# Patient Record
Sex: FEMALE | Race: WHITE | NOT HISPANIC OR LATINO | Employment: OTHER | URBAN - METROPOLITAN AREA
[De-identification: names, ages, dates, MRNs, and addresses within clinical notes are randomized per-mention and may not be internally consistent; named-entity substitution may affect disease eponyms.]

---

## 2018-08-01 LAB — HBA1C MFR BLD HPLC: 6.5 %

## 2019-01-22 ENCOUNTER — OFFICE VISIT (OUTPATIENT)
Dept: FAMILY MEDICINE CLINIC | Facility: OTHER | Age: 69
End: 2019-01-22
Payer: COMMERCIAL

## 2019-01-22 VITALS
TEMPERATURE: 99.2 F | OXYGEN SATURATION: 97 % | DIASTOLIC BLOOD PRESSURE: 82 MMHG | BODY MASS INDEX: 42.92 KG/M2 | HEIGHT: 63 IN | HEART RATE: 75 BPM | SYSTOLIC BLOOD PRESSURE: 134 MMHG | WEIGHT: 242.25 LBS

## 2019-01-22 DIAGNOSIS — E11.59 TYPE 2 DIABETES MELLITUS WITH OTHER CIRCULATORY COMPLICATION, WITHOUT LONG-TERM CURRENT USE OF INSULIN (HCC): Primary | ICD-10-CM

## 2019-01-22 DIAGNOSIS — E78.2 MIXED HYPERLIPIDEMIA: ICD-10-CM

## 2019-01-22 DIAGNOSIS — E55.9 VITAMIN D DEFICIENCY: ICD-10-CM

## 2019-01-22 DIAGNOSIS — I10 ESSENTIAL HYPERTENSION: ICD-10-CM

## 2019-01-22 DIAGNOSIS — Z11.59 NEED FOR HEPATITIS C SCREENING TEST: ICD-10-CM

## 2019-01-22 DIAGNOSIS — E03.9 HYPOTHYROIDISM (ACQUIRED): ICD-10-CM

## 2019-01-22 PROCEDURE — 3725F SCREEN DEPRESSION PERFORMED: CPT | Performed by: FAMILY MEDICINE

## 2019-01-22 PROCEDURE — 99204 OFFICE O/P NEW MOD 45 MIN: CPT | Performed by: FAMILY MEDICINE

## 2019-01-22 RX ORDER — PNEUMOCOCCAL 13-VALENT CONJUGATE VACCINE 2.2; 2.2; 2.2; 2.2; 2.2; 4.4; 2.2; 2.2; 2.2; 2.2; 2.2; 2.2; 2.2 UG/.5ML; UG/.5ML; UG/.5ML; UG/.5ML; UG/.5ML; UG/.5ML; UG/.5ML; UG/.5ML; UG/.5ML; UG/.5ML; UG/.5ML; UG/.5ML; UG/.5ML
INJECTION, SUSPENSION INTRAMUSCULAR
Refills: 0 | COMMUNITY
Start: 2018-11-10 | End: 2019-01-22

## 2019-01-22 RX ORDER — PRAMIPEXOLE DIHYDROCHLORIDE 0.25 MG/1
0.25 TABLET ORAL DAILY
COMMUNITY
Start: 2018-12-17 | End: 2020-12-07

## 2019-01-22 RX ORDER — BIOTIN 1 MG
1000 TABLET ORAL
COMMUNITY
Start: 2014-09-09 | End: 2020-12-10 | Stop reason: SDUPTHER

## 2019-01-22 RX ORDER — ZOSTER VACCINE RECOMBINANT, ADJUVANTED 50 MCG/0.5
KIT INTRAMUSCULAR
Refills: 0 | COMMUNITY
Start: 2018-11-10 | End: 2019-01-22

## 2019-01-22 RX ORDER — ASPIRIN 81 MG/1
81 TABLET ORAL DAILY
COMMUNITY

## 2019-01-22 RX ORDER — CANAGLIFLOZIN 300 MG/1
300 TABLET, FILM COATED ORAL DAILY
COMMUNITY
Start: 2018-12-17 | End: 2020-09-11

## 2019-01-22 RX ORDER — POTASSIUM CHLORIDE 750 MG/1
TABLET, FILM COATED, EXTENDED RELEASE ORAL
COMMUNITY
Start: 2018-11-29 | End: 2019-06-06 | Stop reason: SDUPTHER

## 2019-01-22 RX ORDER — ATORVASTATIN CALCIUM 80 MG/1
TABLET, FILM COATED ORAL
COMMUNITY
Start: 2018-12-21 | End: 2019-06-06 | Stop reason: SDUPTHER

## 2019-01-22 RX ORDER — DIPHENOXYLATE HYDROCHLORIDE AND ATROPINE SULFATE 2.5; .025 MG/1; MG/1
1 TABLET ORAL DAILY
COMMUNITY
End: 2022-01-27

## 2019-01-22 RX ORDER — RAMIPRIL 2.5 MG/1
2.5 CAPSULE ORAL DAILY
COMMUNITY
Start: 2018-11-15 | End: 2019-12-13 | Stop reason: SDUPTHER

## 2019-01-22 RX ORDER — LEVOTHYROXINE SODIUM 112 UG/1
TABLET ORAL
Refills: 0 | COMMUNITY
Start: 2018-11-24 | End: 2019-06-06 | Stop reason: SDUPTHER

## 2019-01-22 RX ORDER — LANCETS 33 GAUGE
EACH MISCELLANEOUS
Refills: 3 | COMMUNITY
Start: 2019-01-17 | End: 2019-05-02 | Stop reason: SDUPTHER

## 2019-01-22 RX ORDER — CETIRIZINE HYDROCHLORIDE 10 MG/1
10 TABLET ORAL DAILY
COMMUNITY
End: 2020-01-07 | Stop reason: ALTCHOICE

## 2019-01-22 RX ORDER — ASCORBIC ACID 500 MG
500 TABLET ORAL DAILY
COMMUNITY
End: 2022-01-27

## 2019-01-22 RX ORDER — NEBIVOLOL 5 MG/1
2.5 TABLET ORAL DAILY
COMMUNITY
Start: 2015-09-11 | End: 2020-08-12

## 2019-01-22 RX ORDER — FLUTICASONE PROPIONATE 50 MCG
1 SPRAY, SUSPENSION (ML) NASAL DAILY
COMMUNITY

## 2019-01-24 ENCOUNTER — APPOINTMENT (OUTPATIENT)
Dept: LAB | Facility: CLINIC | Age: 69
End: 2019-01-24
Payer: COMMERCIAL

## 2019-01-24 DIAGNOSIS — E78.2 MIXED HYPERLIPIDEMIA: ICD-10-CM

## 2019-01-24 DIAGNOSIS — I10 ESSENTIAL HYPERTENSION: ICD-10-CM

## 2019-01-24 DIAGNOSIS — E55.9 VITAMIN D DEFICIENCY: ICD-10-CM

## 2019-01-24 DIAGNOSIS — Z11.59 NEED FOR HEPATITIS C SCREENING TEST: ICD-10-CM

## 2019-01-24 DIAGNOSIS — E11.59 TYPE 2 DIABETES MELLITUS WITH OTHER CIRCULATORY COMPLICATION, WITHOUT LONG-TERM CURRENT USE OF INSULIN (HCC): ICD-10-CM

## 2019-01-24 DIAGNOSIS — E03.9 HYPOTHYROIDISM (ACQUIRED): ICD-10-CM

## 2019-01-24 LAB
25(OH)D3 SERPL-MCNC: 42.4 NG/ML (ref 30–100)
ALBUMIN SERPL BCP-MCNC: 3.5 G/DL (ref 3.5–5)
ALP SERPL-CCNC: 86 U/L (ref 46–116)
ALT SERPL W P-5'-P-CCNC: 37 U/L (ref 12–78)
ANION GAP SERPL CALCULATED.3IONS-SCNC: 6 MMOL/L (ref 4–13)
AST SERPL W P-5'-P-CCNC: 22 U/L (ref 5–45)
BILIRUB SERPL-MCNC: 0.6 MG/DL (ref 0.2–1)
BUN SERPL-MCNC: 20 MG/DL (ref 5–25)
CALCIUM SERPL-MCNC: 8.9 MG/DL (ref 8.3–10.1)
CHLORIDE SERPL-SCNC: 107 MMOL/L (ref 100–108)
CHOLEST SERPL-MCNC: 149 MG/DL (ref 50–200)
CO2 SERPL-SCNC: 26 MMOL/L (ref 21–32)
CREAT SERPL-MCNC: 0.75 MG/DL (ref 0.6–1.3)
CREAT UR-MCNC: 105 MG/DL
ERYTHROCYTE [DISTWIDTH] IN BLOOD BY AUTOMATED COUNT: 14.6 % (ref 11.6–15.1)
EST. AVERAGE GLUCOSE BLD GHB EST-MCNC: 157 MG/DL
GFR SERPL CREATININE-BSD FRML MDRD: 83 ML/MIN/1.73SQ M
GLUCOSE P FAST SERPL-MCNC: 129 MG/DL (ref 65–99)
HBA1C MFR BLD: 7.1 % (ref 4.2–6.3)
HCT VFR BLD AUTO: 46.7 % (ref 34.8–46.1)
HCV AB SER QL: NORMAL
HDLC SERPL-MCNC: 51 MG/DL (ref 40–60)
HGB BLD-MCNC: 15.2 G/DL (ref 11.5–15.4)
LDLC SERPL CALC-MCNC: 69 MG/DL (ref 0–100)
MCH RBC QN AUTO: 30.9 PG (ref 26.8–34.3)
MCHC RBC AUTO-ENTMCNC: 32.5 G/DL (ref 31.4–37.4)
MCV RBC AUTO: 95 FL (ref 82–98)
MICROALBUMIN UR-MCNC: 7.9 MG/L (ref 0–20)
MICROALBUMIN/CREAT 24H UR: 8 MG/G CREATININE (ref 0–30)
NONHDLC SERPL-MCNC: 98 MG/DL
PLATELET # BLD AUTO: 252 THOUSANDS/UL (ref 149–390)
PMV BLD AUTO: 11.1 FL (ref 8.9–12.7)
POTASSIUM SERPL-SCNC: 4.3 MMOL/L (ref 3.5–5.3)
PROT SERPL-MCNC: 7.2 G/DL (ref 6.4–8.2)
RBC # BLD AUTO: 4.92 MILLION/UL (ref 3.81–5.12)
SODIUM SERPL-SCNC: 139 MMOL/L (ref 136–145)
TRIGL SERPL-MCNC: 145 MG/DL
TSH SERPL DL<=0.05 MIU/L-ACNC: 0.81 UIU/ML (ref 0.36–3.74)
WBC # BLD AUTO: 6.72 THOUSAND/UL (ref 4.31–10.16)

## 2019-01-24 PROCEDURE — 82043 UR ALBUMIN QUANTITATIVE: CPT | Performed by: FAMILY MEDICINE

## 2019-01-24 PROCEDURE — 82570 ASSAY OF URINE CREATININE: CPT | Performed by: FAMILY MEDICINE

## 2019-01-24 PROCEDURE — 86803 HEPATITIS C AB TEST: CPT

## 2019-01-24 PROCEDURE — 85027 COMPLETE CBC AUTOMATED: CPT

## 2019-01-24 PROCEDURE — 80061 LIPID PANEL: CPT

## 2019-01-24 PROCEDURE — 36415 COLL VENOUS BLD VENIPUNCTURE: CPT

## 2019-01-24 PROCEDURE — 84443 ASSAY THYROID STIM HORMONE: CPT

## 2019-01-24 PROCEDURE — 3061F NEG MICROALBUMINURIA REV: CPT | Performed by: FAMILY MEDICINE

## 2019-01-24 PROCEDURE — 80053 COMPREHEN METABOLIC PANEL: CPT

## 2019-01-24 PROCEDURE — 83036 HEMOGLOBIN GLYCOSYLATED A1C: CPT

## 2019-01-24 PROCEDURE — 82306 VITAMIN D 25 HYDROXY: CPT

## 2019-01-29 ENCOUNTER — TELEPHONE (OUTPATIENT)
Dept: FAMILY MEDICINE CLINIC | Facility: OTHER | Age: 69
End: 2019-01-29

## 2019-01-29 NOTE — TELEPHONE ENCOUNTER
Left message on machine to call back    ----- Message from Estee Cabrera DO sent at 1/29/2019  8:21 AM EST -----  Good morning, Deidre Peñaloza! I have reviewed your recent labs (my apologies for the delay)  Everything looks good with the exception of a slightly elevated fasting glucose and A1c  Please continue your current medications and make every effort to follow a healthy diet and regular exercise routine  We will review this information together at your next visit  Please reach out if you have any questions in the meantime      Take care,  Dr Vilma Morgan

## 2019-01-29 NOTE — PROGRESS NOTES
Subjective:      Patient ID: Babita Tavarez is a 80 y o  female  Diabetes   She presents for her follow-up diabetic visit  She has type 2 diabetes mellitus  Her disease course has been stable  There are no hypoglycemic associated symptoms  Pertinent negatives for hypoglycemia include no dizziness, headaches, nervousness/anxiousness, sweats or tremors  Pertinent negatives for diabetes include no blurred vision, no chest pain, no fatigue, no foot paresthesias, no foot ulcerations, no polydipsia, no polyphagia, no polyuria, no visual change, no weakness and no weight loss  There are no hypoglycemic complications  There are no diabetic complications  Pertinent negatives for diabetic complications include no CVA, PVD or retinopathy  Risk factors for coronary artery disease include diabetes mellitus, dyslipidemia, family history, hypertension, obesity, post-menopausal, sedentary lifestyle and stress  Current diabetic treatment includes diet  She is compliant with treatment most of the time  Her weight is stable  She is following a generally healthy diet  Meal planning includes avoidance of concentrated sweets and carbohydrate counting  She has had a previous visit with a dietitian  She participates in exercise intermittently  An ACE inhibitor/angiotensin II receptor blocker is being taken  She does not see a podiatrist Eye exam is not current  Hypertension   This is a chronic problem  The current episode started more than 1 year ago  The problem has been gradually improving since onset  The problem is controlled  Pertinent negatives include no anxiety, blurred vision, chest pain, headaches, malaise/fatigue, neck pain, orthopnea, palpitations, peripheral edema, PND, shortness of breath or sweats  There are no associated agents to hypertension  Risk factors for coronary artery disease include diabetes mellitus, dyslipidemia, family history, obesity, post-menopausal state, sedentary lifestyle and stress   Past treatments include ACE inhibitors, beta blockers and lifestyle changes  The current treatment provides moderate improvement  Compliance problems include diet, exercise and psychosocial issues  There is no history of angina, kidney disease, CAD/MI, CVA, heart failure, left ventricular hypertrophy, PVD or retinopathy  Identifiable causes of hypertension include a thyroid problem  There is no history of chronic renal disease or a hypertension causing med  Hyperlipidemia   This is a chronic problem  The current episode started more than 1 year ago  The problem is controlled  Exacerbating diseases include diabetes, hypothyroidism and obesity  She has no history of chronic renal disease, liver disease or nephrotic syndrome  There are no known factors aggravating her hyperlipidemia  Pertinent negatives include no chest pain, focal sensory loss, focal weakness, leg pain, myalgias or shortness of breath  Current antihyperlipidemic treatment includes statins  The current treatment provides moderate improvement of lipids  Compliance problems include adherence to diet, adherence to exercise and psychosocial issues  Risk factors for coronary artery disease include post-menopausal, a sedentary lifestyle, stress, obesity, hypertension, diabetes mellitus, dyslipidemia and family history  Thyroid Problem   Presents for initial visit  Patient reports no anxiety, cold intolerance, constipation, diarrhea, dry skin, fatigue, heat intolerance, leg swelling, nail problem, palpitations, tremors, visual change or weight loss  Treatments tried: Levothyroxine 112 mcg  The treatment provided moderate relief  Her past medical history is significant for diabetes, hyperlipidemia and obesity  There is no history of atrial fibrillation, dementia, heart failure, neuropathy or osteopenia  There are no known risk factors         The following portions of the patient's history were reviewed and updated as appropriate: allergies, current medications, past family history, past medical history, past social history, past surgical history and problem list       Current Outpatient Prescriptions:     ascorbic acid (VITAMIN C) 500 mg tablet, Take 500 mg by mouth daily, Disp: , Rfl:     aspirin (ECOTRIN LOW STRENGTH) 81 mg EC tablet, Take 81 mg by mouth daily, Disp: , Rfl:     atorvastatin (LIPITOR) 80 mg tablet, , Disp: , Rfl:     Biotin 5000 MCG CAPS, Take by mouth, Disp: , Rfl:     cetirizine (ZyrTEC) 10 mg tablet, Take 10 mg by mouth daily, Disp: , Rfl:     Cholecalciferol (VITAMIN D3) 1000 units CAPS, Take 1,000 Units by mouth, Disp: , Rfl:     famotidine-calcium carbonate-magnesium hydroxide (PEPCID COMPLETE) -165 MG CHEW, Chew 1 tablet daily as needed for heartburn, Disp: , Rfl:     fluticasone (FLONASE) 50 mcg/act nasal spray, 1 spray into each nostril daily, Disp: , Rfl:     INVOKANA 300 MG TABS, , Disp: , Rfl:     levothyroxine 112 mcg tablet, TK 1 T PO QD, Disp: , Rfl: 0    Magnesium Cl-Calcium Carbonate (SLOW-MAG PO), Take by mouth, Disp: , Rfl:     multivitamin (THERAGRAN) TABS, Take 1 tablet by mouth daily, Disp: , Rfl:     nebivolol (BYSTOLIC) 5 mg tablet, , Disp: , Rfl:     Omega-3 Fatty Acids (FISH OIL) 1200 MG CPDR, 1200mg two  tablets, Disp: , Rfl:     ONE TOUCH ULTRA TEST test strip, TEST ONCE D UTD, Disp: , Rfl: 3    ONETOUCH DELICA LANCETS 65J MISC, USE ONCE D OR UTD, Disp: , Rfl: 3    potassium chloride (K-DUR) 10 mEq tablet, , Disp: , Rfl:     pramipexole (MIRAPEX) 0 25 mg tablet, , Disp: , Rfl:     ramipril (ALTACE) 2 5 mg capsule, , Disp: , Rfl:       Review of Systems   Constitutional: Negative for activity change, fatigue, fever, malaise/fatigue and weight loss  HENT: Negative for congestion, ear pain, sinus pain and sore throat  Eyes: Negative for blurred vision, pain and itching  Respiratory: Negative for cough and shortness of breath      Cardiovascular: Negative for chest pain, palpitations, orthopnea and PND  Gastrointestinal: Negative for abdominal pain, constipation, diarrhea, nausea and vomiting  Endocrine: Negative for cold intolerance, heat intolerance, polydipsia, polyphagia and polyuria  Genitourinary: Negative for difficulty urinating, dysuria, frequency and urgency  Musculoskeletal: Negative for arthralgias, back pain, gait problem, myalgias and neck pain  Skin: Negative for color change and rash  Neurological: Negative for dizziness, tremors, focal weakness, syncope, weakness and headaches  Hematological: Negative for adenopathy  Psychiatric/Behavioral: Negative for behavioral problems, dysphoric mood and sleep disturbance  The patient is not nervous/anxious  Objective:      /82 (BP Location: Right arm, Patient Position: Sitting, Cuff Size: Large)   Pulse 75   Temp 99 2 °F (37 3 °C) (Tympanic)   Ht 5' 3" (1 6 m)   Wt 110 kg (242 lb 4 oz)   SpO2 97%   BMI 42 91 kg/m²          Physical Exam   Constitutional: She is oriented to person, place, and time  She appears well-developed and well-nourished  No distress  HENT:   Head: Normocephalic and atraumatic  Right Ear: External ear normal    Left Ear: External ear normal    Nose: Nose normal    Mouth/Throat: Oropharynx is clear and moist    Eyes: Pupils are equal, round, and reactive to light  Conjunctivae and EOM are normal  No scleral icterus  Neck: Normal range of motion  Neck supple  No thyromegaly present  Cardiovascular: Normal rate, regular rhythm and normal heart sounds  No murmur heard  Pulmonary/Chest: Effort normal and breath sounds normal  No respiratory distress  She has no wheezes  Abdominal: Soft  Bowel sounds are normal  She exhibits no distension  There is no tenderness  Musculoskeletal: Normal range of motion  She exhibits no edema, tenderness or deformity  Lymphadenopathy:     She has no cervical adenopathy  Neurological: She is alert and oriented to person, place, and time  No cranial nerve deficit  Coordination normal    Skin: Skin is warm and dry  No rash noted  No erythema  Psychiatric: She has a normal mood and affect  Her behavior is normal    Vitals reviewed  Assessment/Plan:   Diagnoses and all orders for this visit:    Type 2 diabetes mellitus with other circulatory complication, without long-term current use of insulin (HCC)  -     Comprehensive metabolic panel; Future  -     Hemoglobin A1C; Future  -     Microalbumin / creatinine urine ratio  -     A1c goal 6-7%    Hypothyroidism (acquired)  -     TSH, 3rd generation with Free T4 reflex; Future    Essential hypertension  -     Comprehensive metabolic panel; Future  -     Lipid panel; Future  -     CBC; Future  -     BP goal less than 140/90, preferably less than 130/80    Mixed hyperlipidemia  -     Lipid panel; Future  -     CBC; Future  -     LDL goal less than 100    Vitamin D deficiency  -     Vitamin D 25 hydroxy; Future    Need for hepatitis C screening test  -     Hepatitis C antibody; Future    Other orders  -     levothyroxine 112 mcg tablet; TK 1 T PO QD  -     INVOKANA 300 MG TABS;   -     nebivolol (BYSTOLIC) 5 mg tablet;   -     ramipril (ALTACE) 2 5 mg capsule;   -     potassium chloride (K-DUR) 10 mEq tablet;   -     pramipexole (MIRAPEX) 0 25 mg tablet;   -     atorvastatin (LIPITOR) 80 mg tablet;   -     ONETOUCH DELICA LANCETS 52I MISC; USE ONCE D OR UTD  -     ONE TOUCH ULTRA TEST test strip; TEST ONCE D UTD  -     Cholecalciferol (VITAMIN D3) 1000 units CAPS; Take 1,000 Units by mouth  -     Discontinue: PREVNAR 13; ADM 0 5ML IM UTD  -     Discontinue: SHINGRIX 50 MCG/0 5ML SUSR; ADM 0 5ML IM UTD  -     Omega-3 Fatty Acids (FISH OIL) 1200 MG CPDR; 1200mg two  tablets  -     aspirin (ECOTRIN LOW STRENGTH) 81 mg EC tablet; Take 81 mg by mouth daily  -     multivitamin (THERAGRAN) TABS; Take 1 tablet by mouth daily  -     Biotin 5000 MCG CAPS;  Take by mouth  -     ascorbic acid (VITAMIN C) 500 mg tablet; Take 500 mg by mouth daily  -     famotidine-calcium carbonate-magnesium hydroxide (PEPCID COMPLETE) -165 MG CHEW; Chew 1 tablet daily as needed for heartburn  -     fluticasone (FLONASE) 50 mcg/act nasal spray; 1 spray into each nostril daily  -     Magnesium Cl-Calcium Carbonate (SLOW-MAG PO); Take by mouth  -     cetirizine (ZyrTEC) 10 mg tablet; Take 10 mg by mouth daily          Return in about 6 weeks (around 3/5/2019) for Recheck DM, HTN  The patient indicates understanding of these issues and agrees with the plan            Russ Morales,

## 2019-03-04 ENCOUNTER — OFFICE VISIT (OUTPATIENT)
Dept: OBGYN CLINIC | Facility: CLINIC | Age: 69
End: 2019-03-04
Payer: COMMERCIAL

## 2019-03-04 VITALS
HEIGHT: 63 IN | WEIGHT: 242 LBS | SYSTOLIC BLOOD PRESSURE: 122 MMHG | DIASTOLIC BLOOD PRESSURE: 80 MMHG | BODY MASS INDEX: 42.88 KG/M2

## 2019-03-04 DIAGNOSIS — Z01.419 ENCOUNTER FOR GYNECOLOGICAL EXAMINATION WITHOUT ABNORMAL FINDING: Primary | ICD-10-CM

## 2019-03-04 DIAGNOSIS — Z12.39 SCREENING FOR MALIGNANT NEOPLASM OF BREAST: ICD-10-CM

## 2019-03-04 PROCEDURE — 99387 INIT PM E/M NEW PAT 65+ YRS: CPT | Performed by: OBSTETRICS & GYNECOLOGY

## 2019-03-04 RX ORDER — IBUPROFEN 200 MG
200 TABLET ORAL
COMMUNITY
Start: 2009-10-15 | End: 2019-09-09

## 2019-03-04 NOTE — PROGRESS NOTES
Vannessa Granado is a 80 y o   female who presents for annual well woman exam  Patient reports no bleeding, spotting, or discharge  Patient reports No hot flashes/night sweats or minimal, not sexually active, No vaginal dryness, sleeping well  Discussed some detrusor instability symptoms with some urgency and urge incontinence, drinks a lot of water, glucose mostly controlled  Discussed watch caffiene and try to practice kegels for feedback to bladder  Patient to call if desire PT additional intervention  Current contraception: post menopausal status  History of abnormal Pap smear: no  Family history of uterine or ovarian cancer: no  Regular self breast exam: yes  History of abnormal mammogram: no  Family history of breast cancer: no    Menstrual History:  OB History    None        Menarche age: 16  Patient's last menstrual period was 2004  The following portions of the patient's history were reviewed and updated as appropriate: allergies, current medications, past family history, past medical history, past social history, past surgical history and problem list   Past Medical History:   Diagnosis Date    Anemia     Anxiety     Chronic constipation     Diabetes mellitus ([address] )     GERD (gastroesophageal reflux disease)     Hyperlipidemia     Hypertension     Hypothyroidism     Obesity     Restless legs     Vitamin D deficiency      Past Surgical History:   Procedure Laterality Date    BREAST LUMPECTOMY Right     CHOLECYSTECTOMY      CORONARY ANGIOPLASTY      Dr Jazmin Reddy     OB History    None         Review of Systems  Review of Systems   Constitutional: Positive for unexpected weight change  Negative for chills, fatigue and fever  HENT: Negative for dental problem, sinus pressure and sinus pain  Eyes: Positive for visual disturbance  Respiratory: Negative for cough, shortness of breath and wheezing      Cardiovascular: Negative for chest pain and leg swelling  Gastrointestinal: Negative for constipation, diarrhea, nausea and vomiting  Genitourinary: Negative for menstrual problem, pelvic pain and urgency  Musculoskeletal: Positive for arthralgias  Negative for back pain  Allergic/Immunologic: Negative for environmental allergies  Neurological: Positive for headaches  Negative for dizziness  Objective      LMP 2004   Vitals:    19 1044   BP: 122/80   BP Location: Left arm   Patient Position: Sitting   Cuff Size: Large   Weight: 110 kg (242 lb)   Height: 5' 3" (1 6 m)       General:   alert and oriented, in no acute distress   Heart: regular rate and rhythm, S1, S2 normal, no murmur, click, rub or gallop   Lungs: clear to auscultation bilaterally   Abdomen: soft, non-tender, without masses or organomegaly   Vulva: normal   Vagina: normal mucosa, atrophic   Cervix: no bleeding following Pap, no cervical motion tenderness and no lesions   Uterus: normal size, mobile, non-tender   Adnexa: normal adnexa and no mass, fullness, tenderness   Breast inspection negative, no nipple discharge or bleeding, no masses or nodularity palpable, some glandularity mostly fatty replaced  Rectal deferred, normal colonscopy   Pupils equal round reactive to light and accommodation  Throat clear no lesions or findings  Thyroid normal no masses or nodules       Assessment   80year-old  here for new annual exam   Patient had all history of normal Paps and mammograms and her prior gyn had told her she did not require further Paps  Reviewed with patient as we do not have record that we would do a Pap and if normal would likely not need any further Pap  Discussed mild urgency and urge incontinence  Plan   Thin prep with Co testing performed, patient had normal mammogram 2018  Ordered mammogram for next September return in 1 year or sooner as needed

## 2019-03-06 ENCOUNTER — OFFICE VISIT (OUTPATIENT)
Dept: FAMILY MEDICINE CLINIC | Facility: OTHER | Age: 69
End: 2019-03-06

## 2019-03-06 VITALS
HEART RATE: 66 BPM | BODY MASS INDEX: 42.59 KG/M2 | TEMPERATURE: 98.2 F | HEIGHT: 63 IN | OXYGEN SATURATION: 97 % | DIASTOLIC BLOOD PRESSURE: 80 MMHG | SYSTOLIC BLOOD PRESSURE: 132 MMHG | WEIGHT: 240.38 LBS

## 2019-03-06 DIAGNOSIS — Z12.11 SCREENING FOR COLON CANCER: ICD-10-CM

## 2019-03-06 DIAGNOSIS — I10 ESSENTIAL HYPERTENSION: ICD-10-CM

## 2019-03-06 DIAGNOSIS — G44.209 ACUTE NON INTRACTABLE TENSION-TYPE HEADACHE: Primary | ICD-10-CM

## 2019-03-06 PROCEDURE — 3079F DIAST BP 80-89 MM HG: CPT | Performed by: FAMILY MEDICINE

## 2019-03-06 PROCEDURE — 3008F BODY MASS INDEX DOCD: CPT | Performed by: FAMILY MEDICINE

## 2019-03-06 PROCEDURE — 99214 OFFICE O/P EST MOD 30 MIN: CPT | Performed by: FAMILY MEDICINE

## 2019-03-06 PROCEDURE — 1160F RVW MEDS BY RX/DR IN RCRD: CPT | Performed by: FAMILY MEDICINE

## 2019-03-06 PROCEDURE — 3075F SYST BP GE 130 - 139MM HG: CPT | Performed by: FAMILY MEDICINE

## 2019-03-06 PROCEDURE — 1036F TOBACCO NON-USER: CPT | Performed by: FAMILY MEDICINE

## 2019-03-06 RX ORDER — CYCLOBENZAPRINE HCL 5 MG
5 TABLET ORAL
Qty: 7 TABLET | Refills: 0 | Status: SHIPPED | OUTPATIENT
Start: 2019-03-06 | End: 2019-09-09

## 2019-03-06 NOTE — PROGRESS NOTES
Subjective:      Patient ID: Brenda Benítez is a 80 y o  female  Headache    This is a recurrent problem  The current episode started 1 to 4 weeks ago  The problem occurs intermittently  The problem has been waxing and waning  The pain is located in the occipital region  The pain radiates to the right shoulder and right neck  The pain quality is similar to prior headaches  The quality of the pain is described as pulsating and throbbing  The pain is moderate  Associated symptoms include muscle aches  Pertinent negatives include no abdominal pain, abnormal behavior, anorexia, back pain, blurred vision, coughing, dizziness, drainage, ear pain, eye pain, eye redness, eye watering, facial sweating, fever, hearing loss, insomnia, loss of balance, nausea, numbness, phonophobia, photophobia, rhinorrhea, scalp tenderness, seizures, sinus pressure, sore throat, swollen glands, tingling, tinnitus, visual change, vomiting, weakness or weight loss  The symptoms are aggravated by unknown  She has tried NSAIDs for the symptoms  The treatment provided mild relief  Her past medical history is significant for hypertension, migraine headaches and obesity  There is no history of recent head traumas or sinus disease         The following portions of the patient's history were reviewed and updated as appropriate: allergies, current medications, past family history, past medical history, past social history, past surgical history and problem list       Current Outpatient Medications:     ascorbic acid (VITAMIN C) 500 mg tablet, Take 500 mg by mouth daily, Disp: , Rfl:     aspirin (ECOTRIN LOW STRENGTH) 81 mg EC tablet, Take 81 mg by mouth daily, Disp: , Rfl:     atorvastatin (LIPITOR) 80 mg tablet, , Disp: , Rfl:     Biotin 5000 MCG CAPS, Take by mouth, Disp: , Rfl:     cetirizine (ZyrTEC) 10 mg tablet, Take 10 mg by mouth daily, Disp: , Rfl:     Cholecalciferol (VITAMIN D3) 1000 units CAPS, Take 1,000 Units by mouth, Disp: , Rfl:     Exenatide ER 2 MG SRER, Inject 2 mg under the skin, Disp: , Rfl:     famotidine-calcium carbonate-magnesium hydroxide (PEPCID COMPLETE) -165 MG CHEW, Chew 1 tablet daily as needed for heartburn, Disp: , Rfl:     fluticasone (FLONASE) 50 mcg/act nasal spray, 1 spray into each nostril daily, Disp: , Rfl:     ibuprofen (MOTRIN) 200 mg tablet, Take 200 mg by mouth, Disp: , Rfl:     INVOKANA 300 MG TABS, , Disp: , Rfl:     levothyroxine 112 mcg tablet, TK 1 T PO QD, Disp: , Rfl: 0    Magnesium Cl-Calcium Carbonate (SLOW-MAG PO), Take by mouth, Disp: , Rfl:     multivitamin (THERAGRAN) TABS, Take 1 tablet by mouth daily, Disp: , Rfl:     nebivolol (BYSTOLIC) 5 mg tablet, , Disp: , Rfl:     Omega-3 Fatty Acids (FISH OIL) 1200 MG CPDR, 1200mg two  tablets, Disp: , Rfl:     ONE TOUCH ULTRA TEST test strip, TEST ONCE D UTD, Disp: , Rfl: 3    ONETOUCH DELICA LANCETS 97D MISC, USE ONCE D OR UTD, Disp: , Rfl: 3    potassium chloride (K-DUR) 10 mEq tablet, , Disp: , Rfl:     pramipexole (MIRAPEX) 0 25 mg tablet, , Disp: , Rfl:     ramipril (ALTACE) 2 5 mg capsule, , Disp: , Rfl:     cyclobenzaprine (FLEXERIL) 5 mg tablet, Take 1 tablet (5 mg total) by mouth daily at bedtime for 7 days Do not drive while taking, Disp: 7 tablet, Rfl: 0      Review of Systems   Constitutional: Negative for activity change, fatigue, fever and weight loss  HENT: Negative for congestion, ear pain, hearing loss, rhinorrhea, sinus pressure, sinus pain, sore throat and tinnitus  Eyes: Negative for blurred vision, photophobia, pain, redness and itching  Respiratory: Negative for cough, chest tightness and shortness of breath  Cardiovascular: Negative for chest pain and palpitations  Gastrointestinal: Negative for abdominal pain, anorexia, constipation, diarrhea, nausea and vomiting  Endocrine: Negative for cold intolerance and heat intolerance     Genitourinary: Negative for difficulty urinating, dysuria, frequency and urgency  Musculoskeletal: Positive for neck stiffness  Negative for back pain and myalgias  Skin: Negative for color change and rash  Neurological: Positive for headaches  Negative for dizziness, tingling, seizures, syncope, facial asymmetry, speech difficulty, weakness, light-headedness, numbness and loss of balance  Hematological: Negative for adenopathy  Psychiatric/Behavioral: Negative for behavioral problems, dysphoric mood and sleep disturbance  The patient is not nervous/anxious and does not have insomnia  Objective:      /80 (BP Location: Left arm, Patient Position: Sitting, Cuff Size: Large)   Pulse 66   Temp 98 2 °F (36 8 °C) (Tympanic)   Ht 5' 3" (1 6 m)   Wt 109 kg (240 lb 6 oz)   SpO2 97%   BMI 42 58 kg/m²          Physical Exam   Constitutional: She is oriented to person, place, and time  She appears well-developed and well-nourished  No distress  HENT:   Head: Normocephalic and atraumatic  Right Ear: External ear normal    Left Ear: External ear normal    Nose: Nose normal    Mouth/Throat: Oropharynx is clear and moist    Eyes: Pupils are equal, round, and reactive to light  Conjunctivae and EOM are normal  No scleral icterus  Neck: Normal range of motion  Neck supple  No thyromegaly present  Cardiovascular: Normal rate, regular rhythm and normal heart sounds  Pulses are no weak pulses  No murmur heard  Pulses:       Dorsalis pedis pulses are 2+ on the right side, and 2+ on the left side  Posterior tibial pulses are 2+ on the right side, and 2+ on the left side  Pulmonary/Chest: Effort normal and breath sounds normal  No respiratory distress  She has no wheezes  Abdominal: Soft  Bowel sounds are normal  She exhibits no distension  There is no tenderness  Musculoskeletal: Normal range of motion  She exhibits no edema, tenderness or deformity     Feet:   Right Foot:   Skin Integrity: Negative for ulcer, skin breakdown, erythema, warmth, callus or dry skin  Left Foot:   Skin Integrity: Negative for ulcer, skin breakdown, erythema, warmth, callus or dry skin  Lymphadenopathy:     She has no cervical adenopathy  Neurological: She is alert and oriented to person, place, and time  No cranial nerve deficit  Coordination normal    Skin: Skin is warm and dry  No rash noted  No erythema  No pallor  Psychiatric: She has a normal mood and affect  Her behavior is normal        Patient's shoes and socks removed  Right Foot/Ankle   Right Foot Inspection  Skin Exam: skin normal and skin intact no dry skin, no warmth, no callus, no erythema, no maceration, no abnormal color, no pre-ulcer, no ulcer and no callus                          Toe Exam: ROM and strength within normal limits  Sensory   Vibration: intact  Proprioception: intact   Monofilament testing: intact  Vascular  Capillary refills: < 3 seconds  The right DP pulse is 2+  The right PT pulse is 2+  Left Foot/Ankle  Left Foot Inspection  Skin Exam: skin normal and skin intactno dry skin, no warmth, no erythema, no maceration, normal color, no pre-ulcer, no ulcer and no callus                         Toe Exam: ROM and strength within normal limits                   Sensory   Vibration: intact  Proprioception: intact  Monofilament: intact  Vascular  Capillary refills: < 3 seconds  The left DP pulse is 2+  The left PT pulse is 2+  Assign Risk Category:  No deformity present; No loss of protective sensation; No weak pulses       Risk: 0            Assessment/Plan:  Diagnoses and all orders for this visit:    Acute non intractable tension-type headache  -     cyclobenzaprine (FLEXERIL) 5 mg tablet;  Take 1 tablet (5 mg total) by mouth daily at bedtime for 7 days Do not drive while taking  -     Continue PRN Advil/Aleve for sx relief along with heat/ice    Essential hypertension        -     Stable -- home BP readings <130/80 -- will consider stopping Bystolic of BP continues to run low    Screening for colon cancer  -     Ambulatory referral to Gastroenterology; Future          Return in about 6 months (around 9/6/2019) for Recheck HTN; AWV  The patient indicates understanding of these issues and agrees with the plan            Nancy Austin DO

## 2019-03-07 LAB
HPV HR 12 DNA CVX QL NAA+PROBE: NOT DETECTED
HPV16 DNA SPEC QL NAA+PROBE: NOT DETECTED
HPV18 DNA SPEC QL NAA+PROBE: NOT DETECTED
THIN PREP CVX: NORMAL

## 2019-03-21 ENCOUNTER — OFFICE VISIT (OUTPATIENT)
Dept: FAMILY MEDICINE CLINIC | Facility: OTHER | Age: 69
End: 2019-03-21
Payer: COMMERCIAL

## 2019-03-21 VITALS
BODY MASS INDEX: 42.52 KG/M2 | OXYGEN SATURATION: 98 % | HEART RATE: 66 BPM | DIASTOLIC BLOOD PRESSURE: 78 MMHG | SYSTOLIC BLOOD PRESSURE: 118 MMHG | HEIGHT: 63 IN | WEIGHT: 240 LBS | TEMPERATURE: 99.6 F

## 2019-03-21 DIAGNOSIS — B07.0 PLANTAR WART OF LEFT FOOT: Primary | ICD-10-CM

## 2019-03-21 PROCEDURE — 99213 OFFICE O/P EST LOW 20 MIN: CPT | Performed by: FAMILY MEDICINE

## 2019-03-24 NOTE — PROGRESS NOTES
Subjective:      Patient ID: Elton Solis is a 80 y o  female      Chief Complaint   Patient presents with    Foot Pain     left foot pain which started last week, but patient has had a plantars wort        HPI   Pt presents c/o left foot pain  Began "a while ago" but has been worsening over the last week  Lateral aspect of left foot (plantar surface) affected  Noticed a plantar wart there some time ago  Has not tried any OTC remedies  Aggravated by certain pairs of shoes  Denies f/c, n/v/d, CP, SOB        The following portions of the patient's history were reviewed and updated as appropriate: allergies, current medications, past family history, past medical history, past social history, past surgical history and problem list       Current Outpatient Medications:     ascorbic acid (VITAMIN C) 500 mg tablet, Take 500 mg by mouth daily, Disp: , Rfl:     aspirin (ECOTRIN LOW STRENGTH) 81 mg EC tablet, Take 81 mg by mouth daily, Disp: , Rfl:     atorvastatin (LIPITOR) 80 mg tablet, , Disp: , Rfl:     Biotin 5000 MCG CAPS, Take by mouth, Disp: , Rfl:     cetirizine (ZyrTEC) 10 mg tablet, Take 10 mg by mouth daily, Disp: , Rfl:     Cholecalciferol (VITAMIN D3) 1000 units CAPS, Take 1,000 Units by mouth, Disp: , Rfl:     famotidine-calcium carbonate-magnesium hydroxide (PEPCID COMPLETE) -165 MG CHEW, Chew 1 tablet daily as needed for heartburn, Disp: , Rfl:     fluticasone (FLONASE) 50 mcg/act nasal spray, 1 spray into each nostril daily, Disp: , Rfl:     ibuprofen (MOTRIN) 200 mg tablet, Take 200 mg by mouth, Disp: , Rfl:     INVOKANA 300 MG TABS, , Disp: , Rfl:     levothyroxine 112 mcg tablet, TK 1 T PO QD, Disp: , Rfl: 0    Magnesium Cl-Calcium Carbonate (SLOW-MAG PO), Take by mouth, Disp: , Rfl:     multivitamin (THERAGRAN) TABS, Take 1 tablet by mouth daily, Disp: , Rfl:     nebivolol (BYSTOLIC) 5 mg tablet, , Disp: , Rfl:     Omega-3 Fatty Acids (FISH OIL) 1200 MG CPDR, 1200mg two  tablets, Disp: , Rfl:     ONE TOUCH ULTRA TEST test strip, TEST ONCE D UTD, Disp: , Rfl: 3    ONETOUCH DELICA LANCETS 48Y MISC, USE ONCE D OR UTD, Disp: , Rfl: 3    potassium chloride (K-DUR) 10 mEq tablet, , Disp: , Rfl:     pramipexole (MIRAPEX) 0 25 mg tablet, , Disp: , Rfl:     ramipril (ALTACE) 2 5 mg capsule, , Disp: , Rfl:     cyclobenzaprine (FLEXERIL) 5 mg tablet, Take 1 tablet (5 mg total) by mouth daily at bedtime for 7 days Do not drive while taking, Disp: 7 tablet, Rfl: 0    Exenatide ER 2 MG SRER, Inject 2 mg under the skin, Disp: , Rfl:       Review of Systems   Constitutional: Negative for activity change, fatigue and fever  HENT: Negative for congestion, ear pain, sinus pain and sore throat  Eyes: Negative for pain and itching  Respiratory: Negative for cough and shortness of breath  Cardiovascular: Negative for chest pain and palpitations  Gastrointestinal: Negative for abdominal pain, constipation, diarrhea, nausea and vomiting  Endocrine: Negative for cold intolerance and heat intolerance  Genitourinary: Negative for dysuria  Musculoskeletal: Negative for myalgias  Skin: Negative for color change and rash  Left plantar wart per HPI   Neurological: Negative for dizziness, syncope and headaches  Hematological: Negative for adenopathy  Psychiatric/Behavioral: Negative for behavioral problems, dysphoric mood and sleep disturbance  The patient is not nervous/anxious  Objective:      /78 (BP Location: Left arm, Patient Position: Sitting, Cuff Size: Large)   Pulse 66   Temp 99 6 °F (37 6 °C) (Tympanic)   Ht 5' 2 5" (1 588 m)   Wt 109 kg (240 lb)   SpO2 98%   BMI 43 20 kg/m²          Physical Exam   Constitutional: She is oriented to person, place, and time  She appears well-developed and well-nourished  No distress  HENT:   Head: Normocephalic and atraumatic     Right Ear: External ear normal    Left Ear: External ear normal    Nose: Nose normal  Mouth/Throat: Oropharynx is clear and moist    Eyes: Pupils are equal, round, and reactive to light  Conjunctivae and EOM are normal  No scleral icterus  Neck: Normal range of motion  Neck supple  No thyromegaly present  Cardiovascular: Normal rate, regular rhythm and normal heart sounds  No murmur heard  Pulmonary/Chest: Effort normal and breath sounds normal  No respiratory distress  She has no wheezes  Abdominal: Soft  Bowel sounds are normal  She exhibits no distension  There is no tenderness  Musculoskeletal: Normal range of motion  She exhibits no edema, tenderness or deformity  Lymphadenopathy:     She has no cervical adenopathy  Neurological: She is alert and oriented to person, place, and time  No cranial nerve deficit  Coordination normal    Skin: Skin is warm and dry  No rash noted  No erythema  No pallor  4mm plantar wart on left foot (over distal aspect of 5th metatarsal)   Psychiatric: She has a normal mood and affect  Her behavior is normal    Vitals reviewed  Assessment/Plan:   Diagnoses and all orders for this visit:    Plantar wart of left foot        -     Discussed tx options including cryotherapy; Pt is interested in cryo when liquid nitrogen shipment is received  Return for Next scheduled follow up  The patient indicates understanding of these issues and agrees with the plan            Romi Morgan DO

## 2019-03-26 ENCOUNTER — TELEPHONE (OUTPATIENT)
Dept: FAMILY MEDICINE CLINIC | Facility: OTHER | Age: 69
End: 2019-03-26

## 2019-03-26 NOTE — TELEPHONE ENCOUNTER
Please offer Orin Bosworth appt to freeze plantar wart now that we have liquid nitrogen    Thanks!   Mat Badillo, DO

## 2019-04-02 ENCOUNTER — OFFICE VISIT (OUTPATIENT)
Dept: FAMILY MEDICINE CLINIC | Facility: OTHER | Age: 69
End: 2019-04-02
Payer: COMMERCIAL

## 2019-04-02 VITALS
DIASTOLIC BLOOD PRESSURE: 82 MMHG | OXYGEN SATURATION: 98 % | SYSTOLIC BLOOD PRESSURE: 122 MMHG | TEMPERATURE: 98.9 F | BODY MASS INDEX: 43.11 KG/M2 | HEART RATE: 66 BPM | WEIGHT: 243.31 LBS | HEIGHT: 63 IN

## 2019-04-02 DIAGNOSIS — B07.0 PLANTAR WART OF LEFT FOOT: Primary | ICD-10-CM

## 2019-04-02 PROCEDURE — 17000 DESTRUCT PREMALG LESION: CPT | Performed by: FAMILY MEDICINE

## 2019-04-04 DIAGNOSIS — Z12.39 SCREENING FOR MALIGNANT NEOPLASM OF BREAST: ICD-10-CM

## 2019-04-10 ENCOUNTER — OFFICE VISIT (OUTPATIENT)
Dept: FAMILY MEDICINE CLINIC | Facility: OTHER | Age: 69
End: 2019-04-10
Payer: COMMERCIAL

## 2019-04-10 VITALS
SYSTOLIC BLOOD PRESSURE: 122 MMHG | HEART RATE: 66 BPM | DIASTOLIC BLOOD PRESSURE: 70 MMHG | HEIGHT: 63 IN | OXYGEN SATURATION: 95 % | TEMPERATURE: 98.9 F | BODY MASS INDEX: 42.96 KG/M2 | WEIGHT: 242.44 LBS

## 2019-04-10 DIAGNOSIS — M72.2 PLANTAR FASCIITIS OF LEFT FOOT: Primary | ICD-10-CM

## 2019-04-10 PROCEDURE — 3008F BODY MASS INDEX DOCD: CPT | Performed by: FAMILY MEDICINE

## 2019-04-10 PROCEDURE — 99213 OFFICE O/P EST LOW 20 MIN: CPT | Performed by: FAMILY MEDICINE

## 2019-04-10 PROCEDURE — 1036F TOBACCO NON-USER: CPT | Performed by: FAMILY MEDICINE

## 2019-04-10 PROCEDURE — 1160F RVW MEDS BY RX/DR IN RCRD: CPT | Performed by: FAMILY MEDICINE

## 2019-05-02 ENCOUNTER — OFFICE VISIT (OUTPATIENT)
Dept: FAMILY MEDICINE CLINIC | Facility: OTHER | Age: 69
End: 2019-05-02
Payer: COMMERCIAL

## 2019-05-02 VITALS
BODY MASS INDEX: 42.43 KG/M2 | WEIGHT: 239.44 LBS | OXYGEN SATURATION: 98 % | HEIGHT: 63 IN | HEART RATE: 64 BPM | DIASTOLIC BLOOD PRESSURE: 66 MMHG | TEMPERATURE: 99.1 F | SYSTOLIC BLOOD PRESSURE: 104 MMHG

## 2019-05-02 DIAGNOSIS — B07.0 PLANTAR WART, LEFT FOOT: Primary | ICD-10-CM

## 2019-05-02 DIAGNOSIS — E11.59 TYPE 2 DIABETES MELLITUS WITH OTHER CIRCULATORY COMPLICATION, WITHOUT LONG-TERM CURRENT USE OF INSULIN (HCC): ICD-10-CM

## 2019-05-02 PROCEDURE — 99212 OFFICE O/P EST SF 10 MIN: CPT | Performed by: FAMILY MEDICINE

## 2019-05-02 PROCEDURE — 17000 DESTRUCT PREMALG LESION: CPT | Performed by: FAMILY MEDICINE

## 2019-05-02 RX ORDER — LANCETS 33 GAUGE
EACH MISCELLANEOUS DAILY
Qty: 90 EACH | Refills: 3 | Status: SHIPPED | OUTPATIENT
Start: 2019-05-02 | End: 2020-05-04 | Stop reason: SDUPTHER

## 2019-05-22 ENCOUNTER — OFFICE VISIT (OUTPATIENT)
Dept: FAMILY MEDICINE CLINIC | Facility: OTHER | Age: 69
End: 2019-05-22
Payer: COMMERCIAL

## 2019-05-22 VITALS
HEART RATE: 61 BPM | OXYGEN SATURATION: 98 % | TEMPERATURE: 98.3 F | DIASTOLIC BLOOD PRESSURE: 62 MMHG | SYSTOLIC BLOOD PRESSURE: 116 MMHG | WEIGHT: 242.19 LBS | HEIGHT: 63 IN | BODY MASS INDEX: 42.91 KG/M2

## 2019-05-22 DIAGNOSIS — B07.0 PLANTAR WART OF LEFT FOOT: Primary | ICD-10-CM

## 2019-05-22 PROCEDURE — 17110 DESTRUCTION B9 LES UP TO 14: CPT | Performed by: FAMILY MEDICINE

## 2019-05-22 PROCEDURE — 99212 OFFICE O/P EST SF 10 MIN: CPT | Performed by: FAMILY MEDICINE

## 2019-06-06 ENCOUNTER — OFFICE VISIT (OUTPATIENT)
Dept: FAMILY MEDICINE CLINIC | Facility: OTHER | Age: 69
End: 2019-06-06
Payer: COMMERCIAL

## 2019-06-06 VITALS
HEIGHT: 63 IN | DIASTOLIC BLOOD PRESSURE: 78 MMHG | SYSTOLIC BLOOD PRESSURE: 118 MMHG | TEMPERATURE: 98 F | WEIGHT: 244.8 LBS | OXYGEN SATURATION: 98 % | BODY MASS INDEX: 43.38 KG/M2 | HEART RATE: 70 BPM

## 2019-06-06 DIAGNOSIS — E03.9 HYPOTHYROIDISM (ACQUIRED): ICD-10-CM

## 2019-06-06 DIAGNOSIS — B07.0 PLANTAR WART OF LEFT FOOT: Primary | ICD-10-CM

## 2019-06-06 DIAGNOSIS — E78.2 MIXED HYPERLIPIDEMIA: ICD-10-CM

## 2019-06-06 DIAGNOSIS — I10 ESSENTIAL HYPERTENSION: ICD-10-CM

## 2019-06-06 DIAGNOSIS — E11.59 TYPE 2 DIABETES MELLITUS WITH OTHER CIRCULATORY COMPLICATION, WITHOUT LONG-TERM CURRENT USE OF INSULIN (HCC): ICD-10-CM

## 2019-06-06 PROCEDURE — 3078F DIAST BP <80 MM HG: CPT | Performed by: FAMILY MEDICINE

## 2019-06-06 PROCEDURE — 99213 OFFICE O/P EST LOW 20 MIN: CPT | Performed by: FAMILY MEDICINE

## 2019-06-06 PROCEDURE — 3074F SYST BP LT 130 MM HG: CPT | Performed by: FAMILY MEDICINE

## 2019-06-06 PROCEDURE — 17000 DESTRUCT PREMALG LESION: CPT | Performed by: FAMILY MEDICINE

## 2019-06-06 PROCEDURE — 3008F BODY MASS INDEX DOCD: CPT | Performed by: FAMILY MEDICINE

## 2019-06-06 RX ORDER — LEVOTHYROXINE SODIUM 112 UG/1
112 TABLET ORAL DAILY
Qty: 90 TABLET | Refills: 1 | Status: SHIPPED | OUTPATIENT
Start: 2019-06-06 | End: 2019-11-30 | Stop reason: SDUPTHER

## 2019-06-06 RX ORDER — ATORVASTATIN CALCIUM 80 MG/1
80 TABLET, FILM COATED ORAL DAILY
Qty: 90 TABLET | Refills: 1 | Status: SHIPPED | OUTPATIENT
Start: 2019-06-06 | End: 2020-01-07 | Stop reason: SDUPTHER

## 2019-06-06 RX ORDER — POTASSIUM CHLORIDE 750 MG/1
10 TABLET, FILM COATED, EXTENDED RELEASE ORAL 2 TIMES DAILY
Qty: 180 TABLET | Refills: 1 | Status: SHIPPED | OUTPATIENT
Start: 2019-06-06 | End: 2019-11-30 | Stop reason: SDUPTHER

## 2019-06-11 ENCOUNTER — APPOINTMENT (OUTPATIENT)
Dept: LAB | Facility: CLINIC | Age: 69
End: 2019-06-11
Payer: COMMERCIAL

## 2019-06-11 DIAGNOSIS — E03.9 HYPOTHYROIDISM (ACQUIRED): ICD-10-CM

## 2019-06-11 DIAGNOSIS — E11.59 TYPE 2 DIABETES MELLITUS WITH OTHER CIRCULATORY COMPLICATION, WITHOUT LONG-TERM CURRENT USE OF INSULIN (HCC): ICD-10-CM

## 2019-06-11 DIAGNOSIS — I10 ESSENTIAL HYPERTENSION: ICD-10-CM

## 2019-06-11 DIAGNOSIS — E78.2 MIXED HYPERLIPIDEMIA: ICD-10-CM

## 2019-06-11 LAB
ALBUMIN SERPL BCP-MCNC: 3.6 G/DL (ref 3.5–5)
ALP SERPL-CCNC: 85 U/L (ref 46–116)
ALT SERPL W P-5'-P-CCNC: 35 U/L (ref 12–78)
ANION GAP SERPL CALCULATED.3IONS-SCNC: 4 MMOL/L (ref 4–13)
AST SERPL W P-5'-P-CCNC: 18 U/L (ref 5–45)
BILIRUB SERPL-MCNC: 0.69 MG/DL (ref 0.2–1)
BUN SERPL-MCNC: 19 MG/DL (ref 5–25)
CALCIUM SERPL-MCNC: 9 MG/DL (ref 8.3–10.1)
CHLORIDE SERPL-SCNC: 105 MMOL/L (ref 100–108)
CHOLEST SERPL-MCNC: 155 MG/DL (ref 50–200)
CO2 SERPL-SCNC: 29 MMOL/L (ref 21–32)
CREAT SERPL-MCNC: 0.79 MG/DL (ref 0.6–1.3)
CREAT UR-MCNC: 75 MG/DL
EST. AVERAGE GLUCOSE BLD GHB EST-MCNC: 148 MG/DL
GFR SERPL CREATININE-BSD FRML MDRD: 77 ML/MIN/1.73SQ M
GLUCOSE P FAST SERPL-MCNC: 127 MG/DL (ref 65–99)
HBA1C MFR BLD: 6.8 % (ref 4.2–6.3)
HDLC SERPL-MCNC: 53 MG/DL (ref 40–60)
LDLC SERPL CALC-MCNC: 73 MG/DL (ref 0–100)
MICROALBUMIN UR-MCNC: 6.2 MG/L (ref 0–20)
MICROALBUMIN/CREAT 24H UR: 8 MG/G CREATININE (ref 0–30)
NONHDLC SERPL-MCNC: 102 MG/DL
POTASSIUM SERPL-SCNC: 4.3 MMOL/L (ref 3.5–5.3)
PROT SERPL-MCNC: 7.3 G/DL (ref 6.4–8.2)
SODIUM SERPL-SCNC: 138 MMOL/L (ref 136–145)
TRIGL SERPL-MCNC: 143 MG/DL
TSH SERPL DL<=0.05 MIU/L-ACNC: 0.36 UIU/ML (ref 0.36–3.74)

## 2019-06-11 PROCEDURE — 84443 ASSAY THYROID STIM HORMONE: CPT

## 2019-06-11 PROCEDURE — 82043 UR ALBUMIN QUANTITATIVE: CPT | Performed by: FAMILY MEDICINE

## 2019-06-11 PROCEDURE — 3061F NEG MICROALBUMINURIA REV: CPT | Performed by: FAMILY MEDICINE

## 2019-06-11 PROCEDURE — 82570 ASSAY OF URINE CREATININE: CPT | Performed by: FAMILY MEDICINE

## 2019-06-11 PROCEDURE — 80061 LIPID PANEL: CPT

## 2019-06-11 PROCEDURE — 80053 COMPREHEN METABOLIC PANEL: CPT

## 2019-06-11 PROCEDURE — 83036 HEMOGLOBIN GLYCOSYLATED A1C: CPT

## 2019-06-11 PROCEDURE — 36415 COLL VENOUS BLD VENIPUNCTURE: CPT

## 2019-06-12 ENCOUNTER — TELEPHONE (OUTPATIENT)
Dept: FAMILY MEDICINE CLINIC | Facility: OTHER | Age: 69
End: 2019-06-12

## 2019-06-18 ENCOUNTER — OFFICE VISIT (OUTPATIENT)
Dept: ENDOCRINOLOGY | Facility: CLINIC | Age: 69
End: 2019-06-18
Payer: COMMERCIAL

## 2019-06-18 VITALS
BODY MASS INDEX: 44.75 KG/M2 | SYSTOLIC BLOOD PRESSURE: 140 MMHG | DIASTOLIC BLOOD PRESSURE: 80 MMHG | WEIGHT: 243.2 LBS | HEART RATE: 63 BPM | HEIGHT: 62 IN

## 2019-06-18 DIAGNOSIS — I10 ESSENTIAL HYPERTENSION: ICD-10-CM

## 2019-06-18 DIAGNOSIS — E04.2 NONTOXIC MULTINODULAR GOITER: Primary | ICD-10-CM

## 2019-06-18 DIAGNOSIS — E78.5 HYPERLIPIDEMIA, UNSPECIFIED HYPERLIPIDEMIA TYPE: ICD-10-CM

## 2019-06-18 DIAGNOSIS — E11.65 TYPE 2 DIABETES MELLITUS WITH HYPERGLYCEMIA, WITHOUT LONG-TERM CURRENT USE OF INSULIN (HCC): ICD-10-CM

## 2019-06-18 DIAGNOSIS — E03.9 HYPOTHYROIDISM, UNSPECIFIED TYPE: ICD-10-CM

## 2019-06-18 PROCEDURE — 99204 OFFICE O/P NEW MOD 45 MIN: CPT | Performed by: INTERNAL MEDICINE

## 2019-06-21 ENCOUNTER — HOSPITAL ENCOUNTER (OUTPATIENT)
Dept: ULTRASOUND IMAGING | Facility: HOSPITAL | Age: 69
Discharge: HOME | End: 2019-06-21
Attending: INTERNAL MEDICINE
Payer: COMMERCIAL

## 2019-06-21 DIAGNOSIS — E04.2 NONTOXIC MULTINODULAR GOITER: ICD-10-CM

## 2019-06-21 PROCEDURE — 76536 US EXAM OF HEAD AND NECK: CPT

## 2019-06-25 ENCOUNTER — OFFICE VISIT (OUTPATIENT)
Dept: DIABETES SERVICES | Facility: CLINIC | Age: 69
End: 2019-06-25
Payer: COMMERCIAL

## 2019-06-25 DIAGNOSIS — E11.65 TYPE 2 DIABETES MELLITUS WITH HYPERGLYCEMIA, WITHOUT LONG-TERM CURRENT USE OF INSULIN (HCC): Primary | ICD-10-CM

## 2019-06-25 PROCEDURE — G0108 DIAB MANAGE TRN  PER INDIV: HCPCS | Performed by: DIETITIAN, REGISTERED

## 2019-06-28 ENCOUNTER — TELEPHONE (OUTPATIENT)
Dept: ENDOCRINOLOGY | Facility: CLINIC | Age: 69
End: 2019-06-28

## 2019-07-18 ENCOUNTER — OFFICE VISIT (OUTPATIENT)
Dept: DIABETES SERVICES | Facility: CLINIC | Age: 69
End: 2019-07-18
Payer: COMMERCIAL

## 2019-07-18 ENCOUNTER — TELEPHONE (OUTPATIENT)
Dept: ENDOCRINOLOGY | Facility: CLINIC | Age: 69
End: 2019-07-18

## 2019-07-18 VITALS — BODY MASS INDEX: 44.52 KG/M2 | WEIGHT: 243.4 LBS

## 2019-07-18 DIAGNOSIS — E11.65 TYPE 2 DIABETES MELLITUS WITH HYPERGLYCEMIA, WITHOUT LONG-TERM CURRENT USE OF INSULIN (HCC): Primary | ICD-10-CM

## 2019-07-18 PROCEDURE — 97802 MEDICAL NUTRITION INDIV IN: CPT | Performed by: DIETITIAN, REGISTERED

## 2019-07-18 NOTE — PROGRESS NOTES
Medical Nutrition Therapy        Assessment    Visit Type: Follow-up visit    Chief complaint T2DM    HPI: Lili Richards has Type 2 Diabetes, most recent HbA1c 6 8%  Lili Richards reports that she was diagnosed with T2DM 5 - 6 years ago  She was in our office for a Living Well with Diabetes class assessment 3 weeks ago and will begin classes in September at [facility] location  She has returned to our office today for initial MNT  Jessica diet history reveals inadequate carbohydrate intake  Currently meals range from 0 to 30 grams of carbohydrate  Breakfast is Sindy's most consistent meal ranging 21 - 30 g CHO  Lunch is the most variable meal ranging 7 - 30 grams CHO  Alexa Dora is routinely inadequate in carbohydrates with both examples from diet recall being void of carbohydrates  Explained basic pathophysiology of diabetes and impact of diet on blood glucose levels  Together we discussed what foods contain CHO, reading a food label, simple vs complex carbohydrates, benefits of fiber, timing of meals, and serving sizes  Discussed switching to fat free/low fat, low sodium versions of foods such as cheese and lunch meat to help improve cholesterol, blood pressure, and weight loss  Used the portion booklet to teach Lili Richards more about food groups and basic carbohydrate counting  Created an individualized meal plan for Lili Richards with 3 meals and 2 snacks providing 30 - 45 g carb per meal and 15 g carb per snack  Put together sample meals for Sindy's reference  Lili Richards demonstrated good understanding and will call with any questions prior to MNT follow-up appointment in 3 months      Home BG readings:  Before breakfast: 136 - 164 mg/dl  2 hours after breakfast: 121 - 179 mg/dl  Before lunch: 112 - 116 mg/dl  2 hour after lunch:132 - 157 mg/dl  Before dinner: 101 - 189 mg/dl  2 hours after dinner:139 -174 mg/dl  Before bedtime: 140 - 163 mg/dl    Ht Readings from Last 1 Encounters:   06/18/19 5' 2" (1 575 m)     Wt Readings from Last 2 Encounters:   07/18/19 110 kg (243 lb 6 4 oz)   06/18/19 110 kg (243 lb 3 2 oz)     Weight Change: No    Medical Diagnosis/ICD10: E11 66 T2DM with hyperglycemia, without long-term current use of insulin    Barriers to Learning: no barriers    Do you follow any special diet presently?: Yes - going to weight watchers  Who shops: patient  Who cooks: patient    Food Log: Completed via the method of food recall  Breakfast:wakes 5 -6 am  Eats 8 am  Cereal 3/4 cup (cheerios, corn falkes, all bran) 1% milk half cup OR [employer] oats (1 cup)  Coffee (2 cups) with flavored cream (2 tbsp per cup)  Morning Snack:n/a  Lunch:12- 12:30 pm  Chamois (ham and cheese with a little bit of butter) on whole wheat bread  OR roll up meat and cheese with a fruit (banana - little bigge than a dollar bill , apple 4 oz , cherries -6 )  Water  Afternoon Snack: 2-3 pm  Cracker or a piece of fruit with peanut butter  Dinner:5:30 - 6 pm  Deni Bowles (makes her own) no roll, salad (bagged with tomatoes, onion, varies dressing oil vinegar or a creamy one)  Or vegetable (spinach, kale, green beans)  Not usually any starchy vegetables) OR fish, salad with oil and vinegar  Evening Snack:8 pm  Yogurt or 9 mini vanilla wafers  Beverages: coffee, water  Eating out/Take out:with friends maybe once a month  Exercise joined the [facility] going every other day  Does cardio (walking usually)  ЕЛЕНА arevalo she has weights that she uses or a bike on the ground       Calorie needs 1600 kcals/day Carbs: 30 - 45 g/meal, 15 g/snack         Nutrition Diagnosis:  Inadequate carbohydrate intake  related to Food and nutrition related knowledge deficit concerning appropriate amount of dietary carbohydrate as evidenced by  Estimated carbohydrate intake less than recommended amounts    Intervention: plate method, reduced fat intake, increased fiber intake, label reading, carbohydrate counting, increased plant based foods, meal planning, individualized meal plan and food diary     Treatment Goals: Patient understands education and recommendations, Patient will monitor food intake daily with tracker, Patient will increase their intake of plant based foods and Patient will count carbohydrates    Monitoring and evaluation:    Term code indicator  FH 1 6 3 Carbohydrate Intake Criteria: 30 - 45 grams of carbohydrate per meal, 15 grams of carbohydrate per snack  Term code indicator  FH 4 4 Mealtime Behavior Criteria: 3 meals per day, 4 - 5 hours apart  2 snacks per day, at least 2 hour buffer apart from meals  Patients Response to Instruction:  Katerina Richards  Expected Compliancegood    Thank you for coming to the [facility] for education today  Please feel free to call with any questions or concerns      Minor Hayes, [address]  [address] [facility] [id number]

## 2019-07-18 NOTE — PATIENT INSTRUCTIONS
3 meals per day, 2 snacks per day  Meals 4 - 5 hours part  Snacks at least a 2 hour buffer away from meals if they contain carbohydrates    30 - 45 grams of carbohydrates per meal  15 grams of carbohydrates per snack

## 2019-07-19 NOTE — TELEPHONE ENCOUNTER
Blood sugars are in optimal range   Continue Invokana 300 mg po daily   Follow diabetic diet   No need to send log, she can bring the log for next appt  Call if fs, < 70 or >300 perisistantly     Haresh Ortega MD

## 2019-07-19 NOTE — TELEPHONE ENCOUNTER
Patient informed to continue current treatment and follow diabetic diet    Patient verbally understands

## 2019-07-26 ENCOUNTER — TRANSCRIBE ORDERS (OUTPATIENT)
Dept: LAB | Facility: CLINIC | Age: 69
End: 2019-07-26

## 2019-07-26 ENCOUNTER — APPOINTMENT (OUTPATIENT)
Dept: LAB | Facility: CLINIC | Age: 69
End: 2019-07-26
Payer: COMMERCIAL

## 2019-07-26 DIAGNOSIS — H49.22 ABDUCENS NERVE PALSY, LEFT: Primary | ICD-10-CM

## 2019-07-26 DIAGNOSIS — H49.22 ABDUCENS NERVE PALSY, LEFT: ICD-10-CM

## 2019-07-26 LAB
CRP SERPL QL: <3 MG/L
ERYTHROCYTE [DISTWIDTH] IN BLOOD BY AUTOMATED COUNT: 14.7 % (ref 11.6–15.1)
ERYTHROCYTE [SEDIMENTATION RATE] IN BLOOD: 16 MM/HOUR (ref 0–20)
EST. AVERAGE GLUCOSE BLD GHB EST-MCNC: 146 MG/DL
GLUCOSE P FAST SERPL-MCNC: 130 MG/DL (ref 65–99)
HBA1C MFR BLD: 6.7 % (ref 4.2–6.3)
HCT VFR BLD AUTO: 47.5 % (ref 34.8–46.1)
HGB BLD-MCNC: 15.3 G/DL (ref 11.5–15.4)
MCH RBC QN AUTO: 30.2 PG (ref 26.8–34.3)
MCHC RBC AUTO-ENTMCNC: 32.2 G/DL (ref 31.4–37.4)
MCV RBC AUTO: 94 FL (ref 82–98)
PLATELET # BLD AUTO: 243 THOUSANDS/UL (ref 149–390)
PMV BLD AUTO: 11.2 FL (ref 8.9–12.7)
RBC # BLD AUTO: 5.06 MILLION/UL (ref 3.81–5.12)
WBC # BLD AUTO: 5.8 THOUSAND/UL (ref 4.31–10.16)

## 2019-07-26 PROCEDURE — 83036 HEMOGLOBIN GLYCOSYLATED A1C: CPT

## 2019-07-26 PROCEDURE — 86140 C-REACTIVE PROTEIN: CPT

## 2019-07-26 PROCEDURE — 82947 ASSAY GLUCOSE BLOOD QUANT: CPT

## 2019-07-26 PROCEDURE — 36415 COLL VENOUS BLD VENIPUNCTURE: CPT

## 2019-07-26 PROCEDURE — 85027 COMPLETE CBC AUTOMATED: CPT

## 2019-07-26 PROCEDURE — 85652 RBC SED RATE AUTOMATED: CPT

## 2019-09-04 ENCOUNTER — OFFICE VISIT (OUTPATIENT)
Dept: DIABETES SERVICES | Facility: CLINIC | Age: 69
End: 2019-09-04
Payer: COMMERCIAL

## 2019-09-04 DIAGNOSIS — E11.65 TYPE 2 DIABETES MELLITUS WITH HYPERGLYCEMIA, WITHOUT LONG-TERM CURRENT USE OF INSULIN (HCC): Primary | ICD-10-CM

## 2019-09-04 PROCEDURE — G0109 DIAB MANAGE TRN IND/GROUP: HCPCS | Performed by: DIETITIAN, REGISTERED

## 2019-09-05 NOTE — PROGRESS NOTES
Living Well with Diabetes Group Class #2    Yadira Lozoya attended the Living Well with Diabetes Group Class #2  During class, Kenny Mcmahon was instructed on the following topics: Macronutrients, Carbohydrate sources, What one serving of carbohydrate equals, effects of diet on blood glucose levels, effect of carbohydrates on blood glucose levels, basics of meal planning: balance, portions, meal times, measurements, reading food labels to determine carbohydrates  Kenny Mcmahon participated in group activities of reading labels together and completing the meal planning activity  Kenny Mcmahon will follow up with class #3  Will call with any questions or concerns prior to next session  The patient's history was reviewed and updated as appropriate: allergies, current medications  Lab Results   Component Value Date    HGBA1C 6 7 (H) 07/26/2019       Diabetes Education Record  Kenny Mcmahon was provided Living Well with Diabetes Class #2 book, portion book, and related handouts  Patient response to instruction    Comprehensionvery good  Motivationvery good  Expected Compliancevery good    Begin Time: 6pm  End Time: 8pm  Referring Provider: Ubaldo France    Thank you for referring your patient to [facility], it was a pleasure working with them today  Please feel free to call with any questions or concerns      Azeem Gauthier  [address]  [address] [facility] [id number]

## 2019-09-09 ENCOUNTER — OFFICE VISIT (OUTPATIENT)
Dept: DIABETES SERVICES | Facility: CLINIC | Age: 69
End: 2019-09-09
Payer: COMMERCIAL

## 2019-09-09 ENCOUNTER — OFFICE VISIT (OUTPATIENT)
Dept: FAMILY MEDICINE CLINIC | Facility: OTHER | Age: 69
End: 2019-09-09
Payer: COMMERCIAL

## 2019-09-09 VITALS
BODY MASS INDEX: 42.39 KG/M2 | TEMPERATURE: 98.3 F | HEART RATE: 64 BPM | DIASTOLIC BLOOD PRESSURE: 70 MMHG | SYSTOLIC BLOOD PRESSURE: 116 MMHG | OXYGEN SATURATION: 98 % | WEIGHT: 239.25 LBS | HEIGHT: 63 IN

## 2019-09-09 DIAGNOSIS — Z00.00 MEDICARE ANNUAL WELLNESS VISIT, SUBSEQUENT: Primary | ICD-10-CM

## 2019-09-09 DIAGNOSIS — E11.65 TYPE 2 DIABETES MELLITUS WITH HYPERGLYCEMIA, WITHOUT LONG-TERM CURRENT USE OF INSULIN (HCC): Primary | ICD-10-CM

## 2019-09-09 DIAGNOSIS — Z12.11 SCREENING FOR COLON CANCER: ICD-10-CM

## 2019-09-09 DIAGNOSIS — Z12.39 BREAST CANCER SCREENING: ICD-10-CM

## 2019-09-09 DIAGNOSIS — Z23 ENCOUNTER FOR IMMUNIZATION: ICD-10-CM

## 2019-09-09 DIAGNOSIS — Z13.820 OSTEOPOROSIS SCREENING: ICD-10-CM

## 2019-09-09 DIAGNOSIS — Z78.0 POST-MENOPAUSAL: ICD-10-CM

## 2019-09-09 DIAGNOSIS — R09.89 RIGHT CAROTID BRUIT: ICD-10-CM

## 2019-09-09 PROCEDURE — G0109 DIAB MANAGE TRN IND/GROUP: HCPCS | Performed by: DIETITIAN, REGISTERED

## 2019-09-09 PROCEDURE — G0008 ADMIN INFLUENZA VIRUS VAC: HCPCS

## 2019-09-09 PROCEDURE — 1170F FXNL STATUS ASSESSED: CPT

## 2019-09-09 PROCEDURE — 1125F AMNT PAIN NOTED PAIN PRSNT: CPT

## 2019-09-09 PROCEDURE — 90662 IIV NO PRSV INCREASED AG IM: CPT

## 2019-09-09 PROCEDURE — G0439 PPPS, SUBSEQ VISIT: HCPCS

## 2019-09-09 RX ORDER — POTASSIUM CHLORIDE 750 MG/1
TABLET, EXTENDED RELEASE ORAL
Qty: 180 TABLET | OUTPATIENT
Start: 2019-09-09

## 2019-09-09 NOTE — PROGRESS NOTES
History of Present Illness:     Patient presents for Medicare Annual Wellness visit    Patient Care Team:  Migel Stone DO as PCP - General (Family Medicine)  Jeovany Caba MD as PCP - Endocrinology (Endocrinology)  Aida Webb MD as Care Coordinator (Gastroenterology)  Prabhakar Galvez MD as Care Coordinator (Ophthalmology)  Alis Edge MD as Care Coordinator (Otolaryngology)  Zenaida Mcmahan MD as Care Coordinator (Cardiology)  Prabhakar Galvez MD (Ophthalmology)     Problem List:     Patient Active Problem List   Diagnosis    Hypothyroidism (acquired)    Nontoxic multinodular goiter    Type 2 diabetes mellitus with circulatory disorder, without long-term current use of insulin ([address] )    Essential hypertension    Mixed hyperlipidemia    Vitamin D deficiency      Past Medical and Surgical History:     Past Medical History:   Diagnosis Date    Anemia     Anxiety     Chronic constipation     Diabetes mellitus ([address] )     GERD (gastroesophageal reflux disease)     Hyperlipidemia     Hypertension     Hypothyroidism     Obesity     Restless legs     Vitamin D deficiency      Past Surgical History:   Procedure Laterality Date    BREAST LUMPECTOMY Right     CHOLECYSTECTOMY      CORONARY ANGIOPLASTY  2015    Dr Hoffmann Proffer      Family History:     Family History   Problem Relation Age of Onset    Multiple sclerosis Brother     Multiple sclerosis Paternal Uncle     Hypertension Father     Diabetes Father     Heart disease Father     Multiple sclerosis Family       Social History:     Social History     Tobacco Use   Smoking Status Never Smoker   Smokeless Tobacco Never Used     Social History     Substance and Sexual Activity   Alcohol Use Yes    Frequency: Monthly or less    Comment: occasional     Social History     Substance and Sexual Activity   Drug Use No      Medications and Allergies:     Current Outpatient Medications   Medication Sig Dispense Refill    ascorbic acid (VITAMIN C) 500 mg tablet Take 500 mg by mouth daily      aspirin (ECOTRIN LOW STRENGTH) 81 mg EC tablet Take 81 mg by mouth daily      atorvastatin (LIPITOR) 80 mg tablet Take 1 tablet (80 mg total) by mouth daily 90 tablet 1    cetirizine (ZyrTEC) 10 mg tablet Take 10 mg by mouth daily      Cholecalciferol (VITAMIN D3) 1000 units CAPS Take 1,000 Units by mouth      famotidine-calcium carbonate-magnesium hydroxide (PEPCID COMPLETE) -165 MG CHEW Chew 1 tablet daily as needed for heartburn      fluticasone (FLONASE) 50 mcg/act nasal spray 1 spray into each nostril daily      INVOKANA 300 MG TABS Take 300 mg by mouth daily       levothyroxine 112 mcg tablet Take 1 tablet (112 mcg total) by mouth daily 90 tablet 1    Magnesium Cl-Calcium Carbonate (SLOW-MAG PO) Take by mouth      multivitamin (THERAGRAN) TABS Take 1 tablet by mouth daily      nebivolol (BYSTOLIC) 5 mg tablet Take 5 mg by mouth daily       Omega-3 Fatty Acids (FISH OIL) 1200 MG CPDR 1200mg two  tablets      ONE TOUCH ULTRA TEST test strip 1 each by Other route daily Use as instructed 90 each 3    ONETOUCH DELICA LANCETS 14S MISC by Other route daily 90 each 3    potassium chloride (K-DUR) 10 mEq tablet Take 1 tablet (10 mEq total) by mouth 2 (two) times a day 180 tablet 1    pramipexole (MIRAPEX) 0 25 mg tablet Take 0 25 mg by mouth daily       ramipril (ALTACE) 2 5 mg capsule Take 2 5 mg by mouth daily        No current facility-administered medications for this visit        Allergies   Allergen Reactions    Other      Seasonal, dust, Cat dander, mold    Penicillins       Immunizations:     Immunization History   Administered Date(s) Administered    INFLUENZA 10/25/2010, 11/14/2011, 10/01/2012, 10/16/2013, 11/20/2014, 11/10/2015, 10/03/2016, 09/20/2017, 09/11/2018, 11/10/2018    Influenza, high dose seasonal 0 5 mL 09/09/2019    Pneumococcal Conjugate 13-Valent 03/17/2014, 11/10/2018    Pneumococcal Conjugate PCV 7 10/01/2003    Pneumococcal Polysaccharide PPV23 05/03/2018    Tdap 10/25/2010    Zoster 02/07/2013    Zoster Vaccine Recombinant 09/11/2018, 11/10/2018      Medicare Screening Tests and Risk Assessments:     [employer] is here for her Subsequent Wellness visit  Health Risk Assessment:  Patient rates overall health as excellent  Patient feels that their physical health rating is Much better  Eyesight was rated as Same  Hearing was rated as Same  Patient feels that their emotional and mental health rating is Much better  Pain experienced by patient in the last 7 days has been None  Patient's pain rating has been 1/10  Emotional/Mental Health:  Patient has not been feeling nervous/anxious  PHQ-9 Depression Screening:    Frequency of the following problems over the past two weeks:      1  Little interest or pleasure in doing things: 0 - not at all      2  Feeling down, depressed, or hopeless: 0 - not at all  PHQ-2 Score: 0          Broken Bones/Falls: Fall Risk Assessment:    In the past year, patient has experienced: No history of falling in past year          Bladder/Bowel:  Patient has leaked urine accidently in the last six months  Patient reports no loss of bowel control  Immunizations:  Patient has had a flu vaccination within the last year  Patient has received a pneumonia shot  Patient has received a shingles shot  Home Safety:  Patient does not have trouble with stairs inside or outside of their home  Patient currently reports that there are no safety hazards present in home, working smoke alarms, no working carbon monoxide detectors  Preventative Screenings:   Breast cancer screening performed, glaucoma eye exam completed,     Nutrition:  Current diet: Diabetic, No Added Salt and Unhealthy with servings of the following:    Medications:  Patient is currently taking over-the-counter supplements  Patient is able to manage medications  Lifestyle Choices:  Patient reports no tobacco use  Patient has not smoked or used tobacco in the past   Patient reports no alcohol use  Patient drives a vehicle  Patient wears seat belt  Activities of Daily Living:  Can get out of bed by his or her self, able to dress self, able to make own meals, able to do own shopping, able to bathe self, can do own laundry/housekeeping, can manage own money, pay bills and track expenses    Previous Hospitalizations:  No hospitalization or ED visit in past 12 months        Advanced Directives:  Patient has decided on a power of   Patient has spoken to designated power of   Patient has not completed advanced directive  Preventative Screening/Counseling:      Cardiovascular:      General: Risks and Benefits Discussed and Screening Current      Counseling: Healthy Diet, Healthy Weight, Improve Cholesterol, Improve Blood Pressure and Improve Exercise Tolerance          Diabetes:      General: Risks and Benefits Discussed and Screening Current      Counseling: Healthy Diet, Healthy Weight and Improve Physical Activity          Colorectal Cancer:      General: Risks and Benefits Discussed      Counseling: high fiber diet      Due for studies: Colonoscopy - Low Risk          Breast Cancer:      General: Risks and Benefits Discussed          Cervical Cancer:      General: Screening Not Indicated          Osteoporosis:      General: Risks and Benefits Discussed      Counseling: Calcium and Vitamin D Intake and Regular Weightbearing Exercise      Due for studies: DXA Axial          AAA:      General: Screening Not Indicated          Glaucoma:      General: Risks and Benefits Discussed and Screening Current          HIV:      General: Screening Not Indicated          Hepatitis C:      General: Risks and Benefits Discussed and Screening Current        Advanced Directives:   Patient has no living will for healthcare, has durable POA for healthcare, patient does not have an advanced directive   Information on ACP and/or AD provided  5 wishes given  Immunizations:      Influenza: Risks & Benefits Discussed and Influenza Due Today      Pneumococcal: Risks & Benefits Discussed and Lifetime Vaccine Completed      Shingrix: Risks & Benefits Discussed      Other Preventative Counseling (Non-Medicare):  Alcohol Use, Fall Prevention, Increase physical activity, Nutrition Counseling, Car/seat belt/driving safety reviewed, Skin self-exam and Sunscreen use          Review of Systems   Constitutional: Negative for appetite change, fatigue, fever and unexpected weight change  HENT: Negative for congestion, dental problem, ear pain, postnasal drip, sore throat and tinnitus  Eyes: Negative for pain, discharge and visual disturbance  Respiratory: Negative for cough, shortness of breath and wheezing  Cardiovascular: Negative for chest pain, palpitations and leg swelling  Gastrointestinal: Negative for abdominal pain, bowel incontinence, constipation, diarrhea, nausea and vomiting  Endocrine: Negative for cold intolerance and heat intolerance  Genitourinary: Negative for difficulty urinating, dysuria, flank pain and urgency  Musculoskeletal: Negative for arthralgias, back pain, joint swelling and myalgias  Skin: Negative for rash and wound  Allergic/Immunologic: Negative for immunocompromised state  Neurological: Negative for dizziness, syncope, speech difficulty, weakness and numbness  Hematological: Negative for adenopathy  Does not bruise/bleed easily  Psychiatric/Behavioral: Negative for confusion, dysphoric mood and sleep disturbance  The patient is not nervous/anxious  /70 (BP Location: Right arm, Patient Position: Sitting, Cuff Size: Large)   Pulse 64   Temp 98 3 °F (36 8 °C) (Tympanic)   Ht 5' 2 5" (1 588 m)   Wt 109 kg (239 lb 4 oz)   SpO2 98%   BMI 43 06 kg/m²       Physical Exam   Constitutional: She is oriented to person, place, and time   She appears well-developed and well-nourished  No distress  Body mass index is 43 06 kg/m²  HENT:   Head: Normocephalic and atraumatic  Right Ear: Hearing, tympanic membrane, external ear and ear canal normal    Left Ear: Hearing, tympanic membrane, external ear and ear canal normal    Nose: Nose normal    Mouth/Throat: Uvula is midline, oropharynx is clear and moist and mucous membranes are normal    Eyes: Pupils are equal, round, and reactive to light  Conjunctivae and EOM are normal  No scleral icterus  Neck: Normal range of motion  Neck supple  No thyromegaly present  Cardiovascular: Normal rate, regular rhythm, normal heart sounds and intact distal pulses  Pulses are no weak pulses  No murmur heard  Pulses:       Dorsalis pedis pulses are 2+ on the right side, and 2+ on the left side  Posterior tibial pulses are 2+ on the right side, and 2+ on the left side  Right carotid bruit   Pulmonary/Chest: Effort normal and breath sounds normal  No respiratory distress  She has no wheezes  Abdominal: Soft  Bowel sounds are normal  She exhibits no distension  There is no tenderness  Musculoskeletal: Normal range of motion  She exhibits no edema, tenderness or deformity  Feet:   Right Foot:   Skin Integrity: Negative for ulcer, skin breakdown, erythema, warmth, callus or dry skin  Left Foot:   Skin Integrity: Negative for ulcer, skin breakdown, erythema, warmth, callus or dry skin  Lymphadenopathy:     She has no cervical adenopathy  Neurological: She is alert and oriented to person, place, and time  No cranial nerve deficit  Coordination normal    Skin: Skin is warm and dry  No rash noted  No erythema  No pallor  Psychiatric: She has a normal mood and affect  Her behavior is normal    Nursing note and vitals reviewed  Patient's shoes and socks removed  Right Foot/Ankle   Right Foot Inspection  Skin Exam: skin normal and skin intact no dry skin, no warmth, no callus, no erythema, no maceration, no abnormal color, no pre-ulcer, no ulcer and no callus                          Toe Exam: ROM and strength within normal limits  Sensory   Vibration: intact  Proprioception: intact   Monofilament testing: intact  Vascular  Capillary refills: < 3 seconds  The right DP pulse is 2+  The right PT pulse is 2+  Left Foot/Ankle  Left Foot Inspection  Skin Exam: skin normal and skin intactno dry skin, no warmth, no erythema, no maceration, normal color, no pre-ulcer, no ulcer and no callus                         Toe Exam: ROM and strength within normal limits                   Sensory   Vibration: intact  Proprioception: intact  Monofilament: intact  Vascular  Capillary refills: < 3 seconds  The left DP pulse is 2+  The left PT pulse is 2+  Assign Risk Category:  No deformity present; No loss of protective sensation; No weak pulses       Risk: 0             Assessment and Plan:     Problem List Items Addressed This Visit     None      Visit Diagnoses     Medicare annual wellness visit, subsequent    -  Primary    Breast cancer screening        Relevant Orders    Mammo screening bilateral w 3d & cad    Post-menopausal        Relevant Orders    DXA bone density spine hip and pelvis    Osteoporosis screening        Relevant Orders    DXA bone density spine hip and pelvis    Screening for colon cancer        Relevant Orders    Ambulatory referral to Gastroenterology    Encounter for immunization        Relevant Orders    PREFERRED: influenza vaccine, [id number], high-dose, PF 0 5 mL, for patients 65 yr+ (FLUZONE HIGH-DOSE)          BMI Counseling: Body mass index is 43 06 kg/m²  Discussed the patient's BMI with her  The BMI is above average  BMI counseling and education was provided to the patient   Nutrition recommendations include reducing portion sizes, decreasing overall calorie intake, 3-5 servings of fruits/vegetables daily, reducing fast food intake, consuming healthier snacks, decreasing soda and/or juice intake, moderation in carbohydrate intake, increasing intake of lean protein, reducing intake of saturated fat and trans fat and reducing intake of cholesterol  Exercise recommendations include moderate aerobic physical activity for 150 minutes/week  Return in about 6 months (around 3/9/2020) for Recheck HTN, DM, HLD  The patient indicates understanding of these issues and agrees with the plan          Jose Luis De Jesus DO

## 2019-09-09 NOTE — PATIENT INSTRUCTIONS
Obesity   AMBULATORY CARE:   Obesity  is when your body mass index (BMI) is greater than 30  Your healthcare provider will use your height and weight to measure your BMI  The risks of obesity include  many health problems, such as injuries or physical disability  You may need tests to check for the following:  · Diabetes     · High blood pressure or high cholesterol     · Heart disease     · Gallbladder or liver disease     · Cancer of the colon, breast, prostate, liver, or kidney     · Sleep apnea     · Arthritis or gout  Seek care immediately if:   · You have a severe headache, confusion, or difficulty speaking  · You have weakness on one side of your body  · You have chest pain, sweating, or shortness of breath  Contact your healthcare provider if:   · You have symptoms of gallbladder or liver disease, such as pain in your upper abdomen  · You have knee or hip pain and discomfort while walking  · You have symptoms of diabetes, such as intense hunger and thirst, and frequent urination  · You have symptoms of sleep apnea, such as snoring or daytime sleepiness  · You have questions or concerns about your condition or care  Treatment for obesity  focuses on helping you lose weight to improve your health  Even a small decrease in BMI can reduce the risk for many health problems  Your healthcare provider will help you set a weight-loss goal   · Lifestyle changes  are the first step in treating obesity  These include making healthy food choices and getting regular physical activity  Your healthcare provider may suggest a weight-loss program that involves coaching, education, and therapy  · Medicine  may help you lose weight when it is used with a healthy diet and physical activity  · Surgery  can help you lose weight if you are very obese and have other health problems  There are several types of weight-loss surgery  Ask your healthcare provider for more information    Be successful losing weight:   · Set small, realistic goals  An example of a small goal is to walk for 20 minutes 5 days a week  Anther goal is to lose 5% of your body weight  · Tell friends, family members, and coworkers about your goals  and ask for their support  Ask a friend to lose weight with you, or join a weight-loss support group  · Identify foods or triggers that may cause you to overeat , and find ways to avoid them  Remove tempting high-calorie foods from your home and workplace  Place a bowl of fresh fruit on your kitchen counter  If stress causes you to eat, then find other ways to cope with stress  · Keep a diary to track what you eat and drink  Also write down how many minutes of physical activity you do each day  Weigh yourself once a week and record it in your diary  Eating changes: You will need to eat 500 to 1,000 fewer calories each day than you currently eat to lose 1 to 2 pounds a week  The following changes will help you cut calories:  · Eat smaller portions  Use small plates, no larger than 9 inches in diameter  Fill your plate half full of fruits and vegetables  Measure your food using measuring cups until you know what a serving size looks like  · Eat 3 meals and 1 or 2 snacks each day  Plan your meals in advance  [employer] and eat at home most of the time  Eat slowly  · Eat fruits and vegetables at every meal   They are low in calories and high in fiber, which makes you feel full  Do not add butter, margarine, or cream sauce to vegetables  Use herbs to season steamed vegetables  · Eat less fat and fewer fried foods  Eat more baked or grilled chicken and fish  These protein sources are lower in calories and fat than red meat  Limit fast food  Dress your salads with olive oil and vinegar instead of bottled dressing  · Limit the amount of sugar you eat  Do not drink sugary beverages  Limit alcohol  Activity changes:  Physical activity is good for your body in many ways   It helps you burn calories and build strong muscles  It decreases stress and depression, and improves your mood  It can also help you sleep better  Talk to your healthcare provider before you begin an exercise program   · Exercise for at least 30 minutes 5 days a week  Start slowly  Set aside time each day for physical activity that you enjoy and that is convenient for you  It is best to do both weight training and an activity that increases your heart rate, such as walking, bicycling, or swimming  · Find ways to be more active  Do yard work and housecleaning  Walk up the stairs instead of using elevators  Spend your leisure time going to events that require walking, such as outdoor festivals or fairs  This extra physical activity can help you lose weight and keep it off  Follow up with your healthcare provider as directed: You may need to meet with a dietitian  Write down your questions so you remember to ask them during your visits  © 2017 [facility] Information is for End User's use only and may not be sold, redistributed or otherwise used for commercial purposes  All illustrations and images included in CareNotes® are the copyrighted property of [employer]  or [employer]ad G[employer]elum  The above information is an  only  It is not intended as medical advice for individual conditions or treatments  Talk to your doctor, nurse or pharmacist before following any medical regimen to see if it is safe and effective for you  Urinary Incontinence   WHAT YOU NEED TO KNOW:   What is urinary incontinence? Urinary incontinence (UI) is when you lose control of your bladder  What causes UI? UI occurs because your bladder cannot store or empty urine properly  The following are the most common types of UI:  · Stress incontinence  is when you leak urine due to increased bladder pressure  This may happen when you cough, sneeze, or exercise       · Urge incontinence  is when you feel the need to urinate right away and leak urine accidentally  · Mixed incontinence  is when you have both stress and urge UI  What are the signs and symptoms of UI?   · You feel like your bladder does not empty completely when you urinate  · You urinate often and need to urinate immediately  · You leak urine when you sleep, or you wake up with the urge to urinate  · You leak urine when you cough, sneeze, exercise, or laugh  How is UI diagnosed? Your healthcare provider will ask how often you leak urine and whether you have stress or urge symptoms  Tell him which medicines you take, how often you urinate, and how much liquid you drink each day  You may need any of the following tests:  · Urine tests  may show infection or kidney function  · A pelvic exam  may be done to check for blockages  A pelvic exam will also show if your bladder, uterus, or other organs have moved out of place  · An x-ray, ultrasound, or CT  may show problems with parts of your urinary system  You may be given contrast liquid to help your organs show up better in the pictures  Tell the healthcare provider if you have ever had an allergic reaction to contrast liquid  Do not enter the MRI room with anything metal  Metal can cause serious injury  Tell the healthcare provider if you have any metal in or on your body  · A bladder scan  will show how much urine is left in your bladder after you urinate  You will be asked to urinate and then healthcare providers will use a small ultrasound machine to check the urine left in your bladder  · Cystometry  is used to check the function of your urinary system  Your healthcare provider checks the pressure in your bladder while filling it with fluid  Your bladder pressure may also be tested when your bladder is full and while you urinate  How is UI treated? · Medicines  can help strengthen your bladder control      · Electrical stimulation  is used to send a small amount of electrical energy to your pelvic floor muscles  This helps control your bladder function  Electrodes may be placed outside your body or in your rectum  For women, the electrodes may be placed in the vagina  · A bulking agent  may be injected into the wall of your urethra to make it thicker  This helps keep your urethra closed and decreases urine leakage  · Devices  such as a clamp, pessary, or tampon may help stop urine leaks  Ask your healthcare provider for more information about these and other devices  · Surgery  may be needed if other treatments do not work  Several types of surgery can help improve your bladder control  Ask your healthcare provider for more information about the surgery you may need  How can I manage my symptoms? · Do pelvic muscle exercises often  Your pelvic muscles help you stop urinating  Squeeze these muscles tight for 5 seconds, then relax for 5 seconds  Gradually work up to squeezing for 10 seconds  Do 3 sets of 15 repetitions a day, or as directed  This will help strengthen your pelvic muscles and improve bladder control  · A catheter  may be used to help empty your bladder  A catheter is a tiny, plastic tube that is put into your bladder to drain your urine  Your healthcare provider may tell you to use a catheter to prevent your bladder from getting too full and leaking urine  · Keep a UI record  Write down how often you leak urine and how much you leak  Make a note of what you were doing when you leaked urine  · Train your bladder  Go to the bathroom at set times, such as every 2 hours, even if you do not feel the urge to go  You can also try to hold your urine when you feel the urge to go  For example, hold your urine for 5 minutes when you feel the urge to go  As that becomes easier, hold your urine for 10 minutes  · Drink liquids as directed  Ask your healthcare provider how much liquid to drink each day and which liquids are best for you   You may need to limit the amount of liquid you drink to help control your urine leakage  Limit or do not have drinks that contain caffeine or alcohol  Do not drink any liquid right before you go to bed  · Prevent constipation  Eat a variety of high-fiber foods  Good examples are high-fiber cereals, beans, vegetables, and whole-grain breads  Prune juice may help make your bowel movement softer  Walking is the best way to trigger your intestines to have a bowel movement  · Exercise regularly and maintain a healthy weight  Ask your healthcare provider how much you should weigh and about the best exercise plan for you  Weight loss and exercise will decrease pressure on your bladder and help you control your leakage  Ask him to help you create a weight loss plan if you are overweight  When should I seek immediate care? · You have severe pain  · You are confused or cannot think clearly  When should I contact my healthcare provider? · You have a fever  · You see blood in your urine  · You have pain when you urinate  · You have new or worse pain, even after treatment  · Your mouth feels dry or you have vision changes  · Your urine is cloudy or smells bad  · You have questions or concerns about your condition or care  CARE AGREEMENT:   You have the right to help plan your care  Learn about your health condition and how it may be treated  Discuss treatment options with your caregivers to decide what care you want to receive  You always have the right to refuse treatment  The above information is an  only  It is not intended as medical advice for individual conditions or treatments  Talk to your doctor, nurse or pharmacist before following any medical regimen to see if it is safe and effective for you  © 2017 [address] Information is for End User's use only and may not be sold, redistributed or otherwise used for commercial purposes   All illustrations and images included in CareNotes® are the copyrighted property of [employer]  or Thad Gastelum  Cigarette Smoking and Your Health   AMBULATORY CARE:   Risks to your health if you smoke:  Nicotine and other chemicals found in tobacco damage every cell in your body  Even if you are a light smoker, you have an increased risk for cancer, heart disease, and lung disease  If you are pregnant or have diabetes, smoking increases your risk for complications  Benefits to your health if you stop smoking:   · You decrease respiratory symptoms such as coughing, wheezing, and shortness of breath  · You reduce your risk for cancers of the lung, mouth, throat, kidney, bladder, pancreas, stomach, and cervix  If you already have cancer, you increase the benefits of chemotherapy  You also reduce your risk for cancer returning or a second cancer from developing  · You reduce your risk for heart disease, blood clots, heart attack, and stroke  · You reduce your risk for lung infections, and diseases such as pneumonia, asthma, chronic bronchitis, and emphysema  · Your circulation improves  More oxygen can be delivered to your body  If you have diabetes, you lower your risk for complications, such as kidney, artery, and eye diseases  You also lower your risk for nerve damage  Nerve damage can lead to amputations, poor vision, and blindness  · You improve your body's ability to heal and to fight infections  Benefits to the health of others if you stop smoking:  Tobacco is harmful to nonsmokers who breathe in your secondhand smoke  The following are ways the health of others around you may improve when you stop smoking:  · You lower the risks for lung cancer and heart disease in nonsmoking adults  · If you are pregnant, you lower the risk for miscarriage, early delivery, low birth weight, and stillbirth  You also lower your baby's risk for SIDS, obesity, developmental delay, and neurobehavioral problems, such as ADHD  · If you have children, you lower their risk for ear infections, colds, pneumonia, bronchitis, and asthma  For more information and support to stop smoking:   · [employer]  comment.com  Phone: 8- 387 - 263-2236  Web Address: www smokefree  gov  Follow up with your healthcare provider as directed:  Write down your questions so you remember to ask them during your visits  © 2017 [address] Information is for End User's use only and may not be sold, redistributed or otherwise used for commercial purposes  All illustrations and images included in CareNotes® are the copyrighted property of [employer] M [employer]  or Thad G[employer]elum  The above information is an  only  It is not intended as medical advice for individual conditions or treatments  Talk to your doctor, nurse or pharmacist before following any medical regimen to see if it is safe and effective for you  Fall Prevention   AMBULATORY CARE:   Fall prevention  includes ways to make your home and other areas safer  It also includes ways you can move more carefully to prevent a fall  Health conditions that cause changes in your blood pressure, vision, or muscle strength and coordination may increase your risk for falls  Medicines may also increase your risk for falls if they make you dizzy, weak, or sleepy  Call 911 or have someone else call if:   · You have fallen and are unconscious  · You have fallen and cannot move part of your body  Contact your healthcare provider if:   · You have fallen and have pain or a headache  · You have questions or concerns about your condition or care  Fall prevention tips:   · Stand or sit up slowly  This may help you keep your balance and prevent falls  · Use assistive devices as directed  Your healthcare provider may suggest that you use a cane or walker to help you keep your balance  You may need to have grab bars put in your bathroom near the toilet or in the shower      · Wear shoes that fit well and have soles that   Wear shoes both inside and outside  Use slippers with good   Do not wear shoes with high heels  · Wear a personal alarm  This is a device that allows you to call 911 if you fall and need help  Ask your healthcare provider for more information  · Stay active  Exercise can help strengthen your muscles and improve your balance  Your healthcare provider may recommend water aerobics or walking  He or she may also recommend physical therapy to improve your coordination  Never start an exercise program without talking to your healthcare provider first      · Manage your medical conditions  Keep all appointments with your healthcare providers  Visit your eye doctor as directed  Home safety tips:   · Add items to prevent falls in the bathroom  Put nonslip strips on your bath or shower floor to prevent you from slipping  Use a bath mat if you do not have carpet in the bathroom  This will prevent you from falling when you step out of the bath or shower  Use a shower seat so you do not need to stand while you shower  Sit on the toilet or a chair in your bathroom to dry yourself and put on clothing  This will prevent you from losing your balance from drying or dressing yourself while you are standing  · Keep paths clear  Remove books, shoes, and other objects from walkways and stairs  Place cords for telephones and lamps out of the way so that you do not need to walk over them  Tape them down if you cannot move them  Remove small rugs  If you cannot remove a rug, secure it with double-sided tape  This will prevent you from tripping  · Install bright lights in your home  Use night lights to help light paths to the bathroom or kitchen  Always turn on the light before you start walking  · Keep items you use often on shelves within reach  Do not use a step stool to help you reach an item  · Paint or place reflective tape on the edges of your stairs    This will help you see the stairs better  Follow up with your healthcare provider as directed:  Write down your questions so you remember to ask them during your visits  © 2017 [address] Information is for End User's use only and may not be sold, redistributed or otherwise used for commercial purposes  All illustrations and images included in CareNotes® are the copyrighted property of [employer]  or [employer]ad G[employer]el[employer]  The above information is an  only  It is not intended as medical advice for individual conditions or treatments  Talk to your doctor, nurse or pharmacist before following any medical regimen to see if it is safe and effective for you  Advance Directives   WHAT YOU NEED TO KNOW:   What are advance directives? Advance directives are legal documents that state your wishes and plans for medical care  These plans are made ahead of time in case you lose your ability to make decisions for yourself  Advance directives can apply to any medical decision, such as the treatments you want, and if you want to donate organs  What are the types of advance directives? There are many types of advance directives, and each state has rules about how to use them  You may choose a combination of any of the following:  · Living will: This is a written record of the treatment you want  You can also choose which treatments you do not want, which to limit, and which to stop at a certain time  This includes surgery, medicine, IV fluid, and tube feedings  · Durable power of  for healthcare [facility]): This is a written record that states who you want to make healthcare choices for you when you are unable to make them for yourself  This person, called a proxy, is usually a family member or a friend  You may choose more than 1 proxy  · Do not resuscitate (DNR) order:  A DNR order is used in case your heart stops beating or you stop breathing   It is a request not to have certain forms of treatment, such as CPR  A DNR order may be included in other types of advance directives  · Medical directive: This covers the care that you want if you are in a coma, near death, or unable to make decisions for yourself  You can list the treatments you want for each condition  Treatment may include pain medicine, surgery, blood transfusions, dialysis, IV or tube feedings, and a ventilator (breathing machine)  · Values history: This document has questions about your views, beliefs, and how you feel and think about life  This information can help others choose the care that you would choose  Why are advance directives important? An advance directive helps you control your care  Although spoken wishes may be used, it is better to have your wishes written down  Spoken wishes can be misunderstood, or not followed  Treatments may be given even if you do not want them  An advance directive may make it easier for your family to make difficult choices about your care  How do I decide what to put in my advance directives? · Make informed decisions:  Make sure you fully understand treatments or care you may receive  Think about the benefits and problems your decisions could cause for you or your family  Talk to healthcare providers if you have concerns or questions before you write down your wishes  You may also want to talk with your Anabaptist or , or a   Check your state laws to make sure that what you put in your advance directive is legal      · Sign all forms:  Sign and date your advance directive when you have finished  You may also need 2 witnesses to sign the forms  Witnesses cannot be your doctor or his staff, your spouse, heirs or beneficiaries, people you owe money to, or your chosen proxy  Talk to your family, proxy, and healthcare providers about your advance directive  Give each person a copy, and keep one for yourself in a place you can get to easily   Do not keep it hidden or locked away  · Review and revise your plans: You can revise your advance directive at any time, as long as you are able to make decisions  Review your plan every year, and when there are changes in your life, or your health  When you make changes, let your family, proxy, and healthcare providers know  Give each a new copy  Where can I find more information? · [employer]  [address] [place] , [place] [address]  Phone: 0- [phone] - [phone]  Phone: 0- [phone] - [phone]  Web Address: http://www  aafp org  · [address] [facility])  [address] [address] [facility], [address] [facility] , [address] [place] [address]  Phone: [phone]  Phone: [phone]  Web Address: Jessica sethi  CARE AGREEMENT:   You have the right to help plan your care  To help with this plan, you must learn about your health condition and treatment options  You must also learn about advance directives and how they are used  Work with your healthcare providers to decide what care will be used to treat you  You always have the right to refuse treatment  The above information is an  only  It is not intended as medical advice for individual conditions or treatments  Talk to your doctor, nurse or pharmacist before following any medical regimen to see if it is safe and effective for you  © 2017 [address] Information is for End User's use only and may not be sold, redistributed or otherwise used for commercial purposes  All illustrations and images included in CareNotes® are the copyrighted property of [employer]  or [employer]ad [employer]elum

## 2019-09-10 NOTE — PROGRESS NOTES
Living Well with Diabetes Group Class #1    Kenneth Khan attended the Living Well with Diabetes Group Class #1  Topics Covered in class today include: What is diabetes; Types of Diabetes; How Diabetes is diagnosed; Management skills; the role of exercise in blood sugar managements, Home glucose monitoring, and target ranges  Lili Richards participated in group activities    The patient's history was reviewed and updated as appropriate: allergies, current medications  Lab Results   Component Value Date    HGBA1C 6 7 (H) 07/26/2019       Diabetes Education Record  Lili Richards was provided Living Well with Diabetes Class #1 book, A1C magnet, BG log booklet      Patient response to instruction    Comprehensiongood  Motivationgood  Expected Compliancegood    Start- Stop: 6:00-8:00  Total Minutes: 120 Minutes  Group or Individual Instruction: DSMT-G1  Other: Dr Shana Piedra MD    Thank you for referring your patient to [facility], it was a pleasure working with them today  Please feel free to call with any questions or concerns      Mojgan Washington  [address] [place] [phone]

## 2019-09-16 ENCOUNTER — OFFICE VISIT (OUTPATIENT)
Dept: DIABETES SERVICES | Facility: CLINIC | Age: 69
End: 2019-09-16
Payer: COMMERCIAL

## 2019-09-16 DIAGNOSIS — E11.65 TYPE 2 DIABETES MELLITUS WITH HYPERGLYCEMIA, WITHOUT LONG-TERM CURRENT USE OF INSULIN (HCC): Primary | ICD-10-CM

## 2019-09-16 PROCEDURE — G0109 DIAB MANAGE TRN IND/GROUP: HCPCS | Performed by: DIETITIAN, REGISTERED

## 2019-09-17 NOTE — PROGRESS NOTES
Living Well with Diabetes Group Class #3    Chloe Perkins attended the Living Well with Diabetes Group Class #3  During class, Niles Rausch was instructed on the following topics: Oral and injectable medications, short term complications of diabetes, long term complications of diabetes, prevention of complications, foot care, sick day management, stress management, and traveling with diabetes  Niles Rausch participated in group activities  Niles Shalom will follow up with class #4  Will call with any questions or concerns prior to next session  The patient's history was reviewed and updated as appropriate: allergies, current medications  Lab Results   Component Value Date    HGBA1C 6 7 (H) 07/26/2019       Diabetes Education Record  Niles Rausch was provided Living Well with Diabetes Class #3 book and goals sheet/DM support form      Patient response to instruction    Comprehensiongood  Motivationgood  Expected Compliancegood    Start- Stop: 6:00-8:00  Total Minutes: 120 Minutes  Group or Individual Instruction: DSMT-G3  Other: Dr Sarah Anderson    Thank you for referring your patient to [facility], it was a pleasure working with them today  Please feel free to call with any questions or concerns      Fredi Skinner  [address] [place] [id number]

## 2019-09-18 ENCOUNTER — OFFICE VISIT (OUTPATIENT)
Dept: DIABETES SERVICES | Facility: CLINIC | Age: 69
End: 2019-09-18
Payer: COMMERCIAL

## 2019-09-18 DIAGNOSIS — E11.65 TYPE 2 DIABETES MELLITUS WITH HYPERGLYCEMIA, WITHOUT LONG-TERM CURRENT USE OF INSULIN (HCC): Primary | ICD-10-CM

## 2019-09-18 PROCEDURE — G0109 DIAB MANAGE TRN IND/GROUP: HCPCS | Performed by: DIETITIAN, REGISTERED

## 2019-09-19 NOTE — PROGRESS NOTES
Living Well with Diabetes Group Class #4    Kwaku Isabel attended the Living Well with Diabetes Group Class #4  During class, Michael Berry was instructed on the following topics: Types of cholesterol, dietary sources of cholesterol, types of fat, types of fiber, reading food labels- in depth, healthy choices when dining out  Michael Berry participated in group activities of reading labels together and completed the dining out activity  Michael Berry will follow up with class #5 or additional DSMT/MNT as desired  Will call with any questions or concerns prior to next session  The patient's history was reviewed and updated as appropriate: allergies, current medications  Lab Results   Component Value Date    HGBA1C 6 7 (H) 07/26/2019       Diabetes Education Record  Michael Berry was provided Living Well with Diabetes Class #4 book      Patient response to instruction    Comprehensionvery good  Motivationvery good  Expected Compliancevery good    Begin Time: 6pm  End Time: 8pm  Referring Provider: Jenny Hernandez    Thank you for referring your patient to [facility], it was a pleasure working with them today  Please feel free to call with any questions or concerns      Ashley Selby  [id number]  [address] [facility] [phone]

## 2019-09-20 ENCOUNTER — TELEPHONE (OUTPATIENT)
Dept: RADIOLOGY | Facility: HOSPITAL | Age: 69
End: 2019-09-20

## 2019-10-08 ENCOUNTER — HOSPITAL ENCOUNTER (OUTPATIENT)
Dept: RADIOLOGY | Facility: HOSPITAL | Age: 69
Discharge: HOME | End: 2019-10-08
Payer: COMMERCIAL

## 2019-10-08 VITALS — BODY MASS INDEX: 42.35 KG/M2 | HEIGHT: 63 IN | WEIGHT: 239 LBS

## 2019-10-08 DIAGNOSIS — Z78.0 POST-MENOPAUSAL: ICD-10-CM

## 2019-10-08 DIAGNOSIS — Z12.39 BREAST CANCER SCREENING: ICD-10-CM

## 2019-10-08 DIAGNOSIS — Z13.820 OSTEOPOROSIS SCREENING: ICD-10-CM

## 2019-10-08 PROCEDURE — 77080 DXA BONE DENSITY AXIAL: CPT

## 2019-10-08 PROCEDURE — 77063 BREAST TOMOSYNTHESIS BI: CPT

## 2019-10-08 PROCEDURE — 77067 SCR MAMMO BI INCL CAD: CPT

## 2019-10-09 ENCOUNTER — TELEPHONE (OUTPATIENT)
Dept: RADIOLOGY | Facility: HOSPITAL | Age: 69
End: 2019-10-09

## 2019-10-09 ENCOUNTER — TELEPHONE (OUTPATIENT)
Dept: FAMILY MEDICINE CLINIC | Facility: OTHER | Age: 69
End: 2019-10-09

## 2019-10-09 NOTE — TELEPHONE ENCOUNTER
Pt aware    ----- Message from Dl Gamboa DO sent at 10/9/2019  1:18 PM EDT -----  Please inform pt that DXA was normal -- repeat screening for osteoporosis in 2 yrs    Thanks!   Dl Gamboa DO

## 2019-10-17 ENCOUNTER — LAB (OUTPATIENT)
Dept: LAB | Facility: CLINIC | Age: 69
End: 2019-10-17
Payer: COMMERCIAL

## 2019-10-17 DIAGNOSIS — I10 ESSENTIAL HYPERTENSION: ICD-10-CM

## 2019-10-17 DIAGNOSIS — E11.65 TYPE 2 DIABETES MELLITUS WITH HYPERGLYCEMIA, WITHOUT LONG-TERM CURRENT USE OF INSULIN (HCC): ICD-10-CM

## 2019-10-17 DIAGNOSIS — E04.2 NONTOXIC MULTINODULAR GOITER: ICD-10-CM

## 2019-10-17 LAB
EST. AVERAGE GLUCOSE BLD GHB EST-MCNC: 140 MG/DL
HBA1C MFR BLD: 6.5 % (ref 4.2–6.3)
T4 FREE SERPL-MCNC: 1.11 NG/DL (ref 0.76–1.46)
TSH SERPL DL<=0.05 MIU/L-ACNC: 0.43 UIU/ML (ref 0.36–3.74)

## 2019-10-17 PROCEDURE — 84443 ASSAY THYROID STIM HORMONE: CPT

## 2019-10-17 PROCEDURE — 36415 COLL VENOUS BLD VENIPUNCTURE: CPT

## 2019-10-17 PROCEDURE — 83036 HEMOGLOBIN GLYCOSYLATED A1C: CPT

## 2019-10-17 PROCEDURE — 84439 ASSAY OF FREE THYROXINE: CPT

## 2019-10-22 ENCOUNTER — OFFICE VISIT (OUTPATIENT)
Dept: ENDOCRINOLOGY | Facility: CLINIC | Age: 69
End: 2019-10-22
Payer: COMMERCIAL

## 2019-10-22 ENCOUNTER — OFFICE VISIT (OUTPATIENT)
Dept: DIABETES SERVICES | Facility: CLINIC | Age: 69
End: 2019-10-22
Payer: COMMERCIAL

## 2019-10-22 VITALS
SYSTOLIC BLOOD PRESSURE: 124 MMHG | BODY MASS INDEX: 42.52 KG/M2 | HEIGHT: 63 IN | WEIGHT: 240 LBS | DIASTOLIC BLOOD PRESSURE: 78 MMHG

## 2019-10-22 VITALS — BODY MASS INDEX: 43.34 KG/M2 | WEIGHT: 240.8 LBS

## 2019-10-22 DIAGNOSIS — E04.2 NONTOXIC MULTINODULAR GOITER: ICD-10-CM

## 2019-10-22 DIAGNOSIS — E03.9 HYPOTHYROIDISM, UNSPECIFIED TYPE: ICD-10-CM

## 2019-10-22 DIAGNOSIS — I10 ESSENTIAL HYPERTENSION: ICD-10-CM

## 2019-10-22 DIAGNOSIS — E11.65 TYPE 2 DIABETES MELLITUS WITH HYPERGLYCEMIA, WITHOUT LONG-TERM CURRENT USE OF INSULIN (HCC): Primary | ICD-10-CM

## 2019-10-22 PROCEDURE — 3078F DIAST BP <80 MM HG: CPT | Performed by: PHYSICIAN ASSISTANT

## 2019-10-22 PROCEDURE — 3074F SYST BP LT 130 MM HG: CPT | Performed by: PHYSICIAN ASSISTANT

## 2019-10-22 PROCEDURE — 99214 OFFICE O/P EST MOD 30 MIN: CPT | Performed by: PHYSICIAN ASSISTANT

## 2019-10-22 PROCEDURE — 97803 MED NUTRITION INDIV SUBSEQ: CPT | Performed by: DIETITIAN, REGISTERED

## 2019-10-22 NOTE — PATIENT INSTRUCTIONS
Hypoglycemia instructions   Soren Broussard  10/22/2019  [id number]    Low Blood Sugar    Steps to treat low blood sugar  1  Test blood sugar if you have symptoms of low blood sugar:   Low Blood Sugar Symptoms:  o Sweaty  o Dizzy  o Rapid heartbeat  o Shaky  o Bad mood  o Hungry      2  Treat blood sugar less than 70 with 15 grams of fast-acting carbohydrate:   Examples of 15 grams Fast-Acting Carbohydrate:  o 4 oz juice  o 4 oz regular soda  o 3-4 glucose tablets (chew)  o 3-4 hard candies (chew)          3  Wait 15 minutes and test your blood sugar again     4   If blood sugar is less than 100, repeat steps 2-3     5  When your blood sugar is 100 or more, eat a snack if it will be longer than one hour until your next meal  The snack should be 15 grams of carbohydrate and a protein:   Examples of snacks:  o ½ sandwich  o 6 crackers with cheese  o Piece of fruit with cheese or peanut butter  o 6 crackers with peanut butter

## 2019-10-22 NOTE — PROGRESS NOTES
Medical Nutrition Therapy      Assessment    Chief complaint T2DM    Visit Type: Follow-up visit    HPI: Maribel Art returned for follow-up today  Most recent HbA1c has improved to 6 5% from previously 6 7%  Maribel Art recently completed Living Well with Diabetes classes and is here today for a follow-up MNT appointment  Maribel Art has been keeping a daily food record with carbohydrate counts and trying to stay between 30-45 grams of carbohydrate per meal and 15 grams per snack  Sindys food record reveals that she has been counting carbohydrates in foods that she should not be such as proteins including sausage, rotisserie chicken, and tuna and fats including almonds  There are rare occasions that she is not having enough carbohydrate at breakfast and lunch, however, she is more frequently not having enough carbohydrate at dinner  There was one isolated occasion that she over consumed carbohydrates at dinner by having 75 grams of carbs due to not knowing how to count chicken chili  Overall, Sindys meals provide between 5-75 grams of carbohydrate per meal   Based on meal plan review and food record provided by patient today provided education about referring to the portion book to determine whether or not to count carbohydrates in certain foods and the importance of having at least 30 grams of carbohydrate at each meal  Maribel Art is doing well reading labels, however, she needs to refer back to the Portion Book to understand that protein and fat foods should not be counted as carbohydrates  Maribel Art also has questions about how to determine carbohydrate amounts when baking foods such as pies  Discussed how to add up sum of ingredients using labels and divide by number or portions  Maribel Art demonstrated good understanding and will call with any questions prior to next follow-up appointment in 3 months      Ht Readings from Last 1 Encounters:   10/08/19 5' 2 5" (1 588 m)     Wt Readings from Last 2 Encounters:   10/08/19 108 kg (239 lb) 09/09/19 109 kg (239 lb 4 oz)     Weight Change: Yes lost about 4 lbs over last 3 months    Medical Diagnosis/reason for visit E11 65 (ICD-10-CM) - Type 2 diabetes mellitus with hyperglycemia, without long-term current use of insulin     Food Log:  Please see scanned 3 day food diary    Exercise Exercises using steps at her house and does some floor exercises as well  Calorie needs 1600 kcals/day Carbs: 30g/meal, 15g/snack     Fat: 45g/day    Protein:88 g/day    Nutrition Diagnosis:  Inconsistent carbohydrate intake  intake related to Food and nutrition related knowledge deficit concerning appropriate amount and timing of carbohydrate intake as evidenced by  Estimated carbohydrate intake that is different from recommended types or ingested on an irregular basis    Intervention: label reading, carbohydrate counting, meal planning and food diary     Treatment Goals: Patient understands education and recommendations, Patient will monitor food intake daily with tracker, Patient will monitor portion control and Patient will count carbohydrates    Monitoring and evaluation:    Term code indicator  FH 1 6 3 Carbohydrate Intake Criteria: 30 grams of carbohydrate per meal, 15 grams of carbohydrate per snack  Term code indicator  FH 4 4 Mealtime Behavior Criteria: 3 meals per day, 4-5 hours apart  3 snacks per day, at least 2 hours apart from meals  Patients Response to Instruction:  Comprehensiongood  Motivationvery good  Expected Compliancevery good     Start- Stop: 10:00 am - 10:48 am  Total Minutes: 50 Minutes  Group or Individual Instruction: MNT - I - FU  Other: Julieta Marino MD    Thank you for coming to the [facility] for education today  Please feel free to call with any questions or concerns      Praneeth Stallworth [address] [facility] [phone]

## 2019-10-22 NOTE — PATIENT INSTRUCTIONS
Refer to the Portion Book if unsure about whether to count carbohydrates in certain foods  When baking: Multiply servings per container times "total carbohydrate" and add all ingredient totals for the pie together, then divide by number of slices  Carbohydrate intake per meal 30 - 45 grams, snacks between meals 15 grams  Please complete another 3 day food record and bring with you to next follow up appointment in 3 months

## 2019-10-22 NOTE — PROGRESS NOTES
Patient Progress Note    CC: hypothyroidism, goiter, DM2     Referring Provider  No referring provider defined for this encounter  History of Present Illness:     Patient is a 67-year-old female here for follow-up hypothyroidism, goiter, type 2 diabetes  Hypothyroidism/goiter: Patient is on levothyroxine 112 mcg 1 tablet daily  Patient is taking it 1 hr before breakfast on an empty stomach  Does not wait 4 hrs to take supplements  Tolerating medication well  No history of external radiation to head/neck/chest   No family history of thyroid cancer  No recent Iodine loading in form of medication, erbs or kelp supplements or radiological diagnostic studies  Most recent thyroid ultrasound results:  June 2019  FINDINGS:  Thyroid parenchyma is diffusely heterogeneous in echotexture with focal nodule(s) as described below      Right lobe:  5 2 x 1 8 x 2 8 cm  Left lobe:  4 1 x 2 0 x 1 8 cm  Isthmus:  0 3 cm      Nodule #1  Image 8  RIGHT midgland nodule measuring 1 5 x 1 8 x 1 4 cm  Given differences in measuring technique, no significant change from prior  This nodule stable dating back to 9/4/2013  COMPOSITION:  2 points, solid or almost completely solid   ECHOGENICITY:  1 point, hyperechoic or isoechoic  SHAPE:  0 points, wider-than-tall  MARGIN: 0 points, smooth  ECHOGENIC FOCI:  0 points, none or large comet-tail artifacts  TI-RADS Classification: TR 3 (3 points), Mildly suspicious  FNA if >2 5 cm  Follow if >1 5 cm  This nodule remains stable for greater than 5 years and has had a prior benign biopsy  If there is a high level of clinical concern, continued surveillance at 2 year intervals could be considered, otherwise no additional workup recommended      Nodule #2  Image 15  RIGHT lower pole nodule measuring 1 5 x 1 5 x 1 1 cm  Given differences in measuring technique, no significant change from prior  This nodule stable dating back to 9/4/2013    COMPOSITION:  2 points, solid or almost completely solid   ECHOGENICITY:  1 point, hyperechoic or isoechoic  SHAPE:  0 points, wider-than-tall  MARGIN: 0 points, smooth  ECHOGENIC FOCI:  0 points, none or large comet-tail artifacts  TI-RADS Classification: TR 3 (3 points), Mildly suspicious  FNA if >2 5 cm  Follow if >1 5 cm  This nodule remains stable for greater than 5 years and has had a prior benign biopsy  If there is a high level of clinical concern, continued surveillance at 2 year intervals could be considered, otherwise no additional workup recommended      Nodule #3  Image 26  RIGHT upper pole nodule measuring 1 2 x 1 6 x 1 1 cm  Given differences in measuring technique, no significant change from prior  COMPOSITION:  2 points, solid or almost completely solid   ECHOGENICITY:  1 point, hyperechoic or isoechoic  SHAPE:  0 points, wider-than-tall  MARGIN: 0 points, smooth  ECHOGENIC FOCI:  0 points, none or large comet-tail artifacts  TI-RADS Classification: TR 3 (3 points), Mildly suspicious  FNA if >2 5 cm  Follow if >1 5 cm  This nodule remains stable for greater than 5 years and has had a prior benign biopsy  If there is a high level of clinical concern, continued surveillance at 2 year intervals could be considered, otherwise no additional workup recommended      Nodule #4  Image 55 and 56  LEFT midgland nodule measuring 1 8 x 1 2 x 1 0 cm  Given differences in measuring technique, no significant change from prior  This is stable dating back to 10/15/2013  COMPOSITION:  2 points, solid or almost completely solid   ECHOGENICITY:  1 point, hyperechoic or isoechoic  SHAPE:  0 points, wider-than-tall  MARGIN: 0 points, ill-defined  ECHOGENIC FOCI:  0 points, none or large comet-tail artifacts  TI-RADS Classification: TR 3 (3 points), Mildly suspicious  FNA if >2 5 cm  Follow if >1 5 cm  This nodule remains stable for greater than 5 years and has had a prior benign biopsy    If there is a high level of clinical concern, continued surveillance at 2 year intervals could be considered, otherwise no additional workup recommended      There are additional nodules of lesser size and/or TI-RADS score  These do not necessitate additional evaluation based on ACR criteria       IMPRESSION:     Bilateral thyroid nodules as described above  These are stable for a period of greater than 5 years with prior benign biopsy  If there is a high level of clinical concern, continued surveillance at 2 year intervals could be considered, otherwise no additional workup recommended  Type 2 diabetes:  Patient is currently taking Invokana 300 mg daily  She is tolerating medication well  In the past she has tried metformin but did not tolerate it  Patient is taking a statin and ACE-inhibitor  Her eye and foot exams are up-to-date  Most recent hemoglobin A1c controlled at 6 5%  She is checking blood glucose levels every other day       Patient Active Problem List   Diagnosis    Hypothyroidism (acquired)    Nontoxic multinodular goiter    Type 2 diabetes mellitus with circulatory disorder, without long-term current use of insulin (HCC)    Essential hypertension    Mixed hyperlipidemia    Vitamin D deficiency     Past Medical History:   Diagnosis Date    Anemia     Anxiety     Chronic constipation     Diabetes mellitus ([address] )     GERD (gastroesophageal reflux disease)     Hyperlipidemia     Hypertension     Hypothyroidism     Obesity     Restless legs     Vitamin D deficiency       Past Surgical History:   Procedure Laterality Date    BREAST BIOPSY Right 2001    BREAST LUMPECTOMY Right     CHOLECYSTECTOMY      CORONARY ANGIOPLASTY  2015    Dr Tiffanie Phillip      Family History   Problem Relation Age of Onset    Multiple sclerosis Brother     Multiple sclerosis Paternal Uncle     Hypertension Father     Diabetes Father     Heart disease Father     Multiple sclerosis Family      Social History     Tobacco Use    Smoking status: Never Smoker    Smokeless tobacco: Never Used   Substance Use Topics    Alcohol use: Yes     Frequency: Monthly or less     Comment: occasional     Allergies   Allergen Reactions    Other      Seasonal, dust, Cat dander, mold    Penicillins      Current Outpatient Medications   Medication Sig Dispense Refill    ascorbic acid (VITAMIN C) 500 mg tablet Take 500 mg by mouth daily      aspirin (ECOTRIN LOW STRENGTH) 81 mg EC tablet Take 81 mg by mouth daily      atorvastatin (LIPITOR) 80 mg tablet Take 1 tablet (80 mg total) by mouth daily 90 tablet 1    cetirizine (ZyrTEC) 10 mg tablet Take 10 mg by mouth daily      Cholecalciferol (VITAMIN D3) 1000 units CAPS Take 1,000 Units by mouth      famotidine-calcium carbonate-magnesium hydroxide (PEPCID COMPLETE) -165 MG CHEW Chew 1 tablet daily as needed for heartburn      fluticasone (FLONASE) 50 mcg/act nasal spray 1 spray into each nostril daily      INVOKANA 300 MG TABS Take 300 mg by mouth daily       levothyroxine 112 mcg tablet Take 1 tablet (112 mcg total) by mouth daily 90 tablet 1    Magnesium Cl-Calcium Carbonate (SLOW-MAG PO) Take by mouth      multivitamin (THERAGRAN) TABS Take 1 tablet by mouth daily      nebivolol (BYSTOLIC) 5 mg tablet Take 2 5 mg by mouth daily       Omega-3 Fatty Acids (FISH OIL) 1200 MG CPDR 1200mg two  tablets      ONE TOUCH ULTRA TEST test strip 1 each by Other route daily Use as instructed (Patient taking differently: 1 each by Other route every other day ) 90 each 3    ONETOUCH DELICA LANCETS 82Z MISC by Other route daily 90 each 3    potassium chloride (K-DUR) 10 mEq tablet Take 1 tablet (10 mEq total) by mouth 2 (two) times a day 180 tablet 1    pramipexole (MIRAPEX) 0 25 mg tablet Take 0 25 mg by mouth daily       ramipril (ALTACE) 2 5 mg capsule Take 2 5 mg by mouth daily        No current facility-administered medications for this visit            Review of Systems Constitutional: Negative for activity change, appetite change, fatigue and unexpected weight change  HENT: Negative for trouble swallowing  Eyes: Negative for visual disturbance  Respiratory: Negative for shortness of breath  Cardiovascular: Negative for chest pain and palpitations  Gastrointestinal: Negative for constipation and diarrhea  Endocrine: Negative for cold intolerance, heat intolerance, polydipsia and polyuria  Musculoskeletal: Positive for arthralgias (arthritis)  Skin: Negative for wound  Neurological: Negative for numbness  Psychiatric/Behavioral: Negative  Physical Exam:  Body mass index is 43 2 kg/m²  /78   Ht 5' 2 5" (1 588 m)   Wt 109 kg (240 lb)   BMI 43 20 kg/m²    Wt Readings from Last 3 Encounters:   10/22/19 109 kg (240 lb)   10/22/19 109 kg (240 lb 12 8 oz)   10/08/19 108 kg (239 lb)       Physical Exam   Constitutional: She appears well-developed and well-nourished  HENT:   Head: Normocephalic  Eyes: Pupils are equal, round, and reactive to light  EOM are normal  No scleral icterus  Neck: Neck supple  No thyromegaly present  Cardiovascular: Normal rate and regular rhythm  Murmur heard  Pulses:       Radial pulses are 2+ on the right side, and 2+ on the left side  Pulmonary/Chest: Effort normal and breath sounds normal  No respiratory distress  She has no wheezes  Neurological: She is alert  She has normal reflexes  Skin: Skin is warm and dry  Psychiatric: She has a normal mood and affect  Nursing note and vitals reviewed  Patient's shoes and socks were not removed        Labs:   Component      Latest Ref Rng & Units 6/11/2019 10/17/2019   Sodium      136 - 145 mmol/L 138    Potassium      3 5 - 5 3 mmol/L 4 3    Chloride      100 - 108 mmol/L 105    CO2      21 - 32 mmol/L 29    Anion Gap      4 - 13 mmol/L 4    BUN      5 - 25 mg/dL 19    Creatinine      0 60 - 1 30 mg/dL 0 79    GLUCOSE FASTING      65 - 99 mg/dL 127 (H) Calcium      8 3 - 10 1 mg/dL 9 0    AST      5 - 45 U/L 18    ALT      12 - 78 U/L 35    Alkaline Phosphatase      46 - 116 U/L 85    Total Protein      6 4 - 8 2 g/dL 7 3    Albumin      3 5 - 5 0 g/dL 3 6    TOTAL BILIRUBIN      0 20 - 1 00 mg/dL 0 69    eGFR      ml/min/1 73sq m 77    Cholesterol      50 - 200 mg/dL 155    Triglycerides      <=150 mg/dL 143    HDL      40 - 60 mg/dL 53    LDL Direct      0 - 100 mg/dL 73    Non-HDL Cholesterol      mg/dl 102    EXT Creatinine Urine      mg/dL 75 0    MICROALBUM ,U,RANDOM      0 0 - 20 0 mg/L 6 2    MICROALBUMIN/CREATININE RATIO      0 - 30 mg/g creatinine 8    Hemoglobin A1C      4 2 - 6 3 % 6 8 (H) 6 5 (H)   EAG      mg/dl 148 140   TSH 3RD GENERATON      0 358 - 3 740 uIU/mL 0 364 0 431   Free T4      0 76 - 1 46 ng/dL  1 11       Plan:    Diagnoses and all orders for this visit:    Type 2 diabetes mellitus with hyperglycemia, without long-term current use of insulin (HCC)  HGA1C 6 5%  Improved  Treatment regimen:  Continue current treatment  Discussed risks/complications associated with uncontrolled diabetes  Advised to adhere to diabetic diet, and recommended staying active/exercising routinely  Keep carbohydrates consistent to limit blood glucose fluctuations  Advised to call if blood sugars less than 70 mg/dl or over 300 mg/dl  Check blood glucose 1 time a day  Discussed symptoms and treatment of hypoglycemia  Recommended routine follow-up with podiatry and ophthalmology  Ordered blood work to complete prior to next visit  -     Hemoglobin A1C; Future  -     Basic metabolic panel; Future    Essential hypertension  Blood pressure well controlled, continue current treatment  -     Basic metabolic panel;  Future    Hypothyroidism  Thyroid function tests within normal range, TSH 0 431 and free T4 1 11  Patient clinically and biochemically euthyroid  Continue current dose of levothyroxine  Repeat blood work prior to next visit  -     T4, free; Future  -     TSH, 3rd generation; Future    Nontoxic multinodular goiter  June 2019: Bilateral thyroid nodules that are stable for a period of greater than 5 years with prior benign biopsy  Discussed with the patient and all questions fully answered  She will call me if any problems arise      Counseled patient on diagnostic results, prognosis, risk and benefit of treatment options, instruction for management, importance of treatment compliance, risk  factor reduction and impressions      Elizabeth Ortega PA-C

## 2019-10-31 RX ORDER — POTASSIUM CHLORIDE 750 MG/1
TABLET, EXTENDED RELEASE ORAL
Qty: 180 TABLET | OUTPATIENT
Start: 2019-10-31

## 2019-11-01 ENCOUNTER — HOSPITAL ENCOUNTER (OUTPATIENT)
Dept: RADIOLOGY | Facility: HOSPITAL | Age: 69
Discharge: HOME | End: 2019-11-01

## 2019-11-01 ENCOUNTER — HOSPITAL ENCOUNTER (OUTPATIENT)
Dept: RADIOLOGY | Facility: HOSPITAL | Age: 69
Discharge: HOME | End: 2019-11-01
Payer: COMMERCIAL

## 2019-11-01 VITALS — WEIGHT: 239 LBS | HEIGHT: 62 IN | BODY MASS INDEX: 43.98 KG/M2

## 2019-11-01 DIAGNOSIS — R92.8 ABNORMAL MAMMOGRAM: ICD-10-CM

## 2019-11-01 PROCEDURE — 77066 DX MAMMO INCL CAD BI: CPT

## 2019-11-01 PROCEDURE — G0279 TOMOSYNTHESIS, MAMMO: HCPCS

## 2019-11-04 ENCOUNTER — TELEPHONE (OUTPATIENT)
Dept: FAMILY MEDICINE CLINIC | Facility: OTHER | Age: 69
End: 2019-11-04

## 2019-11-04 NOTE — TELEPHONE ENCOUNTER
PT aware      ----- Message from Balwinder Carpio DO sent at 11/4/2019 11:20 AM EST -----  Please inform pt that mammo looked good -- repeat in 1 yr    Thanks!   Balwinder Carpio DO

## 2019-11-30 DIAGNOSIS — E03.9 HYPOTHYROIDISM (ACQUIRED): ICD-10-CM

## 2019-11-30 DIAGNOSIS — I10 ESSENTIAL HYPERTENSION: ICD-10-CM

## 2019-12-02 RX ORDER — LEVOTHYROXINE SODIUM 112 UG/1
112 TABLET ORAL DAILY
Qty: 90 TABLET | Refills: 1 | Status: SHIPPED | OUTPATIENT
Start: 2019-12-02 | End: 2020-01-07 | Stop reason: SDUPTHER

## 2019-12-02 RX ORDER — POTASSIUM CHLORIDE 750 MG/1
10 TABLET, FILM COATED, EXTENDED RELEASE ORAL 2 TIMES DAILY
Qty: 180 TABLET | Refills: 1 | Status: SHIPPED | OUTPATIENT
Start: 2019-12-02 | End: 2020-05-22 | Stop reason: SDUPTHER

## 2019-12-09 ENCOUNTER — OFFICE VISIT (OUTPATIENT)
Dept: FAMILY MEDICINE CLINIC | Facility: OTHER | Age: 69
End: 2019-12-09
Payer: COMMERCIAL

## 2019-12-09 VITALS
HEIGHT: 62 IN | TEMPERATURE: 99 F | HEART RATE: 64 BPM | WEIGHT: 245 LBS | BODY MASS INDEX: 45.08 KG/M2 | OXYGEN SATURATION: 98 % | DIASTOLIC BLOOD PRESSURE: 64 MMHG | SYSTOLIC BLOOD PRESSURE: 106 MMHG

## 2019-12-09 DIAGNOSIS — J06.9 VIRAL UPPER RESPIRATORY TRACT INFECTION: Primary | ICD-10-CM

## 2019-12-09 PROCEDURE — 1036F TOBACCO NON-USER: CPT | Performed by: NURSE PRACTITIONER

## 2019-12-09 PROCEDURE — 3008F BODY MASS INDEX DOCD: CPT | Performed by: NURSE PRACTITIONER

## 2019-12-09 PROCEDURE — 99213 OFFICE O/P EST LOW 20 MIN: CPT | Performed by: NURSE PRACTITIONER

## 2019-12-09 PROCEDURE — 1160F RVW MEDS BY RX/DR IN RCRD: CPT | Performed by: NURSE PRACTITIONER

## 2019-12-09 NOTE — PROGRESS NOTES
Assessment/Plan:         Problem List Items Addressed This Visit     None      Visit Diagnoses     Viral upper respiratory tract infection    -  Primary  --Advise rest, fluids, OTC expectorant, cool mist humidifier, Tylenol, saline nasal spray  --Declines Rx cough suppressant  --Monitor blood sugars more frequently while sick  --Call for no improvement/worsening over the next 5-7 days  --UTD with immunizations including flu shot, PVX            Subjective:      Patient ID: Miguel Anders is a 77 y o  female  Patient of Dr Jimy Farley  Here with complaints of scratchy throat, productive cough, nasal congestion, clear rhinorrhea since yesterday  Mildly achy  No headaches  No fever/chills, wheezing, chest pain, dyspnea  Normal appetite, N/V/D, abdominal pain  No known sick contacts  No OTC meds taken  Had flu shot, PVX  Well controlled T2D  Denies history of asthma  Non-smoker  The following portions of the patient's history were reviewed and updated as appropriate: current medications, past family history, past medical history, past social history, past surgical history and problem list     Review of Systems   Constitutional: Negative for fever  HENT: Positive for rhinorrhea and sore throat  Eyes: Negative for visual disturbance  Respiratory: Positive for cough  Negative for shortness of breath and wheezing  Cardiovascular: Negative for chest pain and palpitations  Gastrointestinal: Negative for abdominal pain, blood in stool, constipation, diarrhea, nausea and vomiting  Genitourinary: Negative for difficulty urinating  Skin: Negative  Neurological: Negative for dizziness and headaches  Psychiatric/Behavioral: Negative            Objective:      /64 (BP Location: Left arm, Patient Position: Sitting, Cuff Size: Large)   Pulse 64   Temp 99 °F (37 2 °C) (Tympanic)   Ht 5' 2" (1 575 m)   Wt 111 kg (245 lb)   LMP 01/01/2004   SpO2 98%   BMI 44 81 kg/m² Physical Exam   Constitutional: She is oriented to person, place, and time  She appears well-developed and well-nourished  HENT:   Head: Normocephalic  Right Ear: External ear normal    Left Ear: External ear normal    Turbinates swollen, erythematous  No sinus tenderness  Tonsils 1+, mildly erythematous, without exudate  Eyes: Pupils are equal, round, and reactive to light  Conjunctivae are normal    Neck: Neck supple  Cardiovascular: Normal rate, regular rhythm and normal heart sounds  Pulmonary/Chest: Effort normal and breath sounds normal  She has no wheezes  She has no rales  Breathing easy  RR=16  No cough noted  Abdominal: Soft  Bowel sounds are normal  There is no tenderness  Lymphadenopathy:     She has no cervical adenopathy  Neurological: She is alert and oriented to person, place, and time  She has normal reflexes  Skin: Skin is warm and dry  Psychiatric: She has a normal mood and affect

## 2019-12-09 NOTE — PATIENT INSTRUCTIONS
Upper Respiratory Infection, Ambulatory Care   GENERAL INFORMATION:   An upper respiratory infection  is also called a common cold  It can affect your nose, throat, ears, and sinuses  Common symptoms include the following:   · Runny or stuffy nose    · Sneezing and coughing    · Sore throat or hoarseness    · Red, watery, and sore eyes    · Tiredness or restlessness    · Chills and fever    · Headache, body aches, or sore muscles  Seek immediate care for the following symptoms:   · Headaches or a stiff neck    · Bright lights hurt your eyes    · Chest pain or trouble breathing  Treatment for an upper respiratory infection  may include any of the following:  · Decongestants  help decrease nasal congestion and improve your breathing  Do not use decongestant sprays for more than a few days  · Cough suppressants  help decrease coughing  Ask your healthcare provider which type of cough medicine is best for you  Some cough medicines need a doctor's order  · NSAIDs  help decrease swelling and pain or fever  This medicine is available with or without a doctor's order  NSAIDs can cause stomach bleeding or kidney problems in certain people  If you take blood thinner medicine, always ask your healthcare provider if NSAIDs are safe for you  Always read the medicine label and follow directions  Care for an upper respiratory infection:   · Rest  until your fever is gone or you feel better  · Drink liquids as directed to prevent dehydration  You may need to drink 8 to 10 cups of liquid each day  Good liquids to drink include water, ginger ale, tea, or fruit juices  · Gargle  with warm salt water to help your sore throat feel better  Mix ¼ teaspoon salt with 1 cup warm water  You may also suck on hard candy or throat lozenges  · Saline nasal drops  help loosen your nasal congestion  They can be bought without a doctor's order      · Take a warm bath or shower  to help decrease body aches and help you breathe easier  · Use a cool-mist humidifier  to increase air moisture and make it easier for you to breathe  Prevent the spread of germs:   · Avoid others for the first 2 to 3 days of your cold  Germs are easily spread during this time  · Do not share food, drinks,  towels, or personal items with others  · Wash your hands often  Use soap and water  Wash your hands after you use the bathroom, change a child's diapers, or sneeze  Wash your hands before you prepare or eat food  Cover your mouth and nose with a tissue when you sneeze or cough  Follow up with your healthcare provider as directed:  Write down your questions so you remember to ask them during your visits  CARE AGREEMENT:   You have the right to help plan your care  Learn about your health condition and how it may be treated  Discuss treatment options with your caregivers to decide what care you want to receive  You always have the right to refuse treatment  The above information is an  only  It is not intended as medical advice for individual conditions or treatments  Talk to your doctor, nurse or pharmacist before following any medical regimen to see if it is safe and effective for you  © 2014 [address] is for End User's use only and may not be sold, redistributed or otherwise used for commercial purposes  All illustrations and images included in CareNotes® are the copyrighted property of [employer]  or Thad Gastelum

## 2019-12-10 LAB
LEFT EYE DIABETIC RETINOPATHY: NORMAL
RIGHT EYE DIABETIC RETINOPATHY: NORMAL

## 2019-12-13 DIAGNOSIS — I10 ESSENTIAL HYPERTENSION: Primary | ICD-10-CM

## 2019-12-16 PROCEDURE — 4010F ACE/ARB THERAPY RXD/TAKEN: CPT | Performed by: FAMILY MEDICINE

## 2019-12-16 RX ORDER — RAMIPRIL 2.5 MG/1
2.5 CAPSULE ORAL DAILY
Qty: 90 CAPSULE | Refills: 1 | Status: SHIPPED | OUTPATIENT
Start: 2019-12-16 | End: 2020-12-09 | Stop reason: SDUPTHER

## 2020-01-07 DIAGNOSIS — E78.2 MIXED HYPERLIPIDEMIA: ICD-10-CM

## 2020-01-07 DIAGNOSIS — E03.9 HYPOTHYROIDISM (ACQUIRED): ICD-10-CM

## 2020-01-08 RX ORDER — ATORVASTATIN CALCIUM 80 MG/1
80 TABLET, FILM COATED ORAL DAILY
Qty: 90 TABLET | Refills: 0 | Status: SHIPPED | OUTPATIENT
Start: 2020-01-08 | End: 2020-05-22 | Stop reason: SDUPTHER

## 2020-01-08 RX ORDER — LEVOTHYROXINE SODIUM 112 UG/1
112 TABLET ORAL DAILY
Qty: 90 TABLET | Refills: 0 | Status: SHIPPED | OUTPATIENT
Start: 2020-01-08 | End: 2020-06-16 | Stop reason: SDUPTHER

## 2020-01-22 ENCOUNTER — OFFICE VISIT (OUTPATIENT)
Dept: FAMILY MEDICINE CLINIC | Facility: OTHER | Age: 70
End: 2020-01-22
Payer: COMMERCIAL

## 2020-01-22 VITALS
HEIGHT: 62 IN | WEIGHT: 246.13 LBS | DIASTOLIC BLOOD PRESSURE: 70 MMHG | BODY MASS INDEX: 45.29 KG/M2 | SYSTOLIC BLOOD PRESSURE: 116 MMHG | HEART RATE: 72 BPM | TEMPERATURE: 100.2 F | OXYGEN SATURATION: 97 %

## 2020-01-22 DIAGNOSIS — I10 ESSENTIAL HYPERTENSION: Primary | ICD-10-CM

## 2020-01-22 DIAGNOSIS — J30.9 ALLERGIC RHINITIS, UNSPECIFIED SEASONALITY, UNSPECIFIED TRIGGER: ICD-10-CM

## 2020-01-22 DIAGNOSIS — E78.2 MIXED HYPERLIPIDEMIA: ICD-10-CM

## 2020-01-22 PROCEDURE — 99213 OFFICE O/P EST LOW 20 MIN: CPT | Performed by: NURSE PRACTITIONER

## 2020-01-22 PROCEDURE — 3078F DIAST BP <80 MM HG: CPT | Performed by: NURSE PRACTITIONER

## 2020-01-22 PROCEDURE — 1160F RVW MEDS BY RX/DR IN RCRD: CPT | Performed by: NURSE PRACTITIONER

## 2020-01-22 PROCEDURE — 3074F SYST BP LT 130 MM HG: CPT | Performed by: NURSE PRACTITIONER

## 2020-01-22 PROCEDURE — 3008F BODY MASS INDEX DOCD: CPT | Performed by: NURSE PRACTITIONER

## 2020-01-22 PROCEDURE — 1036F TOBACCO NON-USER: CPT | Performed by: NURSE PRACTITIONER

## 2020-01-22 NOTE — PROGRESS NOTES
Assessment/Plan:         Problem List Items Addressed This Visit     Essential hypertension  --She is currently normotensive, asymptomatic on low dose ACEI + B-blocker  Given her concurrent CAD and moderate aortic stenosis, I am deferring to her cardiologist regarding the appropriateness of stopping her B-blocker  I advised her not to do so unless OK'd by them  She will contact their office for advice  --I did, however, mention that when stopping B-blockers, it is prudent to do so gradually so as not to induce rebound hypertension or exacerbation of underlying cardiac conditions  She verbalized understanding  Allergic rhinitis  --On Xyzal and nasal steroid  Followed by ENT/allergist              Subjective:      Patient ID: Miguel Anders is a 77 y o  female  Patient of Dr Jimy Farley  Here because she would like to discuss stopping her Bystolic  Has been taking this for some time now  Last year, dose was decreased by cardiologist from 5 mg BID to 2 5 mg BID by her cardiologist   She is tolerating this without AE's including fatigue and dizziness, however she sees an allergist for year round environmental allergies  They would like to begin desensitization therapy and want her to be off of B-blocker before doing so  She also takes a low dose ACEI for her blood pressure  Home readings range 915'V-124'I/87'Z-12'W systolic  Saw cardiologist ([facility]) two weeks ago  Asked about stopping B-blocker  Was apparently told to see PCP for this  Other relevant history includes CAD and moderate aortic stenosis  The following portions of the patient's history were reviewed and updated as appropriate: current medications, past family history, past medical history, past social history, past surgical history and problem list     Review of Systems   Constitutional: Negative for fatigue and unexpected weight change  Respiratory: Negative for shortness of breath      Cardiovascular: Negative for chest pain  Neurological: Negative for dizziness  Objective:      /70 (BP Location: Left arm, Patient Position: Sitting, Cuff Size: Large)   Pulse 72   Temp 100 2 °F (37 9 °C) (Tympanic)   Ht 5' 2" (1 575 m)   Wt 112 kg (246 lb 2 oz)   LMP 01/01/2004   SpO2 97%   BMI 45 02 kg/m²          Physical Exam   Constitutional: She is oriented to person, place, and time  She appears well-developed  Cardiovascular: Normal rate and regular rhythm  Murmur heard  Systolic murmur is present with a grade of 2/6  Pulmonary/Chest: Effort normal and breath sounds normal    Musculoskeletal: She exhibits no edema  Neurological: She is alert and oriented to person, place, and time

## 2020-03-23 ENCOUNTER — LAB (OUTPATIENT)
Dept: LAB | Facility: CLINIC | Age: 70
End: 2020-03-23
Payer: COMMERCIAL

## 2020-03-23 DIAGNOSIS — E11.65 TYPE 2 DIABETES MELLITUS WITH HYPERGLYCEMIA, WITHOUT LONG-TERM CURRENT USE OF INSULIN (HCC): ICD-10-CM

## 2020-03-23 DIAGNOSIS — I10 ESSENTIAL HYPERTENSION: ICD-10-CM

## 2020-03-23 DIAGNOSIS — E03.9 HYPOTHYROIDISM, UNSPECIFIED TYPE: ICD-10-CM

## 2020-03-23 LAB
ANION GAP SERPL CALCULATED.3IONS-SCNC: 5 MMOL/L (ref 4–13)
BUN SERPL-MCNC: 19 MG/DL (ref 5–25)
CALCIUM SERPL-MCNC: 8.9 MG/DL (ref 8.3–10.1)
CHLORIDE SERPL-SCNC: 109 MMOL/L (ref 100–108)
CO2 SERPL-SCNC: 28 MMOL/L (ref 21–32)
CREAT SERPL-MCNC: 0.79 MG/DL (ref 0.6–1.3)
EST. AVERAGE GLUCOSE BLD GHB EST-MCNC: 137 MG/DL
GFR SERPL CREATININE-BSD FRML MDRD: 77 ML/MIN/1.73SQ M
GLUCOSE P FAST SERPL-MCNC: 135 MG/DL (ref 65–99)
HBA1C MFR BLD: 6.4 %
POTASSIUM SERPL-SCNC: 4.4 MMOL/L (ref 3.5–5.3)
SODIUM SERPL-SCNC: 142 MMOL/L (ref 136–145)
T4 FREE SERPL-MCNC: 1.2 NG/DL (ref 0.76–1.46)
TSH SERPL DL<=0.05 MIU/L-ACNC: 0.78 UIU/ML (ref 0.36–3.74)

## 2020-03-23 PROCEDURE — 80048 BASIC METABOLIC PNL TOTAL CA: CPT

## 2020-03-23 PROCEDURE — 36415 COLL VENOUS BLD VENIPUNCTURE: CPT

## 2020-03-23 PROCEDURE — 84443 ASSAY THYROID STIM HORMONE: CPT

## 2020-03-23 PROCEDURE — 84439 ASSAY OF FREE THYROXINE: CPT

## 2020-03-23 PROCEDURE — 83036 HEMOGLOBIN GLYCOSYLATED A1C: CPT

## 2020-04-02 ENCOUNTER — TELEMEDICINE (OUTPATIENT)
Dept: ENDOCRINOLOGY | Facility: CLINIC | Age: 70
End: 2020-04-02
Payer: COMMERCIAL

## 2020-04-02 DIAGNOSIS — I10 ESSENTIAL HYPERTENSION: ICD-10-CM

## 2020-04-02 DIAGNOSIS — E11.65 TYPE 2 DIABETES MELLITUS WITH HYPERGLYCEMIA, WITHOUT LONG-TERM CURRENT USE OF INSULIN (HCC): Primary | ICD-10-CM

## 2020-04-02 DIAGNOSIS — E78.5 HYPERLIPIDEMIA, UNSPECIFIED HYPERLIPIDEMIA TYPE: ICD-10-CM

## 2020-04-02 DIAGNOSIS — E03.9 HYPOTHYROIDISM, UNSPECIFIED TYPE: ICD-10-CM

## 2020-04-02 DIAGNOSIS — E04.2 NONTOXIC MULTINODULAR GOITER: ICD-10-CM

## 2020-04-02 PROCEDURE — 99214 OFFICE O/P EST MOD 30 MIN: CPT | Performed by: INTERNAL MEDICINE

## 2020-04-13 ENCOUNTER — TELEMEDICINE (OUTPATIENT)
Dept: FAMILY MEDICINE CLINIC | Facility: OTHER | Age: 70
End: 2020-04-13
Payer: COMMERCIAL
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
## 2020-04-13 DIAGNOSIS — R20.2 PARESTHESIAS IN LEFT HAND: ICD-10-CM
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
## 2020-04-13 DIAGNOSIS — S40.022A ARM CONTUSION, LEFT, INITIAL ENCOUNTER: ICD-10-CM

## 2020-04-13 DIAGNOSIS — S60.212A CONTUSION OF LEFT WRIST, INITIAL ENCOUNTER: ICD-10-CM

## 2020-04-13 PROBLEM — E66.01 SEVERE OBESITY (BMI >= 40) (HCC): Status: ACTIVE | Noted: 2019-09-17

## 2020-04-13 PROBLEM — I35.9 AORTIC VALVE DISORDER: Status: ACTIVE | Noted: 2019-09-17

## 2020-04-13 PROBLEM — I25.10 CORONARY ARTERY DISEASE INVOLVING NATIVE CORONARY ARTERY OF NATIVE HEART WITHOUT ANGINA PECTORIS: Status: ACTIVE | Noted: 2019-09-17

## 2020-04-13 PROCEDURE — 99213 OFFICE O/P EST LOW 20 MIN: CPT | Performed by: FAMILY MEDICINE

## 2020-04-28 ENCOUNTER — TELEMEDICINE (OUTPATIENT)
Dept: FAMILY MEDICINE CLINIC | Facility: OTHER | Age: 70
End: 2020-04-28
Payer: COMMERCIAL
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
## 2020-04-28 DIAGNOSIS — S60.212D CONTUSION OF LEFT WRIST, SUBSEQUENT ENCOUNTER: ICD-10-CM

## 2020-04-28 DIAGNOSIS — S40.022D ARM CONTUSION, LEFT, SUBSEQUENT ENCOUNTER: ICD-10-CM
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
## 2020-04-28 PROCEDURE — 99441 PR PHYS/QHP TELEPHONE EVALUATION 5-10 MIN: CPT | Performed by: FAMILY MEDICINE

## 2020-05-04 DIAGNOSIS — E11.59 TYPE 2 DIABETES MELLITUS WITH OTHER CIRCULATORY COMPLICATION, WITHOUT LONG-TERM CURRENT USE OF INSULIN (HCC): ICD-10-CM

## 2020-05-04 RX ORDER — LANCETS 33 GAUGE
EACH MISCELLANEOUS
Qty: 90 EACH | Refills: 3 | Status: SHIPPED | OUTPATIENT
Start: 2020-05-04 | End: 2020-05-06 | Stop reason: SDUPTHER

## 2020-05-04 RX ORDER — LANCETS 33 GAUGE
EACH MISCELLANEOUS DAILY
Qty: 90 EACH | Refills: 3 | Status: SHIPPED | OUTPATIENT
Start: 2020-05-04 | End: 2020-05-04 | Stop reason: SDUPTHER

## 2020-05-06 RX ORDER — LANCETS 33 GAUGE
EACH MISCELLANEOUS
Qty: 90 EACH | Refills: 3 | Status: SHIPPED | OUTPATIENT
Start: 2020-05-06 | End: 2021-06-02 | Stop reason: SDUPTHER

## 2020-05-22 DIAGNOSIS — I10 ESSENTIAL HYPERTENSION: ICD-10-CM

## 2020-05-22 DIAGNOSIS — E78.2 MIXED HYPERLIPIDEMIA: ICD-10-CM

## 2020-05-26 RX ORDER — ATORVASTATIN CALCIUM 80 MG/1
80 TABLET, FILM COATED ORAL DAILY
Qty: 90 TABLET | Refills: 0 | Status: SHIPPED | OUTPATIENT
Start: 2020-05-26 | End: 2020-07-31 | Stop reason: SDUPTHER

## 2020-05-26 RX ORDER — POTASSIUM CHLORIDE 750 MG/1
10 TABLET, FILM COATED, EXTENDED RELEASE ORAL 2 TIMES DAILY
Qty: 180 TABLET | Refills: 0 | Status: SHIPPED | OUTPATIENT
Start: 2020-05-26 | End: 2020-07-31 | Stop reason: SDUPTHER

## 2020-05-27 ENCOUNTER — TELEPHONE (OUTPATIENT)
Dept: FAMILY MEDICINE CLINIC | Facility: OTHER | Age: 70
End: 2020-05-27

## 2020-05-27 RX ORDER — GATIFLOXACIN 5 MG/ML
SOLUTION/ DROPS OPHTHALMIC
COMMUNITY
Start: 2020-05-22 | End: 2020-09-10

## 2020-05-27 RX ORDER — LOTEPREDNOL ETABONATE 10 MG/ML
SUSPENSION TOPICAL
COMMUNITY
Start: 2020-05-22 | End: 2020-09-10

## 2020-05-28 ENCOUNTER — CONSULT (OUTPATIENT)
Dept: FAMILY MEDICINE CLINIC | Facility: OTHER | Age: 70
End: 2020-05-28
Payer: COMMERCIAL

## 2020-05-28 VITALS
HEIGHT: 62 IN | TEMPERATURE: 97.7 F | HEART RATE: 72 BPM | WEIGHT: 247.38 LBS | BODY MASS INDEX: 45.52 KG/M2 | DIASTOLIC BLOOD PRESSURE: 78 MMHG | SYSTOLIC BLOOD PRESSURE: 124 MMHG | OXYGEN SATURATION: 97 %

## 2020-05-28 DIAGNOSIS — Z01.818 PREOP GENERAL PHYSICAL EXAM: Primary | ICD-10-CM

## 2020-05-28 PROCEDURE — 3044F HG A1C LEVEL LT 7.0%: CPT | Performed by: FAMILY MEDICINE

## 2020-05-28 PROCEDURE — 3074F SYST BP LT 130 MM HG: CPT | Performed by: FAMILY MEDICINE

## 2020-05-28 PROCEDURE — 99214 OFFICE O/P EST MOD 30 MIN: CPT | Performed by: FAMILY MEDICINE

## 2020-05-28 PROCEDURE — 3078F DIAST BP <80 MM HG: CPT | Performed by: FAMILY MEDICINE

## 2020-05-28 PROCEDURE — 1160F RVW MEDS BY RX/DR IN RCRD: CPT | Performed by: FAMILY MEDICINE

## 2020-05-29 ENCOUNTER — TELEPHONE (OUTPATIENT)
Dept: FAMILY MEDICINE CLINIC | Facility: OTHER | Age: 70
End: 2020-05-29

## 2020-06-16 DIAGNOSIS — E03.9 HYPOTHYROIDISM (ACQUIRED): ICD-10-CM

## 2020-06-17 RX ORDER — LEVOTHYROXINE SODIUM 112 UG/1
112 TABLET ORAL DAILY
Qty: 90 TABLET | Refills: 1 | Status: SHIPPED | OUTPATIENT
Start: 2020-06-17 | End: 2020-09-18 | Stop reason: SDUPTHER

## 2020-07-20 DIAGNOSIS — E78.2 MIXED HYPERLIPIDEMIA: ICD-10-CM

## 2020-07-21 RX ORDER — POTASSIUM CHLORIDE 750 MG/1
TABLET, EXTENDED RELEASE ORAL
Qty: 180 TABLET | OUTPATIENT
Start: 2020-07-21

## 2020-07-21 RX ORDER — ATORVASTATIN CALCIUM 80 MG/1
TABLET, FILM COATED ORAL
Qty: 90 TABLET | Refills: 0 | OUTPATIENT
Start: 2020-07-21

## 2020-07-31 ENCOUNTER — OFFICE VISIT (OUTPATIENT)
Dept: FAMILY MEDICINE CLINIC | Facility: OTHER | Age: 70
End: 2020-07-31
Payer: COMMERCIAL

## 2020-07-31 VITALS
HEART RATE: 71 BPM | RESPIRATION RATE: 18 BRPM | OXYGEN SATURATION: 95 % | DIASTOLIC BLOOD PRESSURE: 84 MMHG | SYSTOLIC BLOOD PRESSURE: 138 MMHG | BODY MASS INDEX: 46.74 KG/M2 | HEIGHT: 62 IN | WEIGHT: 254 LBS | TEMPERATURE: 98.4 F

## 2020-07-31 DIAGNOSIS — I10 ESSENTIAL HYPERTENSION: ICD-10-CM

## 2020-07-31 DIAGNOSIS — E11.59 TYPE 2 DIABETES MELLITUS WITH OTHER CIRCULATORY COMPLICATION, WITHOUT LONG-TERM CURRENT USE OF INSULIN (HCC): Primary | ICD-10-CM

## 2020-07-31 DIAGNOSIS — E78.2 MIXED HYPERLIPIDEMIA: ICD-10-CM

## 2020-07-31 LAB
CREAT UR-MCNC: 26.4 MG/DL
MICROALBUMIN UR-MCNC: <5 MG/L (ref 0–20)
MICROALBUMIN/CREAT 24H UR: <19 MG/G CREATININE (ref 0–30)
SL AMB POCT HEMOGLOBIN AIC: 6.7 (ref ?–6.5)

## 2020-07-31 PROCEDURE — 3044F HG A1C LEVEL LT 7.0%: CPT | Performed by: PHYSICIAN ASSISTANT

## 2020-07-31 PROCEDURE — 82570 ASSAY OF URINE CREATININE: CPT | Performed by: FAMILY MEDICINE

## 2020-07-31 PROCEDURE — 99213 OFFICE O/P EST LOW 20 MIN: CPT | Performed by: FAMILY MEDICINE

## 2020-07-31 PROCEDURE — 3079F DIAST BP 80-89 MM HG: CPT | Performed by: FAMILY MEDICINE

## 2020-07-31 PROCEDURE — 3008F BODY MASS INDEX DOCD: CPT | Performed by: PHYSICIAN ASSISTANT

## 2020-07-31 PROCEDURE — 83036 HEMOGLOBIN GLYCOSYLATED A1C: CPT | Performed by: FAMILY MEDICINE

## 2020-07-31 PROCEDURE — 3044F HG A1C LEVEL LT 7.0%: CPT | Performed by: FAMILY MEDICINE

## 2020-07-31 PROCEDURE — 1036F TOBACCO NON-USER: CPT | Performed by: FAMILY MEDICINE

## 2020-07-31 PROCEDURE — 82043 UR ALBUMIN QUANTITATIVE: CPT | Performed by: FAMILY MEDICINE

## 2020-07-31 PROCEDURE — 1160F RVW MEDS BY RX/DR IN RCRD: CPT | Performed by: FAMILY MEDICINE

## 2020-07-31 PROCEDURE — 4040F PNEUMOC VAC/ADMIN/RCVD: CPT | Performed by: FAMILY MEDICINE

## 2020-07-31 PROCEDURE — 3075F SYST BP GE 130 - 139MM HG: CPT | Performed by: FAMILY MEDICINE

## 2020-07-31 RX ORDER — ATORVASTATIN CALCIUM 80 MG/1
80 TABLET, FILM COATED ORAL DAILY
Qty: 90 TABLET | Refills: 0 | Status: SHIPPED | OUTPATIENT
Start: 2020-07-31 | End: 2020-09-23 | Stop reason: SDUPTHER

## 2020-07-31 RX ORDER — POTASSIUM CHLORIDE 750 MG/1
10 TABLET, FILM COATED, EXTENDED RELEASE ORAL 2 TIMES DAILY
Qty: 180 TABLET | Refills: 0 | Status: SHIPPED | OUTPATIENT
Start: 2020-07-31 | End: 2020-09-23 | Stop reason: SDUPTHER

## 2020-07-31 NOTE — PROGRESS NOTES
Assessment/Plan:     Diagnoses and all orders for this visit:    Type 2 diabetes mellitus with other circulatory complication, without long-term current use of insulin ([facility])  -     POCT hemoglobin A1c  -     Microalbumin / creatinine urine ratio    Essential hypertension    Other orders  -     Cancel: POCT urine microalbumin/creatinine        BMI Counseling: Body mass index is 46 46 kg/m²  The BMI is above normal  Nutrition recommendations include reducing portion sizes, decreasing overall calorie intake, 3-5 servings of fruits/vegetables daily, reducing fast food intake, consuming healthier snacks, decreasing soda and/or juice intake, moderation in carbohydrate intake, increasing intake of lean protein, reducing intake of saturated fat and trans fat and reducing intake of cholesterol  Exercise recommendations include moderate aerobic physical activity for 150 minutes/week and strength training exercises  Patient is an obese, but otherwise well appearing 72year old women who presents for follow up of T2DM and HTN  Patient's A1C has increased from 6 4 in March 2020 to 6 7 today  Patient advised to seriously limit sugars/refined carbohydrates in her diet  She will continue on previously prescribed dose of Invokana for now and follow up with Endocrinologist next week  Return in about 6 weeks (around 9/10/2020) for AWV  The patient indicates understanding of these issues and agrees with the plan  Subjective:      Patient ID: Khushboo Courtney is a 72 y o  female  HPI   Patient with past medical history significant for Type 2 Diabetes Mellitus and Hypertension presents for follow up of both conditions  Patient reports that her fasting blood glucose is usually between 120-140  She reports that her post-prandial blood glucose is usually between 160-180  Patient reports that she is compliant with taking her Invokana daily  She denies hypoglycemic symptoms   She reports that her diet is mostly healthy, but she consumes sweets like ice cream a few times a week  She reports that she has been more physically active over the past few weeks, but denies weight changes  Patient's A1C in March of 2020 was 6 4  Patient's POCT hemoglobin A1C has increased and is 6 7  She reports that she has an upcoming appointment with her Endocrinologist on 8/6/2020  In regards to patient's Hypertension, she reports that she monitors her blood pressure daily at various times  She reports her SBP is usually between 115-125 and DBP is usually between 65-75  She reports that she is compliant with daily antihypertensives  Blood pressure today is adequate at 138/84  She denies headaches, chest pain, leg swelling, shortness of breath, peripheral neuropathy and vision changes  The following portions of the patient's history were reviewed and updated as appropriate: allergies, current medications, past family history, past medical history, past social history, past surgical history and problem list     Review of Systems      Objective:  /84   Pulse 71   Temp 98 4 °F (36 9 °C)   Resp 18   Ht 5' 2"   Wt 115 kg (254 lb)   LMP 01/01/2004   SpO2 95%   BMI 46 46 kg/m²          Physical Exam   Constitutional: She is oriented to person, place, and time  She appears well-developed  No distress  HENT:   Head: Normocephalic and atraumatic  Right Ear: External ear normal    Left Ear: External ear normal    Nose: Nose normal    Eyes: Conjunctivae are normal  Right eye exhibits no discharge  Left eye exhibits no discharge  No scleral icterus  Neck: Normal range of motion  Neck supple  Cardiovascular: Normal rate, regular rhythm and normal heart sounds  Pulses are no weak pulses  Pulses:       Dorsalis pedis pulses are 2+ on the right side and 2+ on the left side  Posterior tibial pulses are 2+ on the right side and 2+ on the left side  Pulmonary/Chest: Effort normal and breath sounds normal  No stridor   No respiratory distress  She has no wheezes  She has no rales  Abdominal: Soft  Normal appearance and bowel sounds are normal  She exhibits no distension and no mass  There is no abdominal tenderness  There is no rebound and no guarding  Musculoskeletal: Normal range of motion  General: No swelling, tenderness or deformity  Right lower leg: No edema  Left lower leg: No edema  Feet:   Right Foot:   Skin Integrity: Negative for ulcer, skin breakdown, erythema, warmth, callus or dry skin  Left Foot:   Skin Integrity: Negative for ulcer, skin breakdown, erythema, warmth, callus or dry skin  Neurological: She is alert and oriented to person, place, and time  She displays no weakness and normal reflexes  No cranial nerve deficit or sensory deficit  She exhibits normal muscle tone  Coordination and gait normal    Skin: Skin is warm and dry  She is not diaphoretic  Psychiatric: Her behavior is normal  Mood, judgment and thought content normal          Patient's shoes and socks removed  Right Foot/Ankle   Right Foot Inspection  Skin Exam: skin normal and skin intact no dry skin, no warmth, no callus, no erythema, no maceration, no abnormal color, no pre-ulcer, no ulcer and no callus                          Toe Exam: ROM and strength within normal limitsno swelling, no tenderness, erythema and  no right toe deformity  Sensory   Vibration: intact  Proprioception: intact   Monofilament testing: intact  Vascular  Capillary refills: < 3 seconds  The right DP pulse is 2+  The right PT pulse is 2+       Left Foot/Ankle  Left Foot Inspection  Skin Exam: skin normal and skin intactno dry skin, no warmth, no erythema, no maceration, normal color, no pre-ulcer, no ulcer and no callus                         Toe Exam: ROM and strength within normal limitsno swelling, no tenderness, no erythema and no left toe deformity                   Sensory   Vibration: intact  Proprioception: intact  Monofilament: intact  Vascular  Capillary refills: < 3 seconds  The left DP pulse is 2+  The left PT pulse is 2+  Assign Risk Category:  No deformity present; No loss of protective sensation;  No weak pulses       Risk: 0

## 2020-08-05 PROBLEM — E11.65 TYPE 2 DIABETES MELLITUS WITH HYPERGLYCEMIA, WITHOUT LONG-TERM CURRENT USE OF INSULIN (HCC): Status: ACTIVE | Noted: 2019-01-22

## 2020-08-06 ENCOUNTER — TELEMEDICINE (OUTPATIENT)
Dept: ENDOCRINOLOGY | Facility: CLINIC | Age: 70
End: 2020-08-06
Payer: COMMERCIAL

## 2020-08-06 DIAGNOSIS — E04.2 NONTOXIC MULTINODULAR GOITER: ICD-10-CM

## 2020-08-06 DIAGNOSIS — E03.9 ACQUIRED HYPOTHYROIDISM: ICD-10-CM

## 2020-08-06 DIAGNOSIS — I10 ESSENTIAL HYPERTENSION: ICD-10-CM

## 2020-08-06 DIAGNOSIS — E11.65 TYPE 2 DIABETES MELLITUS WITH HYPERGLYCEMIA, WITHOUT LONG-TERM CURRENT USE OF INSULIN (HCC): Primary | ICD-10-CM

## 2020-08-06 PROCEDURE — 3075F SYST BP GE 130 - 139MM HG: CPT | Performed by: PHYSICIAN ASSISTANT

## 2020-08-06 PROCEDURE — 3079F DIAST BP 80-89 MM HG: CPT | Performed by: PHYSICIAN ASSISTANT

## 2020-08-06 PROCEDURE — 1160F RVW MEDS BY RX/DR IN RCRD: CPT | Performed by: PHYSICIAN ASSISTANT

## 2020-08-06 PROCEDURE — 3044F HG A1C LEVEL LT 7.0%: CPT | Performed by: PHYSICIAN ASSISTANT

## 2020-08-06 PROCEDURE — 99214 OFFICE O/P EST MOD 30 MIN: CPT | Performed by: PHYSICIAN ASSISTANT

## 2020-08-06 PROCEDURE — 1036F TOBACCO NON-USER: CPT | Performed by: PHYSICIAN ASSISTANT

## 2020-08-06 PROCEDURE — 4040F PNEUMOC VAC/ADMIN/RCVD: CPT | Performed by: PHYSICIAN ASSISTANT

## 2020-08-06 NOTE — PATIENT INSTRUCTIONS
Hypoglycemia instructions   Blayne Vannesa  8/6/2020  [id number]    Low Blood Sugar    Steps to treat low blood sugar  1  Test blood sugar if you have symptoms of low blood sugar:   Low Blood Sugar Symptoms:  o Sweaty  o Dizzy  o Rapid heartbeat  o Shaky  o Bad mood  o Hungry      2  Treat blood sugar less than 70 with 15 grams of fast-acting carbohydrate:   Examples of 15 grams Fast-Acting Carbohydrate:  o 4 oz juice  o 4 oz regular soda  o 3-4 glucose tablets (chew)  o 3-4 hard candies (chew)          3  Wait 15 minutes and test your blood sugar again     4   If blood sugar is less than 100, repeat steps 2-3     5  When your blood sugar is 100 or more, eat a snack if it will be longer than one hour until your next meal  The snack should be 15 grams of carbohydrate and a protein:   Examples of snacks:  o ½ sandwich  o 6 crackers with cheese  o Piece of fruit with cheese or peanut butter  o 6 crackers with peanut butter

## 2020-08-12 DIAGNOSIS — I10 ESSENTIAL HYPERTENSION: Primary | ICD-10-CM

## 2020-08-12 RX ORDER — NEBIVOLOL HYDROCHLORIDE 5 MG/1
TABLET ORAL
Qty: 90 TABLET | Refills: 3 | Status: SHIPPED | OUTPATIENT
Start: 2020-08-12 | End: 2021-07-02

## 2020-09-10 ENCOUNTER — ANNUAL EXAM (OUTPATIENT)
Dept: OBGYN CLINIC | Facility: CLINIC | Age: 70
End: 2020-09-10
Payer: COMMERCIAL

## 2020-09-10 VITALS
TEMPERATURE: 98 F | WEIGHT: 249 LBS | DIASTOLIC BLOOD PRESSURE: 78 MMHG | HEIGHT: 63 IN | SYSTOLIC BLOOD PRESSURE: 134 MMHG | BODY MASS INDEX: 44.12 KG/M2

## 2020-09-10 DIAGNOSIS — E11.9 TYPE 2 DIABETES MELLITUS WITHOUT COMPLICATION, WITHOUT LONG-TERM CURRENT USE OF INSULIN (HCC): Primary | ICD-10-CM

## 2020-09-10 DIAGNOSIS — Z12.39 BREAST CANCER SCREENING: Primary | ICD-10-CM

## 2020-09-10 DIAGNOSIS — Z01.419 ENCOUNTER FOR GYNECOLOGICAL EXAMINATION (GENERAL) (ROUTINE) WITHOUT ABNORMAL FINDINGS: Primary | ICD-10-CM

## 2020-09-10 DIAGNOSIS — E11.59 TYPE 2 DIABETES MELLITUS WITH OTHER CIRCULATORY COMPLICATION, WITHOUT LONG-TERM CURRENT USE OF INSULIN (HCC): Primary | ICD-10-CM

## 2020-09-10 PROCEDURE — G0101 CA SCREEN;PELVIC/BREAST EXAM: HCPCS | Performed by: PHYSICIAN ASSISTANT

## 2020-09-10 RX ORDER — CETIRIZINE HYDROCHLORIDE 10 MG/1
10 TABLET ORAL DAILY
COMMUNITY
End: 2021-09-23

## 2020-09-10 NOTE — PROGRESS NOTES
Assessment/Plan   Diagnoses and all orders for this visit:    Encounter for gynecological examination (general) (routine) without abnormal findings  The current ASCCP guidelines were reviewed  Patient's last pap was 3/4/19 - WNL (-) HRHPV type 16/18 negative and therefore, a pap with HPV cotesting is not indicated at this time and reviewed based on history and guidelines - can discontinue pap smear screening  I emphasized the importance of pelvic and breast exam  Patient ok to discontinue pap smear screening  Discussion  I have discussed the importance of monthly self-breast exams, exercise and healthy diet as well as adequate intake of calcium and vitamin D  Encourage at least 1200 mg calcium citrate + 2000 IUs vitamin D3 divided through diet and supplement throughout the day; Encourage 30-40 min weight bearing exercise most days of week  STD testing - declines  A yearly mammogram is recommended for breast cancer screening starting at age 36 - patient has scheduled and order in [address]; In addition, colon cancer screening with a colonoscopy is recommended starting at age 48 and reviewed benefits - patient is up to date with screening  I have reviewed the patient's risk factors for osteoporosis and ordered a dexa scan if applicable  Pt to check with insurance for coverage - patient is up to date with screening  All questions have been answered to her satisfaction  RTO for APE or sooner if needed    Subjective     HPI   Khushboo Courtney is a 72 y o  female who presents for annual well woman exam    Menarche - 16; LMP - 2004; Denies  bleeding  No vulvar itch/burn; No vaginal itch/burn; No abn discharge or odor; No urinary sx - burning/pain/frequency/hematuria; (+) urge incontinence - encourage follow-up with urologist for urodynamic testing  (+) SBEs - no breast masses, asymmetry, nipple discharge or bleeding, changes in skin of breast, or breast tenderness bilaterally  No abd/pelvic pain or HAs;    No menopausal symptoms: No hot flashes/night sweats, problems with intercourse, vaginal dryness; sleeping well  Pt is not sexually active; No issues with intercourse; She declines std/hiv/hep testing; Feels safe at home  (+) PCP for routine Bw/care; Last Pap - 3/4/19 - WNL (-) HRHPV type 16/18 negative  History of abnormal Pap smear: no  Last mammo - 19 - B/L diagnostic mammo - Birads 2 benign - B/L screen in 1 year  History of abnormal mammogram: yes - all benign in nature  Last colonoscopy - about 5 years with Dr Amanda Roberto on [place] [address]  Last Dexa scan - 10/8/19 - WNL    Review of Systems   Constitutional: Negative for activity change, fatigue, fever and unexpected weight change  HENT: Negative for congestion, dental problem, sinus pressure and sinus pain  Eyes: Negative for visual disturbance  Respiratory: Negative for cough, shortness of breath and wheezing  Cardiovascular: Negative for chest pain and leg swelling  Gastrointestinal: Negative for abdominal distention, abdominal pain, blood in stool, constipation, diarrhea, nausea and vomiting  Endocrine: Negative for polydipsia  Genitourinary: Negative for difficulty urinating, dyspareunia, dysuria, frequency, hematuria, menstrual problem, pelvic pain, urgency, vaginal bleeding, vaginal discharge and vaginal pain  Musculoskeletal: Negative for arthralgias and back pain  Allergic/Immunologic: Negative for environmental allergies  Neurological: Negative for dizziness, seizures and headaches  Psychiatric/Behavioral: Negative for dysphoric mood and sleep disturbance  The patient is not nervous/anxious          The following portions of the patient's history were reviewed and updated as appropriate: allergies, current medications, past family history, past medical history, past social history, past surgical history and problem list          OB History        0    Para   0    Term   0       0    AB   0    Living   0 SAB   0    TAB   0    Ectopic   0    Multiple   0    Live Births   0                 Past Medical History:   Diagnosis Date    Allergic rhinitis     Allergies     Anemia     Anxiety     Chronic constipation     Diabetes ([facility] [address]     Diabetes mellitus ([facility] )     GERD (gastroesophageal reflux disease)     Hyperlipidemia     Hypertension     Hypothyroidism     Obesity     Obesity     Restless legs     Vitamin D deficiency        Past Surgical History:   Procedure Laterality Date    BREAST BIOPSY Right 2001    BREAST LUMPECTOMY Right     BREAST LUMPECTOMY Right 01/01/2001    Dr Nakul Levine, BILATERAL      CHOLECYSTECTOMY      COLONOSCOPY  06/01/2018    goes q 5 yr-due in Summer 2018-Dr Marciano Guy  2015    Dr Carlos Betts  01/01/2000    Dr Teo Agudelo       Family History   Problem Relation Age of Onset    Multiple sclerosis Brother     Multiple sclerosis Paternal Uncle     Hypertension Father     Diabetes Father     Heart disease Father     Multiple sclerosis Family     Heart disease Daughter        Social History     Socioeconomic History    Marital status: Single     Spouse name: Not on file    Number of children: Not on file    Years of education: Not on file    Highest education level: Not on file   Occupational History    Not on file   Social Needs    Financial resource strain: Not on file    Food insecurity     Worry: Not on file     Inability: Not on file    Transportation needs     Medical: Not on file     Non-medical: Not on file   Tobacco Use    Smoking status: Never Smoker    Smokeless tobacco: Never Used   Substance and Sexual Activity    Alcohol use: Yes     Frequency: Monthly or less     Comment: occasional    Drug use: No    Sexual activity: Not Currently   Lifestyle    Physical activity     Days per week: Not on file     Minutes per session: Not on file    Stress:  Only a little   Relationships    Social connections Talks on phone: Not on file     Gets together: Not on file     Attends Orthodox service: Not on file     Active member of club or organization: Not on file     Attends meetings of clubs or organizations: Not on file     Relationship status: Not on file    Intimate partner violence     Fear of current or ex partner: Not on file     Emotionally abused: Not on file     Physically abused: Not on file     Forced sexual activity: Not on file   Other Topics Concern    Not on file   Social History Narrative    · Most recent tobacco use screenin2018      · Do you currently or have you served in [employer]:   No      · Were you activated, into active duty, as a member of the [employer] or as a Reservist:   No      · Advance directive:   No      · Live alone or with others:   alone       · Caffeine intake:    Moderate, 2 cups daily          Current Outpatient Medications:     ascorbic acid (VITAMIN C) 500 mg tablet, Take 500 mg by mouth daily, Disp: , Rfl:     aspirin (ECOTRIN LOW STRENGTH) 81 mg EC tablet, Take 81 mg by mouth daily, Disp: , Rfl:     atorvastatin (LIPITOR) 80 mg tablet, Take 1 tablet (80 mg total) by mouth daily, Disp: 90 tablet, Rfl: 0    Bystolic 5 MG tablet, TAKE 1 TABLET BY MOUTH  DAILY, Disp: 90 tablet, Rfl: 3    cetirizine (ZyrTEC) 10 mg tablet, Take 10 mg by mouth daily, Disp: , Rfl:     Cholecalciferol (VITAMIN D3) 1000 units CAPS, Take 1,000 Units by mouth, Disp: , Rfl:     famotidine-calcium carbonate-magnesium hydroxide (PEPCID COMPLETE) -165 MG CHEW, Chew 1 tablet daily as needed for heartburn, Disp: , Rfl:     fluticasone (FLONASE) 50 mcg/act nasal spray, 1 spray into each nostril daily, Disp: , Rfl:     INVOKANA 300 MG TABS, Take 300 mg by mouth daily , Disp: , Rfl:     levothyroxine 112 mcg tablet, Take 1 tablet (112 mcg total) by mouth daily, Disp: 90 tablet, Rfl: 1    Magnesium Cl-Calcium Carbonate (SLOW-MAG PO), Take by mouth, Disp: , Rfl:    multivitamin (THERAGRAN) TABS, Take 1 tablet by mouth daily, Disp: , Rfl:     Omega-3 Fatty Acids (FISH OIL) 1200 MG CPDR, 1200mg two  tablets, Disp: , Rfl:     ONE TOUCH ULTRA TEST test strip, 1 each by Other route daily Test sugars twice every other day, Disp: 90 each, Rfl: 3    OneTouch Delica Lancets 28M MISC, Use to check sugars twice every other day, Disp: 90 each, Rfl: 3    potassium chloride (K-DUR) 10 mEq tablet, Take 1 tablet (10 mEq total) by mouth 2 (two) times a day, Disp: 180 tablet, Rfl: 0    pramipexole (MIRAPEX) 0 25 mg tablet, Take 0 25 mg by mouth daily , Disp: , Rfl:     ramipril (ALTACE) 2 5 mg capsule, Take 1 capsule (2 5 mg total) by mouth daily, Disp: 90 capsule, Rfl: 1    Allergies   Allergen Reactions    Other      Seasonal, dust, Cat dander, mold    Penicillins        Objective   Vitals:    09/10/20 1037   BP: 134/78   BP Location: Left arm   Patient Position: Sitting   Cuff Size: Large   Temp: 98 °F (36 7 °C)   Weight: 113 kg (249 lb)   Height: 5' 3" (1 6 m)     Physical Exam  Vitals signs reviewed  Constitutional:       General: She is awake  She is not in acute distress  Appearance: Normal appearance  She is well-developed and well-groomed  She is not diaphoretic  Eyes:      Conjunctiva/sclera: Conjunctivae normal    Neck:      Thyroid: No thyroid mass, thyromegaly or thyroid tenderness  Cardiovascular:      Rate and Rhythm: Normal rate and regular rhythm  Heart sounds: Normal heart sounds  No murmur  Pulmonary:      Effort: Pulmonary effort is normal  No tachypnea, bradypnea or respiratory distress  Breath sounds: Normal breath sounds  No stridor or decreased air movement  No wheezing  Chest:      Breasts: Breasts are symmetrical          Right: Normal  No swelling, bleeding, inverted nipple, mass, nipple discharge, skin change or tenderness  Left: Normal  No swelling, bleeding, inverted nipple, mass, nipple discharge, skin change or tenderness  Abdominal:      General: There is no distension  Palpations: Abdomen is soft  There is no hepatomegaly, splenomegaly or mass  Tenderness: There is no abdominal tenderness  Hernia: No hernia is present  There is no hernia in the left inguinal area or right inguinal area  Genitourinary:     General: Normal vulva  Exam position: Supine  Pubic Area: No rash or pubic lice  Labia:         Right: No rash, tenderness, lesion or injury  Left: No rash, tenderness, lesion or injury  Urethra: No prolapse, urethral pain, urethral swelling or urethral lesion  Vagina: Normal  No signs of injury and foreign body  No vaginal discharge, erythema, tenderness, bleeding, lesions or prolapsed vaginal walls  Cervix: No cervical motion tenderness, discharge, friability, lesion, erythema or cervical bleeding  Uterus: Not deviated, not enlarged, not fixed, not tender and no uterine prolapse  Adnexa:         Right: No mass, tenderness or fullness  Left: No mass, tenderness or fullness  Rectum: Normal  Guaiac result negative  No mass, tenderness, anal fissure, external hemorrhoid or internal hemorrhoid  Normal anal tone  Comments: Moderate/severe vaginal atrophy noted  Lymphadenopathy:      Cervical: No cervical adenopathy  Upper Body:      Right upper body: No supraclavicular or axillary adenopathy  Left upper body: No supraclavicular or axillary adenopathy  Lower Body: No right inguinal adenopathy  No left inguinal adenopathy  Skin:     General: Skin is warm and dry  Neurological:      Mental Status: She is alert and oriented to person, place, and time  Psychiatric:         Mood and Affect: Mood and affect normal          Speech: Speech normal          Behavior: Behavior normal  Behavior is cooperative  Thought Content:  Thought content normal          Judgment: Judgment normal

## 2020-09-10 NOTE — ASSESSMENT & PLAN NOTE
The current ASCCP guidelines were reviewed  Patient's last pap was 3/4/19 - WNL (-) HRHPV type 16/18 negative and therefore, a pap with HPV cotesting is not indicated at this time and reviewed based on history and guidelines - can discontinue pap smear screening  I emphasized the importance of pelvic and breast exam  Patient ok to discontinue pap smear screening

## 2020-09-11 RX ORDER — CANAGLIFLOZIN 300 MG/1
TABLET, FILM COATED ORAL
Qty: 90 TABLET | Refills: 3 | Status: SHIPPED | OUTPATIENT
Start: 2020-09-11 | End: 2021-08-18 | Stop reason: SDUPTHER

## 2020-09-16 NOTE — PROGRESS NOTES
Savannah Gregg is here for her Subsequent Wellness visit  Last Medicare Wellness visit information reviewed, patient interviewed and updates made to the record today  Health Risk Assessment:   Patient rates overall health as excellent  Patient feels that their physical health rating is much better  Eyesight was rated as same  Hearing was rated as same  Patient feels that their emotional and mental health rating is much better  Pain experienced in the last 7 days has been some  Patient's pain rating has been 6/10  Patient states that she has experienced no weight loss or gain in last 6 months  Fall Risk Screening: In the past year, patient has experienced: history of falling in past year    Injured during fall?: No    Feels unsteady when standing or walking?: No    Worried about falling?: No      Urinary Incontinence Screening:   Patient has leaked urine accidently in the last six months  Sx predominantly Urge-related  Has not tried anything to relieve sx  Home Safety:  Patient has trouble with stairs inside or outside of their home  Patient has no working smoke alarms and has working carbon monoxide detector  Home safety hazards include: none  Nutrition:   Current diet is Diabetic  Medications:   Patient is currently taking over-the-counter supplements  OTC medications include: fish oil, vit c&d, multi vit , slow mag,  Patient is able to manage medications  Activities of Daily Living (ADLs)/Instrumental Activities of Daily Living (IADLs):   Walk and transfer into and out of bed and chair?: Yes  Dress and groom yourself?: Yes    Bathe or shower yourself?: Yes    Feed yourself?  Yes  Do your laundry/housekeeping?: Yes  Manage your money, pay your bills and track your expenses?: Yes  Make your own meals?: Yes    Do your own shopping?: Yes    Previous Hospitalizations:   Any hospitalizations or ED visits within the last 12 months?: No      Advance Care Planning:   Living will: Yes    Durable POA for healthcare: Yes    Advanced directive: Yes      Cognitive Screening:   Provider or family/friend/caregiver concerned regarding cognition?: No    PREVENTIVE SCREENINGS      Cardiovascular Screening:    General: Screening Not Indicated and History Lipid Disorder      Diabetes Screening:     General: Screening Not Indicated and History Diabetes      Colorectal Cancer Screening:     General: Screening Current      Breast Cancer Screening:     General: Screening Current      Cervical Cancer Screening:    General: Screening Not Indicated      Osteoporosis Screening:    General: Screening Current      Abdominal Aortic Aneurysm (AAA) Screening:        General: Screening Not Indicated      Lung Cancer Screening:     General: Screening Not Indicated      Hepatitis C Screening:    General: Screening Current    Other Counseling Topics:   Alcohol use counseling, car/seat belt/driving safety, skin self-exam, sunscreen and calcium and vitamin D intake and regular weightbearing exercise  Past Medical History:   Diagnosis Date    Allergic 2018   weekly inject      Allergic rhinitis     Allergies     Anemia     Anxiety     Arthritis     Chronic constipation     Diabetes ([address]     Diabetes mellitus ([address]     Disease of thyroid gland     GERD (gastroesophageal reflux disease)     Headache(784 0)     Heart murmur     Hyperlipidemia     Hypertension     Hypothyroidism     Obesity     Obesity     Restless legs     Vitamin D deficiency      Past Surgical History:   Procedure Laterality Date    BREAST BIOPSY Right 2001    BREAST LUMPECTOMY Right     BREAST LUMPECTOMY Right 01/01/2001    Dr Rico Bazan, BILATERAL      CHOLECYSTECTOMY      COLONOSCOPY  06/01/2018    goes q 5 yr-due in Summer 2018-Dr Mcarthur Said  2015    Dr Scottie Sears  01/01/2000    Dr Echavarria Sat BIOPSY  2001       Current Outpatient Medications:     ascorbic acid (VITAMIN C) 500 mg tablet, Take 500 mg by mouth daily, Disp: , Rfl:     aspirin (ECOTRIN LOW STRENGTH) 81 mg EC tablet, Take 81 mg by mouth daily, Disp: , Rfl:     atorvastatin (LIPITOR) 80 mg tablet, Take 1 tablet (80 mg total) by mouth daily, Disp: 90 tablet, Rfl: 0    Bystolic 5 MG tablet, TAKE 1 TABLET BY MOUTH  DAILY, Disp: 90 tablet, Rfl: 3    cetirizine (ZyrTEC) 10 mg tablet, Take 10 mg by mouth daily, Disp: , Rfl:     Cholecalciferol (VITAMIN D3) 1000 units CAPS, Take 1,000 Units by mouth, Disp: , Rfl:     famotidine-calcium carbonate-magnesium hydroxide (PEPCID COMPLETE) -165 MG CHEW, Chew 1 tablet daily as needed for heartburn, Disp: , Rfl:     fluticasone (FLONASE) 50 mcg/act nasal spray, 1 spray into each nostril daily, Disp: , Rfl:     Invokana 300 MG TABS, TAKE 1 TABLET BY MOUTH  DAILY, Disp: 90 tablet, Rfl: 3    levothyroxine 112 mcg tablet, Take 1 tablet (112 mcg total) by mouth daily, Disp: 90 tablet, Rfl: 1    Magnesium Cl-Calcium Carbonate (SLOW-MAG PO), Take by mouth, Disp: , Rfl:     multivitamin (THERAGRAN) TABS, Take 1 tablet by mouth daily, Disp: , Rfl:     Omega-3 Fatty Acids (FISH OIL) 1200 MG CPDR, 1200mg two  tablets, Disp: , Rfl:     ONE TOUCH ULTRA TEST test strip, 1 each by Other route daily Test sugars twice every other day, Disp: 90 each, Rfl: 3    OneTouch Delica Lancets 05Z MISC, Use to check sugars twice every other day, Disp: 90 each, Rfl: 3    potassium chloride (K-DUR) 10 mEq tablet, Take 1 tablet (10 mEq total) by mouth 2 (two) times a day, Disp: 180 tablet, Rfl: 0    pramipexole (MIRAPEX) 0 25 mg tablet, Take 0 25 mg by mouth daily , Disp: , Rfl:     ramipril (ALTACE) 2 5 mg capsule, Take 1 capsule (2 5 mg total) by mouth daily, Disp: 90 capsule, Rfl: 1      Allergies   Allergen Reactions    Other      Seasonal, dust, Cat dander, mold    Penicillins          Family History   Problem Relation Age of Onset    Multiple sclerosis Brother     Multiple sclerosis Paternal Uncle     Hypertension Father     Diabetes Father     Heart disease Father     Multiple sclerosis Family     Heart disease Daughter      Social History     Socioeconomic History    Marital status: Single     Spouse name: None    Number of children: None    Years of education: None    Highest education level: None   Occupational History    None   Social Needs    Financial resource strain: None    Food insecurity     Worry: None     Inability: None    Transportation needs     Medical: None     Non-medical: None   Tobacco Use    Smoking status: Never Smoker    Smokeless tobacco: Never Used   Substance and Sexual Activity    Alcohol use: Yes     Frequency: Monthly or less     Comment: on special occasions    Drug use: Never    Sexual activity: Not Currently   Lifestyle    Physical activity     Days per week: None     Minutes per session: None    Stress: Only a little   Relationships    Social connections     Talks on phone: None     Gets together: None     Attends Orthodox service: None     Active member of club or organization: None     Attends meetings of clubs or organizations: None     Relationship status: None    Intimate partner violence     Fear of current or ex partner: None     Emotionally abused: None     Physically abused: None     Forced sexual activity: None   Other Topics Concern    None   Social History Narrative    · Most recent tobacco use screenin2018      · Do you currently or have you served in the [employer]:   No      · Were you activated, into active duty, as a member of the [employer] or as a Reservist:   No      · Advance directive:   No      · Live alone or with others:   alone       · Caffeine intake: Moderate, 2 cups daily          Review of Systems   Constitutional: Negative for appetite change, fatigue, fever and unexpected weight change  HENT: Negative for congestion, dental problem, ear pain, postnasal drip, sore throat and tinnitus  Eyes: Negative for pain, discharge and visual disturbance  Respiratory: Negative for cough, shortness of breath and wheezing  Cardiovascular: Negative for chest pain, palpitations and leg swelling  Gastrointestinal: Negative for abdominal pain, constipation, diarrhea, nausea and vomiting  Endocrine: Negative for cold intolerance and heat intolerance  Genitourinary: Negative for difficulty urinating, dysuria, flank pain and urgency  Incontinence per HPI   Musculoskeletal: Negative for arthralgias, back pain, joint swelling and myalgias  Skin: Negative for rash and wound  Allergic/Immunologic: Negative for immunocompromised state  Neurological: Negative for dizziness, syncope, speech difficulty, weakness and numbness  Hematological: Negative for adenopathy  Does not bruise/bleed easily  Psychiatric/Behavioral: Negative for confusion, dysphoric mood and sleep disturbance  The patient is not nervous/anxious  /82 (BP Location: Left arm, Patient Position: Sitting, Cuff Size: Large)   Pulse 75   Temp (!) 97 3 °F (36 3 °C) (Temporal)   Ht 5' 2 5" (1 588 m)   Wt 114 kg (251 lb)   LMP 01/01/2004   SpO2 98%   BMI 45 18 kg/m²       Physical Exam  Vitals signs and nursing note reviewed  Constitutional:       General: She is not in acute distress  Appearance: She is well-developed  She is obese  She is not ill-appearing  HENT:      Head: Normocephalic and atraumatic  Right Ear: Hearing, tympanic membrane, ear canal and external ear normal       Left Ear: Hearing, tympanic membrane, ear canal and external ear normal       Nose: Nose normal       Mouth/Throat:      Pharynx: Uvula midline  Eyes:      General: No scleral icterus  Conjunctiva/sclera: Conjunctivae normal       Pupils: Pupils are equal, round, and reactive to light  Neck:      Musculoskeletal: Normal range of motion and neck supple  Thyroid: No thyromegaly     Cardiovascular:      Rate and Rhythm: Normal rate and regular rhythm  Heart sounds: Normal heart sounds  No murmur  Pulmonary:      Effort: Pulmonary effort is normal  No respiratory distress  Breath sounds: Normal breath sounds  No wheezing  Abdominal:      General: Bowel sounds are normal  There is no distension  Palpations: Abdomen is soft  Tenderness: There is no abdominal tenderness  Musculoskeletal: Normal range of motion  General: No tenderness  Right lower leg: No edema  Left lower leg: No edema  Lymphadenopathy:      Cervical: No cervical adenopathy  Skin:     General: Skin is warm and dry  Coloration: Skin is not jaundiced  Findings: No erythema or rash  Comments: Seborrheic keratoses noted on left anterior/posterior thigh   Neurological:      General: No focal deficit present  Mental Status: She is alert and oriented to person, place, and time  Cranial Nerves: No cranial nerve deficit  Gait: Gait normal    Psychiatric:         Mood and Affect: Mood normal          Behavior: Behavior normal          ASSESSMENT AND PLAN --    Savannah Gregg was seen today for medicare wellness visit  Diagnoses and all orders for this visit:    Medicare annual wellness visit, subsequent    Urge incontinence  -     Ambulatory referral to Physical Therapy; Future  -     Ambulatory referral to Urogynecology; Future        BMI Counseling: Body mass index is 45 18 kg/m²  The BMI is above normal  Nutrition recommendations include decreasing portion sizes, encouraging healthy choices of fruits and vegetables, decreasing fast food intake, consuming healthier snacks, limiting drinks that contain sugar, moderation in carbohydrate intake, increasing intake of lean protein, reducing intake of saturated and trans fat and reducing intake of cholesterol  Exercise recommendations include moderate physical activity 150 minutes/week         Falls Plan of Care: Medications that increase falls were reviewed  Return in about 8 weeks (around 11/12/2020) for PROC[facility]URE CLIN[facility] -- shave bx seb keratoses  The patient indicates understanding of these issues and agrees with the plan        Rivera Hairston DO

## 2020-09-17 ENCOUNTER — OFFICE VISIT (OUTPATIENT)
Dept: FAMILY MEDICINE CLINIC | Facility: OTHER | Age: 70
End: 2020-09-17
Payer: COMMERCIAL

## 2020-09-17 VITALS
OXYGEN SATURATION: 98 % | SYSTOLIC BLOOD PRESSURE: 138 MMHG | TEMPERATURE: 97.3 F | HEIGHT: 63 IN | HEART RATE: 75 BPM | BODY MASS INDEX: 44.47 KG/M2 | DIASTOLIC BLOOD PRESSURE: 82 MMHG | WEIGHT: 251 LBS

## 2020-09-17 DIAGNOSIS — N39.41 URGE INCONTINENCE: ICD-10-CM

## 2020-09-17 DIAGNOSIS — E03.9 HYPOTHYROIDISM (ACQUIRED): ICD-10-CM

## 2020-09-17 DIAGNOSIS — Z00.00 MEDICARE ANNUAL WELLNESS VISIT, SUBSEQUENT: Primary | ICD-10-CM

## 2020-09-17 PROCEDURE — 3079F DIAST BP 80-89 MM HG: CPT | Performed by: FAMILY MEDICINE

## 2020-09-17 PROCEDURE — 1036F TOBACCO NON-USER: CPT | Performed by: FAMILY MEDICINE

## 2020-09-17 PROCEDURE — 1160F RVW MEDS BY RX/DR IN RCRD: CPT | Performed by: FAMILY MEDICINE

## 2020-09-17 PROCEDURE — 1170F FXNL STATUS ASSESSED: CPT | Performed by: FAMILY MEDICINE

## 2020-09-17 PROCEDURE — 1125F AMNT PAIN NOTED PAIN PRSNT: CPT | Performed by: FAMILY MEDICINE

## 2020-09-17 PROCEDURE — G0439 PPPS, SUBSEQ VISIT: HCPCS | Performed by: FAMILY MEDICINE

## 2020-09-17 PROCEDURE — 3075F SYST BP GE 130 - 139MM HG: CPT | Performed by: FAMILY MEDICINE

## 2020-09-18 RX ORDER — LEVOTHYROXINE SODIUM 112 UG/1
112 TABLET ORAL DAILY
Qty: 90 TABLET | Refills: 0 | Status: SHIPPED | OUTPATIENT
Start: 2020-09-18 | End: 2020-09-23 | Stop reason: SDUPTHER

## 2020-09-23 DIAGNOSIS — I10 ESSENTIAL HYPERTENSION: ICD-10-CM

## 2020-09-23 DIAGNOSIS — E03.9 HYPOTHYROIDISM (ACQUIRED): ICD-10-CM

## 2020-09-23 DIAGNOSIS — E78.2 MIXED HYPERLIPIDEMIA: ICD-10-CM

## 2020-09-23 DIAGNOSIS — E11.59 TYPE 2 DIABETES MELLITUS WITH OTHER CIRCULATORY COMPLICATION, WITHOUT LONG-TERM CURRENT USE OF INSULIN (HCC): ICD-10-CM

## 2020-09-23 RX ORDER — BLOOD SUGAR DIAGNOSTIC
STRIP MISCELLANEOUS
Qty: 90 EACH | Refills: 1 | Status: SHIPPED | OUTPATIENT
Start: 2020-09-23 | End: 2021-06-02 | Stop reason: SDUPTHER

## 2020-09-23 RX ORDER — POTASSIUM CHLORIDE 750 MG/1
10 TABLET, FILM COATED, EXTENDED RELEASE ORAL 2 TIMES DAILY
Qty: 180 TABLET | Refills: 0 | Status: CANCELLED | OUTPATIENT
Start: 2020-09-23

## 2020-09-23 RX ORDER — LEVOTHYROXINE SODIUM 112 UG/1
112 TABLET ORAL DAILY
Qty: 90 TABLET | Refills: 0 | Status: CANCELLED | OUTPATIENT
Start: 2020-09-23

## 2020-09-24 RX ORDER — ATORVASTATIN CALCIUM 80 MG/1
80 TABLET, FILM COATED ORAL DAILY
Qty: 90 TABLET | Refills: 1 | Status: SHIPPED | OUTPATIENT
Start: 2020-09-24 | End: 2021-03-01 | Stop reason: SDUPTHER

## 2020-09-24 RX ORDER — LEVOTHYROXINE SODIUM 112 UG/1
112 TABLET ORAL DAILY
Qty: 90 TABLET | Refills: 1 | Status: SHIPPED | OUTPATIENT
Start: 2020-09-24 | End: 2021-03-01 | Stop reason: SDUPTHER

## 2020-09-24 RX ORDER — POTASSIUM CHLORIDE 750 MG/1
10 TABLET, FILM COATED, EXTENDED RELEASE ORAL 2 TIMES DAILY
Qty: 180 TABLET | Refills: 1 | Status: SHIPPED | OUTPATIENT
Start: 2020-09-24 | End: 2021-03-01 | Stop reason: SDUPTHER

## 2020-09-28 RX ORDER — POTASSIUM CHLORIDE 750 MG/1
TABLET, EXTENDED RELEASE ORAL
Qty: 180 TABLET | Refills: 3 | OUTPATIENT
Start: 2020-09-28

## 2020-10-05 ENCOUNTER — EVALUATION (OUTPATIENT)
Dept: PHYSICAL THERAPY | Facility: REHABILITATION | Age: 70
End: 2020-10-05
Payer: COMMERCIAL

## 2020-10-05 ENCOUNTER — APPOINTMENT (OUTPATIENT)
Dept: LAB | Facility: HOSPITAL | Age: 70
End: 2020-10-05
Attending: INTERNAL MEDICINE
Payer: COMMERCIAL

## 2020-10-05 DIAGNOSIS — E03.9 ACQUIRED HYPOTHYROIDISM: ICD-10-CM

## 2020-10-05 DIAGNOSIS — E03.9 HYPOTHYROIDISM, UNSPECIFIED TYPE: ICD-10-CM

## 2020-10-05 DIAGNOSIS — E78.5 HYPERLIPIDEMIA, UNSPECIFIED HYPERLIPIDEMIA TYPE: ICD-10-CM

## 2020-10-05 DIAGNOSIS — I10 ESSENTIAL HYPERTENSION: ICD-10-CM

## 2020-10-05 DIAGNOSIS — N81.89 PELVIC FLOOR WEAKNESS: ICD-10-CM

## 2020-10-05 DIAGNOSIS — N39.41 URGE INCONTINENCE: ICD-10-CM

## 2020-10-05 DIAGNOSIS — N39.3 STRESS INCONTINENCE: Primary | ICD-10-CM

## 2020-10-05 DIAGNOSIS — E11.65 TYPE 2 DIABETES MELLITUS WITH HYPERGLYCEMIA, WITHOUT LONG-TERM CURRENT USE OF INSULIN (HCC): ICD-10-CM

## 2020-10-05 LAB
ANION GAP SERPL CALCULATED.3IONS-SCNC: 10 MMOL/L (ref 4–13)
BUN SERPL-MCNC: 14 MG/DL (ref 6–20)
CALCIUM SERPL-MCNC: 9.2 MG/DL (ref 8.4–10.2)
CHLORIDE SERPL-SCNC: 105 MMOL/L (ref 96–108)
CHOLEST SERPL-MCNC: 150 MG/DL
CO2 SERPL-SCNC: 26 MMOL/L (ref 22–33)
CREAT SERPL-MCNC: 0.73 MG/DL (ref 0.4–1.1)
CREAT UR-MCNC: 91.9 MG/DL
EST. AVERAGE GLUCOSE BLD GHB EST-MCNC: 143 MG/DL
GFR SERPL CREATININE-BSD FRML MDRD: 84 ML/MIN/1.73SQ M
GLUCOSE P FAST SERPL-MCNC: 152 MG/DL (ref 70–100)
HBA1C MFR BLD: 6.6 %
HDLC SERPL-MCNC: 46 MG/DL
LDLC SERPL CALC-MCNC: 65 MG/DL (ref 0–100)
MICROALBUMIN UR-MCNC: 12.8 MG/L (ref 0–20)
MICROALBUMIN/CREAT 24H UR: 14 MG/G CREATININE (ref 0–30)
NONHDLC SERPL-MCNC: 104 MG/DL
POTASSIUM SERPL-SCNC: 4.2 MMOL/L (ref 3.5–5)
SODIUM SERPL-SCNC: 141 MMOL/L (ref 133–145)
T4 FREE SERPL-MCNC: 1.16 NG/DL (ref 0.76–1.46)
TRIGL SERPL-MCNC: 195.8 MG/DL
TSH SERPL DL<=0.05 MIU/L-ACNC: 1.69 UIU/ML (ref 0.34–5.6)

## 2020-10-05 PROCEDURE — 3061F NEG MICROALBUMINURIA REV: CPT | Performed by: FAMILY MEDICINE

## 2020-10-05 PROCEDURE — 83036 HEMOGLOBIN GLYCOSYLATED A1C: CPT

## 2020-10-05 PROCEDURE — 97162 PT EVAL MOD COMPLEX 30 MIN: CPT | Performed by: PHYSICAL THERAPIST

## 2020-10-05 PROCEDURE — 84439 ASSAY OF FREE THYROXINE: CPT

## 2020-10-05 PROCEDURE — 82043 UR ALBUMIN QUANTITATIVE: CPT

## 2020-10-05 PROCEDURE — 82570 ASSAY OF URINE CREATININE: CPT

## 2020-10-05 PROCEDURE — 36415 COLL VENOUS BLD VENIPUNCTURE: CPT

## 2020-10-05 PROCEDURE — 84443 ASSAY THYROID STIM HORMONE: CPT

## 2020-10-05 PROCEDURE — 80048 BASIC METABOLIC PNL TOTAL CA: CPT

## 2020-10-05 PROCEDURE — 3044F HG A1C LEVEL LT 7.0%: CPT | Performed by: FAMILY MEDICINE

## 2020-10-05 PROCEDURE — 80061 LIPID PANEL: CPT

## 2020-10-06 ENCOUNTER — TELEPHONE (OUTPATIENT)
Dept: ENDOCRINOLOGY | Facility: CLINIC | Age: 70
End: 2020-10-06

## 2020-10-13 RX ORDER — POTASSIUM CHLORIDE 750 MG/1
TABLET, EXTENDED RELEASE ORAL
Qty: 180 TABLET | Refills: 3 | OUTPATIENT
Start: 2020-10-13

## 2020-11-05 ENCOUNTER — OFFICE VISIT (OUTPATIENT)
Dept: PHYSICAL THERAPY | Facility: REHABILITATION | Age: 70
End: 2020-11-05
Payer: COMMERCIAL

## 2020-11-05 DIAGNOSIS — N81.89 PELVIC FLOOR WEAKNESS: ICD-10-CM

## 2020-11-05 DIAGNOSIS — N39.41 URGE INCONTINENCE: ICD-10-CM

## 2020-11-05 DIAGNOSIS — N39.3 STRESS INCONTINENCE: Primary | ICD-10-CM

## 2020-11-05 PROCEDURE — 97112 NEUROMUSCULAR REEDUCATION: CPT | Performed by: PHYSICAL THERAPIST

## 2020-11-05 PROCEDURE — 97530 THERAPEUTIC ACTIVITIES: CPT | Performed by: PHYSICAL THERAPIST

## 2020-11-10 ENCOUNTER — HOSPITAL ENCOUNTER (OUTPATIENT)
Dept: RADIOLOGY | Facility: HOSPITAL | Age: 70
Discharge: HOME | End: 2020-11-10
Payer: COMMERCIAL

## 2020-11-10 VITALS — WEIGHT: 250 LBS | BODY MASS INDEX: 44.3 KG/M2 | HEIGHT: 63 IN

## 2020-11-10 DIAGNOSIS — Z12.39 BREAST CANCER SCREENING: ICD-10-CM

## 2020-11-10 PROCEDURE — 77067 SCR MAMMO BI INCL CAD: CPT

## 2020-11-10 PROCEDURE — 77063 BREAST TOMOSYNTHESIS BI: CPT

## 2020-11-12 ENCOUNTER — OFFICE VISIT (OUTPATIENT)
Dept: PHYSICAL THERAPY | Facility: REHABILITATION | Age: 70
End: 2020-11-12
Payer: COMMERCIAL

## 2020-11-12 DIAGNOSIS — N39.3 STRESS INCONTINENCE: Primary | ICD-10-CM

## 2020-11-12 DIAGNOSIS — N81.89 PELVIC FLOOR WEAKNESS: ICD-10-CM

## 2020-11-12 DIAGNOSIS — N39.41 URGE INCONTINENCE: ICD-10-CM

## 2020-11-12 PROCEDURE — 97112 NEUROMUSCULAR REEDUCATION: CPT | Performed by: PHYSICAL THERAPIST

## 2020-11-12 PROCEDURE — 97530 THERAPEUTIC ACTIVITIES: CPT | Performed by: PHYSICAL THERAPIST

## 2020-11-12 PROCEDURE — 97140 MANUAL THERAPY 1/> REGIONS: CPT | Performed by: PHYSICAL THERAPIST

## 2020-11-16 ENCOUNTER — OFFICE VISIT (OUTPATIENT)
Dept: PHYSICAL THERAPY | Facility: REHABILITATION | Age: 70
End: 2020-11-16
Payer: COMMERCIAL

## 2020-11-16 DIAGNOSIS — N81.89 PELVIC FLOOR WEAKNESS: ICD-10-CM

## 2020-11-16 DIAGNOSIS — N39.3 STRESS INCONTINENCE: Primary | ICD-10-CM

## 2020-11-16 DIAGNOSIS — N39.41 URGE INCONTINENCE: ICD-10-CM

## 2020-11-16 PROCEDURE — 97530 THERAPEUTIC ACTIVITIES: CPT | Performed by: PHYSICAL THERAPIST

## 2020-11-16 PROCEDURE — 97112 NEUROMUSCULAR REEDUCATION: CPT | Performed by: PHYSICAL THERAPIST

## 2020-11-16 PROCEDURE — 97110 THERAPEUTIC EXERCISES: CPT | Performed by: PHYSICAL THERAPIST

## 2020-11-19 ENCOUNTER — APPOINTMENT (OUTPATIENT)
Dept: PHYSICAL THERAPY | Facility: REHABILITATION | Age: 70
End: 2020-11-19
Payer: COMMERCIAL

## 2020-11-24 ENCOUNTER — APPOINTMENT (OUTPATIENT)
Dept: PHYSICAL THERAPY | Facility: REHABILITATION | Age: 70
End: 2020-11-24
Payer: COMMERCIAL

## 2020-11-25 ENCOUNTER — OFFICE VISIT (OUTPATIENT)
Dept: PHYSICAL THERAPY | Facility: REHABILITATION | Age: 70
End: 2020-11-25
Payer: COMMERCIAL

## 2020-11-25 DIAGNOSIS — N81.89 PELVIC FLOOR WEAKNESS: ICD-10-CM

## 2020-11-25 DIAGNOSIS — N39.3 STRESS INCONTINENCE: Primary | ICD-10-CM

## 2020-11-25 DIAGNOSIS — N39.41 URGE INCONTINENCE: ICD-10-CM

## 2020-11-25 PROCEDURE — 97112 NEUROMUSCULAR REEDUCATION: CPT | Performed by: PHYSICAL THERAPIST

## 2020-11-25 PROCEDURE — 97530 THERAPEUTIC ACTIVITIES: CPT | Performed by: PHYSICAL THERAPIST

## 2020-12-03 ENCOUNTER — OFFICE VISIT (OUTPATIENT)
Dept: PHYSICAL THERAPY | Facility: REHABILITATION | Age: 70
End: 2020-12-03
Payer: COMMERCIAL

## 2020-12-03 DIAGNOSIS — N81.89 PELVIC FLOOR WEAKNESS: ICD-10-CM

## 2020-12-03 DIAGNOSIS — N39.41 URGE INCONTINENCE: ICD-10-CM

## 2020-12-03 DIAGNOSIS — N39.3 STRESS INCONTINENCE: Primary | ICD-10-CM

## 2020-12-03 PROCEDURE — 97530 THERAPEUTIC ACTIVITIES: CPT | Performed by: PHYSICAL THERAPIST

## 2020-12-03 PROCEDURE — 97140 MANUAL THERAPY 1/> REGIONS: CPT | Performed by: PHYSICAL THERAPIST

## 2020-12-06 DIAGNOSIS — G47.61 PERIODIC LIMB MOVEMENT DISORDER (PLMD): Primary | ICD-10-CM

## 2020-12-07 RX ORDER — PRAMIPEXOLE DIHYDROCHLORIDE 0.25 MG/1
TABLET ORAL
Qty: 90 TABLET | Refills: 3 | Status: SHIPPED | OUTPATIENT
Start: 2020-12-07 | End: 2021-10-15 | Stop reason: SDUPTHER

## 2020-12-09 DIAGNOSIS — I10 ESSENTIAL HYPERTENSION: ICD-10-CM

## 2020-12-10 ENCOUNTER — OFFICE VISIT (OUTPATIENT)
Dept: ENDOCRINOLOGY | Facility: CLINIC | Age: 70
End: 2020-12-10
Payer: COMMERCIAL

## 2020-12-10 VITALS
TEMPERATURE: 98.2 F | WEIGHT: 251 LBS | HEART RATE: 72 BPM | BODY MASS INDEX: 44.46 KG/M2 | SYSTOLIC BLOOD PRESSURE: 132 MMHG | DIASTOLIC BLOOD PRESSURE: 74 MMHG

## 2020-12-10 DIAGNOSIS — I10 ESSENTIAL HYPERTENSION: ICD-10-CM

## 2020-12-10 DIAGNOSIS — E55.9 VITAMIN D DEFICIENCY: ICD-10-CM

## 2020-12-10 DIAGNOSIS — E78.5 HYPERLIPIDEMIA, UNSPECIFIED HYPERLIPIDEMIA TYPE: ICD-10-CM

## 2020-12-10 DIAGNOSIS — E03.9 ACQUIRED HYPOTHYROIDISM: ICD-10-CM

## 2020-12-10 DIAGNOSIS — E11.65 TYPE 2 DIABETES MELLITUS WITH HYPERGLYCEMIA, WITHOUT LONG-TERM CURRENT USE OF INSULIN (HCC): Primary | ICD-10-CM

## 2020-12-10 DIAGNOSIS — E04.2 NONTOXIC MULTINODULAR GOITER: ICD-10-CM

## 2020-12-10 PROCEDURE — 1036F TOBACCO NON-USER: CPT | Performed by: INTERNAL MEDICINE

## 2020-12-10 PROCEDURE — 4010F ACE/ARB THERAPY RXD/TAKEN: CPT | Performed by: INTERNAL MEDICINE

## 2020-12-10 PROCEDURE — 99214 OFFICE O/P EST MOD 30 MIN: CPT | Performed by: INTERNAL MEDICINE

## 2020-12-10 PROCEDURE — 3078F DIAST BP <80 MM HG: CPT | Performed by: INTERNAL MEDICINE

## 2020-12-10 PROCEDURE — 1160F RVW MEDS BY RX/DR IN RCRD: CPT | Performed by: INTERNAL MEDICINE

## 2020-12-10 PROCEDURE — 3075F SYST BP GE 130 - 139MM HG: CPT | Performed by: INTERNAL MEDICINE

## 2020-12-10 RX ORDER — BIOTIN 1 MG
TABLET ORAL
Start: 2020-12-10

## 2020-12-10 RX ORDER — BLOOD-GLUCOSE METER
KIT MISCELLANEOUS
Qty: 1 EACH | Refills: 0 | Status: SHIPPED | OUTPATIENT
Start: 2020-12-10

## 2020-12-10 RX ORDER — RAMIPRIL 2.5 MG/1
2.5 CAPSULE ORAL DAILY
Qty: 90 CAPSULE | Refills: 1 | Status: SHIPPED | OUTPATIENT
Start: 2020-12-10 | End: 2021-09-11 | Stop reason: SDUPTHER

## 2021-02-13 DIAGNOSIS — Z23 ENCOUNTER FOR IMMUNIZATION: ICD-10-CM

## 2021-02-17 ENCOUNTER — IMMUNIZATIONS (OUTPATIENT)
Dept: FAMILY MEDICINE CLINIC | Facility: HOSPITAL | Age: 71
End: 2021-02-17

## 2021-02-17 DIAGNOSIS — Z23 ENCOUNTER FOR IMMUNIZATION: Primary | ICD-10-CM

## 2021-02-17 PROCEDURE — 91300 SARS-COV-2 / COVID-19 MRNA VACCINE (PFIZER-BIONTECH) 30 MCG: CPT

## 2021-02-17 PROCEDURE — 0001A SARS-COV-2 / COVID-19 MRNA VACCINE (PFIZER-BIONTECH) 30 MCG: CPT

## 2021-02-25 DIAGNOSIS — I10 ESSENTIAL HYPERTENSION: ICD-10-CM

## 2021-02-28 RX ORDER — POTASSIUM CHLORIDE 750 MG/1
TABLET, FILM COATED, EXTENDED RELEASE ORAL
Qty: 180 TABLET | Refills: 3 | OUTPATIENT
Start: 2021-02-28

## 2021-03-01 DIAGNOSIS — E03.9 HYPOTHYROIDISM (ACQUIRED): ICD-10-CM

## 2021-03-01 DIAGNOSIS — I10 ESSENTIAL HYPERTENSION: ICD-10-CM

## 2021-03-01 DIAGNOSIS — E78.2 MIXED HYPERLIPIDEMIA: ICD-10-CM

## 2021-03-01 RX ORDER — ATORVASTATIN CALCIUM 80 MG/1
80 TABLET, FILM COATED ORAL DAILY
Qty: 90 TABLET | Refills: 1 | Status: SHIPPED | OUTPATIENT
Start: 2021-03-01 | End: 2021-10-15 | Stop reason: SDUPTHER

## 2021-03-01 RX ORDER — POTASSIUM CHLORIDE 750 MG/1
10 TABLET, FILM COATED, EXTENDED RELEASE ORAL 2 TIMES DAILY
Qty: 180 TABLET | Refills: 1 | Status: SHIPPED | OUTPATIENT
Start: 2021-03-01 | End: 2021-10-15 | Stop reason: SDUPTHER

## 2021-03-01 RX ORDER — LEVOTHYROXINE SODIUM 112 UG/1
112 TABLET ORAL DAILY
Qty: 90 TABLET | Refills: 1 | Status: SHIPPED | OUTPATIENT
Start: 2021-03-01 | End: 2021-10-07 | Stop reason: SDUPTHER

## 2021-03-04 ENCOUNTER — PROCEDURE VISIT (OUTPATIENT)
Dept: FAMILY MEDICINE CLINIC | Facility: OTHER | Age: 71
End: 2021-03-04
Payer: COMMERCIAL

## 2021-03-04 VITALS
TEMPERATURE: 97.8 F | HEIGHT: 63 IN | DIASTOLIC BLOOD PRESSURE: 86 MMHG | OXYGEN SATURATION: 96 % | BODY MASS INDEX: 45.4 KG/M2 | HEART RATE: 76 BPM | WEIGHT: 256.25 LBS | SYSTOLIC BLOOD PRESSURE: 130 MMHG

## 2021-03-04 DIAGNOSIS — L98.9 SKIN LESION: ICD-10-CM

## 2021-03-04 DIAGNOSIS — Z12.31 ENCOUNTER FOR SCREENING MAMMOGRAM FOR MALIGNANT NEOPLASM OF BREAST: ICD-10-CM

## 2021-03-04 DIAGNOSIS — Z78.0 POST-MENOPAUSAL: ICD-10-CM

## 2021-03-04 DIAGNOSIS — L82.1 SEBORRHEIC KERATOSES: Primary | ICD-10-CM

## 2021-03-04 PROCEDURE — 88305 TISSUE EXAM BY PATHOLOGIST: CPT | Performed by: PATHOLOGY

## 2021-03-04 PROCEDURE — 11102 TANGNTL BX SKIN SINGLE LES: CPT | Performed by: FAMILY MEDICINE

## 2021-03-04 PROCEDURE — 17000 DESTRUCT PREMALG LESION: CPT | Performed by: FAMILY MEDICINE

## 2021-03-04 NOTE — PROGRESS NOTES
Lesion Destruction    Date/Time: 3/4/2021 2:03 PM  Performed by: Angel Tomas MD  Authorized by: Angel Tomas MD   Universal Protocol:  Procedure performed by: Sheela Estevez)  Consent: Verbal consent obtained  Consent given by: patient  Patient understanding: patient states understanding of the procedure being performed  Required items: required blood products, implants, devices, and special equipment available  Patient identity confirmed: verbally with patient      Procedure Details - Lesion Destruction:     Number of Lesions:  1  Lesion 1:     Body area:  Lower extremity    Lower extremity location:  L upper leg    Initial size (mm):  3    Malignancy: pre-malignant lesion      Destruction method: surgical removal      Destruction method comment:  Shave biopsy    Cosmetic?: Yes    Biopsy    Date/Time: 3/4/2021 2:06 PM  Performed by: Angel Tomas MD  Authorized by: Angel Tomas MD   Universal Protocol:  Procedure performed by: Sheela Estevez)  Consent: Verbal consent obtained    Consent given by: patient  Patient understanding: patient states understanding of the procedure being performed  Required items: required blood products, implants, devices, and special equipment available  Patient identity confirmed: verbally with patient      Procedure Details - Skin Biopsy:     Biopsy tissue type: skin    Biopsy method: shave biopsy      Body area:  Lower extremity    Lower extremity location:  L buttock    Initial size (mm):  8    Malignancy: malignancy unknown      Cosmetic?: Yes

## 2021-03-04 NOTE — PATIENT INSTRUCTIONS
Skin Biopsy   AMBULATORY CARE:   A skin biopsy  is a procedure used to remove a small piece of skin for testing  The type of biopsy you need will depend on the condition your healthcare provider wants to test for  Common conditions include cancer or precancer  A precancer growth is not cancer yet, but it could become cancer later  A skin condition such as eczema, rash, or a skin infection may also be reasons for a biopsy  You may need to have treatment depending on the results of the skin biopsy tests  What will happen during your skin biopsy:   · Your healthcare provider will clean the skin where he or she will do the biopsy  He or she will use a local anesthetic medicine to make you more comfortable during your procedure  This medicine is a shot put into the skin that will numb the area, and dull your pain  You may still feel pressure or pushing during the procedure after you get this medicine  · The procedure will depend on the type of biopsy you have:     ? A punch biopsy is used to take the whole thickness of a small, round piece of skin  The punch tool will be placed on the area where the skin sample will be taken  Your healthcare provider will move and press the punch downward to cut the skin  Once the skin is loosened, your healthcare provider will pull it up, and cut it out  Stitches are used to close the wound  ? A shave biopsy is used to scrape off a top layer of skin  Your healthcare provider will first inject medicine into your skin  This will cause the area to be raised  Your provider will use a blade or other tool to scrape or shave off the raised area of skin  ? An excisional biopsy is used if you have a growth or sore that needs to be tested for cancer  Your healthcare provider will cut the growth off the skin  Layers of skin and fat may be taken  The area will be closed with stitches  ? An incisional biopsy is also used to test for cancer, but only part of a growth or sore is removed  Your healthcare provider will cut part of the growth out  The area may need to be closed with stitches  · Your healthcare provider may put medicine on your wound to stop the area from bleeding  The skin sample will be sent to a lab for tests  What will happen after your skin biopsy:  A bandage will cover the biopsy area to keep it clean and dry  The bandage will help to protect the area from infection  When the procedure is over, you may be able to go home  You may have some bleeding, oozing, redness, or swelling after the biopsy  These are normal  You may also have pain during the first 24 to 48 hours after your procedure  Risks of a skin biopsy:  A skin biopsy may cause you to bleed from the biopsy area, or get an infection  You may have bruising, swelling, or pain in the area where the biopsy was done  You may have scarring from where the skin tissue was removed  You are at higher risk of having problems healing after your procedure if you smoke, or take steroid medicines  You may have an allergic response from the numbing medicine used for the procedure  Seek care immediately if:   · You have red lines on your skin coming from your wound area  · Blood soaks through your bandage  Contact your healthcare provider if:   · You have a fever  · You have increased swelling, redness, or bleeding from your wound  · You have pain that does not go away, or is not helped by pain medicines  · You have pus in the wound, or yellow or green drainage coming out of your wound  · You have questions or concerns about your condition or care  Medicines: You may need any of the following:  · NSAIDs , such as ibuprofen, help decrease swelling, pain, and fever  This medicine is available with or without a doctor's order  NSAIDs can cause stomach bleeding or kidney problems in certain people  If you take blood thinner medicine, always ask your healthcare provider if NSAIDs are safe for you   Always read the medicine label and follow directions  · Acetaminophen  decreases pain and fever  It is available without a doctor's order  Ask how much to take and how often to take it  Follow directions  Read the labels of all other medicines you are using to see if they also contain acetaminophen, or ask your doctor or pharmacist  Acetaminophen can cause liver damage if not taken correctly  Do not use more than 4 grams (4,000 milligrams) total of acetaminophen in one day  · Prescription pain medicine  may be given  Ask your healthcare provider how to take this medicine safely  Some prescription pain medicines contain acetaminophen  Do not take other medicines that contain acetaminophen without talking to your healthcare provider  Too much acetaminophen may cause liver damage  Prescription pain medicine may cause constipation  Ask your healthcare provider how to prevent or treat constipation  · Take your medicine as directed  Contact your healthcare provider if you think your medicine is not helping or if you have side effects  Tell him or her if you are allergic to any medicine  Keep a list of the medicines, vitamins, and herbs you take  Include the amounts, and when and why you take them  Bring the list or the pill bottles to follow-up visits  Carry your medicine list with you in case of an emergency  Wound care:  Check the wound for signs of infection, such as redness, swelling, or pus  Carefully wash the wound with soap and water  Dry the area and put on new, clean bandages as directed  Change your bandages when they get wet or dirty  Follow up with your healthcare provider or dermatologist as directed: You may need to return to have your stitches removed  The results of the biopsy are usually ready within 10 days of the procedure  Write down your questions so you remember to ask them during your visits    © Copyright [address] Information is for End User's use only and may not be sold, redistributed or otherwise used for commercial purposes  All illustrations and images included in CareNotes® are the copyrighted property of [employer]  or Brenda Lombardo  The above information is an  only  It is not intended as medical advice for individual conditions or treatments  Talk to your doctor, nurse or pharmacist before following any medical regimen to see if it is safe and effective for you

## 2021-03-10 ENCOUNTER — IMMUNIZATIONS (OUTPATIENT)
Dept: FAMILY MEDICINE CLINIC | Facility: HOSPITAL | Age: 71
End: 2021-03-10

## 2021-03-10 DIAGNOSIS — Z23 ENCOUNTER FOR IMMUNIZATION: Primary | ICD-10-CM

## 2021-03-10 PROCEDURE — 0002A SARS-COV-2 / COVID-19 MRNA VACCINE (PFIZER-BIONTECH) 30 MCG: CPT

## 2021-03-10 PROCEDURE — 91300 SARS-COV-2 / COVID-19 MRNA VACCINE (PFIZER-BIONTECH) 30 MCG: CPT

## 2021-04-02 ENCOUNTER — TELEPHONE (OUTPATIENT)
Dept: FAMILY MEDICINE CLINIC | Facility: OTHER | Age: 71
End: 2021-04-02

## 2021-04-02 NOTE — TELEPHONE ENCOUNTER
Left message on machine reminding patient to get her labs done for appointment schedule on Monday 4/5/21

## 2021-04-04 NOTE — PROGRESS NOTES
Assessment/Plan:    No problem-specific Assessment & Plan notes found for this encounter  Diagnoses and all orders for this visit:    Type 2 diabetes mellitus with hyperglycemia, without long-term current use of insulin (HCC)  Comments:  A1c 6 9%  Continue invokana per Endocrinology  Labs due every 3-6 months  Orders:  -     POCT hemoglobin A1c    Essential hypertension  Comments:  BP goal <717/13  Continue bystolic, ramipril    Mixed hyperlipidemia  Comments:  LDL goal <100  Continue atorvastatin 80 mg  ASCVD Risk 20 9%        BMI Counseling: Body mass index is 45 03 kg/m²  The BMI is above normal  Nutrition recommendations include decreasing portion sizes, encouraging healthy choices of fruits and vegetables, decreasing fast food intake, consuming healthier snacks, limiting drinks that contain sugar, moderation in carbohydrate intake, increasing intake of lean protein, reducing intake of saturated and trans fat and reducing intake of cholesterol  Exercise recommendations include moderate physical activity 150 minutes/week  Falls Plan of Care: balance, strength, and gait training instructions were provided  Medications that increase falls were reviewed  Home safety education provided  Return in about 24 weeks (around 9/20/2021) for AWV  The patient indicates understanding of these issues and agrees with the plan  Subjective:      Patient ID: Blayne Granado is a 72 y o  female  Diabetes  She presents for her follow-up diabetic visit  She has type 2 diabetes mellitus  Her disease course has been stable  Pertinent negatives for hypoglycemia include no dizziness, headaches, nervousness/anxiousness or sweats  Pertinent negatives for diabetes include no blurred vision, no chest pain and no fatigue  Pertinent negatives for diabetic complications include no CVA, PVD or retinopathy   Risk factors for coronary artery disease include dyslipidemia, family history, hypertension, obesity, diabetes mellitus, post-menopausal and sedentary lifestyle  Current diabetic treatment includes oral agent (monotherapy)  She is compliant with treatment all of the time  Her weight is stable  She is following a generally healthy diet  Meal planning includes avoidance of concentrated sweets  She has had a previous visit with a dietitian  She participates in exercise intermittently  An ACE inhibitor/angiotensin II receptor blocker is being taken  She does not see a podiatrist Eye exam is current  Hypertension  This is a chronic problem  The current episode started more than 1 year ago  The problem has been waxing and waning since onset  The problem is controlled  Pertinent negatives include no anxiety, blurred vision, chest pain, headaches, malaise/fatigue, neck pain, orthopnea, palpitations, peripheral edema, PND, shortness of breath or sweats  There are no associated agents to hypertension  Risk factors for coronary artery disease include diabetes mellitus, sedentary lifestyle, obesity, post-menopausal state, dyslipidemia and family history  Past treatments include beta blockers and ACE inhibitors  The current treatment provides moderate improvement  Compliance problems include psychosocial issues, exercise and diet  Hypertensive end-organ damage includes CAD/MI  There is no history of angina, kidney disease, CVA, heart failure, left ventricular hypertrophy, PVD or retinopathy  Identifiable causes of hypertension include a thyroid problem  There is no history of chronic renal disease or a hypertension causing med  Hyperlipidemia  This is a chronic problem  The current episode started more than 1 year ago  The problem is controlled  Exacerbating diseases include diabetes, hypothyroidism and obesity  She has no history of chronic renal disease or liver disease  There are no known factors aggravating her hyperlipidemia   Pertinent negatives include no chest pain, focal sensory loss, focal weakness, leg pain, myalgias or shortness of breath  Current antihyperlipidemic treatment includes statins  The current treatment provides moderate improvement of lipids  Compliance problems include adherence to diet, adherence to exercise and psychosocial issues  Risk factors for coronary artery disease include a sedentary lifestyle, post-menopausal, hypertension, obesity, family history, dyslipidemia and diabetes mellitus         The following portions of the patient's history were reviewed and updated as appropriate: allergies, current medications, past family history, past medical history, past social history, past surgical history and problem list       Current Outpatient Medications:     ascorbic acid (VITAMIN C) 500 mg tablet, Take 500 mg by mouth daily, Disp: , Rfl:     aspirin (ECOTRIN LOW STRENGTH) 81 mg EC tablet, Take 81 mg by mouth daily, Disp: , Rfl:     atorvastatin (LIPITOR) 80 mg tablet, Take 1 tablet (80 mg total) by mouth daily, Disp: 90 tablet, Rfl: 1    Blood Glucose Monitoring Suppl (ONE TOUCH ULTRA MINI) w/Device KIT, Please dispense 1 Kit, Disp: 1 each, Rfl: 0    Bystolic 5 MG tablet, TAKE 1 TABLET BY MOUTH  DAILY, Disp: 90 tablet, Rfl: 3    cetirizine (ZyrTEC) 10 mg tablet, Take 10 mg by mouth daily, Disp: , Rfl:     Cholecalciferol (Vitamin D3) 25 MCG (1000 UT) CAPS, Take 2000 IU with dinner, Disp: , Rfl:     famotidine-calcium carbonate-magnesium hydroxide (PEPCID COMPLETE) -165 MG CHEW, Chew 1 tablet daily as needed for heartburn, Disp: , Rfl:     fluticasone (FLONASE) 50 mcg/act nasal spray, 1 spray into each nostril daily, Disp: , Rfl:     glucose blood (OneTouch Ultra) test strip, Use to test blood sugar two times every other day, Disp: 90 each, Rfl: 1    Invokana 300 MG TABS, TAKE 1 TABLET BY MOUTH  DAILY, Disp: 90 tablet, Rfl: 3    levothyroxine 112 mcg tablet, Take 1 tablet (112 mcg total) by mouth daily, Disp: 90 tablet, Rfl: 1    Magnesium Cl-Calcium Carbonate (SLOW-MAG PO), Take by mouth, Disp: , Rfl:     multivitamin (THERAGRAN) TABS, Take 1 tablet by mouth daily, Disp: , Rfl:     Omega-3 Fatty Acids (FISH OIL) 1200 MG CPDR, 1200mg two  tablets, Disp: , Rfl:     OneTouch Delica Lancets 98T MISC, Use to check sugars twice every other day, Disp: 90 each, Rfl: 3    potassium chloride (Klor-Con) 10 mEq tablet, Take 1 tablet (10 mEq total) by mouth 2 (two) times a day, Disp: 180 tablet, Rfl: 1    pramipexole (MIRAPEX) 0 25 mg tablet, TAKE 1 TABLET BY MOUTH  EVERY NIGHT AT BEDTIME, Disp: 90 tablet, Rfl: 3    ramipril (ALTACE) 2 5 mg capsule, Take 1 capsule (2 5 mg total) by mouth daily, Disp: 90 capsule, Rfl: 1      Review of Systems   Constitutional: Negative for activity change, fatigue, fever and malaise/fatigue  HENT: Negative for congestion, ear pain, sinus pain and sore throat  Eyes: Negative for blurred vision, pain and itching  Respiratory: Negative for cough and shortness of breath  Cardiovascular: Negative for chest pain, palpitations, orthopnea and PND  Gastrointestinal: Negative for abdominal pain, constipation, diarrhea, nausea and vomiting  Endocrine: Negative for cold intolerance and heat intolerance  Genitourinary: Negative for dysuria  Musculoskeletal: Negative for myalgias and neck pain  Skin: Negative for color change and rash  Neurological: Negative for dizziness, focal weakness, syncope and headaches  Hematological: Negative for adenopathy  Psychiatric/Behavioral: Negative for behavioral problems, dysphoric mood and sleep disturbance  The patient is not nervous/anxious  Objective:      /84   Pulse 75   Temp 98 °F (36 7 °C)   Resp 18   Ht 5' 3" (1 6 m)   Wt 115 kg (254 lb 3 2 oz)   LMP 01/01/2004   SpO2 98%   BMI 45 03 kg/m²          Physical Exam  Vitals signs and nursing note reviewed  Constitutional:       General: She is not in acute distress  Appearance: She is well-developed  She is obese  She is not ill-appearing  HENT:      Head: Normocephalic and atraumatic  Right Ear: External ear normal       Left Ear: External ear normal       Nose: Nose normal    Eyes:      General: No scleral icterus  Conjunctiva/sclera: Conjunctivae normal       Pupils: Pupils are equal, round, and reactive to light  Neck:      Musculoskeletal: Normal range of motion and neck supple  Thyroid: No thyromegaly  Cardiovascular:      Rate and Rhythm: Normal rate and regular rhythm  Heart sounds: Normal heart sounds  No murmur  Pulmonary:      Effort: Pulmonary effort is normal  No respiratory distress  Breath sounds: Normal breath sounds  No wheezing  Abdominal:      General: Bowel sounds are normal  There is no distension  Palpations: Abdomen is soft  Tenderness: There is no abdominal tenderness  Musculoskeletal: Normal range of motion  General: No tenderness  Right lower leg: No edema  Left lower leg: No edema  Lymphadenopathy:      Cervical: No cervical adenopathy  Skin:     General: Skin is warm and dry  Findings: No rash  Neurological:      General: No focal deficit present  Mental Status: She is alert and oriented to person, place, and time  Cranial Nerves: No cranial nerve deficit        Gait: Gait normal    Psychiatric:         Mood and Affect: Mood normal          Behavior: Behavior normal

## 2021-04-05 ENCOUNTER — OFFICE VISIT (OUTPATIENT)
Dept: FAMILY MEDICINE CLINIC | Facility: OTHER | Age: 71
End: 2021-04-05
Payer: COMMERCIAL

## 2021-04-05 VITALS
DIASTOLIC BLOOD PRESSURE: 84 MMHG | WEIGHT: 254.2 LBS | RESPIRATION RATE: 18 BRPM | HEART RATE: 75 BPM | OXYGEN SATURATION: 98 % | BODY MASS INDEX: 45.04 KG/M2 | SYSTOLIC BLOOD PRESSURE: 130 MMHG | HEIGHT: 63 IN | TEMPERATURE: 98 F

## 2021-04-05 DIAGNOSIS — E11.65 TYPE 2 DIABETES MELLITUS WITH HYPERGLYCEMIA, WITHOUT LONG-TERM CURRENT USE OF INSULIN (HCC): Primary | ICD-10-CM

## 2021-04-05 DIAGNOSIS — E78.2 MIXED HYPERLIPIDEMIA: ICD-10-CM

## 2021-04-05 DIAGNOSIS — I10 ESSENTIAL HYPERTENSION: ICD-10-CM

## 2021-04-05 LAB — SL AMB POCT HEMOGLOBIN AIC: 6.9 (ref ?–6.5)

## 2021-04-05 PROCEDURE — 3044F HG A1C LEVEL LT 7.0%: CPT | Performed by: FAMILY MEDICINE

## 2021-04-05 PROCEDURE — 1160F RVW MEDS BY RX/DR IN RCRD: CPT | Performed by: FAMILY MEDICINE

## 2021-04-05 PROCEDURE — 3725F SCREEN DEPRESSION PERFORMED: CPT | Performed by: FAMILY MEDICINE

## 2021-04-05 PROCEDURE — 3079F DIAST BP 80-89 MM HG: CPT | Performed by: FAMILY MEDICINE

## 2021-04-05 PROCEDURE — 99214 OFFICE O/P EST MOD 30 MIN: CPT | Performed by: FAMILY MEDICINE

## 2021-04-05 PROCEDURE — 3288F FALL RISK ASSESSMENT DOCD: CPT | Performed by: FAMILY MEDICINE

## 2021-04-05 PROCEDURE — 83036 HEMOGLOBIN GLYCOSYLATED A1C: CPT | Performed by: FAMILY MEDICINE

## 2021-04-05 PROCEDURE — 3008F BODY MASS INDEX DOCD: CPT | Performed by: FAMILY MEDICINE

## 2021-04-05 PROCEDURE — 1101F PT FALLS ASSESS-DOCD LE1/YR: CPT | Performed by: FAMILY MEDICINE

## 2021-04-05 PROCEDURE — 1036F TOBACCO NON-USER: CPT | Performed by: FAMILY MEDICINE

## 2021-04-05 PROCEDURE — 3075F SYST BP GE 130 - 139MM HG: CPT | Performed by: FAMILY MEDICINE

## 2021-05-17 ENCOUNTER — OFFICE VISIT (OUTPATIENT)
Dept: URGENT CARE | Facility: CLINIC | Age: 71
End: 2021-05-17
Payer: COMMERCIAL

## 2021-05-17 VITALS
TEMPERATURE: 97.9 F | RESPIRATION RATE: 16 BRPM | SYSTOLIC BLOOD PRESSURE: 138 MMHG | OXYGEN SATURATION: 97 % | DIASTOLIC BLOOD PRESSURE: 78 MMHG | HEART RATE: 70 BPM | BODY MASS INDEX: 45.17 KG/M2 | WEIGHT: 255 LBS

## 2021-05-17 DIAGNOSIS — M79.672 LEFT FOOT PAIN: Primary | ICD-10-CM

## 2021-05-17 PROCEDURE — S9083 URGENT CARE CENTER GLOBAL: HCPCS | Performed by: NURSE PRACTITIONER

## 2021-05-17 PROCEDURE — 99213 OFFICE O/P EST LOW 20 MIN: CPT | Performed by: NURSE PRACTITIONER

## 2021-05-17 NOTE — PATIENT INSTRUCTIONS
--Initial read of x-ray negative for fracture  Heel spurs noted  Will call with final results when obtained (anticipate 1-6 hours)  --Suspect injury to plantar fascia as most likely etiology  --Advise ice, rest/minimize weight bearing the next couple of days, then weight bearing as tolerated  --Gradually begin home ROM exercises as tolerated per handout  --Tylenol as needed for pain  Can also try OTC Voltaren gel  --Wear comfortable footwear with good arch support  --F/u with podiatry for ongoing symptoms over the next week

## 2021-05-17 NOTE — PROGRESS NOTES
[facility]        NAME: Betsey Rincon is a 72 y o  female  : 1950    MRN: [id number]  DATE: May 17, 2021  TIME: 10:03 AM    Assessment and Plan   Left foot pain [M79 [id number]]  1  Left foot pain  XR foot 3+ vw left    Ambulatory referral to Podiatry     --Declines crutches at this time  Patient Instructions     --Initial read of x-ray negative for fracture  Heel spurs  Will call with final results when obtained (anticipate 1-6 hours)  --Suspect injury to plantar fascia as most likely etiology  --Advise ice, rest/minimize weight bearing the next couple of days, then weight bearing as tolerated  --Gradually begin home ROM exercises as tolerated per handout  --Tylenol as needed for pain  Can also try OTC Voltaren gel  --Wear comfortable footwear with good arch support  --F/u with podiatry for ongoing symptoms over the next week  Chief Complaint     Chief Complaint   Patient presents with    Pain     pt presents with left foot pain , states she was standing and then heard cracking sounds and foot pain began ; started last night          History of Present Illness         Here with complaints of left foot pain since last night  Was walking barefoot in her house  Accidentally stubbed toe of right foot, causing her to put more weight on her left foot in the process  Felt "cracking" sensation in the outside/lateral portion of the arch of her left foot, along with abrupt pain  Pain no better this morning  Limited to arch only, both sides  No pain felt on top of foot, in ankle, heel, toes, elsewhere  Pain felt mainly with foot flexion, weight bearing  Described as pressure  Rates 9/10 at present  Applied ice, which didn't help  No analgesics  Possible swelling, not sure  No numbness/tingling  Denies pre-existing foot injuries/surgeries  Has T2D  Denies history of peripheral neuropathy, Charcot foot          Review of Systems   Review of Systems   Musculoskeletal: Per HPI   Neurological: Negative for numbness           Current Medications       Current Outpatient Medications:     ascorbic acid (VITAMIN C) 500 mg tablet, Take 500 mg by mouth daily, Disp: , Rfl:     aspirin (ECOTRIN LOW STRENGTH) 81 mg EC tablet, Take 81 mg by mouth daily, Disp: , Rfl:     atorvastatin (LIPITOR) 80 mg tablet, Take 1 tablet (80 mg total) by mouth daily, Disp: 90 tablet, Rfl: 1    Blood Glucose Monitoring Suppl (ONE TOUCH ULTRA MINI) w/Device KIT, Please dispense 1 Kit, Disp: 1 each, Rfl: 0    Bystolic 5 MG tablet, TAKE 1 TABLET BY MOUTH  DAILY, Disp: 90 tablet, Rfl: 3    cetirizine (ZyrTEC) 10 mg tablet, Take 10 mg by mouth daily, Disp: , Rfl:     Cholecalciferol (Vitamin D3) 25 MCG (1000 UT) CAPS, Take 2000 IU with dinner, Disp: , Rfl:     famotidine-calcium carbonate-magnesium hydroxide (PEPCID COMPLETE) -165 MG CHEW, Chew 1 tablet daily as needed for heartburn, Disp: , Rfl:     fluticasone (FLONASE) 50 mcg/act nasal spray, 1 spray into each nostril daily, Disp: , Rfl:     glucose blood (OneTouch Ultra) test strip, Use to test blood sugar two times every other day, Disp: 90 each, Rfl: 1    Invokana 300 MG TABS, TAKE 1 TABLET BY MOUTH  DAILY, Disp: 90 tablet, Rfl: 3    levothyroxine 112 mcg tablet, Take 1 tablet (112 mcg total) by mouth daily, Disp: 90 tablet, Rfl: 1    Magnesium Cl-Calcium Carbonate (SLOW-MAG PO), Take by mouth, Disp: , Rfl:     multivitamin (THERAGRAN) TABS, Take 1 tablet by mouth daily, Disp: , Rfl:     Omega-3 Fatty Acids (FISH OIL) 1200 MG CPDR, 1200mg two  tablets, Disp: , Rfl:     OneTouch Delica Lancets 01N MISC, Use to check sugars twice every other day, Disp: 90 each, Rfl: 3    potassium chloride (Klor-Con) 10 mEq tablet, Take 1 tablet (10 mEq total) by mouth 2 (two) times a day, Disp: 180 tablet, Rfl: 1    pramipexole (MIRAPEX) 0 25 mg tablet, TAKE 1 TABLET BY MOUTH  EVERY NIGHT AT BEDTIME, Disp: 90 tablet, Rfl: 3    ramipril (ALTACE) 2 5 mg capsule, Take 1 capsule (2 5 mg total) by mouth daily, Disp: 90 capsule, Rfl: 1    Current Allergies     Allergies as of 05/17/2021 - Reviewed 05/17/2021   Allergen Reaction Noted    Other  03/21/2019    Penicillins  09/21/2015            The following portions of the patient's history were reviewed and updated as appropriate: allergies, current medications, past family history, past medical history, past social history, past surgical history and problem list      Past Medical History:   Diagnosis Date    Allergic 2018   weekly inject   Allergic rhinitis     Allergies     Anemia     Anxiety     Arthritis     Chronic constipation     Diabetes ([address]     Diabetes mellitus ([address]     Disease of thyroid gland     GERD (gastroesophageal reflux disease)     Headache(784 0)     Heart murmur     Hyperlipidemia     Hypertension     Hypothyroidism     Obesity     Obesity     Restless legs     Vitamin D deficiency        Past Surgical History:   Procedure Laterality Date    BREAST BIOPSY Right 2001    BREAST LUMPECTOMY Right     BREAST LUMPECTOMY Right 01/01/2001    Dr Michelle Estrada, BILATERAL      CHOLECYSTECTOMY      COLONOSCOPY  06/01/2018    goes q 5 yr-due in Summer 2018-Dr Nelli Lowery  2015    Dr Shahab Carrillo  01/01/2000    Dr Burma Severs BIOPSY  2001       Family History   Problem Relation Age of Onset    Multiple sclerosis Brother     Multiple sclerosis Paternal Uncle     Hypertension Father     Diabetes Father     Heart disease Father     Multiple sclerosis Family     Heart disease Daughter          Medications have been verified  Objective   /78   Pulse 70   Temp 97 9 °F (36 6 °C)   Resp 16   Wt 116 kg (255 lb)   LMP 01/01/2004   SpO2 97%   BMI 45 17 kg/m²   Patient's last menstrual period was 01/01/2004         Physical Exam     Physical Exam  Constitutional:       General: She is not in acute distress  Appearance: She is not ill-appearing or diaphoretic  Pulmonary:      Effort: Pulmonary effort is normal    Musculoskeletal: Normal range of motion  General: Tenderness present  No swelling, deformity or signs of injury  Comments: Left foot with normal appearance  No swelling, discoloration, deformity noted  Pes cavus noted with mild tenderness to deep palpation of plantar surface of arch  Increased with passive dorsiflexion and resisted plantar flexion  Normal ankle and foot AROM  Negative anterior drawer  Remainder of foot/ankle nontender with normal appearance including calcaneous, ball of foot, toes, dorsal mid foot  2+ DP pulses  Toes with normal temp, sensation, ROM  Mildly antalgic gait  Neurological:      Mental Status: She is alert  Sensory: No sensory deficit     Psychiatric:         Mood and Affect: Mood normal

## 2021-06-02 DIAGNOSIS — E11.59 TYPE 2 DIABETES MELLITUS WITH OTHER CIRCULATORY COMPLICATION, WITHOUT LONG-TERM CURRENT USE OF INSULIN (HCC): ICD-10-CM

## 2021-06-02 RX ORDER — LANCETS 33 GAUGE
EACH MISCELLANEOUS
Qty: 90 EACH | Refills: 0 | Status: SHIPPED | OUTPATIENT
Start: 2021-06-02 | End: 2021-12-18 | Stop reason: SDUPTHER

## 2021-06-02 RX ORDER — BLOOD SUGAR DIAGNOSTIC
STRIP MISCELLANEOUS
Qty: 90 EACH | Refills: 0 | Status: SHIPPED | OUTPATIENT
Start: 2021-06-02 | End: 2021-12-18 | Stop reason: SDUPTHER

## 2021-06-07 ENCOUNTER — LAB (OUTPATIENT)
Dept: LAB | Facility: HOSPITAL | Age: 71
End: 2021-06-07
Attending: INTERNAL MEDICINE
Payer: COMMERCIAL

## 2021-06-07 DIAGNOSIS — E03.9 ACQUIRED HYPOTHYROIDISM: ICD-10-CM

## 2021-06-07 DIAGNOSIS — E11.65 TYPE 2 DIABETES MELLITUS WITH HYPERGLYCEMIA, WITHOUT LONG-TERM CURRENT USE OF INSULIN (HCC): ICD-10-CM

## 2021-06-07 LAB
ANION GAP SERPL CALCULATED.3IONS-SCNC: 10 MMOL/L (ref 4–13)
BUN SERPL-MCNC: 16 MG/DL (ref 6–20)
CALCIUM SERPL-MCNC: 9.2 MG/DL (ref 8.4–10.2)
CHLORIDE SERPL-SCNC: 106 MMOL/L (ref 96–108)
CO2 SERPL-SCNC: 25 MMOL/L (ref 22–33)
CREAT SERPL-MCNC: 0.86 MG/DL (ref 0.4–1.1)
CREAT UR-MCNC: 188 MG/DL
EST. AVERAGE GLUCOSE BLD GHB EST-MCNC: 166 MG/DL
GFR SERPL CREATININE-BSD FRML MDRD: 69 ML/MIN/1.73SQ M
GLUCOSE P FAST SERPL-MCNC: 169 MG/DL (ref 70–105)
HBA1C MFR BLD: 7.4 %
MICROALBUMIN UR-MCNC: 31 MG/L (ref 0–20)
MICROALBUMIN/CREAT 24H UR: 16 MG/G CREATININE (ref 0–30)
POTASSIUM SERPL-SCNC: 4.2 MMOL/L (ref 3.5–5)
SODIUM SERPL-SCNC: 141 MMOL/L (ref 133–145)
T4 FREE SERPL-MCNC: 1.06 NG/DL (ref 0.76–1.46)
TSH SERPL DL<=0.05 MIU/L-ACNC: 1.92 UIU/ML (ref 0.34–5.6)

## 2021-06-07 PROCEDURE — 36415 COLL VENOUS BLD VENIPUNCTURE: CPT

## 2021-06-07 PROCEDURE — 82043 UR ALBUMIN QUANTITATIVE: CPT

## 2021-06-07 PROCEDURE — 3060F POS MICROALBUMINURIA REV: CPT | Performed by: FAMILY MEDICINE

## 2021-06-07 PROCEDURE — 83036 HEMOGLOBIN GLYCOSYLATED A1C: CPT

## 2021-06-07 PROCEDURE — 82570 ASSAY OF URINE CREATININE: CPT

## 2021-06-07 PROCEDURE — 80048 BASIC METABOLIC PNL TOTAL CA: CPT

## 2021-06-07 PROCEDURE — 84439 ASSAY OF FREE THYROXINE: CPT

## 2021-06-07 PROCEDURE — 84443 ASSAY THYROID STIM HORMONE: CPT

## 2021-06-10 ENCOUNTER — HOSPITAL ENCOUNTER (OUTPATIENT)
Dept: ULTRASOUND IMAGING | Facility: HOSPITAL | Age: 71
Discharge: HOME | End: 2021-06-10
Attending: INTERNAL MEDICINE
Payer: COMMERCIAL

## 2021-06-10 DIAGNOSIS — E04.2 NONTOXIC MULTINODULAR GOITER: ICD-10-CM

## 2021-06-10 PROCEDURE — 76536 US EXAM OF HEAD AND NECK: CPT

## 2021-06-22 ENCOUNTER — VBI (OUTPATIENT)
Dept: ADMINISTRATIVE | Facility: OTHER | Age: 71
End: 2021-06-22

## 2021-07-28 ENCOUNTER — OFFICE VISIT (OUTPATIENT)
Dept: ENDOCRINOLOGY | Facility: CLINIC | Age: 71
End: 2021-07-28
Payer: COMMERCIAL

## 2021-07-28 VITALS
BODY MASS INDEX: 44.99 KG/M2 | WEIGHT: 254 LBS | DIASTOLIC BLOOD PRESSURE: 84 MMHG | TEMPERATURE: 98 F | SYSTOLIC BLOOD PRESSURE: 142 MMHG | HEART RATE: 75 BPM

## 2021-07-28 DIAGNOSIS — R60.9 SUBMANDIBULAR GLAND SWELLING: ICD-10-CM

## 2021-07-28 DIAGNOSIS — E78.2 MIXED HYPERLIPIDEMIA: ICD-10-CM

## 2021-07-28 DIAGNOSIS — E66.01 SEVERE OBESITY (BMI >= 40) (HCC): ICD-10-CM

## 2021-07-28 DIAGNOSIS — E11.65 TYPE 2 DIABETES MELLITUS WITH HYPERGLYCEMIA, WITHOUT LONG-TERM CURRENT USE OF INSULIN (HCC): ICD-10-CM

## 2021-07-28 DIAGNOSIS — E03.9 ACQUIRED HYPOTHYROIDISM: Primary | ICD-10-CM

## 2021-07-28 DIAGNOSIS — E04.2 NONTOXIC MULTINODULAR GOITER: ICD-10-CM

## 2021-07-28 DIAGNOSIS — E55.9 VITAMIN D DEFICIENCY: ICD-10-CM

## 2021-07-28 PROCEDURE — 99214 OFFICE O/P EST MOD 30 MIN: CPT | Performed by: INTERNAL MEDICINE

## 2021-07-28 NOTE — PROGRESS NOTES
Progress Note      Cc: follow up for diabetes     Referring Provider  Do Kashif Billings[address]  [place],  [address] [place] [address]     History of Present Illness:   Holli Cortes is a 80 y o  female with a history of type 2 diabetes without long term use of insulin for 10 years  Last seen in the office in 12/10/200  Sophy Killian Reports no complications of diabetes  Denies recent illness or hospitalizations  Denies recent severe hypoglycemic or severe hyperglycemic episodes  Denies any issues with her current regimen  home glucose monitoring: are performed regularly      Current regimen:   Invokana 300 mg daily    further diabetic ROS:    She denies any polyuria, polydipsia, nocturia, blurry vision, neuropathy, retinopathy, heart attack, stroke and claudication    Home blood glucose readings: she checks every other day   Before breakfast: 143  Before lunch: 147  Before dinner: 132  Bedtime: 135    Hypoglycemic episodes:   H/o of hypoglycemia causing hospitalization or Intervention such as glucagon injection  or ambulance call : no  Hypoglycemia symptoms: never experienced     Diabetes education: YES    Diet: 2-3 meals per day, 2-3 snacks per day  Timing of meals: expected   diabetic diet compliance:                Opthamology: UTD; no retinopathy, no macular edema  Podiatry: no    Has hypertension: followed by PCP; on ACE inhibitor  Has hyperlipidemia: followed by PCP; on statin - tolerating well, no myalgias  Thyroid disorders:   Hypothyroidism: on levothyroxine 112 mcg once daily   She is clinically euthyroid  History of thyroid nodule : RIGHT midgland nodule measuring 2 4 x 1 4 x 1 8 cm, RIGHT lower pole nodule measuring 1 6 x 1 4 x 1 2 cm  RIGHT upper pole nodule measuring 1 9 x 1 3 x 1 8 cm, LEFT midgland nodule measuring 1 7 x 1 1 x 0 8 cm, LEFT midgland nodule measuring 1 7 x 1 1 x 0 8 cm     No compressive symptoms        History of pancreatitis: no    Patient Active Problem List   Diagnosis    Acquired hypothyroidism    Nontoxic multinodular goiter    Type 2 diabetes mellitus with hyperglycemia, without long-term current use of insulin (HCC)    Essential hypertension    Mixed hyperlipidemia    Vitamin D deficiency    Allergic rhinitis    Coronary artery disease involving native coronary artery of native heart without angina pectoris    Aortic valve disorder    [facility] >= [facility]    Encounter for gynecological examination (general) (routine) without abnormal findings      Past Medical History:   Diagnosis Date    Allergic 2018   weekly inject      Allergic rhinitis     Allergies     Anemia     Anxiety     Arthritis     Chronic constipation     [facility]     Diabetes mellitus [facility]     Disease of thyroid gland     GERD (gastroesophageal reflux disease)     Headache(784 0)     Heart murmur     Hyperlipidemia     Hypertension     Hypothyroidism     Obesity     Obesity     Restless legs     Vitamin D deficiency       Past Surgical History:   Procedure Laterality Date    BREAST BIOPSY Right 2001    BREAST LUMPECTOMY Right     BREAST LUMPECTOMY Right 01/01/2001    Dr Linda Coelho, BILATERAL  05/2021    CHOLECYSTECTOMY      COLONOSCOPY  06/01/2018    goes q 5 yr-due in Summer 2018-Dr Halina Arteaga  2015    Dr Mcfarland Files  01/01/2000    Dr Tyler Anthony  2001      Family History   Problem Relation Age of Onset    Multiple sclerosis Brother     Multiple sclerosis Paternal Uncle     Hypertension Father     Diabetes Father     Heart disease Father     Multiple sclerosis Family     Heart disease Daughter      Social History     Tobacco Use    Smoking status: Never Smoker    Smokeless tobacco: Never Used   Substance Use Topics    Alcohol use: Yes     Comment: on special occasions     Allergies   Allergen Reactions    Other      Seasonal, dust, Cat dander, mold    Penicillins          Current Outpatient Medications:     ascorbic acid (VITAMIN C) 500 mg tablet, Take 500 mg by mouth daily, Disp: , Rfl:     aspirin (ECOTRIN LOW STRENGTH) 81 mg EC tablet, Take 81 mg by mouth daily, Disp: , Rfl:     atorvastatin (LIPITOR) 80 mg tablet, Take 1 tablet (80 mg total) by mouth daily, Disp: 90 tablet, Rfl: 1    Blood Glucose Monitoring Suppl (ONE TOUCH ULTRA MINI) w/Device KIT, Please dispense 1 Kit, Disp: 1 each, Rfl: 0    Bystolic 5 MG tablet, TAKE 1 TABLET BY MOUTH  DAILY, Disp: 90 tablet, Rfl: 0    cetirizine (ZyrTEC) 10 mg tablet, Take 10 mg by mouth daily, Disp: , Rfl:     Cholecalciferol (Vitamin D3) 25 MCG (1000 UT) CAPS, Take 2000 IU with dinner, Disp: , Rfl:     famotidine-calcium carbonate-magnesium hydroxide (PEPCID COMPLETE) -165 MG CHEW, Chew 1 tablet daily as needed for heartburn, Disp: , Rfl:     fluticasone (FLONASE) 50 mcg/act nasal spray, 1 spray into each nostril daily, Disp: , Rfl:     glucose blood (OneTouch Ultra) test strip, Use to test blood sugar two times every other day, Disp: 90 each, Rfl: 0    Invokana 300 MG TABS, TAKE 1 TABLET BY MOUTH  DAILY, Disp: 90 tablet, Rfl: 3    levothyroxine 112 mcg tablet, Take 1 tablet (112 mcg total) by mouth daily, Disp: 90 tablet, Rfl: 1    Magnesium Cl-Calcium Carbonate (SLOW-MAG PO), Take by mouth, Disp: , Rfl:     multivitamin (THERAGRAN) TABS, Take 1 tablet by mouth daily, Disp: , Rfl:     Omega-3 Fatty Acids (FISH OIL) 1200 MG CPDR, 1200mg two  tablets, Disp: , Rfl:     OneTouch Delica Lancets 06J MISC, Use to check sugars twice every other day, Disp: 90 each, Rfl: 0    potassium chloride (Klor-Con) 10 mEq tablet, Take 1 tablet (10 mEq total) by mouth 2 (two) times a day, Disp: 180 tablet, Rfl: 1    pramipexole (MIRAPEX) 0 25 mg tablet, TAKE 1 TABLET BY MOUTH  EVERY NIGHT AT BEDTIME, Disp: 90 tablet, Rfl: 3    ramipril (ALTACE) 2 5 mg capsule, Take 1 capsule (2 5 mg total) by mouth daily, Disp: 90 capsule, Rfl: 1  Review of Systems   Constitutional: Positive for unexpected weight change  Negative for activity change, appetite change, chills, diaphoresis, fatigue and fever  HENT: Negative for congestion, drooling, ear discharge, ear pain, trouble swallowing and voice change  Eyes: Negative for photophobia, pain, discharge, redness, itching and visual disturbance  Respiratory: Negative for cough, chest tightness, shortness of breath and wheezing  Cardiovascular: Negative for chest pain, palpitations and leg swelling  Gastrointestinal: Negative for abdominal distention, abdominal pain, blood in stool, diarrhea, nausea and vomiting  Endocrine: Negative for cold intolerance, heat intolerance, polydipsia, polyphagia and polyuria  Genitourinary: Negative for dysuria, flank pain, frequency and hematuria  Musculoskeletal: Negative for back pain, gait problem, joint swelling, myalgias, neck pain and neck stiffness  Skin: Negative for color change, pallor, rash and wound  Neurological: Negative for dizziness, tremors, seizures, syncope, speech difficulty, weakness, numbness and headaches  Psychiatric/Behavioral: Negative for agitation  Physical Exam:  Body mass index is 44 99 kg/m²    /84   Pulse 75   Temp 98 °F (36 7 °C)   Wt 115 kg (254 lb)   LMP 01/01/2004   BMI 44 99 kg/m²    Wt Readings from Last 3 Encounters:   07/28/21 115 kg (254 lb)   05/17/21 116 kg (255 lb)   04/05/21 115 kg (254 lb 3 2 oz)       GEN: NAD, obese   E/n/m nl facies, hearing intact bilat, tongue midline, lips nl  Eyes: no stare or proptosis, nl lids and conjunctiva, EOMI  Neck: trachea midline, thyroid NT to palpation, nl in size, no nodules or neck masses noted, no cervical LAD  CV; heart reg rate s1s2 nl, no m/r/g appreciated, no AZEEM  Resp: CTAB, good effort  Ab+BS  Neuro: no tremor, 2+ DTRs in BUE  MS: no c/c in digits, moves all 4 ext, nl muscle bulk, gait nl  Skin: warm and dry, no palmar erythema  Ext: no c/c in digits, no edema bilaterally, 2+ DP and PT pulses bilat  Psych: nl mood and affect, no gross lapses in memory    Labs:   No components found for: HA1C  No components found for: GLU    Lab Results   Component Value Date    CREATININE 0 86 06/07/2021    CREATININE 0 73 10/05/2020    CREATININE 0 79 03/23/2020    BUN 16 06/07/2021    K 4 2 06/07/2021     06/07/2021    CO2 25 06/07/2021     eGFR   Date Value Ref Range Status   06/07/2021 69 ml/min/1 73sq m Final     No components found for: [facility]    Lab Results   Component Value Date    HDL 46 (L) 10/05/2020    TRIG 195 8 (H) 10/05/2020       Lab Results   Component Value Date    ALT 35 06/11/2019    AST 18 06/11/2019    ALKPHOS 85 06/11/2019       Lab Results   Component Value Date    FREET4 1 06 06/07/2021     Component      Latest Ref Rng & Units 6/7/2021   Sodium      133 - 145 mmol/L 141   Potassium      3 5 - 5 0 mmol/L 4 2   Chloride      96 - 108 mmol/L 106   CO2      22 - 33 mmol/L 25   Anion Gap      4 - 13 mmol/L 10   BUN      6 - 20 mg/dL 16   Creatinine      0 40 - 1 10 mg/dL 0 86   GLUCOSE FASTING      70 - 105 mg/dL 169 (H)   Calcium      8 4 - 10 2 mg/dL 9 2   eGFR      ml/min/1 73sq m 69   EXT Creatinine Urine      mg/dL 188 0   MICROALBUM ,U,RANDOM      0 0 - 20 0 mg/L 31 0 (H)   MICROALBUMIN/CREATININE RATIO      0 - 30 mg/g creatinine 16   Hemoglobin A1C      Normal 3 8-5 6%; PreDiabetic 5 7-6 4%;  Diabetic >=6 5%; Glycemic control for adults with diabetes <7 0% % 7 4 (H)   eAG, EST AVG Glucose      mg/dl 166   Free T4      0 76 - 1 46 ng/dL 1 06   TSH 3RD GENERATON      0 340 - 5 600 uIU/mL 1 922     THYROID ULTRASOUND     INDICATION:    E04 2: Nontoxic multinodular goiter      COMPARISON:  None     TECHNIQUE:   Ultrasound of the thyroid was performed with a high frequency linear transducer in transverse and sagittal planes including volumetric imaging sweeps as well as traditional still imaging technique      FINDINGS:  Thyroid parenchyma is diffusely heterogeneous in echotexture with focal nodule(s) as described below      Right lobe:  5 1 x 2 1 x 3 3 cm   16 8 mL  Left lobe:  4 3 x 1 9 x 1 8 cm   0 9  Isthmus:  0 1 cm      Nodule #1  Image 30  RIGHT midgland nodule measuring 2 4 x 1 4 x 1 8 cm  Given differences in measuring technique, no significant change from prior  COMPOSITION:  2 points, solid or almost completely solid   ECHOGENICITY:  1 point, hyperechoic or isoechoic  SHAPE:  0 points, wider-than-tall  MARGIN: 0 points, smooth  ECHOGENIC FOCI:  0 points, none or large comet-tail artifacts  TI-RADS Classification: TR 3 (3 points), FNA if >2 5 cm  Follow if >1 5 cm  This nodule remains stable for greater than 5 years and has had a prior benign biopsy  If there is a high level of clinical concern, continued surveillance at 2 year intervals   could be considered, otherwise no additional workup recommended      Nodule #2  Image 37  RIGHT lower pole nodule measuring 1 6 x 1 4 x 1 2 cm  Unchanged from prior  COMPOSITION:  2 points, solid or almost completely solid   ECHOGENICITY:  1 point, hyperechoic or isoechoic  SHAPE:  0 points, wider-than-tall  MARGIN: 0 points, smooth  ECHOGENIC FOCI:  0 points, none or large comet-tail artifacts  TI-RADS Classification: TR 3 (3 points), FNA if >2 5 cm  Follow if >1 5 cm  This nodule remains stable for greater than 5 years and has had a prior benign biopsy  If there is a high level of clinical concern, continued surveillance at 2 year intervals   could be considered, otherwise no additional workup recommended      Nodule #3  Image 26  RIGHT upper pole nodule measuring 1 9 x 1 3 x 1 8 cm  Given differences in measuring technique, no significant change from prior  COMPOSITION:  2 points, solid or almost completely solid   ECHOGENICITY:  1 point, hyperechoic or isoechoic  SHAPE:  0 points, wider-than-tall      MARGIN: 0 points, smooth  ECHOGENIC FOCI:  0 points, none or large comet-tail artifacts  TI-RADS Classification: TR 3 (3 points), FNA if >2 5 cm  Follow if >1 5 cm  This nodule remains stable for greater than 5 years and has had a prior benign biopsy  If there is a high level of clinical concern, continued surveillance at 2 year intervals   could be considered, otherwise no additional workup recommended      Nodule #4  Image 68  LEFT midgland nodule measuring 1 7 x 1 1 x 0 8 cm  Given differences in measuring technique, no significant change from prior  COMPOSITION:  2 points, solid or almost completely solid   ECHOGENICITY:  1 point, hyperechoic or isoechoic  SHAPE:  0 points, wider-than-tall  MARGIN: 0 points, smooth  ECHOGENIC FOCI:  0 points, none or large comet-tail artifacts  TI-RADS Classification: TR 3 (3 points), FNA if >2 5 cm  Follow if >1 5 cm  This nodule remains stable for greater than 5 years and has had a prior benign biopsy  If there is a high level of clinical concern, continued surveillance at 2 year intervals   could be considered, otherwise no additional workup recommended            IMPRESSION:     These are stable with previous non-malignant biopsy results as above  Based on 2015 EMY guidelines, additional followup ultrasound should be performed in 12 to 24 months             Reference: ACR Thyroid Imaging, Reporting and Data System (TI-RADS): White Paper of the [email or web address]  J AM Luis Radiol [id number];[id number]  (additional recommendations based on American Thyroid Association 2015 guidelines )        Workstation performed: [id number]      Imaging    US thyroid (Order: [id number]) - 6/10/2021  Result History    US thyroid (Order #[id number]) on 6/17/2021 - Order Result History Report       Impression:  1  Acquired hypothyroidism    2  Nontoxic multinodular goiter    3  Mixed hyperlipidemia    4  Severe obesity (BMI >= 40) (HCC)    5  Vitamin D deficiency    6   Type 2 diabetes mellitus with hyperglycemia, without long-term current use of insulin (HCC)    7  Submandibular gland swelling           Plan:        Acquired hypothyroidism:  Levothyroxine 112 mcg once daily  Patient is taking it regularly and properly  Patient is clinically and biochemically euthyroid  Continue current management  Check TFT in 6 months  Nontoxic multinodular goiter:  She has history of thyroid nodules and the most recent thyroid ultrasound in June 2021 showed RIGHT midgland nodule measuring 2 4 x 1 4 x 1 8 cm, RIGHT lower pole nodule measuring 1 6 x 1 4 x 1 2 cm  RIGHT upper pole nodule measuring 1 9 x 1 3 x 1 8 cm, LEFT midgland nodule measuring 1 7 x 1 1 x 0 8 cm, LEFT midgland nodule measuring 1 7 x 1 1 x 0 8 cm which are all stable in size, and normal biopsy in the past   No compressive symptoms  Will monitor with thyroid ultrasound in 2 years      Mixed hyperlipidemia:  On Lipitor 80 mg and fish oil  She tolerated with no side effects reported  Check lipid profile before next visit  Severe obesity (BMI >= 40) ([address] ): She was advised to lifestyle changes including regular daily exercise and watching her diet  Vitamin D deficiency:  On vitamin-D 1000 units once daily  Check vitamin-D before next visit    Type 2 diabetes mellitus without long-term current use of insulin ([facility]  On Invokana 300 mg once daily, tolerated, no UTI or fungal infection reported  Performs home glucose monitoring and her blood sugars are ranging from 120-140, most recent A1c is 7 4%,  Patient is willing to start lifestyle modification, including regular daily exercise and balanced diet, and if A1c still above 7 will add DPP-4 inhibitor,  She did not tolerate metformin,   She was advised to follow-up with ophthalmology and podiatry  She will continue home glucose monitoring and will inform us if her blood sugars are consistently high(above 180)  See her back in the office in 6 months    Check labs immediately before next visit  Submandibular gland swelling  -     Ambulatory Referral to Otolaryngology; Future    Discussed with the patient and all questioned fully answered  She will call me if any problems arise      Counseled patient on diagnostic results, prognosis, risk and benefit of treatment options, instruction for management, importance of treatment compliance, Risk  factor reduction and impressions

## 2021-07-30 PROCEDURE — 4010F ACE/ARB THERAPY RXD/TAKEN: CPT | Performed by: FAMILY MEDICINE

## 2021-08-05 ENCOUNTER — OFFICE VISIT (OUTPATIENT)
Dept: FAMILY MEDICINE CLINIC | Facility: OTHER | Age: 71
End: 2021-08-05
Payer: COMMERCIAL

## 2021-08-05 VITALS
HEART RATE: 89 BPM | OXYGEN SATURATION: 98 % | SYSTOLIC BLOOD PRESSURE: 132 MMHG | RESPIRATION RATE: 18 BRPM | BODY MASS INDEX: 45.22 KG/M2 | WEIGHT: 255.2 LBS | TEMPERATURE: 97.8 F | DIASTOLIC BLOOD PRESSURE: 78 MMHG | HEIGHT: 63 IN

## 2021-08-05 DIAGNOSIS — H92.02 LEFT EAR PAIN: Primary | ICD-10-CM

## 2021-08-05 PROCEDURE — 3075F SYST BP GE 130 - 139MM HG: CPT | Performed by: FAMILY MEDICINE

## 2021-08-05 PROCEDURE — 99213 OFFICE O/P EST LOW 20 MIN: CPT | Performed by: FAMILY MEDICINE

## 2021-08-05 PROCEDURE — 3008F BODY MASS INDEX DOCD: CPT | Performed by: FAMILY MEDICINE

## 2021-08-05 PROCEDURE — 1036F TOBACCO NON-USER: CPT | Performed by: FAMILY MEDICINE

## 2021-08-05 PROCEDURE — 1160F RVW MEDS BY RX/DR IN RCRD: CPT | Performed by: FAMILY MEDICINE

## 2021-08-05 PROCEDURE — 3078F DIAST BP <80 MM HG: CPT | Performed by: FAMILY MEDICINE

## 2021-08-05 NOTE — PATIENT INSTRUCTIONS
Earache   WHAT YOU NEED TO KNOW:   An earache can be caused by a problem within your ear or from another body area  Common causes include earwax buildup, objects in your ear, injury, infections, or jaw or dental problems  Less often, earaches may be caused by arthritis in your upper spine  DISCHARGE INSTRUCTIONS:   Return to the emergency department if:   · You have a severe earache  · You have ear pain with itching, hearing loss, dizziness, a feeling of fullness in your ear, or ringing in your ears  Contact your healthcare provider if:   · Your ear pain worsens or does not go away with treatment  · You have drainage from your ear  · You have a fever  · Your outer ear becomes red, swollen, and warm  · You have questions or concerns about your condition or care  Medicines: You may need any of the following:  · NSAIDs , such as ibuprofen, help decrease swelling, pain, and fever  This medicine is available with or without a doctor's order  NSAIDs can cause stomach bleeding or kidney problems in certain people  If you take blood thinner medicine, always ask if NSAIDs are safe for you  Always read the medicine label and follow directions  Do not give these medicines to children under 10months of age without direction from your child's healthcare provider  · Acetaminophen  decreases pain and fever  It is available without a doctor's order  Ask how much to take and how often to take it  Follow directions  Acetaminophen can cause liver damage if not taken correctly  · Do not give aspirin to children under 25years of age  Your child could develop Reye syndrome if he takes aspirin  Reye syndrome can cause life-threatening brain and liver damage  Check your child's medicine labels for aspirin, salicylates, or oil of wintergreen  · Take your medicine as directed  Call your healthcare provider if you think your medicine is not helping or if you have side effects   Tell him if you are allergic to any medicine  Keep a list of the medicines, vitamins, and herbs you take  Include the amounts, and when and why you take them  Bring the list or the pill bottles to follow-up visits  Carry your medicine list with you in case of an emergency  Follow up with your healthcare provider as directed:  Write down your questions so you remember to ask them during your visits  © Copyright [address] Information is for End User's use only and may not be sold, redistributed or otherwise used for commercial purposes  All illustrations and images included in CareNotes® are the copyrighted property of [employer]  or [address] [place], [place] [address]franko Lopez   The above information is an  only  It is not intended as medical advice for individual conditions or treatments  Talk to your doctor, nurse or pharmacist before following any medical regimen to see if it is safe and effective for you

## 2021-08-18 DIAGNOSIS — E11.9 TYPE 2 DIABETES MELLITUS WITHOUT COMPLICATION, WITHOUT LONG-TERM CURRENT USE OF INSULIN (HCC): ICD-10-CM

## 2021-08-18 RX ORDER — CANAGLIFLOZIN 300 MG/1
300 TABLET, FILM COATED ORAL DAILY
Qty: 90 TABLET | Refills: 1 | Status: SHIPPED | OUTPATIENT
Start: 2021-08-18 | End: 2021-10-15 | Stop reason: SDUPTHER

## 2021-09-11 DIAGNOSIS — I10 ESSENTIAL HYPERTENSION: ICD-10-CM

## 2021-09-13 PROCEDURE — 4010F ACE/ARB THERAPY RXD/TAKEN: CPT | Performed by: FAMILY MEDICINE

## 2021-09-13 RX ORDER — RAMIPRIL 2.5 MG/1
2.5 CAPSULE ORAL DAILY
Qty: 90 CAPSULE | Refills: 0 | Status: SHIPPED | OUTPATIENT
Start: 2021-09-13 | End: 2021-10-15 | Stop reason: SDUPTHER

## 2021-09-20 ENCOUNTER — VBI (OUTPATIENT)
Dept: ADMINISTRATIVE | Facility: OTHER | Age: 71
End: 2021-09-20

## 2021-09-20 NOTE — TELEPHONE ENCOUNTER
09/20/21 11:51 AM     See documentation in the [employer] SmartForm       [facility]

## 2021-09-23 ENCOUNTER — OFFICE VISIT (OUTPATIENT)
Dept: FAMILY MEDICINE CLINIC | Facility: OTHER | Age: 71
End: 2021-09-23
Payer: COMMERCIAL

## 2021-09-23 VITALS
HEART RATE: 73 BPM | OXYGEN SATURATION: 98 % | SYSTOLIC BLOOD PRESSURE: 130 MMHG | WEIGHT: 256.6 LBS | RESPIRATION RATE: 18 BRPM | BODY MASS INDEX: 45.46 KG/M2 | DIASTOLIC BLOOD PRESSURE: 88 MMHG | TEMPERATURE: 97.8 F | HEIGHT: 63 IN

## 2021-09-23 DIAGNOSIS — J30.2 SEASONAL ALLERGIES: ICD-10-CM

## 2021-09-23 DIAGNOSIS — M19.042 ARTHRITIS OF BOTH HANDS: ICD-10-CM

## 2021-09-23 DIAGNOSIS — M19.041 ARTHRITIS OF BOTH HANDS: ICD-10-CM

## 2021-09-23 DIAGNOSIS — Z23 NEED FOR VACCINATION: ICD-10-CM

## 2021-09-23 DIAGNOSIS — Z00.00 MEDICARE ANNUAL WELLNESS VISIT, SUBSEQUENT: Primary | ICD-10-CM

## 2021-09-23 DIAGNOSIS — I10 ESSENTIAL HYPERTENSION: ICD-10-CM

## 2021-09-23 DIAGNOSIS — E11.9 TYPE 2 DIABETES MELLITUS WITHOUT COMPLICATION, WITHOUT LONG-TERM CURRENT USE OF INSULIN (HCC): ICD-10-CM

## 2021-09-23 DIAGNOSIS — E66.01 SEVERE OBESITY (BMI >= 40) (HCC): ICD-10-CM

## 2021-09-23 LAB — SL AMB POCT HEMOGLOBIN AIC: 7.1 (ref ?–6.5)

## 2021-09-23 PROCEDURE — 1036F TOBACCO NON-USER: CPT | Performed by: FAMILY MEDICINE

## 2021-09-23 PROCEDURE — 3075F SYST BP GE 130 - 139MM HG: CPT | Performed by: FAMILY MEDICINE

## 2021-09-23 PROCEDURE — 1125F AMNT PAIN NOTED PAIN PRSNT: CPT | Performed by: FAMILY MEDICINE

## 2021-09-23 PROCEDURE — 3008F BODY MASS INDEX DOCD: CPT | Performed by: FAMILY MEDICINE

## 2021-09-23 PROCEDURE — G0008 ADMIN INFLUENZA VIRUS VAC: HCPCS

## 2021-09-23 PROCEDURE — 1170F FXNL STATUS ASSESSED: CPT | Performed by: FAMILY MEDICINE

## 2021-09-23 PROCEDURE — 3725F SCREEN DEPRESSION PERFORMED: CPT | Performed by: FAMILY MEDICINE

## 2021-09-23 PROCEDURE — G0439 PPPS, SUBSEQ VISIT: HCPCS | Performed by: FAMILY MEDICINE

## 2021-09-23 PROCEDURE — 3079F DIAST BP 80-89 MM HG: CPT | Performed by: FAMILY MEDICINE

## 2021-09-23 PROCEDURE — 83036 HEMOGLOBIN GLYCOSYLATED A1C: CPT | Performed by: FAMILY MEDICINE

## 2021-09-23 PROCEDURE — 3288F FALL RISK ASSESSMENT DOCD: CPT | Performed by: FAMILY MEDICINE

## 2021-09-23 PROCEDURE — 1160F RVW MEDS BY RX/DR IN RCRD: CPT | Performed by: FAMILY MEDICINE

## 2021-09-23 PROCEDURE — 3051F HG A1C>EQUAL 7.0%<8.0%: CPT | Performed by: FAMILY MEDICINE

## 2021-09-23 PROCEDURE — 90662 IIV NO PRSV INCREASED AG IM: CPT

## 2021-09-23 RX ORDER — LEVOCETIRIZINE DIHYDROCHLORIDE 5 MG/1
5 TABLET, FILM COATED ORAL EVERY EVENING
Start: 2021-09-23 | End: 2022-03-24 | Stop reason: SDUPTHER

## 2021-09-23 RX ORDER — NEBIVOLOL 5 MG/1
2.5 TABLET ORAL DAILY
Qty: 90 TABLET | Refills: 0
Start: 2021-09-23 | End: 2021-10-15 | Stop reason: SDUPTHER

## 2021-09-23 NOTE — PROGRESS NOTES
Assessment and Plan:     Problem List Items Addressed This Visit        Cardiovascular and Mediastinum    Essential hypertension    Relevant Medications    nebivolol (Bystolic) 5 mg tablet    Other Relevant Orders    Ambulatory referral to Weight Management       Other    Severe obesity (BMI >= 40) (HCC)    Relevant Orders    Ambulatory referral to Weight Management    Seasonal allergies    Relevant Medications    levocetirizine (XYZAL) 5 MG tablet      Other Visit Diagnoses     Medicare annual wellness visit, subsequent    -  Primary    Type 2 diabetes mellitus without complication, without long-term current use of insulin ([facility] )        Relevant Orders    POCT hemoglobin A1c (Completed)    Ambulatory referral to Weight Management    Ambulatory referral to Diabetic Education    Arthritis of both hands        Relevant Orders    Ambulatory referral to PT/OT hand therapy    Need for vaccination        Relevant Orders    influenza vaccine, high-dose, PF 0 7 mL (FLUZONE HIGH-DOSE) (Completed)        BMI Counseling: Body mass index is 45 45 kg/m²  The BMI is above normal  Nutrition recommendations include decreasing portion sizes, encouraging healthy choices of fruits and vegetables, decreasing fast food intake, consuming healthier snacks, limiting drinks that contain sugar, moderation in carbohydrate intake, increasing intake of lean protein, reducing intake of saturated and trans fat and reducing intake of cholesterol  Exercise recommendations include exercising 3-5 times per week  Patient referred to weight management  Rationale for BMI follow-up plan is due to patient being overweight or obese  Depression Screening and Follow-up Plan:   Patient was screened for depression during today's encounter  They screened negative with a PHQ-2 score of 0  Falls Plan of Care: balance, strength, and gait training instructions were provided  Medications that increase falls were reviewed         Preventive health issues were discussed with patient, and age appropriate screening tests were ordered as noted in patient's After Visit Summary  Personalized health advice and appropriate referrals for health education or preventive services given if needed, as noted in patient's After Visit Summary  Return in about 6 months (around 3/23/2022) for Recheck DM, HTN, HLD  The patient indicates understanding of these issues and agrees with the plan  History of Present Illness:     Patient presents for Medicare Annual Wellness visit    Patient Care Team:  Luann López DO as PCP - General (Family Medicine)  Keisha Hernandez MD as PCP - Endocrinology (Endocrinology)  Macario Varela MD as Care Coordinator (Gastroenterology)  Aspen Langley MD as Care Coordinator (Ophthalmology)  Milagros Mcfarland MD as Care Coordinator (Otolaryngology)  Felicity Wall MD as Care Coordinator (Cardiology)  Aspen Langley MD (Ophthalmology)     Problem List:     Patient Active Problem List   Diagnosis    Acquired hypothyroidism    Nontoxic multinodular goiter    Type 2 diabetes mellitus with hyperglycemia, without long-term current use of insulin ([facility] [address] )    Essential hypertension    Mixed hyperlipidemia    Vitamin D deficiency    Allergic rhinitis    Coronary artery disease involving native coronary artery of native heart without angina pectoris    Aortic valve disorder    Severe obesity (BMI >= 40) ([address] )    Encounter for gynecological examination (general) (routine) without abnormal findings    Seasonal allergies      Past Medical and Surgical History:     Past Medical History:   Diagnosis Date    Allergic 2018   weekly inject      Allergic rhinitis     Allergies     Anemia     Anxiety     Arthritis     Chronic constipation     Diabetes ([address]     Diabetes mellitus ([address]     Disease of thyroid gland     GERD (gastroesophageal reflux disease)     Headache(784 0)     Heart murmur     Hyperlipidemia     Hypertension     Hypothyroidism  Obesity     Obesity     Restless legs     Vitamin D deficiency      Past Surgical History:   Procedure Laterality Date    BREAST BIOPSY Right     BREAST LUMPECTOMY Right     BREAST LUMPECTOMY Right 2001    Dr Gonzalez Patel CATARACT EXTRACTION      CATARACT EXTRACTION, BILATERAL  2021    CHOLECYSTECTOMY      COLONOSCOPY  2018    goes q 5 yr-due in Summer 2018-Dr Cecelia Lopez      Dr Nas Feliciano  2000    Dr Orin Silverio  2013    [address] [place] [address] ([address])  10/15/2013      Family History:     Family History   Problem Relation Age of Onset    Multiple sclerosis Brother     Multiple sclerosis Paternal Uncle     Hypertension Father     Diabetes Father     Heart disease Father     Multiple sclerosis Family     Heart disease Daughter       Social History:     Social History     Socioeconomic History    Marital status: Single     Spouse name: None    Number of children: None    Years of education: None    Highest education level: None   Occupational History    None   Tobacco Use    Smoking status: Never Smoker    Smokeless tobacco: Never Used   Vaping Use    Vaping Use: Never used   Substance and Sexual Activity    Alcohol use: Yes     Comment: on special occasions    Drug use: Never    Sexual activity: Not Currently   Other Topics Concern    None   Social History Narrative    · Most recent tobacco use screenin2018      · Do you currently or have you served in the [employer]:   No      · Were you activated, into active duty, as a member of the [employer] or as a Reservist:   No      · Advance directive:   No      · Live alone or with others:   alone       · Caffeine intake:    Moderate, 2 cups daily      Social Determinants of Health     Financial Resource Strain:     Difficulty of Paying Living Expenses:    Food Insecurity:     Worried About Running Out of Food in the Last Year:    [address] in the Last Year:    Transportation Needs:     Lack of Transportation (Medical):      Lack of Transportation (Non-Medical):    Physical Activity:     Days of Exercise per Week:     Minutes of Exercise per Session:    Stress:     Feeling of Stress :    Social Connections:     Frequency of Communication with Friends and Family:     Frequency of Social Gatherings with Friends and Family:     Attends [employer] Services:     Active Member of Clubs or Organizations:     Attends Club or Organization Meetings:     Marital Status:    Intimate Partner Violence:     Fear of Current or Ex-Partner:     Emotionally Abused:     Physically Abused:     Sexually Abused:       Medications and Allergies:     Current Outpatient Medications   Medication Sig Dispense Refill    ascorbic acid (VITAMIN C) 500 mg tablet Take 500 mg by mouth daily      aspirin (ECOTRIN LOW STRENGTH) 81 mg EC tablet Take 81 mg by mouth daily      atorvastatin (LIPITOR) 80 mg tablet Take 1 tablet (80 mg total) by mouth daily 90 tablet 1    Blood Glucose Monitoring Suppl (ONE TOUCH ULTRA MINI) w/Device KIT Please dispense 1 Kit 1 each 0    Canagliflozin (Invokana) 300 MG TABS Take 1 tablet (300 mg total) by mouth daily 90 tablet 1    Cholecalciferol (Vitamin D3) 25 MCG (1000 UT) CAPS Take 2000 IU with dinner      famotidine-calcium carbonate-magnesium hydroxide (PEPCID COMPLETE) -165 MG CHEW Chew 1 tablet daily as needed for heartburn      fluticasone (FLONASE) 50 mcg/act nasal spray 1 spray into each nostril daily      glucose blood (OneTouch Ultra) test strip Use to test blood sugar two times every other day 90 each 0    levothyroxine 112 mcg tablet Take 1 tablet (112 mcg total) by mouth daily 90 tablet 1    Magnesium Cl-Calcium Carbonate (SLOW-MAG PO) Take by mouth      multivitamin (THERAGRAN) TABS Take 1 tablet by mouth daily      nebivolol (Bystolic) 5 mg tablet Take 0 5 tablets (2 5 mg total) by mouth daily 90 tablet 0    Omega-3 Fatty Acids (FISH OIL) 1200 MG CPDR 1200mg two  tablets      OneTouch Delica Lancets 20X MISC Use to check sugars twice every other day 90 each 0    potassium chloride (Klor-Con) 10 mEq tablet Take 1 tablet (10 mEq total) by mouth 2 (two) times a day 180 tablet 1    pramipexole (MIRAPEX) 0 25 mg tablet TAKE 1 TABLET BY MOUTH  EVERY NIGHT AT BEDTIME 90 tablet 3    ramipril (ALTACE) 2 5 mg capsule Take 1 capsule (2 5 mg total) by mouth daily 90 capsule 0    levocetirizine (XYZAL) 5 MG tablet Take 1 tablet (5 mg total) by mouth every evening       No current facility-administered medications for this visit  Allergies   Allergen Reactions    Other Sneezing     Seasonal, dust, Cat dander, mold    Penicillins Hives      Immunizations:     Immunization History   Administered Date(s) Administered    INFLUENZA 10/25/2010, 11/14/2011, 10/01/2012, 10/16/2013, 11/20/2014, 11/10/2015, 10/03/2016, 09/20/2017, 09/11/2018, 11/10/2018    Influenza, high dose seasonal 0 7 mL 09/09/2019, 08/17/2020, 09/23/2021    Pneumococcal Conjugate 13-Valent 03/17/2014, 11/10/2018    Pneumococcal Conjugate PCV 7 10/01/2003    Pneumococcal Polysaccharide PPV23 05/03/2018    SARS-CoV-2 / COVID-19 mRNA IM (Pfizer-BioNTech) 02/17/2021, 03/10/2021    Tdap 10/25/2010    Zoster 02/07/2013    Zoster Vaccine Recombinant 09/11/2018, 11/10/2018      Health Maintenance:         Topic Date Due    DXA SCAN  10/08/2021    Breast Cancer Screening: Mammogram  11/10/2021    Colorectal Cancer Screening  05/16/2023    Hepatitis C Screening  Completed     There are no preventive care reminders to display for this patient  Medicare Health Risk Assessment:     /88   Pulse 73   Temp 97 8 °F (36 6 °C)   Resp 18   Ht 5' 3" (1 6 m)   Wt 116 kg (256 lb 9 6 oz)   LMP 01/01/2004   SpO2 98%   BMI 45 45 kg/m²      Rip White is here for her Subsequent Wellness visit   Last Medicare Wellness visit information reviewed, patient interviewed and updates made to the record today  Health Risk Assessment:   Patient rates overall health as good  Patient feels that their physical health rating is same  Patient is very satisfied with their life  Eyesight was rated as same  Hearing was rated as same  Patient feels that their emotional and mental health rating is same  Patients states they are sometimes angry  Patient states they are sometimes unusually tired/fatigued  Pain experienced in the last 7 days has been none  Patient states that she has experienced no weight loss or gain in last 6 months  Depression Screening:   PHQ-2 Score: 0      Fall Risk Screening: In the past year, patient has experienced: history of falling in past year    Number of falls: 1  Injured during fall?: No    Feels unsteady when standing or walking?: No    Worried about falling?: No      Urinary Incontinence Screening:   Patient has leaked urine accidently in the last six months  Home Safety:  Patient does not have trouble with stairs inside or outside of their home  Patient has working smoke alarms and has working carbon monoxide detector  Home safety hazards include: none  Nutrition:   Current diet is Regular  Medications:   Patient is currently taking over-the-counter supplements  OTC medications include: see medication list  Patient is able to manage medications  Activities of Daily Living (ADLs)/Instrumental Activities of Daily Living (IADLs):   Walk and transfer into and out of bed and chair?: Yes  Dress and groom yourself?: Yes    Bathe or shower yourself?: Yes    Feed yourself?  Yes  Do your laundry/housekeeping?: Yes  Manage your money, pay your bills and track your expenses?: Yes  Make your own meals?: Yes    Do your own shopping?: Yes    Previous Hospitalizations:   Any hospitalizations or ED visits within the last 12 months?: No      Advance Care Planning:   Living will: Yes    Durable POA for healthcare: Yes    Advanced directive: Yes      Cognitive Screening:   Provider or family/friend/caregiver concerned regarding cognition?: No    PREVENTIVE SCREENINGS      Cardiovascular Screening:    General: Screening Not Indicated and History Lipid Disorder      Diabetes Screening:     General: Screening Not Indicated and History Diabetes      Colorectal Cancer Screening:     General: Screening Current      Breast Cancer Screening:     General: Screening Current and Risks and Benefits Discussed    Due for: Mammogram        Cervical Cancer Screening:    General: Screening Not Indicated      Osteoporosis Screening:    General: Screening Current and Risks and Benefits Discussed    Due for: DXA Axial      Abdominal Aortic Aneurysm (AAA) Screening:        General: Screening Not Indicated      Lung Cancer Screening:     General: Screening Not Indicated      Hepatitis C Screening:    General: Screening Current    Screening, Brief Intervention, and Referral to Treatment (SBIRT)    Screening  Typical number of drinks in a day: 0  Typical number of drinks in a week: 0  Interpretation: Low risk drinking behavior  Single Item Drug Screening:  How often have you used an illegal drug (including marijuana) or a prescription medication for non-medical reasons in the past year? never    Single Item Drug Screen Score: 0  Interpretation: Negative screen for possible drug use disorder    Brief Intervention  Alcohol & drug use screenings were reviewed  No concerns regarding substance use disorder identified  Other Counseling Topics:   Car/seat belt/driving safety, skin self-exam, sunscreen and regular weightbearing exercise and calcium and vitamin D intake  Review of Systems   Constitutional: Negative for appetite change, fatigue, fever and unexpected weight change  HENT: Negative for congestion, dental problem, ear pain, postnasal drip, sore throat and tinnitus      Eyes: Negative for pain, discharge and visual disturbance  Respiratory: Negative for cough, shortness of breath and wheezing  Cardiovascular: Negative for chest pain, palpitations and leg swelling  Gastrointestinal: Negative for abdominal pain, constipation, diarrhea, nausea and vomiting  Endocrine: Negative for cold intolerance and heat intolerance  Genitourinary: Negative for difficulty urinating, dysuria, flank pain and urgency  Musculoskeletal: Positive for arthralgias (hands) and joint swelling (hands)  Negative for back pain and myalgias  Skin: Negative for rash and wound  Allergic/Immunologic: Negative for immunocompromised state  Neurological: Negative for dizziness, syncope, speech difficulty, weakness and numbness  Hematological: Negative for adenopathy  Does not bruise/bleed easily  Psychiatric/Behavioral: Negative for confusion, dysphoric mood and sleep disturbance  The patient is not nervous/anxious  /88   Pulse 73   Temp 97 8 °F (36 6 °C)   Resp 18   Ht 5' 3" (1 6 m)   Wt 116 kg (256 lb 9 6 oz)   LMP 01/01/2004   SpO2 98%   BMI 45 45 kg/m²       Physical Exam  Vitals and nursing note reviewed  Constitutional:       General: She is not in acute distress  Appearance: Normal appearance  She is well-developed  She is not ill-appearing  Comments: Body mass index is 45 45 kg/m²  HENT:      Head: Normocephalic and atraumatic  Right Ear: Hearing, tympanic membrane, ear canal and external ear normal       Left Ear: Hearing, tympanic membrane, ear canal and external ear normal       Nose: Nose normal       Mouth/Throat:      Mouth: Mucous membranes are moist       Pharynx: Oropharynx is clear  Uvula midline  Eyes:      General: No scleral icterus  Conjunctiva/sclera: Conjunctivae normal       Pupils: Pupils are equal, round, and reactive to light  Neck:      Thyroid: No thyromegaly  Cardiovascular:      Rate and Rhythm: Normal rate and regular rhythm  Heart sounds: Murmur heard  Systolic murmur is present with a grade of 2/6  Pulmonary:      Effort: Pulmonary effort is normal  No respiratory distress  Breath sounds: Normal breath sounds  No wheezing  Abdominal:      General: Bowel sounds are normal  There is no distension  Palpations: Abdomen is soft  Tenderness: There is no abdominal tenderness  Musculoskeletal:         General: No tenderness  Normal range of motion  Cervical back: Normal range of motion and neck supple  Right lower leg: No edema  Left lower leg: No edema  Lymphadenopathy:      Cervical: No cervical adenopathy  Skin:     General: Skin is warm and dry  Coloration: Skin is not jaundiced  Findings: No erythema or rash  Neurological:      General: No focal deficit present  Mental Status: She is alert and oriented to person, place, and time  Cranial Nerves: No cranial nerve deficit     Psychiatric:         Mood and Affect: Mood normal          Behavior: Behavior normal            Terese Alford, DO

## 2021-10-04 ENCOUNTER — EVALUATION (OUTPATIENT)
Dept: OCCUPATIONAL THERAPY | Facility: CLINIC | Age: 71
End: 2021-10-04
Payer: COMMERCIAL

## 2021-10-04 DIAGNOSIS — M19.041 ARTHRITIS OF BOTH HANDS: ICD-10-CM

## 2021-10-04 DIAGNOSIS — M19.042 ARTHRITIS OF BOTH HANDS: ICD-10-CM

## 2021-10-04 PROCEDURE — 97165 OT EVAL LOW COMPLEX 30 MIN: CPT | Performed by: OCCUPATIONAL THERAPIST

## 2021-10-04 PROCEDURE — 97018 PARAFFIN BATH THERAPY: CPT | Performed by: OCCUPATIONAL THERAPIST

## 2021-10-04 PROCEDURE — 97110 THERAPEUTIC EXERCISES: CPT | Performed by: OCCUPATIONAL THERAPIST

## 2021-10-07 DIAGNOSIS — E03.9 HYPOTHYROIDISM (ACQUIRED): ICD-10-CM

## 2021-10-08 RX ORDER — LEVOTHYROXINE SODIUM 112 UG/1
112 TABLET ORAL DAILY
Qty: 90 TABLET | Refills: 0 | Status: SHIPPED | OUTPATIENT
Start: 2021-10-08 | End: 2021-10-15 | Stop reason: SDUPTHER

## 2021-10-09 ENCOUNTER — IMMUNIZATIONS (OUTPATIENT)
Dept: FAMILY MEDICINE CLINIC | Facility: HOSPITAL | Age: 71
End: 2021-10-09

## 2021-10-09 DIAGNOSIS — Z23 ENCOUNTER FOR IMMUNIZATION: Primary | ICD-10-CM

## 2021-10-09 PROCEDURE — 0001A SARS-COV-2 / COVID-19 MRNA VACCINE (PFIZER-BIONTECH) 30 MCG: CPT

## 2021-10-09 PROCEDURE — 91300 SARS-COV-2 / COVID-19 MRNA VACCINE (PFIZER-BIONTECH) 30 MCG: CPT

## 2021-10-11 ENCOUNTER — OFFICE VISIT (OUTPATIENT)
Dept: OCCUPATIONAL THERAPY | Facility: CLINIC | Age: 71
End: 2021-10-11
Payer: COMMERCIAL

## 2021-10-11 ENCOUNTER — HOSPITAL ENCOUNTER (OUTPATIENT)
Dept: RADIOLOGY | Facility: HOSPITAL | Age: 71
Discharge: HOME | End: 2021-10-11
Payer: COMMERCIAL

## 2021-10-11 DIAGNOSIS — M19.041 ARTHRITIS OF BOTH HANDS: Primary | ICD-10-CM

## 2021-10-11 DIAGNOSIS — M19.042 ARTHRITIS OF BOTH HANDS: Primary | ICD-10-CM

## 2021-10-11 DIAGNOSIS — Z78.0 POST-MENOPAUSAL: ICD-10-CM

## 2021-10-11 PROCEDURE — 97018 PARAFFIN BATH THERAPY: CPT

## 2021-10-11 PROCEDURE — 77080 DXA BONE DENSITY AXIAL: CPT

## 2021-10-11 PROCEDURE — 97110 THERAPEUTIC EXERCISES: CPT

## 2021-10-13 ENCOUNTER — OFFICE VISIT (OUTPATIENT)
Dept: OCCUPATIONAL THERAPY | Facility: CLINIC | Age: 71
End: 2021-10-13
Payer: COMMERCIAL

## 2021-10-13 DIAGNOSIS — M19.041 ARTHRITIS OF BOTH HANDS: Primary | ICD-10-CM

## 2021-10-13 DIAGNOSIS — M19.042 ARTHRITIS OF BOTH HANDS: Primary | ICD-10-CM

## 2021-10-13 PROCEDURE — 97110 THERAPEUTIC EXERCISES: CPT

## 2021-10-14 DIAGNOSIS — I10 ESSENTIAL HYPERTENSION: ICD-10-CM

## 2021-10-14 DIAGNOSIS — E78.2 MIXED HYPERLIPIDEMIA: ICD-10-CM

## 2021-10-14 DIAGNOSIS — G47.61 PERIODIC LIMB MOVEMENT DISORDER (PLMD): ICD-10-CM

## 2021-10-14 DIAGNOSIS — E11.9 TYPE 2 DIABETES MELLITUS WITHOUT COMPLICATION, WITHOUT LONG-TERM CURRENT USE OF INSULIN (HCC): ICD-10-CM

## 2021-10-14 DIAGNOSIS — E03.9 HYPOTHYROIDISM (ACQUIRED): ICD-10-CM

## 2021-10-15 RX ORDER — LEVOTHYROXINE SODIUM 112 UG/1
112 TABLET ORAL DAILY
Qty: 30 TABLET | Refills: 0 | Status: SHIPPED | OUTPATIENT
Start: 2021-10-15 | End: 2021-12-29 | Stop reason: SDUPTHER

## 2021-10-15 RX ORDER — POTASSIUM CHLORIDE 750 MG/1
10 TABLET, FILM COATED, EXTENDED RELEASE ORAL 2 TIMES DAILY
Qty: 60 TABLET | Refills: 0 | Status: SHIPPED | OUTPATIENT
Start: 2021-10-15 | End: 2021-12-15 | Stop reason: SDUPTHER

## 2021-10-15 RX ORDER — CANAGLIFLOZIN 300 MG/1
300 TABLET, FILM COATED ORAL DAILY
Qty: 30 TABLET | Refills: 0 | Status: SHIPPED | OUTPATIENT
Start: 2021-10-15 | End: 2021-12-29 | Stop reason: SDUPTHER

## 2021-10-15 RX ORDER — PRAMIPEXOLE DIHYDROCHLORIDE 0.25 MG/1
0.25 TABLET ORAL
Qty: 30 TABLET | Refills: 0 | Status: SHIPPED | OUTPATIENT
Start: 2021-10-15 | End: 2021-12-29 | Stop reason: SDUPTHER

## 2021-10-15 RX ORDER — NEBIVOLOL 5 MG/1
2.5 TABLET ORAL DAILY
Qty: 30 TABLET | Refills: 0
Start: 2021-10-15 | End: 2021-12-29 | Stop reason: SDUPTHER

## 2021-10-15 RX ORDER — RAMIPRIL 2.5 MG/1
2.5 CAPSULE ORAL DAILY
Qty: 30 CAPSULE | Refills: 0 | Status: SHIPPED | OUTPATIENT
Start: 2021-10-15 | End: 2021-12-11 | Stop reason: SDUPTHER

## 2021-10-15 RX ORDER — ATORVASTATIN CALCIUM 80 MG/1
80 TABLET, FILM COATED ORAL DAILY
Qty: 30 TABLET | Refills: 0 | Status: SHIPPED | OUTPATIENT
Start: 2021-10-15 | End: 2021-12-29 | Stop reason: SDUPTHER

## 2021-11-08 ENCOUNTER — OFFICE VISIT (OUTPATIENT)
Dept: OCCUPATIONAL THERAPY | Facility: CLINIC | Age: 71
End: 2021-11-08
Payer: COMMERCIAL

## 2021-11-08 DIAGNOSIS — M19.041 ARTHRITIS OF BOTH HANDS: Primary | ICD-10-CM

## 2021-11-08 DIAGNOSIS — M19.042 ARTHRITIS OF BOTH HANDS: Primary | ICD-10-CM

## 2021-11-08 PROCEDURE — 97110 THERAPEUTIC EXERCISES: CPT | Performed by: OCCUPATIONAL THERAPIST

## 2021-11-10 ENCOUNTER — OFFICE VISIT (OUTPATIENT)
Dept: OCCUPATIONAL THERAPY | Facility: CLINIC | Age: 71
End: 2021-11-10
Payer: COMMERCIAL

## 2021-11-10 ENCOUNTER — OFFICE VISIT (OUTPATIENT)
Dept: BARIATRICS | Facility: CLINIC | Age: 71
End: 2021-11-10
Payer: COMMERCIAL

## 2021-11-10 VITALS
RESPIRATION RATE: 20 BRPM | SYSTOLIC BLOOD PRESSURE: 124 MMHG | DIASTOLIC BLOOD PRESSURE: 80 MMHG | HEART RATE: 72 BPM | BODY MASS INDEX: 45.18 KG/M2 | TEMPERATURE: 100.1 F | HEIGHT: 63 IN | WEIGHT: 255 LBS

## 2021-11-10 DIAGNOSIS — I10 ESSENTIAL HYPERTENSION: ICD-10-CM

## 2021-11-10 DIAGNOSIS — E66.01 SEVERE OBESITY (BMI >= 40) (HCC): ICD-10-CM

## 2021-11-10 DIAGNOSIS — E11.9 TYPE 2 DIABETES MELLITUS WITHOUT COMPLICATION, WITHOUT LONG-TERM CURRENT USE OF INSULIN (HCC): ICD-10-CM

## 2021-11-10 DIAGNOSIS — I25.10 CORONARY ARTERY DISEASE INVOLVING NATIVE CORONARY ARTERY OF NATIVE HEART WITHOUT ANGINA PECTORIS: ICD-10-CM

## 2021-11-10 DIAGNOSIS — E11.65 TYPE 2 DIABETES MELLITUS WITH HYPERGLYCEMIA, WITHOUT LONG-TERM CURRENT USE OF INSULIN (HCC): Primary | ICD-10-CM

## 2021-11-10 DIAGNOSIS — E03.9 ACQUIRED HYPOTHYROIDISM: ICD-10-CM

## 2021-11-10 DIAGNOSIS — M19.042 ARTHRITIS OF BOTH HANDS: Primary | ICD-10-CM

## 2021-11-10 DIAGNOSIS — M19.041 ARTHRITIS OF BOTH HANDS: Primary | ICD-10-CM

## 2021-11-10 PROCEDURE — 97110 THERAPEUTIC EXERCISES: CPT

## 2021-11-10 PROCEDURE — 3074F SYST BP LT 130 MM HG: CPT | Performed by: PHYSICIAN ASSISTANT

## 2021-11-10 PROCEDURE — 3079F DIAST BP 80-89 MM HG: CPT | Performed by: PHYSICIAN ASSISTANT

## 2021-11-10 PROCEDURE — 1160F RVW MEDS BY RX/DR IN RCRD: CPT | Performed by: PHYSICIAN ASSISTANT

## 2021-11-10 PROCEDURE — 3008F BODY MASS INDEX DOCD: CPT | Performed by: PHYSICIAN ASSISTANT

## 2021-11-10 PROCEDURE — 99204 OFFICE O/P NEW MOD 45 MIN: CPT | Performed by: PHYSICIAN ASSISTANT

## 2021-11-10 PROCEDURE — 1036F TOBACCO NON-USER: CPT | Performed by: PHYSICIAN ASSISTANT

## 2021-11-11 ENCOUNTER — HOSPITAL ENCOUNTER (OUTPATIENT)
Dept: RADIOLOGY | Facility: HOSPITAL | Age: 71
Discharge: HOME | End: 2021-11-11
Payer: COMMERCIAL

## 2021-11-11 VITALS — BODY MASS INDEX: 39.87 KG/M2 | WEIGHT: 225 LBS | HEIGHT: 63 IN

## 2021-11-11 DIAGNOSIS — Z12.31 ENCOUNTER FOR SCREENING MAMMOGRAM FOR MALIGNANT NEOPLASM OF BREAST: ICD-10-CM

## 2021-11-11 PROCEDURE — 77067 SCR MAMMO BI INCL CAD: CPT

## 2021-11-11 PROCEDURE — 77063 BREAST TOMOSYNTHESIS BI: CPT

## 2021-11-15 ENCOUNTER — OFFICE VISIT (OUTPATIENT)
Dept: OCCUPATIONAL THERAPY | Facility: CLINIC | Age: 71
End: 2021-11-15
Payer: COMMERCIAL

## 2021-11-15 DIAGNOSIS — M19.041 ARTHRITIS OF BOTH HANDS: Primary | ICD-10-CM

## 2021-11-15 DIAGNOSIS — M19.042 ARTHRITIS OF BOTH HANDS: Primary | ICD-10-CM

## 2021-11-15 PROCEDURE — 97018 PARAFFIN BATH THERAPY: CPT | Performed by: OCCUPATIONAL THERAPIST

## 2021-11-15 PROCEDURE — 97110 THERAPEUTIC EXERCISES: CPT | Performed by: OCCUPATIONAL THERAPIST

## 2021-11-15 PROCEDURE — 97530 THERAPEUTIC ACTIVITIES: CPT | Performed by: OCCUPATIONAL THERAPIST

## 2021-11-17 ENCOUNTER — OFFICE VISIT (OUTPATIENT)
Dept: OCCUPATIONAL THERAPY | Facility: CLINIC | Age: 71
End: 2021-11-17
Payer: COMMERCIAL

## 2021-11-17 DIAGNOSIS — M19.042 ARTHRITIS OF BOTH HANDS: Primary | ICD-10-CM

## 2021-11-17 DIAGNOSIS — M19.041 ARTHRITIS OF BOTH HANDS: Primary | ICD-10-CM

## 2021-11-17 PROCEDURE — 97018 PARAFFIN BATH THERAPY: CPT

## 2021-11-17 PROCEDURE — 97110 THERAPEUTIC EXERCISES: CPT

## 2021-11-17 PROCEDURE — 97530 THERAPEUTIC ACTIVITIES: CPT

## 2021-11-22 ENCOUNTER — OFFICE VISIT (OUTPATIENT)
Dept: OCCUPATIONAL THERAPY | Facility: CLINIC | Age: 71
End: 2021-11-22
Payer: COMMERCIAL

## 2021-11-22 DIAGNOSIS — M19.042 ARTHRITIS OF BOTH HANDS: Primary | ICD-10-CM

## 2021-11-22 DIAGNOSIS — M19.041 ARTHRITIS OF BOTH HANDS: Primary | ICD-10-CM

## 2021-11-22 PROCEDURE — 97110 THERAPEUTIC EXERCISES: CPT | Performed by: OCCUPATIONAL THERAPIST

## 2021-11-22 PROCEDURE — 97018 PARAFFIN BATH THERAPY: CPT | Performed by: OCCUPATIONAL THERAPIST

## 2021-11-22 PROCEDURE — 97530 THERAPEUTIC ACTIVITIES: CPT | Performed by: OCCUPATIONAL THERAPIST

## 2021-11-26 ENCOUNTER — OFFICE VISIT (OUTPATIENT)
Dept: OCCUPATIONAL THERAPY | Facility: CLINIC | Age: 71
End: 2021-11-26
Payer: COMMERCIAL

## 2021-11-26 DIAGNOSIS — M19.042 ARTHRITIS OF BOTH HANDS: Primary | ICD-10-CM

## 2021-11-26 DIAGNOSIS — M19.041 ARTHRITIS OF BOTH HANDS: Primary | ICD-10-CM

## 2021-11-26 PROCEDURE — 97530 THERAPEUTIC ACTIVITIES: CPT | Performed by: OCCUPATIONAL THERAPIST

## 2021-11-26 PROCEDURE — 97110 THERAPEUTIC EXERCISES: CPT | Performed by: OCCUPATIONAL THERAPIST

## 2021-11-29 ENCOUNTER — OFFICE VISIT (OUTPATIENT)
Dept: OCCUPATIONAL THERAPY | Facility: CLINIC | Age: 71
End: 2021-11-29
Payer: COMMERCIAL

## 2021-11-29 DIAGNOSIS — M19.041 ARTHRITIS OF BOTH HANDS: Primary | ICD-10-CM

## 2021-11-29 DIAGNOSIS — M19.042 ARTHRITIS OF BOTH HANDS: Primary | ICD-10-CM

## 2021-11-29 PROCEDURE — 97110 THERAPEUTIC EXERCISES: CPT | Performed by: OCCUPATIONAL THERAPIST

## 2021-11-29 PROCEDURE — 97018 PARAFFIN BATH THERAPY: CPT | Performed by: OCCUPATIONAL THERAPIST

## 2021-11-29 PROCEDURE — 97530 THERAPEUTIC ACTIVITIES: CPT | Performed by: OCCUPATIONAL THERAPIST

## 2021-12-01 ENCOUNTER — OFFICE VISIT (OUTPATIENT)
Dept: OCCUPATIONAL THERAPY | Facility: CLINIC | Age: 71
End: 2021-12-01
Payer: COMMERCIAL

## 2021-12-01 DIAGNOSIS — M19.042 ARTHRITIS OF BOTH HANDS: Primary | ICD-10-CM

## 2021-12-01 DIAGNOSIS — M19.041 ARTHRITIS OF BOTH HANDS: Primary | ICD-10-CM

## 2021-12-01 PROCEDURE — 97530 THERAPEUTIC ACTIVITIES: CPT

## 2021-12-01 PROCEDURE — 97140 MANUAL THERAPY 1/> REGIONS: CPT

## 2021-12-01 PROCEDURE — 97110 THERAPEUTIC EXERCISES: CPT

## 2021-12-06 ENCOUNTER — OFFICE VISIT (OUTPATIENT)
Dept: OCCUPATIONAL THERAPY | Facility: CLINIC | Age: 71
End: 2021-12-06
Payer: COMMERCIAL

## 2021-12-06 DIAGNOSIS — M19.041 ARTHRITIS OF BOTH HANDS: Primary | ICD-10-CM

## 2021-12-06 DIAGNOSIS — M19.042 ARTHRITIS OF BOTH HANDS: Primary | ICD-10-CM

## 2021-12-06 PROCEDURE — 97018 PARAFFIN BATH THERAPY: CPT | Performed by: OCCUPATIONAL THERAPIST

## 2021-12-06 PROCEDURE — 97530 THERAPEUTIC ACTIVITIES: CPT | Performed by: OCCUPATIONAL THERAPIST

## 2021-12-06 PROCEDURE — 97110 THERAPEUTIC EXERCISES: CPT | Performed by: OCCUPATIONAL THERAPIST

## 2021-12-08 ENCOUNTER — OFFICE VISIT (OUTPATIENT)
Dept: OCCUPATIONAL THERAPY | Facility: CLINIC | Age: 71
End: 2021-12-08
Payer: COMMERCIAL

## 2021-12-08 DIAGNOSIS — M19.041 ARTHRITIS OF BOTH HANDS: Primary | ICD-10-CM

## 2021-12-08 DIAGNOSIS — M19.042 ARTHRITIS OF BOTH HANDS: Primary | ICD-10-CM

## 2021-12-08 PROCEDURE — 97018 PARAFFIN BATH THERAPY: CPT

## 2021-12-08 PROCEDURE — 97530 THERAPEUTIC ACTIVITIES: CPT

## 2021-12-08 PROCEDURE — 97110 THERAPEUTIC EXERCISES: CPT

## 2021-12-11 DIAGNOSIS — I10 ESSENTIAL HYPERTENSION: ICD-10-CM

## 2021-12-13 ENCOUNTER — OFFICE VISIT (OUTPATIENT)
Dept: OCCUPATIONAL THERAPY | Facility: CLINIC | Age: 71
End: 2021-12-13
Payer: COMMERCIAL

## 2021-12-13 DIAGNOSIS — M19.041 ARTHRITIS OF BOTH HANDS: Primary | ICD-10-CM

## 2021-12-13 DIAGNOSIS — M19.042 ARTHRITIS OF BOTH HANDS: Primary | ICD-10-CM

## 2021-12-13 PROCEDURE — 97530 THERAPEUTIC ACTIVITIES: CPT

## 2021-12-13 PROCEDURE — 97110 THERAPEUTIC EXERCISES: CPT

## 2021-12-13 PROCEDURE — 97140 MANUAL THERAPY 1/> REGIONS: CPT

## 2021-12-13 RX ORDER — RAMIPRIL 2.5 MG/1
2.5 CAPSULE ORAL DAILY
Qty: 30 CAPSULE | Refills: 0 | Status: SHIPPED | OUTPATIENT
Start: 2021-12-13 | End: 2021-12-29 | Stop reason: SDUPTHER

## 2021-12-14 ENCOUNTER — VBI (OUTPATIENT)
Dept: ADMINISTRATIVE | Facility: OTHER | Age: 71
End: 2021-12-14

## 2021-12-15 ENCOUNTER — EVALUATION (OUTPATIENT)
Dept: OCCUPATIONAL THERAPY | Facility: CLINIC | Age: 71
End: 2021-12-15
Payer: COMMERCIAL

## 2021-12-15 DIAGNOSIS — M19.041 ARTHRITIS OF BOTH HANDS: Primary | ICD-10-CM

## 2021-12-15 DIAGNOSIS — M19.042 ARTHRITIS OF BOTH HANDS: Primary | ICD-10-CM

## 2021-12-15 DIAGNOSIS — I10 ESSENTIAL HYPERTENSION: ICD-10-CM

## 2021-12-15 PROCEDURE — 97110 THERAPEUTIC EXERCISES: CPT | Performed by: OCCUPATIONAL THERAPIST

## 2021-12-15 RX ORDER — POTASSIUM CHLORIDE 750 MG/1
10 TABLET, FILM COATED, EXTENDED RELEASE ORAL 2 TIMES DAILY
Qty: 60 TABLET | Refills: 0 | Status: SHIPPED | OUTPATIENT
Start: 2021-12-15 | End: 2021-12-29 | Stop reason: SDUPTHER

## 2021-12-18 DIAGNOSIS — E11.59 TYPE 2 DIABETES MELLITUS WITH OTHER CIRCULATORY COMPLICATION, WITHOUT LONG-TERM CURRENT USE OF INSULIN (HCC): ICD-10-CM

## 2021-12-20 RX ORDER — BLOOD SUGAR DIAGNOSTIC
STRIP MISCELLANEOUS
Qty: 90 EACH | Refills: 3 | Status: SHIPPED | OUTPATIENT
Start: 2021-12-20

## 2021-12-20 RX ORDER — LANCETS 33 GAUGE
EACH MISCELLANEOUS
Qty: 90 EACH | Refills: 3 | Status: SHIPPED | OUTPATIENT
Start: 2021-12-20

## 2021-12-29 DIAGNOSIS — E11.9 TYPE 2 DIABETES MELLITUS WITHOUT COMPLICATION, WITHOUT LONG-TERM CURRENT USE OF INSULIN (HCC): ICD-10-CM

## 2021-12-29 DIAGNOSIS — E78.2 MIXED HYPERLIPIDEMIA: ICD-10-CM

## 2021-12-29 DIAGNOSIS — I10 ESSENTIAL HYPERTENSION: ICD-10-CM

## 2021-12-29 DIAGNOSIS — E03.9 HYPOTHYROIDISM (ACQUIRED): ICD-10-CM

## 2021-12-29 DIAGNOSIS — G47.61 PERIODIC LIMB MOVEMENT DISORDER (PLMD): ICD-10-CM

## 2021-12-29 RX ORDER — CANAGLIFLOZIN 300 MG/1
300 TABLET, FILM COATED ORAL DAILY
Qty: 90 TABLET | Refills: 1 | Status: SHIPPED | OUTPATIENT
Start: 2021-12-29 | End: 2022-03-15 | Stop reason: SDUPTHER

## 2021-12-30 PROCEDURE — 4010F ACE/ARB THERAPY RXD/TAKEN: CPT | Performed by: PHYSICIAN ASSISTANT

## 2021-12-30 RX ORDER — RAMIPRIL 2.5 MG/1
2.5 CAPSULE ORAL DAILY
Qty: 30 CAPSULE | Refills: 0 | Status: SHIPPED | OUTPATIENT
Start: 2021-12-30 | End: 2022-01-25 | Stop reason: SDUPTHER

## 2021-12-30 RX ORDER — NEBIVOLOL 5 MG/1
2.5 TABLET ORAL DAILY
Qty: 30 TABLET | Refills: 0
Start: 2021-12-30 | End: 2022-03-24 | Stop reason: SDUPTHER

## 2021-12-30 RX ORDER — LEVOTHYROXINE SODIUM 112 UG/1
112 TABLET ORAL DAILY
Qty: 30 TABLET | Refills: 0 | Status: SHIPPED | OUTPATIENT
Start: 2021-12-30 | End: 2022-03-15 | Stop reason: SDUPTHER

## 2021-12-30 RX ORDER — PRAMIPEXOLE DIHYDROCHLORIDE 0.25 MG/1
0.25 TABLET ORAL
Qty: 30 TABLET | Refills: 0 | Status: SHIPPED | OUTPATIENT
Start: 2021-12-30 | End: 2022-05-17 | Stop reason: SDUPTHER

## 2021-12-30 RX ORDER — POTASSIUM CHLORIDE 750 MG/1
10 TABLET, FILM COATED, EXTENDED RELEASE ORAL 2 TIMES DAILY
Qty: 60 TABLET | Refills: 0 | Status: SHIPPED | OUTPATIENT
Start: 2021-12-30 | End: 2022-03-05 | Stop reason: SDUPTHER

## 2021-12-30 RX ORDER — ATORVASTATIN CALCIUM 80 MG/1
80 TABLET, FILM COATED ORAL DAILY
Qty: 30 TABLET | Refills: 0 | Status: SHIPPED | OUTPATIENT
Start: 2021-12-30 | End: 2022-03-24

## 2022-01-04 ENCOUNTER — TELEPHONE (OUTPATIENT)
Dept: UROLOGY | Facility: AMBULATORY SURGERY CENTER | Age: 72
End: 2022-01-04

## 2022-01-04 NOTE — TELEPHONE ENCOUNTER
Patient has an appointment on 1/12  Please remind patient to bring all insurance cards with her to the appointment    Thank you

## 2022-01-12 ENCOUNTER — OFFICE VISIT (OUTPATIENT)
Dept: UROLOGY | Facility: CLINIC | Age: 72
End: 2022-01-12
Payer: COMMERCIAL

## 2022-01-12 ENCOUNTER — TELEPHONE (OUTPATIENT)
Dept: ENDOCRINOLOGY | Facility: CLINIC | Age: 72
End: 2022-01-12

## 2022-01-12 VITALS
WEIGHT: 254 LBS | DIASTOLIC BLOOD PRESSURE: 88 MMHG | SYSTOLIC BLOOD PRESSURE: 140 MMHG | BODY MASS INDEX: 45 KG/M2 | HEART RATE: 73 BPM | HEIGHT: 63 IN

## 2022-01-12 DIAGNOSIS — N32.81 OVERACTIVE BLADDER: ICD-10-CM

## 2022-01-12 DIAGNOSIS — N32.81 OVERACTIVE BLADDER: Primary | ICD-10-CM

## 2022-01-12 LAB
BACTERIA UR QL AUTO: ABNORMAL /HPF
BILIRUB UR QL STRIP: NEGATIVE
CLARITY UR: CLEAR
COLOR UR: YELLOW
GLUCOSE UR STRIP-MCNC: ABNORMAL MG/DL
HGB UR QL STRIP.AUTO: NEGATIVE
HYALINE CASTS #/AREA URNS LPF: ABNORMAL /LPF
KETONES UR STRIP-MCNC: NEGATIVE MG/DL
LEUKOCYTE ESTERASE UR QL STRIP: ABNORMAL
NITRITE UR QL STRIP: NEGATIVE
NON-SQ EPI CELLS URNS QL MICRO: ABNORMAL /HPF
PH UR STRIP.AUTO: 6.5 [PH]
POST-VOID RESIDUAL VOLUME, ML POC: 96 ML
PROT UR STRIP-MCNC: NEGATIVE MG/DL
RBC #/AREA URNS AUTO: ABNORMAL /HPF
SL AMB  POCT GLUCOSE, UA: 2000
SL AMB LEUKOCYTE ESTERASE,UA: NORMAL
SL AMB POCT BILIRUBIN,UA: NORMAL
SL AMB POCT BLOOD,UA: NORMAL
SL AMB POCT CLARITY,UA: CLEAR
SL AMB POCT COLOR,UA: YELLOW
SL AMB POCT KETONES,UA: NORMAL
SL AMB POCT NITRITE,UA: NORMAL
SL AMB POCT PH,UA: 6.5
SL AMB POCT SPECIFIC GRAVITY,UA: 1.01
SL AMB POCT URINE PROTEIN: NORMAL
SL AMB POCT UROBILINOGEN: 0.2
SP GR UR STRIP.AUTO: 1.01 (ref 1–1.03)
UROBILINOGEN UR QL STRIP.AUTO: 0.2 E.U./DL
WBC #/AREA URNS AUTO: ABNORMAL /HPF

## 2022-01-12 PROCEDURE — 3061F NEG MICROALBUMINURIA REV: CPT | Performed by: FAMILY MEDICINE

## 2022-01-12 PROCEDURE — 99203 OFFICE O/P NEW LOW 30 MIN: CPT | Performed by: PHYSICIAN ASSISTANT

## 2022-01-12 PROCEDURE — 51798 US URINE CAPACITY MEASURE: CPT | Performed by: PHYSICIAN ASSISTANT

## 2022-01-12 PROCEDURE — 81002 URINALYSIS NONAUTO W/O SCOPE: CPT | Performed by: PHYSICIAN ASSISTANT

## 2022-01-12 PROCEDURE — 87086 URINE CULTURE/COLONY COUNT: CPT | Performed by: PHYSICIAN ASSISTANT

## 2022-01-12 PROCEDURE — 81001 URINALYSIS AUTO W/SCOPE: CPT | Performed by: PHYSICIAN ASSISTANT

## 2022-01-12 RX ORDER — SOLIFENACIN SUCCINATE 5 MG/1
5 TABLET, FILM COATED ORAL DAILY
Qty: 30 TABLET | Refills: 3 | Status: SHIPPED | OUTPATIENT
Start: 2022-01-12 | End: 2022-01-12

## 2022-01-12 RX ORDER — SOLIFENACIN SUCCINATE 5 MG/1
TABLET, FILM COATED ORAL
Qty: 90 TABLET | Refills: 0 | Status: SHIPPED | OUTPATIENT
Start: 2022-01-12 | End: 2022-04-13 | Stop reason: SDUPTHER

## 2022-01-12 NOTE — TELEPHONE ENCOUNTER
Would not recommend to stop or reduce the dose as it is helping with her diabetes, and kidneys    We will recommend to stop only if she is having urine tract infections,    If pt does not want to take it, it is up to her   Please inform patient    Rossana Curtis MD

## 2022-01-12 NOTE — TELEPHONE ENCOUNTER
Pt saw urologist today for frequent urination  The urologist suggested that it could be the invokana medication that she is on  Urologist and patient would like to know if it can be lowered or if there is an alternative

## 2022-01-12 NOTE — PROGRESS NOTES
1  Overactive bladder  POCT urine dip    POCT Measure PVR       Assessment and plan:       1  Overactive bladder  -I reviewed with the patient that her Invokana (SGLT2 inhibitor) may be exacerbating some of her lower urinary tract symptoms  Patient is going to reach out to her endocrinologist to determine if there are alternative options  -patient's urine will be submitted for formal microscopic evaluation  She will be contacted with any abnormalities  -patient has already completed pelvic floor physical therapy  -demonstrating adequate bladder emptying in the office today  -patient's blood pressure is borderline elevated today, therefore will trial VESIcare 5 mg daily  Use and side effect profile reviewed  Follow-up 3 months for PVR in symptom reassessment  Michelle Peck PA-C      Chief Complaint     Overactive bladder    History of Present Illness     Ruth Ann Burton is a 80 y o  female presenting today as a new patient for overactive bladder  Patient reports over the past 1-2 years she has been having increasing urinary frequency, urgecy, and urge urinary incontinence  Her primary care provider had referred her to pelvic floor physical therapy in which she noticed improvement however ongoing symptoms  Patient denies any dysuria, gross hematuria, flank pain, nausea, vomiting, fevers, or chills  Denies any prior  surgical manipulation  Medical comorbidities include type 2 diabetes, goiter, hypothyroidism, hypertension, obesity  Patient is on SGLT 2 inhibitor for her diabetic management  Urine dip in the office today is leukocyte and nitrite negative, trace blood, significant glucosuria  Clear yellow in color  PVR 96 mL    Laboratory     Lab Results   Component Value Date    CREATININE 0 86 06/07/2021       Review of Systems     Review of Systems   Constitutional: Negative for activity change, appetite change, chills, diaphoresis, fatigue, fever and unexpected weight change  Respiratory: Negative for chest tightness and shortness of breath  Cardiovascular: Negative for chest pain, palpitations and leg swelling  Gastrointestinal: Negative for abdominal distention, abdominal pain, constipation, diarrhea, nausea and vomiting  Genitourinary: Positive for frequency and urgency  Negative for decreased urine volume, difficulty urinating, dysuria, enuresis, flank pain, genital sores and hematuria  Musculoskeletal: Negative for back pain, gait problem and myalgias  Skin: Negative for color change, pallor, rash and wound  Psychiatric/Behavioral: Negative for behavioral problems  The patient is not nervous/anxious  Allergies     Allergies   Allergen Reactions    Other Sneezing     Seasonal, dust, Cat dander, mold    Penicillins Hives       Physical Exam     Physical Exam  Constitutional:       General: She is not in acute distress  Appearance: Normal appearance  She is obese  She is not ill-appearing, toxic-appearing or diaphoretic  HENT:      Head: Normocephalic and atraumatic  Eyes:      General:         Right eye: No discharge  Conjunctiva/sclera: Conjunctivae normal    Pulmonary:      Effort: Pulmonary effort is normal  No respiratory distress  Musculoskeletal:         General: No swelling or tenderness  Normal range of motion  Skin:     General: Skin is warm and dry  Coloration: Skin is not jaundiced or pale  Neurological:      General: No focal deficit present  Mental Status: She is alert and oriented to person, place, and time  Psychiatric:         Mood and Affect: Mood normal          Behavior: Behavior normal          Thought Content: Thought content normal          Judgment: Judgment normal            Vital Signs     There were no vitals filed for this visit        Current Medications       Current Outpatient Medications:     ascorbic acid (VITAMIN C) 500 mg tablet, Take 500 mg by mouth daily, Disp: , Rfl:     aspirin (ECOTRIN LOW STRENGTH) 81 mg EC tablet, Take 81 mg by mouth daily, Disp: , Rfl:     atorvastatin (LIPITOR) 80 mg tablet, Take 1 tablet (80 mg total) by mouth daily, Disp: 30 tablet, Rfl: 0    Blood Glucose Monitoring Suppl (ONE TOUCH ULTRA MINI) w/Device KIT, Please dispense 1 Kit, Disp: 1 each, Rfl: 0    Canagliflozin (Invokana) 300 MG TABS, Take 1 tablet (300 mg total) by mouth daily, Disp: 90 tablet, Rfl: 1    Cholecalciferol (Vitamin D3) 25 MCG (1000 UT) CAPS, Take 2000 IU with dinner, Disp: , Rfl:     famotidine-calcium carbonate-magnesium hydroxide (PEPCID COMPLETE) -165 MG CHEW, Chew 1 tablet daily as needed for heartburn, Disp: , Rfl:     fluticasone (FLONASE) 50 mcg/act nasal spray, 1 spray into each nostril daily, Disp: , Rfl:     glucose blood (OneTouch Ultra) test strip, Use to test blood sugar two times every other day, Disp: 90 each, Rfl: 3    levocetirizine (XYZAL) 5 MG tablet, Take 1 tablet (5 mg total) by mouth every evening, Disp: , Rfl:     levothyroxine 112 mcg tablet, Take 1 tablet (112 mcg total) by mouth daily, Disp: 30 tablet, Rfl: 0    Magnesium Cl-Calcium Carbonate (SLOW-MAG PO), Take by mouth, Disp: , Rfl:     multivitamin (THERAGRAN) TABS, Take 1 tablet by mouth daily, Disp: , Rfl:     nebivolol (Bystolic) 5 mg tablet, Take 0 5 tablets (2 5 mg total) by mouth daily, Disp: 30 tablet, Rfl: 0    Omega-3 Fatty Acids (FISH OIL) 1200 MG CPDR, 1200mg two  tablets, Disp: , Rfl:     OneTouch Delica Lancets 89E MISC, Use to check sugars twice every other day, Disp: 90 each, Rfl: 3    potassium chloride (Klor-Con) 10 mEq tablet, Take 1 tablet (10 mEq total) by mouth 2 (two) times a day, Disp: 60 tablet, Rfl: 0    pramipexole (MIRAPEX) 0 25 mg tablet, Take 1 tablet (0 25 mg total) by mouth daily at bedtime, Disp: 30 tablet, Rfl: 0    ramipril (ALTACE) 2 5 mg capsule, Take 1 capsule (2 5 mg total) by mouth daily, Disp: 30 capsule, Rfl: 0      Active Problems     Patient Active Problem List   Diagnosis    Acquired hypothyroidism    Nontoxic multinodular goiter    Type 2 diabetes mellitus with hyperglycemia, without long-term current use of insulin ([place] [address] )    Essential hypertension    Mixed hyperlipidemia    Vitamin D deficiency    Allergic rhinitis    Coronary artery disease involving native coronary artery of native heart without angina pectoris    Aortic valve disorder    Severe obesity (BMI >= 40) ([facility] )    Encounter for gynecological examination (general) (routine) without abnormal findings    Seasonal allergies         Past Medical History     Past Medical History:   Diagnosis Date    Allergic 2018   weekly inject      Allergic rhinitis     Allergies     Anemia     Anxiety     Arthritis     Chronic constipation     Diabetes ([facility] )     Diabetes mellitus ([place] [address] )     Disease of thyroid gland     GERD (gastroesophageal reflux disease)     Headache(784 0)     Heart murmur     Hyperlipidemia     Hypertension     Hypothyroidism     Obesity     Obesity     Restless legs     Vitamin D deficiency          Surgical History     Past Surgical History:   Procedure Laterality Date    BREAST BIOPSY Right 2001    CATARACT EXTRACTION      CATARACT EXTRACTION, BILATERAL  05/2021    CHOLECYSTECTOMY      COLONOSCOPY  06/01/2018    goes q 5 yr-due in Summer 2018-Dr Gerardo Petty  2015    Dr Marjan Rhodes  01/01/2000    Dr Adan Jang  9/26/2013    US GUIDANCE  10/17/2016    [address] [place] [address] ([employer])  10/15/2013         Family History     Family History   Problem Relation Age of Onset    Multiple sclerosis Brother     Multiple sclerosis Paternal Uncle     Diabetes Paternal Uncle     Hypertension Father     Diabetes Father     Heart disease Father     Multiple sclerosis Family     Diabetes Paternal Aunt     Cancer Neg Hx     Stroke Neg Hx     Obesity Neg Hx          Social History Social History       Radiology

## 2022-01-13 LAB — BACTERIA UR CULT: NORMAL

## 2022-01-25 ENCOUNTER — OFFICE VISIT (OUTPATIENT)
Dept: FAMILY MEDICINE CLINIC | Facility: OTHER | Age: 72
End: 2022-01-25
Payer: COMMERCIAL

## 2022-01-25 VITALS
SYSTOLIC BLOOD PRESSURE: 148 MMHG | HEIGHT: 63 IN | WEIGHT: 248 LBS | HEART RATE: 80 BPM | OXYGEN SATURATION: 97 % | BODY MASS INDEX: 43.94 KG/M2 | TEMPERATURE: 98 F | DIASTOLIC BLOOD PRESSURE: 90 MMHG | RESPIRATION RATE: 20 BRPM

## 2022-01-25 DIAGNOSIS — R05.1 ACUTE COUGH: ICD-10-CM

## 2022-01-25 DIAGNOSIS — I10 ESSENTIAL HYPERTENSION: ICD-10-CM

## 2022-01-25 DIAGNOSIS — K21.9 GASTROESOPHAGEAL REFLUX DISEASE WITHOUT ESOPHAGITIS: Primary | ICD-10-CM

## 2022-01-25 PROCEDURE — 99213 OFFICE O/P EST LOW 20 MIN: CPT | Performed by: FAMILY MEDICINE

## 2022-01-25 RX ORDER — OMEPRAZOLE 40 MG/1
40 CAPSULE, DELAYED RELEASE ORAL DAILY
Qty: 90 CAPSULE | Refills: 0 | Status: SHIPPED | OUTPATIENT
Start: 2022-01-25 | End: 2022-04-11 | Stop reason: SDUPTHER

## 2022-01-25 RX ORDER — RAMIPRIL 2.5 MG/1
2.5 CAPSULE ORAL DAILY
Qty: 90 CAPSULE | Refills: 3 | Status: SHIPPED | OUTPATIENT
Start: 2022-01-25 | End: 2022-03-24 | Stop reason: SDUPTHER

## 2022-01-25 NOTE — PATIENT INSTRUCTIONS
Take 40 mg of Omeprazole in the morning and 20mg of Pepcid (famotidine) at bedtime    GERD (Gastroesophageal Reflux Disease)   WHAT YOU NEED TO KNOW:   Gastroesophageal reflux disease (GERD) is reflux that occurs more than twice a week for a few weeks  Reflux means acid and food in the stomach back up into the esophagus  It usually causes heartburn and other symptoms  GERD can cause other health problems over time if it is not treated  DISCHARGE INSTRUCTIONS:   Call your local emergency number (911 in the [address] [facility]) if:   · You have severe chest pain and sudden trouble breathing  Return to the emergency department if:   · You have trouble breathing after you vomit  · You have trouble swallowing, or pain with swallowing  · Your bowel movements are black, bloody, or tarry-looking  · Your vomit looks like coffee grounds or has blood in it  Call your doctor or gastroenterologist if:   · You feel full and cannot burp or vomit  · You vomit large amounts, or you vomit often  · You are losing weight without trying  · Your symptoms get worse or do not improve with treatment  · You have questions or concerns about your condition or care  Medicines:   · Medicines  are used to decrease stomach acid  Medicine may also be used to help your lower esophageal sphincter and stomach contract (tighten) more  · Take your medicine as directed  Contact your healthcare provider if you think your medicine is not helping or if you have side effects  Tell him of her if you are allergic to any medicine  Keep a list of the medicines, vitamins, and herbs you take  Include the amounts, and when and why you take them  Bring the list or the pill bottles to follow-up visits  Carry your medicine list with you in case of an emergency  Manage GERD:       · Do not have foods or drinks that may increase heartburn    These include chocolate, peppermint, fried or fatty foods, drinks that contain caffeine, or carbonated drinks (soda)  Other foods include spicy foods, onions, tomatoes, and tomato-based foods  Do not have foods or drinks that can irritate your esophagus, such as citrus fruits, juices, and alcohol  · Do not eat large meals  When you eat a lot of food at one time, your stomach needs more acid to digest it  Eat 6 small meals each day instead of 3 large ones, and eat slowly  Do not eat meals 2 to 3 hours before bedtime  · Elevate the head of your bed  Place 6-inch blocks under the head of your bed frame  You may also use more than one pillow under your head and shoulders while you sleep  · Maintain a healthy weight  If you are overweight, weight loss may help relieve symptoms of GERD  · Do not smoke  Smoking weakens the lower esophageal sphincter and increases the risk of GERD  Ask your healthcare provider for information if you currently smoke and need help to quit  E-cigarettes or smokeless tobacco still contain nicotine  Talk to your healthcare provider before you use these products  · Do not wear clothing that is tight around your waist   Tight clothing can put pressure on your stomach and cause or worsen GERD symptoms  Follow up with your doctor or gastroenterologist as directed:  Write down your questions so you remember to ask them during your visits  © Copyright [employer] 2021 Information is for End User's use only and may not be sold, redistributed or otherwise used for commercial purposes  All illustrations and images included in CareNotes® are the copyrighted property of [employer]  or Brenda Lombardo  The above information is an  only  It is not intended as medical advice for individual conditions or treatments  Talk to your doctor, nurse or pharmacist before following any medical regimen to see if it is safe and effective for you

## 2022-01-25 NOTE — PROGRESS NOTES
Assessment/Plan:    68yo female who woke up with a tickle in her throat and subsequent non-productive coughing now lasting throughout the day  Given the acute onset of her cough without any other symptoms, suspect possible reflux or allergic component  Encouraged her to continue her current allergic rhinitis management with Flonase nasal spray and daily antihistamine  Also recommended taking omeprazole 40mg every morning and okay to still take her Pepcid in the evening  Patient leaving for [facility] for a couple months, so follow up at her next scheduled follow up with Dr Aldo King when she returns  Diagnoses and all orders for this visit:    Gastroesophageal reflux disease without esophagitis  -     omeprazole (PriLOSEC) 40 MG capsule; Take 1 capsule (40 mg total) by mouth daily    Acute cough    Essential hypertension  -     ramipril (ALTACE) 2 5 mg capsule; Take 1 capsule (2 5 mg total) by mouth daily          Subjective:      Patient ID: Brenda Benítez is a 80 y o  female  Patient is 78-year-old female presents reporting that she woke up with a "tickle' in her throat and has been coughing throughout day  Her throat feels like a "ball" in her throat  She has been using cough drops ans drinking lots of water without help  She denies fever, chills  She reports chronic runny nose and uses flonase regularly for allergic rhinitis  She denies SOB, chest pain, tightness, or congestion  The cough is non-productive  She has a history of GERD and takes Pepcid twice daily, 10mg  She isn't aware of any triggering foods for her reflux  The following portions of the patient's history were reviewed and updated as appropriate: allergies, current medications, past family history, past medical history, past social history, past surgical history and problem list     Review of Systems   Constitutional: Negative for activity change, appetite change, chills, diaphoresis, fatigue, fever and unexpected weight change     HENT: Positive for rhinorrhea  Negative for congestion, sinus pressure, sinus pain and sore throat  Respiratory: Positive for cough  Negative for chest tightness, shortness of breath and wheezing  Cardiovascular: Negative for chest pain and palpitations  Gastrointestinal: Negative for abdominal pain, constipation, diarrhea, nausea and vomiting  Skin: Negative for rash  Neurological: Negative for headaches  Objective:  /90   Pulse 80   Temp 98 °F (36 7 °C)   Resp 20   Ht 5' 3" (1 6 m)   Wt 112 kg (248 lb)   LMP 01/01/2004   SpO2 97%   BMI 43 93 kg/m²      Physical Exam  Vitals reviewed  Constitutional:       General: She is not in acute distress  Appearance: She is obese  She is not ill-appearing  HENT:      Head: Normocephalic and atraumatic  Right Ear: Tympanic membrane, ear canal and external ear normal       Left Ear: Tympanic membrane, ear canal and external ear normal       Nose: Nose normal  No congestion or rhinorrhea  Mouth/Throat:      Mouth: Mucous membranes are moist       Pharynx: Oropharynx is clear  No oropharyngeal exudate  Eyes:      Extraocular Movements: Extraocular movements intact  Conjunctiva/sclera: Conjunctivae normal    Neck:      Thyroid: No thyroid mass, thyromegaly or thyroid tenderness  Cardiovascular:      Rate and Rhythm: Normal rate and regular rhythm  Pulses: Normal pulses  Heart sounds: Normal heart sounds  Pulmonary:      Effort: Pulmonary effort is normal       Breath sounds: Normal breath sounds  Abdominal:      General: Abdomen is flat  Bowel sounds are normal  There is no distension  Palpations: Abdomen is soft  Tenderness: There is no abdominal tenderness  There is no guarding or rebound  Musculoskeletal:      Cervical back: Neck supple  No tenderness  Lymphadenopathy:      Cervical: No cervical adenopathy  Neurological:      Mental Status: She is alert     Psychiatric:         Mood and Affect: Mood normal

## 2022-01-26 PROCEDURE — 4010F ACE/ARB THERAPY RXD/TAKEN: CPT | Performed by: FAMILY MEDICINE

## 2022-01-27 ENCOUNTER — OFFICE VISIT (OUTPATIENT)
Dept: FAMILY MEDICINE CLINIC | Facility: OTHER | Age: 72
End: 2022-01-27
Payer: COMMERCIAL

## 2022-01-27 VITALS
SYSTOLIC BLOOD PRESSURE: 112 MMHG | OXYGEN SATURATION: 98 % | WEIGHT: 254.4 LBS | BODY MASS INDEX: 45.07 KG/M2 | RESPIRATION RATE: 18 BRPM | DIASTOLIC BLOOD PRESSURE: 82 MMHG | HEART RATE: 77 BPM | TEMPERATURE: 98 F | HEIGHT: 63 IN

## 2022-01-27 DIAGNOSIS — R05.9 COUGH: Primary | ICD-10-CM

## 2022-01-27 LAB
SARS-COV-2 AG UPPER RESP QL IA: NEGATIVE
VALID CONTROL: NORMAL

## 2022-01-27 PROCEDURE — 1036F TOBACCO NON-USER: CPT | Performed by: FAMILY MEDICINE

## 2022-01-27 PROCEDURE — 3079F DIAST BP 80-89 MM HG: CPT | Performed by: FAMILY MEDICINE

## 2022-01-27 PROCEDURE — 3074F SYST BP LT 130 MM HG: CPT | Performed by: FAMILY MEDICINE

## 2022-01-27 PROCEDURE — 99213 OFFICE O/P EST LOW 20 MIN: CPT | Performed by: FAMILY MEDICINE

## 2022-01-27 PROCEDURE — 87811 SARS-COV-2 COVID19 W/OPTIC: CPT | Performed by: FAMILY MEDICINE

## 2022-01-27 PROCEDURE — 3008F BODY MASS INDEX DOCD: CPT | Performed by: FAMILY MEDICINE

## 2022-01-27 PROCEDURE — 1160F RVW MEDS BY RX/DR IN RCRD: CPT | Performed by: FAMILY MEDICINE

## 2022-01-27 RX ORDER — BENZONATATE 100 MG/1
200 CAPSULE ORAL 3 TIMES DAILY PRN
Qty: 40 CAPSULE | Refills: 1 | Status: SHIPPED | OUTPATIENT
Start: 2022-01-27

## 2022-01-27 NOTE — PROGRESS NOTES
Assessment/Plan:     Diagnoses and all orders for this visit:    Cough  -     POCT Rapid Covid Ag- NEGATIVE     -     benzonatate (TESSALON PERLES) 100 mg capsule; Take 2 capsules (200 mg total) by mouth 3 (three) times a day as needed for cough    Patient who is fully vaccinated against COVID-19 with booster has tested negative for COVID-19 at today's visit  Symptoms likely secondary to viral URI  Return if symptoms worsen or fail to improve  The patient indicates understanding of these issues and agrees with the plan  Subjective:      Patient ID: Wilder Mishra is a 80 y o  female  HPI   Patient reports that since this Monday she has been experiencing a cough occasionally productive of white sputum  Patient reports she has tried Ricola cough drops which have not improved cough  Patient denies any fevers, chills, shortness of breath or chest pain  Patient denies any worsening of chronic nasal congestion (seconday to allergies)  Patient states she saw provider earlier this week who suspected cough was due to her GERD and prescribed her PPI in addition to her daily Pepcid  She states this has not made a difference in cough  Patient reports she is compliant with using Flonase and Xyzal daily for her seasonal allergies  Patient denies any sick contacts  Rapid COVID-19 test negative in office today  Patient is fully vaccinated against COVID-19 and also has her booster  The following portions of the patient's history were reviewed and updated as appropriate: allergies, current medications, past family history, past medical history, past social history, past surgical history and problem list     Review of Systems   Constitutional: Negative for chills and fever  HENT: Positive for congestion, rhinorrhea and sore throat  Negative for sinus pressure and sinus pain  Respiratory: Positive for cough  Negative for chest tightness, shortness of breath and wheezing      Cardiovascular: Negative for chest pain and palpitations  Gastrointestinal: Negative for abdominal pain, diarrhea, nausea and vomiting  Musculoskeletal: Negative for myalgias  Neurological: Negative for dizziness, light-headedness and headaches  Objective:  /82   Pulse 77   Temp 98 °F (36 7 °C)   Resp 18   Ht 5' 3" (1 6 m)   Wt 115 kg (254 lb 6 4 oz)   LMP 01/01/2004   SpO2 98%   BMI 45 06 kg/m²          Physical Exam  Vitals reviewed  Constitutional:       General: She is not in acute distress  Appearance: Normal appearance  She is not ill-appearing, toxic-appearing or diaphoretic  HENT:      Nose: Rhinorrhea present  Mouth/Throat:      Mouth: Mucous membranes are moist       Pharynx: Oropharynx is clear  No oropharyngeal exudate or posterior oropharyngeal erythema  Eyes:      General: No scleral icterus  Right eye: No discharge  Left eye: No discharge  Extraocular Movements: Extraocular movements intact  Conjunctiva/sclera: Conjunctivae normal    Cardiovascular:      Rate and Rhythm: Normal rate and regular rhythm  Pulses: Normal pulses  Heart sounds: Normal heart sounds  Pulmonary:      Effort: Pulmonary effort is normal  No respiratory distress  Breath sounds: Normal breath sounds  Skin:     General: Skin is warm and dry  Neurological:      General: No focal deficit present  Mental Status: She is alert and oriented to person, place, and time     Psychiatric:         Mood and Affect: Mood normal          Behavior: Behavior normal

## 2022-01-31 ENCOUNTER — TELEMEDICINE (OUTPATIENT)
Dept: FAMILY MEDICINE CLINIC | Facility: OTHER | Age: 72
End: 2022-01-31

## 2022-01-31 DIAGNOSIS — R05.9 COUGH: Primary | ICD-10-CM

## 2022-01-31 PROCEDURE — 99213 OFFICE O/P EST LOW 20 MIN: CPT | Performed by: FAMILY MEDICINE

## 2022-01-31 RX ORDER — AZITHROMYCIN 250 MG/1
TABLET, FILM COATED ORAL
Qty: 6 TABLET | Refills: 0 | Status: SHIPPED | OUTPATIENT
Start: 2022-01-31 | End: 2022-02-05

## 2022-01-31 RX ORDER — GUAIFENESIN 600 MG
1200 TABLET, EXTENDED RELEASE 12 HR ORAL EVERY 12 HOURS SCHEDULED
Qty: 28 TABLET | Refills: 0 | Status: SHIPPED | OUTPATIENT
Start: 2022-01-31 | End: 2022-02-07

## 2022-01-31 NOTE — PROGRESS NOTES
Virtual Regular Visit    Verification of patient location:    Patient is located in the following state in which I hold an active license [place]      Assessment/Plan:    Problem List Items Addressed This Visit     None      Visit Diagnoses     Cough    -  Primary  - Patient with worsening productive cough (clear phlegm) x 6 days with mild relief with tessalon perles  - Discussed with patient that likely has viral URI leading to bronchitis, she reports concern as she is traveling tomorrow by plane and requests a Aby Crea as this has helped her in the past  - Patient may trial zpack for symptoms and encouraged to use mucinex to symptoms above    Relevant Medications    azithromycin (Zithromax) 250 mg tablet    guaiFENesin (MUCINEX) 600 mg 12 hr tablet               Reason for visit is   Chief Complaint   Patient presents with    Virtual Regular Visit        Encounter provider Angelina Bob MD    Provider located at [address] [id number]      Recent Visits  Date Type Provider Dept   01/27/22 Office Visit MD Oscar Landeros   01/25/22 Office Visit DO Oscar Flood   Showing recent visits within past 7 days and meeting all other requirements  Today's Visits  Date Type Provider Dept   01/31/22 Telemedicine MD Oscar Mckinney   Showing today's visits and meeting all other requirements  Future Appointments  No visits were found meeting these conditions  Showing future appointments within next 150 days and meeting all other requirements       The patient was identified by name and date of birth  Ruth Ann Peña was informed that this is a telemedicine visit and that the visit is being conducted through [facility] and patient was informed this is a secure, HIPAA-complaint platform  She agrees to proceed     My office door was closed  No one else was in the room    She acknowledged consent and understanding of privacy and security of the video platform  The patient has agreed to participate and understands they can discontinue the visit at any time  Patient is aware this is a billable service  Subjective  Devonte Wilkins is a 80 y o  female       HPI     Patient returns for follow up of worsening productive cough (clear phlegm)  Patient also reporting that cough is so severe that she unfortunately vomited "a couple of times"  Had 1 episodes of loose BM light brown yesterday but denies any blood and resolved thereafter, no further episodes this am   Patient was initially seen by PCP on 1/25 and had follow up on 1/27 during which she was started on tessalon perles with mild relief of symptoms  Patient denies any SOB, dyspnea, or chest pain/tightness  Discussed with patient that likely has viral URI leading to bronchitis, she reports concern as she is traveling tomorrow by plane and requests a Keli Wigginsr as this has helped her in the past    Past Medical History:   Diagnosis Date    Allergic 2018   weekly inject      Allergic rhinitis     Allergies     Anemia     Anxiety     Arthritis     Chronic constipation     Diabetes ([address]     Diabetes mellitus ([address]     Disease of thyroid gland     GERD (gastroesophageal reflux disease)     Headache(784 0)     Heart murmur     Hyperlipidemia     Hypertension     Hypothyroidism     Obesity     Obesity     Restless legs     Vitamin D deficiency        Past Surgical History:   Procedure Laterality Date    BREAST BIOPSY Right 2001    CATARACT EXTRACTION      CATARACT EXTRACTION, BILATERAL  05/2021    CHOLECYSTECTOMY      COLONOSCOPY  06/01/2018    goes q 5 yr-due in Summer 2018-Dr Veena Fierro  2015    Dr Reina Reynolds  01/01/2000    Dr Mainor Connell  9/26/2013    US GUIDANCE  10/17/2016    [address] [place] [address] ([facility])  10/15/2013       Current Outpatient Medications   Medication Sig Dispense Refill    aspirin (ECOTRIN LOW STRENGTH) 81 mg EC tablet Take 81 mg by mouth daily      atorvastatin (LIPITOR) 80 mg tablet Take 1 tablet (80 mg total) by mouth daily 30 tablet 0    azithromycin (Zithromax) 250 mg tablet Take 2 tablets (500 mg total) by mouth daily for 1 day, THEN 1 tablet (250 mg total) daily for 4 days   6 tablet 0    benzonatate (TESSALON PERLES) 100 mg capsule Take 2 capsules (200 mg total) by mouth 3 (three) times a day as needed for cough 40 capsule 1    Blood Glucose Monitoring Suppl (ONE TOUCH ULTRA MINI) w/Device KIT Please dispense 1 Kit 1 each 0    Canagliflozin (Invokana) 300 MG TABS Take 1 tablet (300 mg total) by mouth daily 90 tablet 1    Cholecalciferol (Vitamin D3) 25 MCG (1000 UT) CAPS Take 2000 IU with dinner      famotidine-calcium carbonate-magnesium hydroxide (PEPCID COMPLETE) -165 MG CHEW Chew 1 tablet daily as needed for heartburn      fluticasone (FLONASE) 50 mcg/act nasal spray 1 spray into each nostril daily      glucose blood (OneTouch Ultra) test strip Use to test blood sugar two times every other day 90 each 3    guaiFENesin (MUCINEX) 600 mg 12 hr tablet Take 2 tablets (1,200 mg total) by mouth every 12 (twelve) hours for 7 days 28 tablet 0    levocetirizine (XYZAL) 5 MG tablet Take 1 tablet (5 mg total) by mouth every evening      levothyroxine 112 mcg tablet Take 1 tablet (112 mcg total) by mouth daily 30 tablet 0    nebivolol (Bystolic) 5 mg tablet Take 0 5 tablets (2 5 mg total) by mouth daily 30 tablet 0    omeprazole (PriLOSEC) 40 MG capsule Take 1 capsule (40 mg total) by mouth daily 90 capsule 0    OneTouch Delica Lancets 63E MISC Use to check sugars twice every other day 90 each 3    potassium chloride (Klor-Con) 10 mEq tablet Take 1 tablet (10 mEq total) by mouth 2 (two) times a day 60 tablet 0    pramipexole (MIRAPEX) 0 25 mg tablet Take 1 tablet (0 25 mg total) by mouth daily at bedtime 30 tablet 0    ramipril (ALTACE) 2 5 mg capsule Take 1 capsule (2 5 mg total) by mouth daily 90 capsule 3    solifenacin (VESICARE) 5 mg tablet TAKE 1 TABLET(5 MG) BY MOUTH DAILY 90 tablet 0     No current facility-administered medications for this visit  Allergies   Allergen Reactions    Other Sneezing     Seasonal, dust, Cat dander, mold    Penicillins Hives       Review of Systems   Constitutional: Negative for chills and fever  HENT: Negative for congestion, rhinorrhea, sore throat and voice change  Respiratory: Positive for cough  Negative for chest tightness and shortness of breath  Gastrointestinal: Negative for nausea  Video Exam    There were no vitals filed for this visit  Physical Exam  Constitutional:       Appearance: Normal appearance  HENT:      Head: Normocephalic and atraumatic  Eyes:      Conjunctiva/sclera: Conjunctivae normal    Pulmonary:      Comments: No conversational dyspnea  Musculoskeletal:      Cervical back: Normal range of motion and neck supple  Neurological:      Mental Status: She is alert and oriented to person, place, and time  Psychiatric:         Mood and Affect: Mood normal          Behavior: Behavior normal           I spent 15 minutes directly with the patient during this visit    VIRTUAL VISIT DISCLAIMER      Conchis Mora verbally agrees to participate in [employer]  Pt is aware that [employer] could be limited without vital signs or the ability to perform a full hands-on physical S[employer]und [employer] understands she or the provider may request at any time to terminate the video visit and request the patient to seek care or treatment in person

## 2022-03-05 DIAGNOSIS — I10 ESSENTIAL HYPERTENSION: ICD-10-CM

## 2022-03-07 RX ORDER — POTASSIUM CHLORIDE 750 MG/1
10 TABLET, FILM COATED, EXTENDED RELEASE ORAL 2 TIMES DAILY
Qty: 60 TABLET | Refills: 0 | Status: SHIPPED | OUTPATIENT
Start: 2022-03-07 | End: 2022-03-26 | Stop reason: SDUPTHER

## 2022-03-08 ENCOUNTER — APPOINTMENT (OUTPATIENT)
Dept: LAB | Facility: HOSPITAL | Age: 72
End: 2022-03-08
Payer: COMMERCIAL

## 2022-03-08 DIAGNOSIS — E55.9 VITAMIN D DEFICIENCY: ICD-10-CM

## 2022-03-08 DIAGNOSIS — E04.2 NONTOXIC MULTINODULAR GOITER: ICD-10-CM

## 2022-03-08 DIAGNOSIS — E78.2 MIXED HYPERLIPIDEMIA: ICD-10-CM

## 2022-03-08 DIAGNOSIS — E11.65 TYPE 2 DIABETES MELLITUS WITH HYPERGLYCEMIA, WITHOUT LONG-TERM CURRENT USE OF INSULIN (HCC): ICD-10-CM

## 2022-03-08 DIAGNOSIS — E03.9 ACQUIRED HYPOTHYROIDISM: ICD-10-CM

## 2022-03-08 DIAGNOSIS — E66.01 SEVERE OBESITY (BMI >= 40) (HCC): ICD-10-CM

## 2022-03-08 LAB
25(OH)D3 SERPL-MCNC: 28.9 NG/ML (ref 30–100)
ALBUMIN SERPL BCP-MCNC: 4 G/DL (ref 3.5–5)
ALP SERPL-CCNC: 65 U/L (ref 34–104)
ALT SERPL W P-5'-P-CCNC: 19 U/L (ref 7–52)
ANION GAP SERPL CALCULATED.3IONS-SCNC: 9 MMOL/L (ref 4–13)
AST SERPL W P-5'-P-CCNC: 17 U/L (ref 13–39)
BILIRUB SERPL-MCNC: 0.62 MG/DL (ref 0.2–1)
BUN SERPL-MCNC: 15 MG/DL (ref 5–25)
CALCIUM SERPL-MCNC: 9 MG/DL (ref 8.4–10.2)
CHLORIDE SERPL-SCNC: 105 MMOL/L (ref 96–108)
CHOLEST SERPL-MCNC: 172 MG/DL
CO2 SERPL-SCNC: 26 MMOL/L (ref 21–32)
CREAT SERPL-MCNC: 0.79 MG/DL (ref 0.6–1.3)
CREAT UR-MCNC: 87.5 MG/DL
EST. AVERAGE GLUCOSE BLD GHB EST-MCNC: 166 MG/DL
GFR SERPL CREATININE-BSD FRML MDRD: 76 ML/MIN/1.73SQ M
GLUCOSE P FAST SERPL-MCNC: 148 MG/DL (ref 65–99)
HBA1C MFR BLD: 7.4 %
HDLC SERPL-MCNC: 49 MG/DL
LDLC SERPL CALC-MCNC: 93 MG/DL (ref 0–100)
MICROALBUMIN UR-MCNC: 10.4 MG/L (ref 0–20)
MICROALBUMIN/CREAT 24H UR: 12 MG/G CREATININE (ref 0–30)
POTASSIUM SERPL-SCNC: 4.2 MMOL/L (ref 3.5–5.3)
PROT SERPL-MCNC: 6.7 G/DL (ref 6.4–8.4)
SODIUM SERPL-SCNC: 140 MMOL/L (ref 135–147)
T4 FREE SERPL-MCNC: 1.1 NG/DL (ref 0.76–1.46)
TRIGL SERPL-MCNC: 152 MG/DL
TSH SERPL DL<=0.05 MIU/L-ACNC: 2 UIU/ML (ref 0.45–5.33)

## 2022-03-08 PROCEDURE — 3051F HG A1C>EQUAL 7.0%<8.0%: CPT | Performed by: FAMILY MEDICINE

## 2022-03-08 PROCEDURE — 83036 HEMOGLOBIN GLYCOSYLATED A1C: CPT

## 2022-03-08 PROCEDURE — 84439 ASSAY OF FREE THYROXINE: CPT

## 2022-03-08 PROCEDURE — 84443 ASSAY THYROID STIM HORMONE: CPT

## 2022-03-08 PROCEDURE — 80053 COMPREHEN METABOLIC PANEL: CPT

## 2022-03-08 PROCEDURE — 80061 LIPID PANEL: CPT

## 2022-03-08 PROCEDURE — 36415 COLL VENOUS BLD VENIPUNCTURE: CPT

## 2022-03-08 PROCEDURE — 3061F NEG MICROALBUMINURIA REV: CPT | Performed by: FAMILY MEDICINE

## 2022-03-08 PROCEDURE — 82306 VITAMIN D 25 HYDROXY: CPT

## 2022-03-08 PROCEDURE — 82043 UR ALBUMIN QUANTITATIVE: CPT

## 2022-03-08 PROCEDURE — 82570 ASSAY OF URINE CREATININE: CPT

## 2022-03-11 ENCOUNTER — TELEPHONE (OUTPATIENT)
Dept: ENDOCRINOLOGY | Facility: CLINIC | Age: 72
End: 2022-03-11

## 2022-03-11 NOTE — TELEPHONE ENCOUNTER
----- Message from Onesimo Moser MD sent at 3/11/2022 12:10 PM EST -----  Lab results reviewed, no critical lab results will discuss in her upcoming appointment

## 2022-03-15 ENCOUNTER — OFFICE VISIT (OUTPATIENT)
Dept: ENDOCRINOLOGY | Facility: CLINIC | Age: 72
End: 2022-03-15
Payer: COMMERCIAL

## 2022-03-15 VITALS
SYSTOLIC BLOOD PRESSURE: 124 MMHG | BODY MASS INDEX: 45.03 KG/M2 | TEMPERATURE: 97.8 F | DIASTOLIC BLOOD PRESSURE: 82 MMHG | WEIGHT: 254.2 LBS | HEART RATE: 68 BPM

## 2022-03-15 DIAGNOSIS — E11.9 TYPE 2 DIABETES MELLITUS WITHOUT COMPLICATION, WITHOUT LONG-TERM CURRENT USE OF INSULIN (HCC): ICD-10-CM

## 2022-03-15 DIAGNOSIS — E78.2 MIXED HYPERLIPIDEMIA: ICD-10-CM

## 2022-03-15 DIAGNOSIS — E03.9 HYPOTHYROIDISM (ACQUIRED): ICD-10-CM

## 2022-03-15 DIAGNOSIS — E11.65 TYPE 2 DIABETES MELLITUS WITH HYPERGLYCEMIA, WITHOUT LONG-TERM CURRENT USE OF INSULIN (HCC): Primary | ICD-10-CM

## 2022-03-15 DIAGNOSIS — E78.5 HYPERLIPIDEMIA, UNSPECIFIED HYPERLIPIDEMIA TYPE: ICD-10-CM

## 2022-03-15 DIAGNOSIS — E03.9 ACQUIRED HYPOTHYROIDISM: ICD-10-CM

## 2022-03-15 DIAGNOSIS — E04.2 NONTOXIC MULTINODULAR GOITER: ICD-10-CM

## 2022-03-15 PROCEDURE — 3074F SYST BP LT 130 MM HG: CPT | Performed by: INTERNAL MEDICINE

## 2022-03-15 PROCEDURE — 3079F DIAST BP 80-89 MM HG: CPT | Performed by: INTERNAL MEDICINE

## 2022-03-15 PROCEDURE — 99215 OFFICE O/P EST HI 40 MIN: CPT | Performed by: INTERNAL MEDICINE

## 2022-03-15 RX ORDER — LEVOTHYROXINE SODIUM 112 UG/1
112 TABLET ORAL DAILY
Qty: 90 TABLET | Refills: 1 | Status: SHIPPED | OUTPATIENT
Start: 2022-03-15 | End: 2022-06-18 | Stop reason: SDUPTHER

## 2022-03-15 NOTE — PROGRESS NOTES
Nichol Ceron 80 y o  female MRN: [id number]    Encounter: [id number]      Assessment/Plan     Assessment: This is a 80y o -year-old female with diabetes with hyperglycemia  Plan:  Diagnoses and all orders for this visit:    Type 2 diabetes mellitus with hyperglycemia, without long-term current use of insulin ([facility] )  Lab Results   Component Value Date    HGBA1C 7 4 (H) 03/08/2022   A1c is in optimal range for her age  Educated to follow low-carbohydrate diet avoid sweets and free sugars  Discussed importance of exercise regimen  Continue Invokana 300 mg daily  Will follow-up in 6 months    -     Hemoglobin A1C; Future  -     Basic metabolic panel; Future  -     Microalbumin / creatinine urine ratio; Future    Nontoxic multinodular goiter  Recent thyroid ultrasound showed stable thyroid nodule  Dominant nodules have been biopsied previously with benign results  All the nodules are stable  Follow-up every 2 years with ultrasound    Acquired hypothyroidism  Lab Results   Component Value Date    [id number] 1 996 03/08/2022   Patient is clinically and biochemically euthyroid  Continue current dose of levothyroxine  Follow-up TFT in 6 months  -     T4, free; Future  -     TSH, 3rd generation; Future    Mixed hyperlipidemia  Continue statins to keep LDL at goal of less than 100    Hyperlipidemia, unspecified hyperlipidemia type  Continue statins  Type 2 diabetes mellitus without complication, without long-term current use of insulin ([facility])  Continue Invokana  -     Canagliflozin (Invokana) 300 MG TABS; Take 1 tablet (300 mg total) by mouth daily      CC: Diabetes    History of Present Illness     HPI:    Nichol Ceron is 80-year-old woman with medical history of type 2 diabetes for at least 10 years is here for follow-up  She denies any complications of diabetes  Denies any illnesses or hospitalization recently    She checks her blood sugars once or twice daily and her average blood sugars are in 140 mg/dL range   Highest blood sugar is 176 mg per dL  She tries to follow low-carbohydrate diet  She takes Invokana 300 mg daily  She denies any side effects, urine tract infection or vaginal infections tolerating it well  She takes vitamin-D 3 supplementation 1000 International Units daily  For hyperlipidemia she takes Lipitor 80 mg daily  For hypothyroidism she is taking levothyroxine 112 mcg daily on empty stomach 1 hour before breakfast   Denies missing doses    She has multinodular thyroid goiter, with multiple dominant thyroid nodules, right upper pole nodule which is 1 9 x 1 3 x 1 8 cm and left mid gland nodule 1 7 x 1 1 x 0 8 cm has been biopsied with benign results  Right lower pole nodule measuring 1 6 x 1 4 x 1 2 cm was biopsied previously and was benign  Rest of the nodules are stable  She denies obstructive symptoms    Thyroid ultrasound which was done in June 2021  FINDINGS:  Thyroid parenchyma is diffusely heterogeneous in echotexture with focal nodule(s) as described below      Right lobe:  5 1 x 2 1 x 3 3 cm   16 8 mL  Left lobe:  4 3 x 1 9 x 1 8 cm   0 9  Isthmus:  0 1 cm      Nodule #1  Image 30  RIGHT midgland nodule measuring 2 4 x 1 4 x 1 8 cm  Given differences in measuring technique, no significant change from prior  COMPOSITION:  2 points, solid or almost completely solid   ECHOGENICITY:  1 point, hyperechoic or isoechoic  SHAPE:  0 points, wider-than-tall  MARGIN: 0 points, smooth  ECHOGENIC FOCI:  0 points, none or large comet-tail artifacts  TI-RADS Classification: TR 3 (3 points), FNA if >2 5 cm  Follow if >1 5 cm  This nodule remains stable for greater than 5 years and has had a prior benign biopsy  If there is a high level of clinical concern, continued surveillance at 2 year intervals   could be considered, otherwise no additional workup recommended      Nodule #2  Image 37  RIGHT lower pole nodule measuring 1 6 x 1 4 x 1 2 cm  Unchanged from prior  COMPOSITION:  2 points, solid or almost completely solid   ECHOGENICITY:  1 point, hyperechoic or isoechoic  SHAPE:  0 points, wider-than-tall  MARGIN: 0 points, smooth  ECHOGENIC FOCI:  0 points, none or large comet-tail artifacts  TI-RADS Classification: TR 3 (3 points), FNA if >2 5 cm  Follow if >1 5 cm  This nodule remains stable for greater than 5 years and has had a prior benign biopsy  If there is a high level of clinical concern, continued surveillance at 2 year intervals   could be considered, otherwise no additional workup recommended      Nodule #3  Image 26  RIGHT upper pole nodule measuring 1 9 x 1 3 x 1 8 cm  Given differences in measuring technique, no significant change from prior  COMPOSITION:  2 points, solid or almost completely solid   ECHOGENICITY:  1 point, hyperechoic or isoechoic  SHAPE:  0 points, wider-than-tall  MARGIN: 0 points, smooth  ECHOGENIC FOCI:  0 points, none or large comet-tail artifacts  TI-RADS Classification: TR 3 (3 points), FNA if >2 5 cm  Follow if >1 5 cm  This nodule remains stable for greater than 5 years and has had a prior benign biopsy  If there is a high level of clinical concern, continued surveillance at 2 year intervals   could be considered, otherwise no additional workup recommended      Nodule #4  Image 68  LEFT midgland nodule measuring 1 7 x 1 1 x 0 8 cm  Given differences in measuring technique, no significant change from prior  COMPOSITION:  2 points, solid or almost completely solid   ECHOGENICITY:  1 point, hyperechoic or isoechoic  SHAPE:  0 points, wider-than-tall  MARGIN: 0 points, smooth  ECHOGENIC FOCI:  0 points, none or large comet-tail artifacts  TI-RADS Classification: TR 3 (3 points), FNA if >2 5 cm  Follow if >1 5 cm  This nodule remains stable for greater than 5 years and has had a prior benign biopsy    If there is a high level of clinical concern, continued surveillance at 2 year intervals could be considered, otherwise no additional workup recommended            IMPRESSION:     These are stable with previous non-malignant biopsy results as above  Based on 2015 EMY guidelines, additional followup ultrasound should be performed in 12 to 24 months  Lab Results   Component Value Date    HGBA1C 7 4 (H) 03/08/2022          Review of Systems   Constitutional: Negative for activity change, diaphoresis, fatigue, fever and unexpected weight change  HENT: Negative  Eyes: Negative for visual disturbance  Respiratory: Negative for cough, chest tightness and shortness of breath  Cardiovascular: Negative for chest pain, palpitations and leg swelling  Gastrointestinal: Negative for abdominal pain, blood in stool, constipation, diarrhea, nausea and vomiting  Endocrine: Negative for cold intolerance, heat intolerance, polydipsia, polyphagia and polyuria  Genitourinary: Negative for dysuria, enuresis, frequency and urgency  Musculoskeletal: Negative for arthralgias and myalgias  Skin: Negative for pallor, rash and wound  Allergic/Immunologic: Negative  Neurological: Negative for dizziness, tremors, weakness and numbness  Hematological: Negative  Psychiatric/Behavioral: Negative  Historical Information   Past Medical History:   Diagnosis Date    Allergic 2018   weekly inject      Allergic rhinitis     Allergies     Anemia     Anxiety     Arthritis     Chronic constipation     [facility]     Diabetes mellitus ([facility] )     Disease of thyroid gland     GERD (gastroesophageal reflux disease)     Headache(784 0)     Heart murmur     Hyperlipidemia     Hypertension     Hypothyroidism     Obesity     Obesity     Restless legs     Vitamin D deficiency      Past Surgical History:   Procedure Laterality Date    BREAST BIOPSY Right 2001    CATARACT EXTRACTION      CATARACT EXTRACTION, BILATERAL  05/2021    CHOLECYSTECTOMY      COLONOSCOPY  06/01/2018    goes q 5 yr-due in Summer 2018-Dr Renny Naqvi  2015    Dr Rob Magallanes  01/01/2000    Dr Janene Knight  9/26/2013    US GUIDANCE  10/17/2016    [facility]  10/15/2013     Social History   Social History     Substance and Sexual Activity   Alcohol Use Yes    Comment: on special occasions     Social History     Substance and Sexual Activity   Drug Use Never     Social History     Tobacco Use   Smoking Status Never Smoker   Smokeless Tobacco Never Used     Family History:   Family History   Problem Relation Age of Onset    Multiple sclerosis Brother     Multiple sclerosis Paternal Uncle     Diabetes Paternal Uncle     Hypertension Father     Diabetes Father     Heart disease Father     Multiple sclerosis Family     Diabetes Paternal Aunt     Cancer Neg Hx     Stroke Neg Hx     Obesity Neg Hx        Meds/Allergies   Current Outpatient Medications   Medication Sig Dispense Refill    aspirin (ECOTRIN LOW STRENGTH) 81 mg EC tablet Take 81 mg by mouth daily      atorvastatin (LIPITOR) 80 mg tablet Take 1 tablet (80 mg total) by mouth daily 30 tablet 0    Blood Glucose Monitoring Suppl (ONE TOUCH ULTRA MINI) w/Device KIT Please dispense 1 Kit 1 each 0    Canagliflozin (Invokana) 300 MG TABS Take 1 tablet (300 mg total) by mouth daily 90 tablet 1    Cholecalciferol (Vitamin D3) 25 MCG (1000 UT) CAPS Take 2000 IU with dinner      famotidine-calcium carbonate-magnesium hydroxide (PEPCID COMPLETE) -165 MG CHEW Chew 1 tablet daily as needed for heartburn      fluticasone (FLONASE) 50 mcg/act nasal spray 1 spray into each nostril daily      glucose blood (OneTouch Ultra) test strip Use to test blood sugar two times every other day 90 each 3    levocetirizine (XYZAL) 5 MG tablet Take 1 tablet (5 mg total) by mouth every evening      levothyroxine 112 mcg tablet Take 1 tablet (112 mcg total) by mouth daily 90 tablet 1    nebivolol (Bystolic) 5 mg tablet Take 0 5 tablets (2 5 mg total) by mouth daily 30 tablet 0    omeprazole (PriLOSEC) 40 MG capsule Take 1 capsule (40 mg total) by mouth daily 90 capsule 0    OneTouch Delica Lancets 32B MISC Use to check sugars twice every other day 90 each 3    potassium chloride (Klor-Con) 10 mEq tablet Take 1 tablet (10 mEq total) by mouth 2 (two) times a day 60 tablet 0    pramipexole (MIRAPEX) 0 25 mg tablet Take 1 tablet (0 25 mg total) by mouth daily at bedtime 30 tablet 0    ramipril (ALTACE) 2 5 mg capsule Take 1 capsule (2 5 mg total) by mouth daily 90 capsule 3    solifenacin (VESICARE) 5 mg tablet TAKE 1 TABLET(5 MG) BY MOUTH DAILY 90 tablet 0    benzonatate (TESSALON PERLES) 100 mg capsule Take 2 capsules (200 mg total) by mouth 3 (three) times a day as needed for cough (Patient not taking: Reported on 3/15/2022 ) 40 capsule 1     No current facility-administered medications for this visit  Allergies   Allergen Reactions    Other Sneezing     Seasonal, dust, Cat dander, mold    Penicillins Hives       Objective   Vitals: Blood pressure 124/82, pulse 68, temperature 97 8 °F (36 6 °C), weight 115 kg (254 lb 3 2 oz), last menstrual period 01/01/2004  Physical Exam  Constitutional:       General: She is not in acute distress  Appearance: Normal appearance  She is not ill-appearing  HENT:      Head: Normocephalic and atraumatic  Nose: Nose normal       Mouth/Throat:      Mouth: Mucous membranes are moist    Eyes:      Extraocular Movements: Extraocular movements intact  Conjunctiva/sclera: Conjunctivae normal    Cardiovascular:      Rate and Rhythm: Normal rate and regular rhythm  Pulses: Normal pulses  no weak pulses          Dorsalis pedis pulses are 2+ on the right side and 2+ on the left side  Posterior tibial pulses are 2+ on the right side and 2+ on the left side  Heart sounds: Normal heart sounds     Pulmonary:      Effort: Pulmonary effort is normal  No respiratory distress  Breath sounds: Normal breath sounds  Abdominal:      General: Bowel sounds are normal       Palpations: Abdomen is soft  Musculoskeletal:         General: Normal range of motion  Cervical back: Normal range of motion and neck supple  Right lower leg: No edema  Left lower leg: No edema  Feet:      Right foot:      Skin integrity: No ulcer, skin breakdown, erythema, warmth, callus or dry skin  Left foot:      Skin integrity: No ulcer, skin breakdown, erythema, warmth, callus or dry skin  Skin:     General: Skin is warm and dry  Neurological:      General: No focal deficit present  Mental Status: She is alert and oriented to person, place, and time  Psychiatric:         Mood and Affect: Mood normal          Behavior: Behavior normal      Diabetic Foot Exam    Patient's shoes and socks removed  Right Foot/Ankle   Right Foot Inspection  Skin Exam: skin normal and skin intact  No dry skin, no warmth, no callus, no erythema, no maceration, no abnormal color, no pre-ulcer, no ulcer and no callus  Toe Exam: ROM and strength within normal limits  Sensory   Vibration: intact  Monofilament testing: intact    Vascular  The right DP pulse is 2+  The right PT pulse is 2+  Left Foot/Ankle  Left Foot Inspection  Skin Exam: skin normal and skin intact  No dry skin, no warmth, no erythema, no maceration, normal color, no pre-ulcer, no ulcer and no callus  Toe Exam: ROM and strength within normal limits  Sensory   Vibration: intact  Monofilament testing: intact    Vascular  The left DP pulse is 2+  The left PT pulse is 2+  Assign Risk Category  No deformity present  No loss of protective sensation  No weak pulses  Risk: 0    The history was obtained from the review of the chart, patient      Lab Results:   Lab Results   Component Value Date/Time    Hemoglobin A1C 7 4 (H) 03/08/2022 06:35 AM    Hemoglobin A1C 7 1 (A) 09/23/2021 10:14 AM    Hemoglobin A1C 7 4 (H) 06/07/2021 06:50 AM    Hemoglobin A1C 6 9 (A) 04/05/2021 10:01 AM    BUN 15 03/08/2022 06:35 AM    BUN 16 06/07/2021 06:50 AM    Potassium 4 2 03/08/2022 06:35 AM    Potassium 4 2 06/07/2021 06:50 AM    Chloride 105 03/08/2022 06:35 AM    Chloride 106 06/07/2021 06:50 AM    CO2 26 03/08/2022 06:35 AM    CO2 25 06/07/2021 06:50 AM    Creatinine 0 79 03/08/2022 06:35 AM    Creatinine 0 86 06/07/2021 06:50 AM    AST 17 03/08/2022 06:35 AM    ALT 19 03/08/2022 06:35 AM    Albumin 4 0 03/08/2022 06:35 AM    HDL, Direct 49 (L) 03/08/2022 06:35 AM    Triglycerides 152 (H) 03/08/2022 06:35 AM           Imaging Studies: I have personally reviewed pertinent reports  Portions of the record may have been created with voice recognition software  Occasional wrong word or "sound a like" substitutions may have occurred due to the inherent limitations of voice recognition software  Read the chart carefully and recognize, using context, where substitutions have occurred

## 2022-03-17 ENCOUNTER — OFFICE VISIT (OUTPATIENT)
Dept: DIABETES SERVICES | Facility: CLINIC | Age: 72
End: 2022-03-17
Payer: COMMERCIAL

## 2022-03-17 VITALS — BODY MASS INDEX: 45.15 KG/M2 | HEIGHT: 63 IN | WEIGHT: 254.8 LBS

## 2022-03-17 DIAGNOSIS — E11.9 TYPE 2 DIABETES MELLITUS WITHOUT COMPLICATION, WITHOUT LONG-TERM CURRENT USE OF INSULIN (HCC): ICD-10-CM

## 2022-03-17 PROCEDURE — G0108 DIAB MANAGE TRN  PER INDIV: HCPCS | Performed by: DIETITIAN, REGISTERED

## 2022-03-17 NOTE — PROGRESS NOTES
Type 2 Diabetes Class Assessment    HPI: Met with Nichol Ceron for DSME Initial visit  Marcelo Garcia has Type 2 Diabetes  Most recent HbA1c 7 4%  No blood glucose log or glucometer to review today  She states that she was seen by our endocrinologist 2 days ago and she provided her meter and log then  Log is not currently under media to review  She reports verbally today that her fasting BG has been ranging 130-140 mg/dL, before lunch and dinner 115-130 mg/dL, and 2 hour postprandial 130-145 mg/dL  Marcelo Garcia previously attended in person living well with diabetes classes in 2019  Christen Sabillon to check with her insurance to see if they will cover classes again this soon since she reports that her insurance has not changed since then  Provided billing code for RICHARD Izaguirre 2 check with her insurance  If classes are covered by insurance [employer] will then call to schedule classes  [employer] also has a new referral for medical nutrition therapy and will get scheduled for that appointment  She will call with any questions  Diabetes Assessment  Visit Type: Initial visit  Present at Session: patient   Special Learning Needs: Yes - wants to know how to read labels and wants to know what to look at (actual calories and grams vs %DV)  Barriers to Learning: no barriers    How do you feel about making lifestyle changes at this time? "I have no problem making any changes with my lifestyle"  How would you rate your current knowledge of diabetes? very good  How confident are you that will be able to take better control of your diabetes?: excellent    How long have you had diabetes? Maybe 10 years  Have you had diabetes education in the past?: Yes - took classes and had MNT with KAREY back in 2019  Do you have any family members with diabetes?: Yes - father  Do you monitor your blood sugar? yes  Type of blood sugar monitor: One Touch Ultra  How old is your meter?: 3year old  How often do you test your blood sugars? :Twice per day every other day (Pair testing)  Do you keep a written record of your blood sugars? Yes   Blood sugar log with patient today and reviewed by educator?: Yes   Blood Sugar ranges:    Fastin-140 mg/dL   Before meals: 115-130 mg/dL   2 hours after meals: 130-145 mg/dL  Any financial concerns pertaining to your diabetes supplies, medication or care?: No  Have you ever experienced hypoglycemia?:  No  Have you ever been hospitalized or gone to the ER due to your blood sugars?: No  How do you treat low blood sugars?: half cup orange juice or hard candy  How do you treat high blood sugars? Does not know  Educated on exercise and drink water  Do you wear a Diabetes I D ?: no  Where do you dispose of your sharps (needles,lancetes)? : puts in biohazard container and then brings them to [place] and takes to dump and they give him a new one  Ht Readings from Last 1 Encounters:   22 5' 3" (1 6 m)     Wt Readings from Last 3 Encounters:   03/15/22 115 kg (254 lb 3 2 oz)   22 115 kg (254 lb 6 4 oz)   22 112 kg (248 lb)        There is no height or weight on file to calculate BMI       Lab Results   Component Value Date    HGBA1C 7 4 (H) 2022    HGBA1C 7 1 (A) 2021    HGBA1C 7 4 (H) 2021       No results found for: CHOL  Lab Results   Component Value Date    HDL 49 (L) 2022    HDL 46 (L) 10/05/2020    HDL 53 2019     Lab Results   Component Value Date    LDLCALC 93 2022    LDLCALC 65 10/05/2020    LDLCALC 73 2019     Lab Results   Component Value Date    TRIG 152 (H) 2022    TRIG 195 8 (H) 10/05/2020    TRIG 143 2019     No results found for: CHOLHDL  No results found for: Niki Gillis    Weight Change: No    Diet Assessment    Do you follow any special diet presently?: Yes - weight watchers  Who cooks at home?:  patient  Who does the grocery shopping?: patient   How frequently do you eat out?: twice per month, usually delivery    Activity Assessment    Exercise: apartment has gym  Does treadmill or bike for 10-15 min plus some weights and uses exercise ball  At home in her apartment has 5 lbs weights (10 min)  Usually exercises a total of 3 days  Lifestyle/Social Assessment    Racial/ethnic group:                                       Primary Language: English  Marital Status: Single  Education Level: High School Graduate   Work status: Retired  Type of job and hours: Retired  Who lives in your household?: none  Who is you primary support person(s): sibling(s) (sister) and friend Hamida Fry  Describe your quality of life currently?: good  Any concerns for your safety?: No  Any Congregation or cultural practices that may affect your diabetes care: No  Do you have a decrease or loss of hearing?: No  Do you have a decrease or loss of vision?: No  When was the last time you had an ophthalmology exam?: last Friday  When was the last time you had dental exam?: January 2022  Do you check your feet for cracks, sores, debris?: Yes  When was the last time you had podiatry or foot exam?: 2 days ago (3/15/22)  Last flu shot?: September of 2021  Pneumonia shot?: Yes      The patient's history was reviewed and updated as appropriate: allergies, current medications  Intervention    Diabetes Overview :   Joe Serna was instructed on basic concepts of diabetes, including identifying role of diabetes self management, basic pathophysiology and types of diabetes, A1c and blood sugar targets  Joejerson Roye has good understanding of material covered  Taking Medications: Instructed patient on action, side effects, efficacy, prescribed dosage and appropriate timing and frequency of administration of her diabetes medication  Joe Serna has good understanding of material covered       Monitoring Blood Sugars  Instructions for Meter Teaching- Patient instructed in the following:  Site selection and skin preparation, Loading strips and lancet device, meter activation, obtaining blood sample, test strip and lancet disposal and recording log book entries  Testing frequency: Encouraged pair testing  Test sugars before a meal and 2hr after the same meal, rotating between breakfast, lunch, and dinner  Test sugars twice a day (3 days a week)    Goal Blood Sugars:   Premeal , even better <110  2hr after a meal <180, even better <140  A1C <7%, even better <6 5%  Hypoglycemia: Instructed patient on definition/risk of hypoglycemia, treatment, causes/symptoms, when to notify provider of lows, prevention of hypoglycemia and exercise precautions  Comments: April Prior understanding of hypoglycemia concepts      Physical Activity: Discussed benefits of physical activity to optimize blood glucose control, encouraged activity at patient is physically able  Always consult a physician prior to starting an exercise program   Comments: April Prior understanding of hypoglycemia concepts        Diabetes Education Record  Jacquie Mccann received the following handouts:  Signs and symptoms of hypoglycemia and hyperglycemia, 15/15 rule, know your blood glucose numbers, nutrition guidelines for diabetes, [place] sharps disposal, diabetes zone tool, 2022 living well with diabetes class schedule      Patient response to instruction    Comprehensiongood  Motivationgood  Expected Compliancegood    Thank you for referring your patient to [facility], it was a pleasure working with them today  Please feel free to call with any questions or concerns  Start:  10:00 a m  Stop:  11:00 a m    Referred by: Tim Pila, DO Alver Hashimoto, [address] [place] [address] [place] [id number]

## 2022-03-17 NOTE — PATIENT INSTRUCTIONS
Class Assessment AVS    Please check with insurance regarding coverage of classes (billing code )  If covered then call to schedule 21 [phone]  Testing frequency: Continue pair testing meals every other day  Goal Blood Sugars:   Premeal , even better <110  2hr after a meal <180, even better <140  A1C <7%, even better <6 5%  Thank you for coming to the [facility] for education today  Please feel free to call with any questions or concerns      Rosemary Aggarwal, Prane[address] [facility] [id number]

## 2022-03-24 ENCOUNTER — OFFICE VISIT (OUTPATIENT)
Dept: FAMILY MEDICINE CLINIC | Facility: OTHER | Age: 72
End: 2022-03-24
Payer: COMMERCIAL

## 2022-03-24 VITALS
RESPIRATION RATE: 18 BRPM | WEIGHT: 253.4 LBS | HEART RATE: 74 BPM | OXYGEN SATURATION: 98 % | TEMPERATURE: 98 F | SYSTOLIC BLOOD PRESSURE: 130 MMHG | HEIGHT: 63 IN | DIASTOLIC BLOOD PRESSURE: 88 MMHG | BODY MASS INDEX: 44.9 KG/M2

## 2022-03-24 DIAGNOSIS — E03.9 ACQUIRED HYPOTHYROIDISM: ICD-10-CM

## 2022-03-24 DIAGNOSIS — E11.65 TYPE 2 DIABETES MELLITUS WITH HYPERGLYCEMIA, WITHOUT LONG-TERM CURRENT USE OF INSULIN (HCC): Primary | ICD-10-CM

## 2022-03-24 DIAGNOSIS — J30.2 SEASONAL ALLERGIES: ICD-10-CM

## 2022-03-24 DIAGNOSIS — E66.01 SEVERE OBESITY (BMI >= 40) (HCC): ICD-10-CM

## 2022-03-24 DIAGNOSIS — Z12.31 ENCOUNTER FOR SCREENING MAMMOGRAM FOR BREAST CANCER: ICD-10-CM

## 2022-03-24 DIAGNOSIS — E78.2 MIXED HYPERLIPIDEMIA: ICD-10-CM

## 2022-03-24 DIAGNOSIS — I10 ESSENTIAL HYPERTENSION: ICD-10-CM

## 2022-03-24 DIAGNOSIS — I35.0 AORTIC STENOSIS, SEVERE: ICD-10-CM

## 2022-03-24 PROBLEM — Z01.419 ENCOUNTER FOR GYNECOLOGICAL EXAMINATION (GENERAL) (ROUTINE) WITHOUT ABNORMAL FINDINGS: Status: RESOLVED | Noted: 2020-09-10 | Resolved: 2022-03-24

## 2022-03-24 PROCEDURE — 3725F SCREEN DEPRESSION PERFORMED: CPT | Performed by: FAMILY MEDICINE

## 2022-03-24 PROCEDURE — 4010F ACE/ARB THERAPY RXD/TAKEN: CPT | Performed by: FAMILY MEDICINE

## 2022-03-24 PROCEDURE — 3008F BODY MASS INDEX DOCD: CPT | Performed by: FAMILY MEDICINE

## 2022-03-24 PROCEDURE — 1036F TOBACCO NON-USER: CPT | Performed by: FAMILY MEDICINE

## 2022-03-24 PROCEDURE — 1160F RVW MEDS BY RX/DR IN RCRD: CPT | Performed by: FAMILY MEDICINE

## 2022-03-24 PROCEDURE — 99214 OFFICE O/P EST MOD 30 MIN: CPT | Performed by: FAMILY MEDICINE

## 2022-03-24 PROCEDURE — 1003F LEVEL OF ACTIVITY ASSESS: CPT | Performed by: FAMILY MEDICINE

## 2022-03-24 RX ORDER — ATORVASTATIN CALCIUM 80 MG/1
80 TABLET, FILM COATED ORAL DAILY
Qty: 90 TABLET | Refills: 1 | Status: SHIPPED | OUTPATIENT
Start: 2022-03-24 | End: 2022-06-18 | Stop reason: SDUPTHER

## 2022-03-24 RX ORDER — RAMIPRIL 2.5 MG/1
2.5 CAPSULE ORAL DAILY
Qty: 90 CAPSULE | Refills: 1 | Status: SHIPPED | OUTPATIENT
Start: 2022-03-24 | End: 2022-06-18 | Stop reason: SDUPTHER

## 2022-03-24 RX ORDER — NEBIVOLOL 5 MG/1
2.5 TABLET ORAL DAILY
Qty: 90 TABLET | Refills: 1 | Status: SHIPPED | OUTPATIENT
Start: 2022-03-24 | End: 2022-05-17 | Stop reason: SDUPTHER

## 2022-03-24 RX ORDER — LEVOCETIRIZINE DIHYDROCHLORIDE 5 MG/1
5 TABLET, FILM COATED ORAL EVERY EVENING
Qty: 90 TABLET | Refills: 1 | Status: SHIPPED | OUTPATIENT
Start: 2022-03-24 | End: 2022-05-17 | Stop reason: SDUPTHER

## 2022-03-24 NOTE — PROGRESS NOTES
Assessment/Plan:    No problem-specific Assessment & Plan notes found for this encounter  Diagnoses and all orders for this visit:    Type 2 diabetes mellitus with hyperglycemia, without long-term current use of insulin ([facility])  Comments:  A1c 7 4%  Following with DM Ed and Endo  Continue Invokana and LM    Essential hypertension  Comments:  BP goal <140/90  Follows with [facility] Cardio  Continue ramipril in AM, Bystolic in PM  Orders:  -     nebivolol (Bystolic) 5 mg tablet; Take 0 5 tablets (2 5 mg total) by mouth daily  -     ramipril (ALTACE) 2 5 mg capsule; Take 1 capsule (2 5 mg total) by mouth daily    Mixed hyperlipidemia  Comments:  LDL goal <70  Continue atorvastatin 80 mg daily  Orders:  -     atorvastatin (LIPITOR) 80 mg tablet; Take 1 tablet (80 mg total) by mouth daily    Acquired hypothyroidism  Comments:  TSH stable, managed by Endo  Continue levothyroxine 112 mcg daily    Aortic stenosis, severe  Comments: Followed by Cardio  Last echo 9/2021    Severe obesity (BMI >= 40) ([facility])    Seasonal allergies  -     levocetirizine (XYZAL) 5 MG tablet; Take 1 tablet (5 mg total) by mouth every evening    Encounter for screening mammogram for breast cancer  -     Mammo screening bilateral w 3d & cad; Future        BMI Counseling: Body mass index is 44 89 kg/m²  The BMI is above normal  Nutrition recommendations include decreasing portion sizes, encouraging healthy choices of fruits and vegetables, decreasing fast food intake, consuming healthier snacks, limiting drinks that contain sugar, moderation in carbohydrate intake, increasing intake of lean protein, reducing intake of saturated and trans fat and reducing intake of cholesterol  Exercise recommendations include moderate physical activity 150 minutes/week  Rationale for BMI follow-up plan is due to patient being overweight or obese  Depression Screening and Follow-up Plan: Patient was screened for depression during today's encounter   They screened negative with a PHQ-2 score of 0  Return in about 6 months (around 9/24/2022) for AWV  The patient indicates understanding of these issues and agrees with the plan  Subjective:      Patient ID: Nichol Ceron is a 80 y o  female  Saw Endo 3/15/22 -- A1c 7 4% -- continue Invokana and f/u with them 6 mo  Saw Cardio 12/2021 -- Dr Lopez Ceron [facility]) -- last Echo 9/2021    Hypertension  This is a chronic problem  The current episode started more than 1 year ago  The problem has been waxing and waning since onset  The problem is controlled  Pertinent negatives include no anxiety, blurred vision, chest pain, headaches, malaise/fatigue, neck pain, orthopnea, palpitations, peripheral edema, PND, shortness of breath or sweats  Agents associated with hypertension include thyroid hormones  Risk factors for coronary artery disease include diabetes mellitus, dyslipidemia, family history, obesity and post-menopausal state  Past treatments include ACE inhibitors and beta blockers  The current treatment provides moderate improvement  Compliance problems include diet, exercise and psychosocial issues  Hypertensive end-organ damage includes CAD/MI  There is no history of angina, kidney disease, CVA, heart failure, left ventricular hypertrophy, PVD or retinopathy  Identifiable causes of hypertension include a thyroid problem  There is no history of chronic renal disease or a hypertension causing med  Diabetes  She presents for her follow-up diabetic visit  She has type 2 diabetes mellitus  Her disease course has been stable  There are no hypoglycemic associated symptoms  Pertinent negatives for hypoglycemia include no dizziness, headaches, nervousness/anxiousness, sweats or tremors  Pertinent negatives for diabetes include no blurred vision, no chest pain, no fatigue, no foot paresthesias, no foot ulcerations, no polydipsia, no polyphagia, no polyuria, no visual change, no weakness and no weight loss   There are no hypoglycemic complications  Symptoms are stable  Diabetic complications include heart disease  Pertinent negatives for diabetic complications include no autonomic neuropathy, CVA, nephropathy, peripheral neuropathy, PVD or retinopathy  Risk factors for coronary artery disease include diabetes mellitus, post-menopausal, hypertension, obesity, dyslipidemia and family history  Current diabetic treatment includes oral agent (monotherapy) (INVOKANA)  She is compliant with treatment all of the time  Her weight is stable  She is following a generally healthy diet  Meal planning includes avoidance of concentrated sweets and ADA exchanges  She has had a previous visit with a dietitian  An ACE inhibitor/angiotensin II receptor blocker is being taken  She does not see a podiatrist Eye exam is current  Hyperlipidemia  This is a chronic problem  The current episode started more than 1 year ago  The problem is controlled  Exacerbating diseases include diabetes, hypothyroidism and obesity  She has no history of chronic renal disease, liver disease or nephrotic syndrome  There are no known factors aggravating her hyperlipidemia  Pertinent negatives include no chest pain, focal sensory loss, focal weakness, leg pain, myalgias or shortness of breath  Current antihyperlipidemic treatment includes statins  The current treatment provides moderate improvement of lipids  Compliance problems include adherence to diet, adherence to exercise and psychosocial issues  Risk factors for coronary artery disease include diabetes mellitus, dyslipidemia, family history, hypertension, obesity and post-menopausal    Thyroid Problem  Presents for follow-up visit  Patient reports no anxiety, cold intolerance, constipation, depressed mood, diaphoresis, diarrhea, dry skin, fatigue, hair loss, heat intolerance, hoarse voice, leg swelling, nail problem, palpitations, tremors, visual change, weight gain or weight loss  The symptoms have been resolved   Her past medical history is significant for diabetes and hyperlipidemia  There is no history of heart failure         The following portions of the patient's history were reviewed and updated as appropriate: allergies, current medications, past family history, past medical history, past social history, past surgical history and problem list       Current Outpatient Medications:     aspirin (ECOTRIN LOW STRENGTH) 81 mg EC tablet, Take 81 mg by mouth daily, Disp: , Rfl:     atorvastatin (LIPITOR) 80 mg tablet, Take 1 tablet (80 mg total) by mouth daily, Disp: 90 tablet, Rfl: 1    Blood Glucose Monitoring Suppl (ONE TOUCH ULTRA MINI) w/Device KIT, Please dispense 1 Kit, Disp: 1 each, Rfl: 0    Canagliflozin (Invokana) 300 MG TABS, Take 1 tablet (300 mg total) by mouth daily, Disp: 90 tablet, Rfl: 1    Cholecalciferol (Vitamin D3) 25 MCG (1000 UT) CAPS, Take 2000 IU with dinner, Disp: , Rfl:     famotidine-calcium carbonate-magnesium hydroxide (PEPCID COMPLETE) -165 MG CHEW, Chew 1 tablet daily as needed for heartburn, Disp: , Rfl:     fluticasone (FLONASE) 50 mcg/act nasal spray, 1 spray into each nostril daily, Disp: , Rfl:     glucose blood (OneTouch Ultra) test strip, Use to test blood sugar two times every other day, Disp: 90 each, Rfl: 3    levocetirizine (XYZAL) 5 MG tablet, Take 1 tablet (5 mg total) by mouth every evening, Disp: 90 tablet, Rfl: 1    levothyroxine 112 mcg tablet, Take 1 tablet (112 mcg total) by mouth daily, Disp: 90 tablet, Rfl: 1    nebivolol (Bystolic) 5 mg tablet, Take 0 5 tablets (2 5 mg total) by mouth daily, Disp: 90 tablet, Rfl: 1    omeprazole (PriLOSEC) 40 MG capsule, Take 1 capsule (40 mg total) by mouth daily, Disp: 90 capsule, Rfl: 0    OneTouch Delica Lancets 43P MISC, Use to check sugars twice every other day, Disp: 90 each, Rfl: 3    potassium chloride (Klor-Con) 10 mEq tablet, Take 1 tablet (10 mEq total) by mouth 2 (two) times a day, Disp: 60 tablet, Rfl: 0   pramipexole (MIRAPEX) 0 25 mg tablet, Take 1 tablet (0 25 mg total) by mouth daily at bedtime, Disp: 30 tablet, Rfl: 0    ramipril (ALTACE) 2 5 mg capsule, Take 1 capsule (2 5 mg total) by mouth daily, Disp: 90 capsule, Rfl: 1    solifenacin (VESICARE) 5 mg tablet, TAKE 1 TABLET(5 MG) BY MOUTH DAILY, Disp: 90 tablet, Rfl: 0    benzonatate (TESSALON PERLES) 100 mg capsule, Take 2 capsules (200 mg total) by mouth 3 (three) times a day as needed for cough (Patient not taking: Reported on 3/15/2022 ), Disp: 40 capsule, Rfl: 1      Review of Systems   Constitutional: Negative for activity change, diaphoresis, fatigue, fever, malaise/fatigue, weight gain and weight loss  HENT: Negative for congestion, ear pain, hoarse voice, sinus pain and sore throat  Eyes: Negative for blurred vision, pain and itching  Respiratory: Negative for cough and shortness of breath  Cardiovascular: Negative for chest pain, palpitations, orthopnea and PND  Gastrointestinal: Negative for abdominal pain, constipation, diarrhea, nausea and vomiting  Endocrine: Negative for cold intolerance, heat intolerance, polydipsia, polyphagia and polyuria  Genitourinary: Negative for dysuria  Musculoskeletal: Negative for myalgias and neck pain  Skin: Negative for color change and rash  Neurological: Negative for dizziness, tremors, focal weakness, syncope, weakness and headaches  Hematological: Negative for adenopathy  Psychiatric/Behavioral: Negative for behavioral problems, dysphoric mood and sleep disturbance  The patient is not nervous/anxious  Objective:      /88   Pulse 74   Temp 98 °F (36 7 °C)   Resp 18   Ht 5' 3" (1 6 m)   Wt 115 kg (253 lb 6 4 oz)   LMP 01/01/2004   SpO2 98%   BMI 44 89 kg/m²          Physical Exam  Vitals and nursing note reviewed  Constitutional:       General: She is not in acute distress  Appearance: Normal appearance  She is well-developed  She is not ill-appearing  Comments: Body mass index is 44 89 kg/m²  HENT:      Head: Normocephalic and atraumatic  Right Ear: External ear normal       Left Ear: External ear normal       Nose: Nose normal    Eyes:      General: No scleral icterus  Conjunctiva/sclera: Conjunctivae normal       Pupils: Pupils are equal, round, and reactive to light  Neck:      Thyroid: No thyromegaly  Cardiovascular:      Rate and Rhythm: Normal rate and regular rhythm  Heart sounds: Murmur heard  Systolic murmur is present with a grade of 4/6  Pulmonary:      Effort: Pulmonary effort is normal  No respiratory distress  Breath sounds: Normal breath sounds  No wheezing  Abdominal:      General: Bowel sounds are normal  There is no distension  Palpations: Abdomen is soft  Tenderness: There is no abdominal tenderness  Musculoskeletal:         General: No tenderness  Normal range of motion  Cervical back: Normal range of motion and neck supple  Right lower leg: No edema  Left lower leg: No edema  Lymphadenopathy:      Cervical: No cervical adenopathy  Skin:     General: Skin is warm and dry  Coloration: Skin is not jaundiced  Findings: No rash  Neurological:      General: No focal deficit present  Mental Status: She is alert and oriented to person, place, and time  Cranial Nerves: No cranial nerve deficit        Gait: Gait normal    Psychiatric:         Mood and Affect: Mood normal          Behavior: Behavior normal

## 2022-03-26 DIAGNOSIS — I10 ESSENTIAL HYPERTENSION: ICD-10-CM

## 2022-03-28 RX ORDER — POTASSIUM CHLORIDE 750 MG/1
10 TABLET, FILM COATED, EXTENDED RELEASE ORAL 2 TIMES DAILY
Qty: 60 TABLET | Refills: 0 | Status: SHIPPED | OUTPATIENT
Start: 2022-03-28 | End: 2022-05-17 | Stop reason: SDUPTHER

## 2022-04-11 DIAGNOSIS — K21.9 GASTROESOPHAGEAL REFLUX DISEASE WITHOUT ESOPHAGITIS: ICD-10-CM

## 2022-04-11 RX ORDER — OMEPRAZOLE 40 MG/1
40 CAPSULE, DELAYED RELEASE ORAL DAILY
Qty: 90 CAPSULE | Refills: 0 | Status: SHIPPED | OUTPATIENT
Start: 2022-04-11 | End: 2022-06-18 | Stop reason: SDUPTHER

## 2022-04-13 ENCOUNTER — OFFICE VISIT (OUTPATIENT)
Dept: UROLOGY | Facility: CLINIC | Age: 72
End: 2022-04-13
Payer: COMMERCIAL

## 2022-04-13 VITALS
DIASTOLIC BLOOD PRESSURE: 88 MMHG | WEIGHT: 259.6 LBS | HEIGHT: 63 IN | SYSTOLIC BLOOD PRESSURE: 142 MMHG | BODY MASS INDEX: 46 KG/M2 | HEART RATE: 70 BPM

## 2022-04-13 DIAGNOSIS — N32.81 OVERACTIVE BLADDER: Primary | ICD-10-CM

## 2022-04-13 LAB

## 2022-04-13 PROCEDURE — 51798 US URINE CAPACITY MEASURE: CPT | Performed by: PHYSICIAN ASSISTANT

## 2022-04-13 PROCEDURE — 3061F NEG MICROALBUMINURIA REV: CPT | Performed by: PHYSICIAN ASSISTANT

## 2022-04-13 PROCEDURE — 3077F SYST BP >= 140 MM HG: CPT | Performed by: PHYSICIAN ASSISTANT

## 2022-04-13 PROCEDURE — 3079F DIAST BP 80-89 MM HG: CPT | Performed by: PHYSICIAN ASSISTANT

## 2022-04-13 PROCEDURE — 99213 OFFICE O/P EST LOW 20 MIN: CPT | Performed by: PHYSICIAN ASSISTANT

## 2022-04-13 PROCEDURE — 3008F BODY MASS INDEX DOCD: CPT | Performed by: PHYSICIAN ASSISTANT

## 2022-04-13 PROCEDURE — 1036F TOBACCO NON-USER: CPT | Performed by: PHYSICIAN ASSISTANT

## 2022-04-13 PROCEDURE — 1160F RVW MEDS BY RX/DR IN RCRD: CPT | Performed by: PHYSICIAN ASSISTANT

## 2022-04-13 PROCEDURE — 81002 URINALYSIS NONAUTO W/O SCOPE: CPT | Performed by: PHYSICIAN ASSISTANT

## 2022-04-13 RX ORDER — SOLIFENACIN SUCCINATE 5 MG/1
5 TABLET, FILM COATED ORAL DAILY
Qty: 90 TABLET | Refills: 3 | Status: SHIPPED | OUTPATIENT
Start: 2022-04-13 | End: 2022-05-08 | Stop reason: SDUPTHER

## 2022-04-13 NOTE — PROGRESS NOTES
1  Overactive bladder  POCT urine dip    POCT Measure PVR    solifenacin (VESICARE) 5 mg tablet       Assessment and plan:     1  Overactive bladder  - good response with vesicare 5mg QD  - continue with proper hydration, dietary modification, diabetic control, etc  - f/u 1 year for reassessment       Jaiden Sharma PA-C      Chief Complaint     Overactive bladder    History of Present Illness     Mariam Bhatt is a 80 y o  female presenting today as a new patient for overactive bladder  Patient reports over the past 1-2 years she has been having increasing urinary frequency, urgecy, and urge urinary incontinence  Her primary care provider had referred her to pelvic floor physical therapy in which she noticed improvement however ongoing symptoms  Started on vesicare 5mg with excellent response  Denies any adverse side effects  Patient denies any dysuria, gross hematuria, flank pain, nausea, vomiting, fevers, or chills  Denies any prior  surgical manipulation  Medical comorbidities include type 2 diabetes, goiter, hypothyroidism, hypertension, obesity  Patient is on SGLT 2 inhibitor for her diabetic management  Urine dip leukocyte, nitrite, blood negative  + glucosuria  PVR 68mL    Laboratory     Lab Results   Component Value Date    CREATININE 0 79 03/08/2022       Review of Systems     Review of Systems   Constitutional: Negative for activity change, appetite change, chills, diaphoresis, fatigue, fever and unexpected weight change  Respiratory: Negative for chest tightness and shortness of breath  Cardiovascular: Negative for chest pain, palpitations and leg swelling  Gastrointestinal: Negative for abdominal distention, abdominal pain, constipation, diarrhea, nausea and vomiting  Genitourinary: Positive for frequency and urgency  Negative for decreased urine volume, difficulty urinating, dysuria, enuresis, flank pain, genital sores and hematuria     Musculoskeletal: Negative for back pain, gait problem and myalgias  Skin: Negative for color change, pallor, rash and wound  Psychiatric/Behavioral: Negative for behavioral problems  The patient is not nervous/anxious  Allergies     Allergies   Allergen Reactions    Other Sneezing     Seasonal, dust, Cat dander, mold    Penicillins Hives       Physical Exam     Physical Exam  Constitutional:       General: She is not in acute distress  Appearance: Normal appearance  She is obese  She is not ill-appearing, toxic-appearing or diaphoretic  HENT:      Head: Normocephalic and atraumatic  Eyes:      General:         Right eye: No discharge  Conjunctiva/sclera: Conjunctivae normal    Pulmonary:      Effort: Pulmonary effort is normal  No respiratory distress  Musculoskeletal:         General: No swelling or tenderness  Normal range of motion  Skin:     General: Skin is warm and dry  Coloration: Skin is not jaundiced or pale  Neurological:      General: No focal deficit present  Mental Status: She is alert and oriented to person, place, and time  Psychiatric:         Mood and Affect: Mood normal          Behavior: Behavior normal          Thought Content:  Thought content normal          Judgment: Judgment normal            Vital Signs     Vitals:    04/13/22 1322   BP: 142/88   BP Location: Left arm   Patient Position: Sitting   Cuff Size: Adult   Pulse: 70   Weight: 118 kg (259 lb 9 6 oz)   Height: 5' 3" (1 6 m)         Current Medications       Current Outpatient Medications:     aspirin (ECOTRIN LOW STRENGTH) 81 mg EC tablet, Take 81 mg by mouth daily, Disp: , Rfl:     atorvastatin (LIPITOR) 80 mg tablet, Take 1 tablet (80 mg total) by mouth daily, Disp: 90 tablet, Rfl: 1    Blood Glucose Monitoring Suppl (ONE TOUCH ULTRA MINI) w/Device KIT, Please dispense 1 Kit, Disp: 1 each, Rfl: 0    Canagliflozin (Invokana) 300 MG TABS, Take 1 tablet (300 mg total) by mouth daily, Disp: 90 tablet, Rfl: 1    Cholecalciferol (Vitamin D3) 25 MCG (1000 UT) CAPS, Take 2000 IU with dinner, Disp: , Rfl:     famotidine-calcium carbonate-magnesium hydroxide (PEPCID COMPLETE) -165 MG CHEW, Chew 1 tablet daily as needed for heartburn, Disp: , Rfl:     fluticasone (FLONASE) 50 mcg/act nasal spray, 1 spray into each nostril daily, Disp: , Rfl:     glucose blood (OneTouch Ultra) test strip, Use to test blood sugar two times every other day, Disp: 90 each, Rfl: 3    levocetirizine (XYZAL) 5 MG tablet, Take 1 tablet (5 mg total) by mouth every evening, Disp: 90 tablet, Rfl: 1    levothyroxine 112 mcg tablet, Take 1 tablet (112 mcg total) by mouth daily, Disp: 90 tablet, Rfl: 1    nebivolol (Bystolic) 5 mg tablet, Take 0 5 tablets (2 5 mg total) by mouth daily, Disp: 90 tablet, Rfl: 1    omeprazole (PriLOSEC) 40 MG capsule, Take 1 capsule (40 mg total) by mouth daily, Disp: 90 capsule, Rfl: 0    OneTouch Delica Lancets 32V MISC, Use to check sugars twice every other day, Disp: 90 each, Rfl: 3    potassium chloride (Klor-Con) 10 mEq tablet, Take 1 tablet (10 mEq total) by mouth 2 (two) times a day, Disp: 60 tablet, Rfl: 0    pramipexole (MIRAPEX) 0 25 mg tablet, Take 1 tablet (0 25 mg total) by mouth daily at bedtime, Disp: 30 tablet, Rfl: 0    ramipril (ALTACE) 2 5 mg capsule, Take 1 capsule (2 5 mg total) by mouth daily, Disp: 90 capsule, Rfl: 1    solifenacin (VESICARE) 5 mg tablet, Take 1 tablet (5 mg total) by mouth daily, Disp: 90 tablet, Rfl: 3    benzonatate (TESSALON PERLES) 100 mg capsule, Take 2 capsules (200 mg total) by mouth 3 (three) times a day as needed for cough (Patient not taking: Reported on 3/15/2022 ), Disp: 40 capsule, Rfl: 1      Active Problems     Patient Active Problem List   Diagnosis    Acquired hypothyroidism    Nontoxic multinodular goiter    Type 2 diabetes mellitus with hyperglycemia, without long-term current use of insulin (HCC)    Essential hypertension    Mixed hyperlipidemia    Vitamin D deficiency    Allergic rhinitis    Coronary artery disease involving native coronary artery of native heart without angina pectoris    Aortic valve disorder    Severe obesity (BMI >= 40) (HCC)    Seasonal allergies    Gastroesophageal reflux disease without esophagitis         Past Medical History     Past Medical History:   Diagnosis Date    Allergic 2018   weekly inject      Allergic rhinitis     Allergies     Anemia     Anxiety     Arthritis     Chronic constipation     Diabetes ([address]     Diabetes mellitus [facility] [address] [facility]     Disease of thyroid gland     GERD (gastroesophageal reflux disease)     Headache(784 0)     Heart murmur     Hyperlipidemia     Hypertension     Hypothyroidism     Obesity     Obesity     Restless legs     Vitamin D deficiency          Surgical History     Past Surgical History:   Procedure Laterality Date    BREAST BIOPSY Right 2001    CATARACT EXTRACTION      CATARACT EXTRACTION, BILATERAL  05/2021    CHOLECYSTECTOMY      COLONOSCOPY  06/01/2018    goes q 5 yr-due in Summer 2018-Dr Madeline Wesley  2015    Dr Jennifer Gooden  01/01/2000    Dr Avinash March  9/26/2013    US GUIDANCE  10/17/2016    [facility]  10/15/2013         Family History     Family History   Problem Relation Age of Onset    Multiple sclerosis Brother     Multiple sclerosis Paternal Uncle     Diabetes Paternal Uncle     Hypertension Father     Diabetes Father     Heart disease Father     Multiple sclerosis Family     Diabetes Paternal Aunt     Cancer Neg Hx     Stroke Neg Hx     Obesity Neg Hx          Social History     Social History       Radiology

## 2022-05-08 DIAGNOSIS — N32.81 OVERACTIVE BLADDER: ICD-10-CM

## 2022-05-09 RX ORDER — SOLIFENACIN SUCCINATE 5 MG/1
5 TABLET, FILM COATED ORAL DAILY
Qty: 90 TABLET | Refills: 0 | Status: SHIPPED | OUTPATIENT
Start: 2022-05-09 | End: 2022-07-16 | Stop reason: SDUPTHER

## 2022-05-16 ENCOUNTER — TELEMEDICINE (OUTPATIENT)
Dept: FAMILY MEDICINE CLINIC | Facility: OTHER | Age: 72
End: 2022-05-16
Payer: COMMERCIAL

## 2022-05-16 DIAGNOSIS — U07.1 COVID-19: Primary | ICD-10-CM

## 2022-05-16 PROCEDURE — 99213 OFFICE O/P EST LOW 20 MIN: CPT | Performed by: FAMILY MEDICINE

## 2022-05-16 NOTE — PROGRESS NOTES
COVID-19 Outpatient Progress Note    Assessment/Plan:    Problem List Items Addressed This Visit    None     Visit Diagnoses     COVID-19    -  Primary  - Patient is a 81 yo F fully vaccinated and boosted x 2 who presents on day 2 of symptoms and tested positive with an at home test on 5/16  - Continue with symptomatic care and self-isolation         Disposition:     Discussed symptom directed medication options with patient  Discussed vitamin D, vitamin C, and/or zinc supplementation with patient  I have spent 15 minutes directly with the patient  Greater than 50% of this time was spent in counseling/coordination of care regarding: instructions for management  Encounter provider Irene Abad MD    Provider located at [place] [address]  [address] [place] [id number]    Recent Visits  Date Type Provider Dept   05/16/22 Telemedicine Irene Abad MD [facility]   Showing recent visits within past 7 days and meeting all other requirements  Future Appointments  No visits were found meeting these conditions  Showing future appointments within next 150 days and meeting all other requirements     This virtual check-in was done via [address] and patient was informed that this is a secure, HIPAA-compliant platform  She agrees to proceed  Patient agrees to participate in a virtual check in via telephone or video visit instead of presenting to the office to address urgent/immediate medical needs  Patient is aware this is a billable service  After connecting through [facility], the patient was identified by name and date of birth  Miguel Anders was informed that this was a telemedicine visit and that the exam was being conducted confidentially over secure lines  My office door was closed  No one else was in the room  Miguel Anders acknowledged consent and understanding of privacy and security of the telemedicine visit   I informed the patient that I have reviewed her record in [address] [place] [address] and presented the opportunity for her to ask any questions regarding the visit today  The patient agreed to participate  Verification of patient location:  Patient is located in the following state in which I hold an active license: [place]    Subjective:   Lennox Pour is a 80 y o  female who is concerned about COVID-19  Patient's symptoms include fatigue, nasal congestion, nausea and headache  Patient denies malaise, rhinorrhea, shortness of breath, chest tightness and vomiting      - Date of symptom onset: 5/14/2022      COVID-19 vaccination status: Fully vaccinated with booster    Exposure:   Contact with a person who is under investigation (PUI) for or who is positive for COVID-19 within the last 14 days?: Yes    Hospitalized recently for fever and/or lower respiratory symptoms?: No      Currently a healthcare worker that is involved in direct patient care?: No      Works in a special setting where the risk of COVID-19 transmission may be high? (this may include long-term care, correctional and retirement facilities; homeless shelters; assisted-living facilities and group homes ): No      Resident in a special setting where the risk of COVID-19 transmission may be high? (this may include long-term care, correctional and retirement facilities; homeless shelters; assisted-living facilities and group homes ): No      Patient reports she was at [employer] on Saturday night (5/14) and unfortunately no one else was wearing a mask and felt sick thereafter  She reports headaches, upset stomach and some neck stiffness  Lab Results   Component Value Date    SARSCOVAG Negative 01/27/2022     Past Medical History:   Diagnosis Date    Allergic 2018   weekly inject      Allergic rhinitis     Allergies     Anemia     Anxiety     Arthritis     Chronic constipation     [facility] [address] [facility]     Diabetes mellitus ([facility] [address] [facility]     Disease of thyroid gland     GERD (gastroesophageal reflux disease)     Headache(784 0)     Heart murmur     Hyperlipidemia     Hypertension     Hypothyroidism     Obesity     Obesity     Restless legs     Vitamin D deficiency      Past Surgical History:   Procedure Laterality Date    BREAST BIOPSY Right 2001    CATARACT EXTRACTION      CATARACT EXTRACTION, BILATERAL  05/2021    CHOLECYSTECTOMY      COLONOSCOPY  06/01/2018    goes q 5 yr-due in Summer 2018-Dr Robert Polanco  2015    Dr Stacia Henley  01/01/2000    Dr Cj Simms  9/26/2013    US GUIDANCE  10/17/2016    US GUIDED FINE NEEDLE ASPIRATION [facility]  10/15/2013     Current Outpatient Medications   Medication Sig Dispense Refill    aspirin (ECOTRIN LOW STRENGTH) 81 mg EC tablet Take 81 mg by mouth daily      atorvastatin (LIPITOR) 80 mg tablet Take 1 tablet (80 mg total) by mouth daily 90 tablet 1    benzonatate (TESSALON PERLES) 100 mg capsule Take 2 capsules (200 mg total) by mouth 3 (three) times a day as needed for cough (Patient not taking: Reported on 3/15/2022 ) 40 capsule 1    Blood Glucose Monitoring Suppl (ONE TOUCH ULTRA MINI) w/Device KIT Please dispense 1 Kit 1 each 0    Canagliflozin (Invokana) 300 MG TABS Take 1 tablet (300 mg total) by mouth daily 90 tablet 1    Cholecalciferol (Vitamin D3) 25 MCG (1000 UT) CAPS Take 2000 IU with dinner      famotidine-calcium carbonate-magnesium hydroxide (PEPCID COMPLETE) -165 MG CHEW Chew 1 tablet daily as needed for heartburn      fluticasone (FLONASE) 50 mcg/act nasal spray 1 spray into each nostril daily      glucose blood (OneTouch Ultra) test strip Use to test blood sugar two times every other day 90 each 3    levocetirizine (XYZAL) 5 MG tablet Take 1 tablet (5 mg total) by mouth every evening 90 tablet 1    levothyroxine 112 mcg tablet Take 1 tablet (112 mcg total) by mouth daily 90 tablet 1    nebivolol (Bystolic) 5 mg tablet Take 0 5 tablets (2 5 mg total) by mouth daily 90 tablet 1    omeprazole (PriLOSEC) 40 MG capsule Take 1 capsule (40 mg total) by mouth daily 90 capsule 0    OneTouch Delica Lancets 56W MISC Use to check sugars twice every other day 90 each 3    potassium chloride (Klor-Con) 10 mEq tablet Take 1 tablet (10 mEq total) by mouth 2 (two) times a day 60 tablet 0    pramipexole (MIRAPEX) 0 25 mg tablet Take 1 tablet (0 25 mg total) by mouth daily at bedtime 30 tablet 0    ramipril (ALTACE) 2 5 mg capsule Take 1 capsule (2 5 mg total) by mouth daily 90 capsule 1    solifenacin (VESICARE) 5 mg tablet Take 1 tablet (5 mg total) by mouth daily 90 tablet 0     No current facility-administered medications for this visit  Allergies   Allergen Reactions    Other Sneezing     Seasonal, dust, Cat dander, mold    Penicillins Hives       Review of Systems   Constitutional: Positive for fatigue  HENT: Positive for congestion  Negative for rhinorrhea  Respiratory: Negative for chest tightness and shortness of breath  Gastrointestinal: Positive for nausea  Negative for vomiting  Neurological: Positive for headaches  Objective: There were no vitals filed for this visit  Physical Exam  Constitutional:       Appearance: Normal appearance  HENT:      Head: Normocephalic and atraumatic  Eyes:      Conjunctiva/sclera: Conjunctivae normal    Pulmonary:      Comments: No conversational dyspnea  Musculoskeletal:      Cervical back: Normal range of motion and neck supple  Neurological:      Mental Status: She is alert and oriented to person, place, and time  Psychiatric:         Mood and Affect: Mood normal          Behavior: Behavior normal          VIRTUAL VISIT DISCLAIMER    Jesusyousif Jeff verbally agrees to participate in [employer]   Pt is aware that [employer] could be limited without vital signs or the ability to perform a full hands-on physical Josefasaeid Mis understands she or the provider may request at any time to terminate the video visit and request the patient to seek care or treatment in person

## 2022-05-17 DIAGNOSIS — G47.61 PERIODIC LIMB MOVEMENT DISORDER (PLMD): ICD-10-CM

## 2022-05-17 DIAGNOSIS — I10 ESSENTIAL HYPERTENSION: ICD-10-CM

## 2022-05-17 DIAGNOSIS — J30.2 SEASONAL ALLERGIES: ICD-10-CM

## 2022-05-17 RX ORDER — PRAMIPEXOLE DIHYDROCHLORIDE 0.25 MG/1
0.25 TABLET ORAL
Qty: 30 TABLET | Refills: 0 | Status: SHIPPED | OUTPATIENT
Start: 2022-05-17 | End: 2022-06-01

## 2022-05-17 RX ORDER — POTASSIUM CHLORIDE 750 MG/1
10 TABLET, FILM COATED, EXTENDED RELEASE ORAL 2 TIMES DAILY
Qty: 60 TABLET | Refills: 0 | Status: SHIPPED | OUTPATIENT
Start: 2022-05-17 | End: 2022-06-18 | Stop reason: SDUPTHER

## 2022-05-17 RX ORDER — LEVOCETIRIZINE DIHYDROCHLORIDE 5 MG/1
5 TABLET, FILM COATED ORAL EVERY EVENING
Qty: 90 TABLET | Refills: 0 | Status: SHIPPED | OUTPATIENT
Start: 2022-05-17

## 2022-05-17 RX ORDER — NEBIVOLOL 5 MG/1
2.5 TABLET ORAL DAILY
Qty: 90 TABLET | Refills: 0 | Status: SHIPPED | OUTPATIENT
Start: 2022-05-17

## 2022-05-18 ENCOUNTER — TELEMEDICINE (OUTPATIENT)
Dept: FAMILY MEDICINE CLINIC | Facility: OTHER | Age: 72
End: 2022-05-18
Payer: COMMERCIAL

## 2022-05-18 DIAGNOSIS — U07.1 COVID: Primary | ICD-10-CM

## 2022-05-18 PROCEDURE — 99213 OFFICE O/P EST LOW 20 MIN: CPT | Performed by: FAMILY MEDICINE

## 2022-05-18 PROCEDURE — 1036F TOBACCO NON-USER: CPT | Performed by: FAMILY MEDICINE

## 2022-05-18 PROCEDURE — 1160F RVW MEDS BY RX/DR IN RCRD: CPT | Performed by: FAMILY MEDICINE

## 2022-05-18 NOTE — PROGRESS NOTES
COVID-19 Outpatient Progress Note    Assessment/Plan:    Problem List Items Addressed This Visit        Other    COVID - Primary     Patient is a 79 yo F fully vaccinated and boosted x 2 who presents on day 4 of symptoms and tested positive with an at home test on 5/16  Underlying risk factors of older age, T2DM, Obesity and hx of CAD  Patient reports ongoing fatigue, non productive cough, PND, HA and myalgias  She has been taking Tylenol PRN for HA  She has a pulse ox and has been checking her SpO2 regularly and has been hovering around 93%  She reports no change in appetite, weakness, SOB, lightheadedness, dizziness, chest pain or N/V  She does note an overall improvement from symptoms she presented for via telemedicine just 2 days prior  Plan  - Continue Tylenol PRN for HA  - Discussed Mucinex with nasal saline irrigation and flonase OTC for PND  - Discussed Robitussin DM for cough  - Advised plenty of fluids  - Humidifier in room for congestion  - Advised going to ED his O2 to drop <88% and be sustained there >5 davie  - Advised wearing a mask when out in public for 6 more days  - Advised to continue to check pulse ox regularly due to multiple comorbid dz                Disposition:     Patient is fully vaccinated and is not yet due for their booster shot  According to CDC guidelines, quarantine is not required after close contact exposure and they were advised to wear a mask for 10 days after the exposure  If patient were to develop symptoms, they should immediately self isolate and call our office for further guidance  Discussed symptom directed medication options with patient  I have spent 20 minutes directly with the patient  Greater than 50% of this time was spent in counseling/coordination of care regarding: prognosis, instructions for management, patient and family education and impressions        Encounter provider Sami Crowley DO    Provider located at [address] [place] [address] PR[facility]  [address] [facility] [id number]    Recent Visits  Date Type Provider Dept   05/24/22 Telephone Rocío Real, DO [facility]   05/18/22 Telemedicine Troy Bunn, DO [facility]   Showing recent visits within past 7 days and meeting all other requirements  Future Appointments  No visits were found meeting these conditions  Showing future appointments within next 150 days and meeting all other requirements       Patient agrees to participate in a virtual check in via telephone or video visit instead of presenting to the office to address urgent/immediate medical needs  Patient is aware this is a billable service  After connecting through [facility], the patient was identified by name and date of birth  Kyle Sahu was informed that this was a telemedicine visit and that the exam was being conducted confidentially over secure lines  Kyle Sahu acknowledged consent and understanding of privacy and security of the telemedicine visit  I informed the patient that I have reviewed her record in Epic and presented the opportunity for her to ask any questions regarding the visit today  The patient agreed to participate  Verification of patient location:  Patient is located in the following state in which I hold an active license: [place]    Subjective:   Kyle Sahu is a 80 y o  female who is concerned about COVID-19  Patient's symptoms include fatigue, malaise, cough, myalgias and headache  Patient denies fever, chills, congestion, rhinorrhea, sore throat, anosmia, loss of taste, shortness of breath, chest tightness, abdominal pain, nausea, vomiting and diarrhea  COVID-19 vaccination status: Fully vaccinated with booster    Lab Results   Component Value Date    SARSCOVAG Negative 01/27/2022     Past Medical History:   Diagnosis Date    Allergic 2018   weekly inject      Allergic rhinitis     Allergies     Anemia     Anxiety     Arthritis     Chronic constipation  [facility] [address] [facility]     Diabetes mellitus ([facility] [address] [facility]     Disease of thyroid gland     GERD (gastroesophageal reflux disease)     Headache(784 0)     Heart murmur     Hyperlipidemia     Hypertension     Hypothyroidism     Obesity     Obesity     Restless legs     Vitamin D deficiency      Past Surgical History:   Procedure Laterality Date    BREAST BIOPSY Right 2001    CATARACT EXTRACTION      CATARACT EXTRACTION, BILATERAL  05/2021    CHOLECYSTECTOMY      COLONOSCOPY  06/01/2018    goes q 5 yr-due in Summer 2018-Dr Elier Clayton  2015    Dr Charito Dean  01/01/2000    Dr Zoraida Nelson  9/26/2013    US GUIDANCE  10/17/2016    [facility]  10/15/2013     Current Outpatient Medications   Medication Sig Dispense Refill    aspirin (ECOTRIN LOW STRENGTH) 81 mg EC tablet Take 81 mg by mouth daily      atorvastatin (LIPITOR) 80 mg tablet Take 1 tablet (80 mg total) by mouth daily 90 tablet 1    benzonatate (TESSALON PERLES) 100 mg capsule Take 2 capsules (200 mg total) by mouth 3 (three) times a day as needed for cough (Patient not taking: Reported on 3/15/2022 ) 40 capsule 1    Blood Glucose Monitoring Suppl (ONE TOUCH ULTRA MINI) w/Device KIT Please dispense 1 Kit 1 each 0    Canagliflozin (Invokana) 300 MG TABS Take 1 tablet (300 mg total) by mouth daily 90 tablet 1    Cholecalciferol (Vitamin D3) 25 MCG (1000 UT) CAPS Take 2000 IU with dinner      famotidine-calcium carbonate-magnesium hydroxide (PEPCID COMPLETE) -165 MG CHEW Chew 1 tablet daily as needed for heartburn      fluticasone (FLONASE) 50 mcg/act nasal spray 1 spray into each nostril daily      glucose blood (OneTouch Ultra) test strip Use to test blood sugar two times every other day 90 each 3    levocetirizine (XYZAL) 5 MG tablet Take 1 tablet (5 mg total) by mouth every evening 90 tablet 0    levothyroxine 112 mcg tablet Take 1 tablet (112 mcg total) by mouth daily 90 tablet 1    nebivolol (Bystolic) 5 mg tablet Take 0 5 tablets (2 5 mg total) by mouth in the morning  90 tablet 0    omeprazole (PriLOSEC) 40 MG capsule Take 1 capsule (40 mg total) by mouth daily 90 capsule 0    OneTouch Delica Lancets 26S MISC Use to check sugars twice every other day 90 each 3    potassium chloride (Klor-Con) 10 mEq tablet Take 1 tablet (10 mEq total) by mouth in the morning and 1 tablet (10 mEq total) in the evening  60 tablet 0    pramipexole (MIRAPEX) 0 25 mg tablet Take 1 tablet (0 25 mg total) by mouth daily at bedtime 30 tablet 0    ramipril (ALTACE) 2 5 mg capsule Take 1 capsule (2 5 mg total) by mouth daily 90 capsule 1    solifenacin (VESICARE) 5 mg tablet Take 1 tablet (5 mg total) by mouth daily 90 tablet 0     No current facility-administered medications for this visit  Allergies   Allergen Reactions    Other Sneezing     Seasonal, dust, Cat dander, mold    Penicillins Hives       Review of Systems   Constitutional: Positive for fatigue  Negative for chills and fever  HENT: Positive for postnasal drip  Negative for congestion, rhinorrhea and sore throat  Eyes: Negative for discharge and redness  Respiratory: Positive for cough  Negative for chest tightness and shortness of breath  Cardiovascular: Negative for chest pain, palpitations and leg swelling  Gastrointestinal: Negative for abdominal pain, diarrhea, nausea and vomiting  Genitourinary: Negative for difficulty urinating  Musculoskeletal: Positive for myalgias  Skin: Negative for color change  Allergic/Immunologic: Positive for environmental allergies  Neurological: Positive for headaches  Negative for dizziness, weakness and light-headedness  Psychiatric/Behavioral: Negative for decreased concentration  The patient is not nervous/anxious  Objective: There were no vitals filed for this visit      Physical Exam    VIRTUAL VISIT DISCLAIMER    Maeve Jaimes Baljit Awan verbally agrees to participate in [employer]  Pt is aware that [employer] could be limited without vital signs or the ability to perform a full hands-on physical Gearline Janethhers understands she or the provider may request at any time to terminate the video visit and request the patient to seek care or treatment in person

## 2022-05-24 ENCOUNTER — TELEPHONE (OUTPATIENT)
Dept: FAMILY MEDICINE CLINIC | Facility: OTHER | Age: 72
End: 2022-05-24

## 2022-05-24 NOTE — TELEPHONE ENCOUNTER
Harsh cough that starts with a tickle and then comes and goes  Worse at bedtime when laying down  Patient would like to know if you can prescribe the cough syrup that you had spoke to her about or does she need another appt    Please advise    Thank you!

## 2022-05-25 PROBLEM — U07.1 COVID: Status: ACTIVE | Noted: 2022-05-25

## 2022-05-25 NOTE — ASSESSMENT & PLAN NOTE
Patient is a 81 yo F fully vaccinated and boosted x 2 who presents on day 4 of symptoms and tested positive with an at home test on 5/16  Underlying risk factors of older age, T2DM, Obesity and hx of CAD  Patient reports ongoing fatigue, non productive cough, PND, HA and myalgias  She has been taking Tylenol PRN for HA  She has a pulse ox and has been checking her SpO2 regularly and has been hovering around 93%  She reports no change in appetite, weakness, SOB, lightheadedness, dizziness, chest pain or N/V  She does note an overall improvement from symptoms she presented for via telemedicine just 2 days prior      Plan  - Continue Tylenol PRN for HA  - Discussed Mucinex with nasal saline irrigation and flonase OTC for PND  - Discussed Robitussin DM for cough  - Advised plenty of fluids  - Humidifier in room for congestion  - Advised going to ED his O2 to drop <88% and be sustained there >5 davie  - Advised wearing a mask when out in public for 6 more days  - Advised to continue to check pulse ox regularly due to multiple comorbid dz

## 2022-05-27 ENCOUNTER — TELEMEDICINE (OUTPATIENT)
Dept: FAMILY MEDICINE CLINIC | Facility: OTHER | Age: 72
End: 2022-05-27
Payer: COMMERCIAL

## 2022-05-27 DIAGNOSIS — R05.9 COUGH: Primary | ICD-10-CM

## 2022-05-27 PROCEDURE — 1160F RVW MEDS BY RX/DR IN RCRD: CPT | Performed by: FAMILY MEDICINE

## 2022-05-27 PROCEDURE — 99213 OFFICE O/P EST LOW 20 MIN: CPT | Performed by: FAMILY MEDICINE

## 2022-05-27 NOTE — PATIENT INSTRUCTIONS
Acute Cough   AMBULATORY CARE:   An acute cough  can last up to 3 weeks  Common causes of an acute cough include a cold, allergies, or a lung infection  Seek care immediately if:   You have trouble breathing or feel short of breath  You cough up blood, or you see blood in your mucus  You faint or feel weak or dizzy  You have chest pain when you cough or take a deep breath  You have new wheezing  Contact your healthcare provider if:   You have a fever  Your cough lasts longer than 4 weeks  Your symptoms do not improve with treatment  You have questions or concerns about your condition or care  Treatment:  An acute cough usually goes away on its own  Ask your healthcare provider about medicines you can take to decrease your cough  You may need medicine to stop the cough, decrease swelling in your airways, or help open your airways  Medicine may also be given to help you cough up mucus  If you have an infection caused by bacteria, you may need antibiotics  Manage your symptoms:   Do not smoke and stay away from others who smoke  Nicotine and other chemicals in cigarettes and cigars can cause lung damage and make your cough worse  Ask your healthcare provider for information if you currently smoke and need help to quit  E-cigarettes or smokeless tobacco still contain nicotine  Talk to your healthcare provider before you use these products  Drink extra liquids as directed  Liquids will help thin and loosen mucus so you can cough it up  Liquids will also help prevent dehydration  Examples of good liquids to drink include water, fruit juice, and broth  Do not drink liquids that contain caffeine  Caffeine can increase your risk for dehydration  Ask your healthcare provider how much liquid to drink each day  Rest as directed  Do not do activities that make your cough worse, such as exercise  Use a humidifier or vaporizer    Use a cool mist humidifier or a vaporizer to increase air moisture in your home  This may make it easier for you to breathe and help decrease your cough  Eat 2 to 5 mL of honey 2 times each day  Honey can help thin mucus and decrease your cough  Use cough drops or lozenges  These can help decrease throat irritation and your cough  Follow up with your healthcare provider as directed:  Write down your questions so you remember to ask them during your visits  © Copyright [email or web address] 2022 Information is for End User's use only and may not be sold, redistributed or otherwise used for commercial purposes  All illustrations and images included in CareNotes® are the copyrighted property of [employer]  or [address] [place], [place] [address]franko hilda   The above information is an  only  It is not intended as medical advice for individual conditions or treatments  Talk to your doctor, nurse or pharmacist before following any medical regimen to see if it is safe and effective for you

## 2022-05-27 NOTE — PROGRESS NOTES
COVID-19 Outpatient Progress Note    Assessment/Plan:    Problem List Items Addressed This Visit    None     Visit Diagnoses     Cough    -  Primary         Disposition:     Discussed symptom directed medication options with patient  Likely post-viral cough following COVID-19  Symptom onset was 2 weeks ago and all other symptoms have resolved  No need to isolate further  Recommended trying Mucinex as well as continuing allergy and GERD treatment  If no improvement in next 1-2 weeks, may consider other cause including UACS, uncontrolled GERD, and side effect of ramipril  I have spent 25 minutes directly with the patient  Greater than 50% of this time was spent in counseling/coordination of care regarding: instructions for management and impressions  Encounter provider Saray Kaye MD    Provider located at [address] [address]    Recent Visits  Date Type Provider Dept   05/27/22 Telemedicine MD Oscar Gambino   05/24/22 Telephone DO Oscar Barajas   Showing recent visits within past 7 days and meeting all other requirements  Future Appointments  No visits were found meeting these conditions  Showing future appointments within next 150 days and meeting all other requirements     This virtual check-in was done via [facility] and patient was informed that this is a secure, HIPAA-compliant platform  She agrees to proceed  Patient agrees to participate in a virtual check in via telephone or video visit instead of presenting to the office to address urgent/immediate medical needs  Patient is aware this is a billable service  After connecting through [facility], the patient was identified by name and date of birth  Haris Coleman was informed that this was a telemedicine visit and that the exam was being conducted confidentially over secure lines  My office door was closed  No one else was in the room   Haris Coleman acknowledged consent and understanding of privacy and security of the telemedicine visit  I informed the patient that I have reviewed her record in Epic and presented the opportunity for her to ask any questions regarding the visit today  The patient agreed to participate  Verification of patient location:  Patient is located in the following state in which I hold an active license: [place]    Subjective:   Gemini Gary is a 71 y o  female who has been screened for COVID-19  Symptom change since last report: improving  Patient's symptoms include cough (mildly productive of white/clear sputum)  Patient denies fever, chills, fatigue, malaise, congestion, rhinorrhea, sore throat, anosmia, loss of taste, shortness of breath, chest tightness, abdominal pain, nausea, vomiting, diarrhea, myalgias and headaches  - Date of symptom onset: 5/14/2022  - Date of positive COVID-19 test: 5/16/2022  Type of test: Home antigen  Home antigen result verified by provider  Will get picture of test uploaded/scanned into patient's chart  COVID-19 vaccination status: Fully vaccinated with booster        Staying home and isolating themselves?: has not      Taking care not to share personal items?: is not      Cleaning all surfaces that are touched often?: is not      Wearing a mask when leaving room?: is not      The patient reports a constant tickle in her throat and resulting cough  She has been using Robitussin, Tessalon perles, and cough drops without improvement  She does endorse a sensation of postnasal drip and a history of allergies for which she uses Flonase and Xyzal  She also takes Pepcid for GERD  Lab Results   Component Value Date    SARSCOVAG Negative 01/27/2022     Past Medical History:   Diagnosis Date    Allergic 2018   weekly inject      Allergic rhinitis     Allergies     Anemia     Anxiety     Arthritis     Chronic constipation     [facility]     Diabetes mellitus ([facility]     Disease of thyroid gland  GERD (gastroesophageal reflux disease)     Headache(784 0)     Heart murmur     Hyperlipidemia     Hypertension     Hypothyroidism     Obesity     Obesity     Restless legs     Vitamin D deficiency      Past Surgical History:   Procedure Laterality Date    BREAST BIOPSY Right 2001    CATARACT EXTRACTION      CATARACT EXTRACTION, BILATERAL  05/2021    CHOLECYSTECTOMY      COLONOSCOPY  06/01/2018    goes q 5 yr-due in Summer 2018-Dr Cosmo Cordero  2015    Dr Michael Scott  01/01/2000    Dr Maxine Stephens  9/26/2013    US GUIDANCE  10/17/2016    US GUIDED FINE NEEDLE ASPIRATION (ALL INC)  10/15/2013     Current Outpatient Medications   Medication Sig Dispense Refill    aspirin (ECOTRIN LOW STRENGTH) 81 mg EC tablet Take 81 mg by mouth daily      atorvastatin (LIPITOR) 80 mg tablet Take 1 tablet (80 mg total) by mouth daily 90 tablet 1    benzonatate (TESSALON PERLES) 100 mg capsule Take 2 capsules (200 mg total) by mouth 3 (three) times a day as needed for cough (Patient not taking: Reported on 3/15/2022 ) 40 capsule 1    Blood Glucose Monitoring Suppl (ONE TOUCH ULTRA MINI) w/Device KIT Please dispense 1 Kit 1 each 0    Canagliflozin (Invokana) 300 MG TABS Take 1 tablet (300 mg total) by mouth daily 90 tablet 1    Cholecalciferol (Vitamin D3) 25 MCG (1000 UT) CAPS Take 2000 IU with dinner      famotidine-calcium carbonate-magnesium hydroxide (PEPCID COMPLETE) -165 MG CHEW Chew 1 tablet daily as needed for heartburn      fluticasone (FLONASE) 50 mcg/act nasal spray 1 spray into each nostril daily      glucose blood (OneTouch Ultra) test strip Use to test blood sugar two times every other day 90 each 3    levocetirizine (XYZAL) 5 MG tablet Take 1 tablet (5 mg total) by mouth every evening 90 tablet 0    levothyroxine 112 mcg tablet Take 1 tablet (112 mcg total) by mouth daily 90 tablet 1    nebivolol (Bystolic) 5 mg tablet Take 0 5 tablets (2 5 mg total) by mouth in the morning  90 tablet 0    omeprazole (PriLOSEC) 40 MG capsule Take 1 capsule (40 mg total) by mouth daily 90 capsule 0    OneTouch Delica Lancets 86C MISC Use to check sugars twice every other day 90 each 3    potassium chloride (Klor-Con) 10 mEq tablet Take 1 tablet (10 mEq total) by mouth in the morning and 1 tablet (10 mEq total) in the evening  60 tablet 0    pramipexole (MIRAPEX) 0 25 mg tablet Take 1 tablet (0 25 mg total) by mouth daily at bedtime 30 tablet 0    ramipril (ALTACE) 2 5 mg capsule Take 1 capsule (2 5 mg total) by mouth daily 90 capsule 1    solifenacin (VESICARE) 5 mg tablet Take 1 tablet (5 mg total) by mouth daily 90 tablet 0     No current facility-administered medications for this visit  Allergies   Allergen Reactions    Other Sneezing     Seasonal, dust, Cat dander, mold    Penicillins Hives       Review of Systems   Constitutional: Negative for chills, fatigue and fever  HENT: Negative for congestion, rhinorrhea and sore throat  Respiratory: Positive for cough (mildly productive of white/clear sputum)  Negative for chest tightness and shortness of breath  Gastrointestinal: Negative for abdominal pain, diarrhea, nausea and vomiting  Musculoskeletal: Negative for myalgias  Neurological: Negative for headaches  Objective: There were no vitals filed for this visit  Physical Exam  Constitutional:       General: She is not in acute distress  Appearance: She is not diaphoretic  HENT:      Head: Normocephalic  Right Ear: External ear normal       Left Ear: External ear normal       Nose: Nose normal       Mouth/Throat:      Mouth: Mucous membranes are moist       Pharynx: Oropharynx is clear  Eyes:      Extraocular Movements: Extraocular movements intact  Conjunctiva/sclera: Conjunctivae normal    Pulmonary:      Effort: Pulmonary effort is normal  No respiratory distress        Breath sounds: No stridor  Musculoskeletal:         General: Normal range of motion  Cervical back: Normal range of motion  Skin:     General: Skin is warm and dry  Findings: No rash  Neurological:      General: No focal deficit present  Mental Status: She is alert and oriented to person, place, and time  Psychiatric:         Mood and Affect: Mood normal          Behavior: Behavior normal          VIRTUAL VISIT DISCLAIMER    Lluvia Hankins verbally agrees to participate in [employer]  Pt is aware that [employer] could be limited without vital signs or the ability to perform a full hands-on physical Francies Eliza understands she or the provider may request at any time to terminate the video visit and request the patient to seek care or treatment in person

## 2022-06-16 ENCOUNTER — OFFICE VISIT (OUTPATIENT)
Dept: DIABETES SERVICES | Facility: CLINIC | Age: 72
End: 2022-06-16
Payer: COMMERCIAL

## 2022-06-16 VITALS — BODY MASS INDEX: 45.36 KG/M2 | HEIGHT: 63 IN | WEIGHT: 256 LBS

## 2022-06-16 DIAGNOSIS — E11.9 TYPE 2 DIABETES MELLITUS WITHOUT COMPLICATION, WITHOUT LONG-TERM CURRENT USE OF INSULIN (HCC): Primary | ICD-10-CM

## 2022-06-16 PROCEDURE — 3008F BODY MASS INDEX DOCD: CPT | Performed by: FAMILY MEDICINE

## 2022-06-16 PROCEDURE — 97802 MEDICAL NUTRITION INDIV IN: CPT | Performed by: DIETITIAN, REGISTERED

## 2022-06-16 NOTE — PROGRESS NOTES
Medical Nutrition Therapy        Assessment     Visit Type: Initial visit    Chief complaint T2DM    HPI:  78-year-old female being seen for medical nutrition therapy for type 2 diabetes  Most recent HbA1c 7 4%  Her glucometer was downloaded today using [facility] revealing average glucose 155 mg/dL, range 137-197 mg/dL, standard deviation 19 mg/dL  Princess Casas was last seen for medical nutrition therapy in October of 2019 and was lost to follow-up after that  She was seen this past March for a class assessment to retake the living well with diabetes class series that she also took back in 2019  Jessica diet history reveals inconsistent carbohydrate intake  Currently meals/snacks range from 0 to 90 grams of carbohydrate  Explained basic pathophysiology of diabetes and impact of diet on blood glucose levels  Together we discussed what foods contain CHO, reading a food label, timing of meals and snacks,  serving sizes, the role of fiber, and the importance of consistent carbohydrate intake in the appropriate amounts  Used the portion booklet to teach Princess Casas more about food groups and basic carbohydrate counting  Created an individualized meal plan for Princess Casas with 3 meals and 2 snacks providing 30-45 g carb per meal and 0-15 g carb per snack  Gaby Trevizo to switch from drinking her home med laminated that includes sugar in the recipe to sugar free beverages such as Crystal Light  She was agreeable to this recommendation  Also recommended that she choose more whole grains rather than refined gradients such as her pretzels that she has has a snack in the afternoon with the lemonade  Also advised choose more high-fiber fruit such as berries rather than lower fiber fruit choices such as grapes are bananas  Put together sample meals for Sindy's reference  Princess Casas demonstrated good understanding and will call with any questions prior to follow-up appointment  Recommend follow-up in 6 weeks      Ht Readings from Last 1 Encounters:   04/13/22 5' 3" (1 6 m)     Wt Readings from Last 2 Encounters:   04/13/22 118 kg (259 lb 9 6 oz)   03/24/22 115 kg (253 lb 6 4 oz)     Weight Change: No    Medical Diagnosis/ICD10: E11 9 (ICD-10-CM) - Type 2 diabetes mellitus without complication, without long-term current use of insulin (HCC)    Barriers to Learning: no barriers    Do you follow any special diet presently?: No  Who shops: patient  Who cooks: patient    Food Log: Completed via the method of food recall    Breakfast:wakes 5:30-6 am  Coffee first with cream (no sugar added)  Takes levothyroxine  Eats around 7-7:30 am  Bowl of cereal ( 3/4 cupcheerios - plain, or wheaties, or bran flakes) with 2% milk with fruit (1/2 cup berries) OR hot cereal (1 packet could be flavored or plain made with water) OR 1-2 frozen pancakes (3-4" diameter)  Water  Morning Snack: 10-10:30 am yogurt (5-6 oz flavored but not fruit at the bottom)  Lunch:12:30-1 am  [place] on whole wheat (turkey or and cheese, butter) OR a turkey and cheese roll up OR a yogurt with a small banana  Water  Afternoon Snack: 2:30-3 pm   beer pretzels (salt free) 2 medium  Water or 16 oz lemonade (made from home) or unsweetened iced tea  Dinner:5 pm  Salad (lettuce, croutons, cherry tomatoes, guillaume bits, oil and vinegar dressing)  Water  Evening Snack:none  Beverages: water, coffee, lemonade, unsweetened tea  Eating out/Take out:once per month  Exercise walks 10 min per day, goes to the gym in her apartment 2 days per week for 30 min per day  Bike,eliptical, and weights      Calorie needs 1,512 kcals/day Carbs: 30-45 g/meal, 0-15 g/snack         Nutrition Diagnosis:  Inconsistent carbohydrate intake  intake related to Food and nutrition related knowledge deficit concerning appropriate amount and timing of carbohydrate intake as evidenced by  Estimated carbohydrate intake that is different from recommended types or ingested on an irregular basis    Intervention: plate method, increased fiber intake, label reading, carbohydrate counting, increased plant based foods, meal timing, meal planning, individualized meal plan, monitoring portion control and food diary     Treatment Goals: Patient understands education and recommendations, Patient will consume 3 meals a day, Patient will monitor portion control, Patient will increase their intake of plant based foods and Patient will count carbohydrates    Monitoring and evaluation:    Term code indicator  FH 1 6 3 Carbohydrate Intake Criteria: 30-45 g of carbohydrate per meal, 0-15 g carbohydrate per snack  Term code indicator  FH 4 4 Mealtime Behavior Criteria: Three meals per day, 4-5 hours apart  Two snacks per day, at least 2 hours apart from meals  Patients Response to Instruction:  Spencer Hernandez  Expected Compliancegood    Thank you for coming to the [facility] for education today  Please feel free to call with any questions or concerns  Start:  12:48 p m  Stop:  1:49 p m    Referred by:Terese Alford DO    [facility] [address] [place] [id number]

## 2022-06-16 NOTE — PATIENT INSTRUCTIONS
30-45 grams of carbohydrate per meal  0-15 grams of carbohydrate per snack    3 meals per day, 4-5 hours apart  2 snacks per day, at least 2 hours apart from meals    Use the Portion Book and label reading to determine carbohydrate amounts in food and beverages  Switch from lemonade to crystal lite, unsweetened iced tea, or water  Use the Portion Book and label reading to determine carbohydrate amounts in food and beverages

## 2022-06-18 DIAGNOSIS — J30.2 SEASONAL ALLERGIES: ICD-10-CM

## 2022-06-18 DIAGNOSIS — K21.9 GASTROESOPHAGEAL REFLUX DISEASE WITHOUT ESOPHAGITIS: ICD-10-CM

## 2022-06-18 DIAGNOSIS — E78.2 MIXED HYPERLIPIDEMIA: ICD-10-CM

## 2022-06-18 DIAGNOSIS — G47.61 PERIODIC LIMB MOVEMENT DISORDER (PLMD): ICD-10-CM

## 2022-06-18 DIAGNOSIS — I10 ESSENTIAL HYPERTENSION: ICD-10-CM

## 2022-06-18 DIAGNOSIS — E03.9 HYPOTHYROIDISM (ACQUIRED): ICD-10-CM

## 2022-06-18 RX ORDER — LEVOCETIRIZINE DIHYDROCHLORIDE 5 MG/1
5 TABLET, FILM COATED ORAL EVERY EVENING
Qty: 90 TABLET | Refills: 0 | Status: CANCELLED | OUTPATIENT
Start: 2022-06-18

## 2022-06-20 PROCEDURE — 4010F ACE/ARB THERAPY RXD/TAKEN: CPT | Performed by: FAMILY MEDICINE

## 2022-06-20 RX ORDER — RAMIPRIL 2.5 MG/1
2.5 CAPSULE ORAL DAILY
Qty: 90 CAPSULE | Refills: 0 | Status: SHIPPED | OUTPATIENT
Start: 2022-06-20

## 2022-06-20 RX ORDER — LEVOTHYROXINE SODIUM 112 UG/1
112 TABLET ORAL DAILY
Qty: 90 TABLET | Refills: 1 | Status: SHIPPED | OUTPATIENT
Start: 2022-06-20

## 2022-06-20 RX ORDER — ATORVASTATIN CALCIUM 80 MG/1
80 TABLET, FILM COATED ORAL DAILY
Qty: 90 TABLET | Refills: 0 | Status: SHIPPED | OUTPATIENT
Start: 2022-06-20

## 2022-06-20 RX ORDER — OMEPRAZOLE 40 MG/1
40 CAPSULE, DELAYED RELEASE ORAL DAILY
Qty: 90 CAPSULE | Refills: 0 | Status: SHIPPED | OUTPATIENT
Start: 2022-06-20

## 2022-06-20 RX ORDER — PRAMIPEXOLE DIHYDROCHLORIDE 0.25 MG/1
0.25 TABLET ORAL
Qty: 90 TABLET | Refills: 0 | Status: SHIPPED | OUTPATIENT
Start: 2022-06-20

## 2022-06-20 RX ORDER — POTASSIUM CHLORIDE 750 MG/1
10 TABLET, FILM COATED, EXTENDED RELEASE ORAL 2 TIMES DAILY
Qty: 180 TABLET | Refills: 0 | Status: SHIPPED | OUTPATIENT
Start: 2022-06-20

## 2022-07-16 DIAGNOSIS — N32.81 OVERACTIVE BLADDER: ICD-10-CM

## 2022-07-18 RX ORDER — SOLIFENACIN SUCCINATE 5 MG/1
5 TABLET, FILM COATED ORAL DAILY
Qty: 90 TABLET | Refills: 0 | Status: SHIPPED | OUTPATIENT
Start: 2022-07-18 | End: 2022-08-06 | Stop reason: SDUPTHER

## 2022-08-01 ENCOUNTER — OFFICE VISIT (OUTPATIENT)
Dept: DIABETES SERVICES | Facility: CLINIC | Age: 72
End: 2022-08-01
Payer: COMMERCIAL

## 2022-08-01 VITALS — BODY MASS INDEX: 45.25 KG/M2 | WEIGHT: 255.4 LBS | HEIGHT: 63 IN

## 2022-08-01 DIAGNOSIS — E11.9 TYPE 2 DIABETES MELLITUS WITHOUT COMPLICATION, WITHOUT LONG-TERM CURRENT USE OF INSULIN (HCC): Primary | ICD-10-CM

## 2022-08-01 PROCEDURE — 97803 MED NUTRITION INDIV SUBSEQ: CPT | Performed by: DIETITIAN, REGISTERED

## 2022-08-01 NOTE — PATIENT INSTRUCTIONS
Continue with meal plan of 30-45 grams of carbohydrate per meal and 0-15 grams of carbohydrate per snack  Increase overall intake of non-starchy vegetables  Try having a 15 gram carb snack 2-3 hours after dinner (around 8 pm) such as 5-6 oz yogurt or an apple with peanut butter

## 2022-08-01 NOTE — PROGRESS NOTES
Medical Nutrition Therapy      Assessment    Chief complaint T2DM    Visit Type: Follow-up visit    HPI: Jose Guadalupe Tabares returned for follow-up today  No new HbA1c since last visit  She reports fasting BG has been ranging 145-160 mg/dL, before lunch/dinner 125-130 mg/dL, 2 hours after meals 140-155 mg/dL, q h s  120-130 mg/dL  Sindys food record reveals that most of her meals do range between the recommended 30-45 g carbohydrate per meal   Her food record did reveal some minor inaccuracies with counting carbohydrates and also that her intake of non starchy vegetables is inadequate  She did have some occasional examples of meals at her other slightly too high or slightly too low in carbohydrate, however, as mentioned most of her meals to fall within the recommended 30-45 g carbohydrate per meal   Reviewed the food items in her food record that were inaccurate with carb counts and also reviewed to not count carbohydrate in proteins, fats, or free foods if having serving size or less  Recommended that she try having a 15 g carb snack 2-3 hours after dinner to see if  fasting BG improves  Discussed examples such as having 5-6 oz yogurt or small apple with peanut butter  Jose Guadalupe Tabares verbalized good understanding of topics and recommendations discussed today and will call with any questions prior to next follow-up appointment in 3 months      Ht Readings from Last 1 Encounters:   06/16/22 5' 3" (1 6 m)     Wt Readings from Last 2 Encounters:   06/16/22 116 kg (256 lb)   04/13/22 118 kg (259 lb 9 6 oz)     Weight Change: No    Medical Diagnosis/reason for visit E11 9 (ICD-10-CM) - Type 2 diabetes mellitus without complication, without long-term current use of insulin ([facility])    Food Log:   Please see scanned 3 day food diary      Calorie needs 1,512 kcals/day Carbs: 30-45 g/meal, 0-15 g/snack     Fat: 6 oz/day    Protein:4 servings /day    Nutrition Diagnosis:  Altered Nutrition-Related Laboratory values  related to Kidney, liver, cardiac, endocrine, neurologic, and/or pulmonary dysfunction as evidenced by  Abnormal plasma glucose and/or HgbA1c levels    Intervention: plate method, increased fiber intake, label reading, carbohydrate counting, increased plant based foods, meal planning and food diary     Treatment Goals: Patient understands education and recommendations, Patient will increase their intake of plant based foods and Patient will count carbohydrates    Monitoring and evaluation:    Term code indicator  FH 1 6 3 Carbohydrate Intake Criteria: 30-45 g carbohydrate per meal, 0-15 g carbohydrate per snack  Term code indicator  FH 4 4 Mealtime Behavior Criteria: Three meals per day, 4-5 hours apart  Two snacks per day, at least 2 hours apart from meals  Patients Response to Instruction:  Alexia Castellon  Expected Compliancegood    Thank you for coming to the [facility] for education today  Please feel free to call with any questions or concerns  Start:  9:30 a m  Stop:  10:00 a m     Referred by: DO Catina Jackson, RD  [address] [facility] [phone]

## 2022-08-02 DIAGNOSIS — E11.9 TYPE 2 DIABETES MELLITUS WITHOUT COMPLICATION, WITHOUT LONG-TERM CURRENT USE OF INSULIN (HCC): ICD-10-CM

## 2022-08-03 RX ORDER — CANAGLIFLOZIN 300 MG/1
TABLET, FILM COATED ORAL
Qty: 90 TABLET | Refills: 3 | Status: SHIPPED | OUTPATIENT
Start: 2022-08-03

## 2022-08-06 DIAGNOSIS — N32.81 OVERACTIVE BLADDER: ICD-10-CM

## 2022-08-08 DIAGNOSIS — N32.81 OVERACTIVE BLADDER: ICD-10-CM

## 2022-08-08 RX ORDER — SOLIFENACIN SUCCINATE 5 MG/1
5 TABLET, FILM COATED ORAL DAILY
Qty: 90 TABLET | Refills: 3 | Status: SHIPPED | OUTPATIENT
Start: 2022-08-08

## 2022-08-08 RX ORDER — SOLIFENACIN SUCCINATE 5 MG/1
5 TABLET, FILM COATED ORAL DAILY
Qty: 90 TABLET | Refills: 3 | Status: CANCELLED | OUTPATIENT
Start: 2022-08-08

## 2022-08-14 DIAGNOSIS — I10 ESSENTIAL HYPERTENSION: ICD-10-CM

## 2022-08-16 RX ORDER — RAMIPRIL 2.5 MG/1
CAPSULE ORAL
Qty: 90 CAPSULE | Refills: 3 | Status: SHIPPED | OUTPATIENT
Start: 2022-08-16

## 2022-08-18 DIAGNOSIS — J30.2 SEASONAL ALLERGIES: ICD-10-CM

## 2022-08-18 DIAGNOSIS — I10 ESSENTIAL HYPERTENSION: ICD-10-CM

## 2022-08-18 RX ORDER — LEVOCETIRIZINE DIHYDROCHLORIDE 5 MG/1
5 TABLET, FILM COATED ORAL EVERY EVENING
Qty: 90 TABLET | Refills: 0 | Status: CANCELLED | OUTPATIENT
Start: 2022-08-18

## 2022-08-18 RX ORDER — RAMIPRIL 2.5 MG/1
2.5 CAPSULE ORAL DAILY
Qty: 90 CAPSULE | Refills: 0 | Status: CANCELLED | OUTPATIENT
Start: 2022-08-18

## 2022-08-23 RX ORDER — LEVOCETIRIZINE DIHYDROCHLORIDE 5 MG/1
5 TABLET, FILM COATED ORAL EVERY EVENING
Qty: 90 TABLET | Refills: 0 | Status: SHIPPED | OUTPATIENT
Start: 2022-08-23

## 2022-08-24 DIAGNOSIS — I10 ESSENTIAL HYPERTENSION: ICD-10-CM

## 2022-08-24 RX ORDER — POTASSIUM CHLORIDE 750 MG/1
10 TABLET, FILM COATED, EXTENDED RELEASE ORAL 2 TIMES DAILY
Qty: 180 TABLET | Refills: 0 | Status: SHIPPED | OUTPATIENT
Start: 2022-08-24

## 2022-08-26 DIAGNOSIS — K21.9 GASTROESOPHAGEAL REFLUX DISEASE WITHOUT ESOPHAGITIS: ICD-10-CM

## 2022-08-26 RX ORDER — OMEPRAZOLE 40 MG/1
CAPSULE, DELAYED RELEASE ORAL
Qty: 90 CAPSULE | Refills: 3 | Status: SHIPPED | OUTPATIENT
Start: 2022-08-26

## 2022-09-13 ENCOUNTER — LAB (OUTPATIENT)
Dept: LAB | Facility: HOSPITAL | Age: 72
End: 2022-09-13
Attending: INTERNAL MEDICINE
Payer: COMMERCIAL

## 2022-09-13 DIAGNOSIS — E78.5 HYPERLIPIDEMIA, UNSPECIFIED HYPERLIPIDEMIA TYPE: ICD-10-CM

## 2022-09-13 DIAGNOSIS — E11.65 TYPE 2 DIABETES MELLITUS WITH HYPERGLYCEMIA, WITHOUT LONG-TERM CURRENT USE OF INSULIN (HCC): ICD-10-CM

## 2022-09-13 DIAGNOSIS — E03.9 ACQUIRED HYPOTHYROIDISM: ICD-10-CM

## 2022-09-13 DIAGNOSIS — I25.119 ATHEROSCLEROSIS OF NATIVE CORONARY ARTERY WITH ANGINA PECTORIS, UNSPECIFIED WHETHER NATIVE OR TRANSPLANTED HEART (HCC): ICD-10-CM

## 2022-09-13 LAB
ANION GAP SERPL CALCULATED.3IONS-SCNC: 10 MMOL/L (ref 4–13)
BUN SERPL-MCNC: 18 MG/DL (ref 5–25)
CALCIUM SERPL-MCNC: 9.3 MG/DL (ref 8.4–10.2)
CHLORIDE SERPL-SCNC: 104 MMOL/L (ref 96–108)
CO2 SERPL-SCNC: 25 MMOL/L (ref 21–32)
CREAT SERPL-MCNC: 0.83 MG/DL (ref 0.6–1.3)
CREAT UR-MCNC: 106 MG/DL
EST. AVERAGE GLUCOSE BLD GHB EST-MCNC: 183 MG/DL
GFR SERPL CREATININE-BSD FRML MDRD: 71 ML/MIN/1.73SQ M
GLUCOSE P FAST SERPL-MCNC: 163 MG/DL (ref 65–99)
HBA1C MFR BLD: 8 %
LDLC SERPL DIRECT ASSAY-MCNC: 106 MG/DL (ref 0–100)
MICROALBUMIN UR-MCNC: 12.9 MG/L (ref 0–20)
MICROALBUMIN/CREAT 24H UR: 12 MG/G CREATININE (ref 0–30)
POTASSIUM SERPL-SCNC: 4.4 MMOL/L (ref 3.5–5.3)
SODIUM SERPL-SCNC: 139 MMOL/L (ref 135–147)
T4 FREE SERPL-MCNC: 1.07 NG/DL (ref 0.76–1.46)
TSH SERPL DL<=0.05 MIU/L-ACNC: 2.12 UIU/ML (ref 0.45–4.5)

## 2022-09-13 PROCEDURE — 80048 BASIC METABOLIC PNL TOTAL CA: CPT

## 2022-09-13 PROCEDURE — 83721 ASSAY OF BLOOD LIPOPROTEIN: CPT

## 2022-09-13 PROCEDURE — 82043 UR ALBUMIN QUANTITATIVE: CPT

## 2022-09-13 PROCEDURE — 84439 ASSAY OF FREE THYROXINE: CPT

## 2022-09-13 PROCEDURE — 84443 ASSAY THYROID STIM HORMONE: CPT

## 2022-09-13 PROCEDURE — 3061F NEG MICROALBUMINURIA REV: CPT | Performed by: FAMILY MEDICINE

## 2022-09-13 PROCEDURE — 3052F HG A1C>EQUAL 8.0%<EQUAL 9.0%: CPT | Performed by: FAMILY MEDICINE

## 2022-09-13 PROCEDURE — 36415 COLL VENOUS BLD VENIPUNCTURE: CPT

## 2022-09-13 PROCEDURE — 82570 ASSAY OF URINE CREATININE: CPT

## 2022-09-13 PROCEDURE — 83036 HEMOGLOBIN GLYCOSYLATED A1C: CPT

## 2022-09-21 ENCOUNTER — OFFICE VISIT (OUTPATIENT)
Dept: ENDOCRINOLOGY | Facility: CLINIC | Age: 72
End: 2022-09-21
Payer: COMMERCIAL

## 2022-09-21 VITALS
HEART RATE: 70 BPM | WEIGHT: 253.8 LBS | SYSTOLIC BLOOD PRESSURE: 122 MMHG | DIASTOLIC BLOOD PRESSURE: 84 MMHG | BODY MASS INDEX: 44.96 KG/M2

## 2022-09-21 DIAGNOSIS — E04.2 NONTOXIC MULTINODULAR GOITER: ICD-10-CM

## 2022-09-21 DIAGNOSIS — I25.10 CORONARY ARTERY DISEASE INVOLVING NATIVE CORONARY ARTERY OF NATIVE HEART WITHOUT ANGINA PECTORIS: ICD-10-CM

## 2022-09-21 DIAGNOSIS — E55.9 VITAMIN D DEFICIENCY: ICD-10-CM

## 2022-09-21 DIAGNOSIS — I10 ESSENTIAL HYPERTENSION: ICD-10-CM

## 2022-09-21 DIAGNOSIS — E78.2 MIXED HYPERLIPIDEMIA: ICD-10-CM

## 2022-09-21 DIAGNOSIS — E03.9 ACQUIRED HYPOTHYROIDISM: ICD-10-CM

## 2022-09-21 DIAGNOSIS — E66.01 SEVERE OBESITY (BMI >= 40) (HCC): ICD-10-CM

## 2022-09-21 DIAGNOSIS — E11.65 TYPE 2 DIABETES MELLITUS WITH HYPERGLYCEMIA, WITHOUT LONG-TERM CURRENT USE OF INSULIN (HCC): Primary | ICD-10-CM

## 2022-09-21 PROCEDURE — 99215 OFFICE O/P EST HI 40 MIN: CPT | Performed by: INTERNAL MEDICINE

## 2022-09-21 RX ORDER — SEMAGLUTIDE 1.34 MG/ML
INJECTION, SOLUTION SUBCUTANEOUS
Qty: 6 ML | Refills: 5 | Status: SHIPPED | OUTPATIENT
Start: 2022-09-21 | End: 2022-09-22 | Stop reason: SDUPTHER

## 2022-09-21 RX ORDER — EZETIMIBE 10 MG/1
10 TABLET ORAL DAILY
COMMUNITY

## 2022-09-21 NOTE — PROGRESS NOTES
Wenceslao Fonseca 71 y o  female MRN: [id number]    Encounter: [id number]      Assessment/Plan     Assessment: This is a 71y o -year-old female with diabetes with hyperglycemia  Plan:    Diagnoses and all orders for this visit:    Type 2 diabetes mellitus with hyperglycemia, without long-term current use of insulin ([facility] )  Lab Results   Component Value Date    HGBA1C 8 0 (H) 09/13/2022   A1c is uncontrolled, blood sugars are in 200 mg/dL range  Will continue Invokana 300 mg daily  Start Ozempic 0 25 mg once a week for 4 weeks and then increase the dose to 0 5 mg once a week  Discussed the side effects and benefits  Will refer to diabetes education for Ozempic pen teaching  Educated about checking blood sugar twice daily and goal is  mg/dL  Hypoglycemia symptoms and treatment reviewed  -     Basic metabolic panel; Future  -     Hemoglobin A1C; Future   Semaglutide,0 25 or 0 5MG/DOS, (Ozempic, 0 25 or 0 5 MG/DOSE,) 2 MG/1 5ML SOPN; Inject 0 25 mg once a week for 4 weeks, increase the dose to 0 5 mg after 4 week  -     Ambulatory referral to Diabetic Education; Future    Acquired hypothyroidism  Lab Results   Component Value Date    [id number] 2 121 09/13/2022       Patient is clinically and biochemically euthyroid  Continue current dose of levothyroxine  -     T4, free; Future  -     TSH, 3rd generation; Future    Coronary artery disease involving native coronary artery of native heart without angina pectoris  Stable  Follow with Cardiology  Nontoxic multinodular goiter  Stable  Vitamin D deficiency  Continue current dose of vitamin-D 3 supplementation  Essential hypertension  Blood pressure well controlled  Continue current management as per PCP  Mixed hyperlipidemia  Continue current management as per PCP  Severe obesity (BMI >= 40) ([facility])  Educated about dietary modification  Patient started on Ozempic  Other orders  -     ezetimibe (ZETIA) 10 mg tablet;  Take 10 mg by mouth daily    I have spent 40 minutes with Patient  today in which greater than 50% of this time was spent in counseling/coordination of care regarding Diagnostic results, Prognosis, Risks and benefits of tx options, Intructions for management, Patient and family education, Importance of tx compliance and Risk factor reductions  CC:   Type 2 diabetes, hyperlipidemia, multinodular goiter, hypothyroidism    History of Present Illness     HPI:    Morelia Gong 71-year-old woman with medical history of type 2 diabetes, hypothyroidism, multinodular goiter, hypertension, coronary artery disease, vitamin-D deficiency is here for follow-up  Diabetes course has been stable  She takes Invokana 300 mg daily,    For hypothyroidism she takes levothyroxine 112 mcg daily on empty stomach 1 hour before breakfast, thyroid blood work is within normal range  She checks blood sugars once or twice daily and her blood sugars are usually elevated in 160-200 mg/dL range in the morning before breakfast   A1c went up to 8%  She has tried metformin previously with GI side effects  She has not tried glucagon like peptide agonist   By diet and exercise she has lost in a 3-4 lb in the last 3 months  For hyperlipidemia, she takes Lipitor 80 mg daily, Zetia 10 mg daily  Vitamin-D deficiency, she takes vitamin-D 3 1000 International Units daily  Component      Latest Ref Rng & Units 9/13/2022   Sodium      135 - 147 mmol/L 139   Potassium      3 5 - 5 3 mmol/L 4 4   Chloride      96 - 108 mmol/L 104   CO2      21 - 32 mmol/L 25   Anion Gap      4 - 13 mmol/L 10   BUN      5 - 25 mg/dL 18   Creatinine      0 60 - 1 30 mg/dL 0 83   GLUCOSE FASTING      65 - 99 mg/dL 163 (H)   Calcium      8 4 - 10 2 mg/dL 9 3   eGFR      ml/min/1 73sq m 71   EXT Creatinine Urine      mg/dL 106 0   MICROALBUM ,U,RANDOM      0 0 - 20 0 mg/L 12 9   MICROALBUMIN/CREATININE RATIO      0 - 30 mg/g creatinine 12   Hemoglobin A1C      Normal 3 8-5 6%; PreDiabetic 5 7-6 4%;  Diabetic >=6 5%; Glycemic control for adults with diabetes <7 0% % 8 0 (H)   eAG, EST AVG Glucose      mg/dl 183   Free T4      0 76 - 1 46 ng/dL 1 07   TSH 3RD GENERATON      0 450 - 4 500 uIU/mL 2 121   LDL CHOLESTEROL DIRECT      0 - 100 mg/dl 106 (H)     Lab Results   Component Value Date    HGBA1C 8 0 (H) 09/13/2022       Wt Readings from Last 3 Encounters:   09/21/22 115 kg (253 lb 12 8 oz)   08/01/22 116 kg (255 lb 6 4 oz)   06/16/22 116 kg (256 lb)       Review of Systems    Historical Information   Past Medical History:   Diagnosis Date    Allergic 2018   weekly inject      Allergic rhinitis     Allergies     Anemia     Anxiety     Arthritis     Chronic constipation     Diabetes ([address]     Diabetes mellitus ([address]     Disease of thyroid gland     GERD (gastroesophageal reflux disease)     Headache(784 0)     Heart murmur     Hyperlipidemia     Hypertension     Hypothyroidism     Obesity     Obesity     Restless legs     Vitamin D deficiency      Past Surgical History:   Procedure Laterality Date    BREAST BIOPSY Right 2001    CATARACT EXTRACTION      CATARACT EXTRACTION, BILATERAL  05/2021    CHOLECYSTECTOMY      COLONOSCOPY  06/01/2018    goes q 5 yr-due in Summer 2018-Dr Joleen White  2015    Dr Margie Garcia  01/01/2000    Dr Shelly Najera  9/26/2013    US GUIDANCE  10/17/2016    [facility]  10/15/2013     Social History   Social History     Substance and Sexual Activity   Alcohol Use Yes    Comment: on special occasions     Social History     Substance and Sexual Activity   Drug Use Never     Social History     Tobacco Use   Smoking Status Never Smoker   Smokeless Tobacco Never Used     Family History:   Family History   Problem Relation Age of Onset    Multiple sclerosis Brother     Multiple sclerosis Paternal Uncle     Diabetes Paternal Uncle     Hypertension Father     Diabetes Father     Heart disease Father     Multiple sclerosis Family     Diabetes Paternal Aunt     Cancer Neg Hx     Stroke Neg Hx     Obesity Neg Hx        Meds/Allergies   Current Outpatient Medications   Medication Sig Dispense Refill    aspirin (ECOTRIN LOW STRENGTH) 81 mg EC tablet Take 81 mg by mouth daily      atorvastatin (LIPITOR) 80 mg tablet Take 1 tablet (80 mg total) by mouth daily 90 tablet 0    Blood Glucose Monitoring Suppl (ONE TOUCH ULTRA MINI) w/Device KIT Please dispense 1 Kit 1 each 0    Cholecalciferol (Vitamin D3) 25 MCG (1000 UT) CAPS Take 2000 IU with dinner      ezetimibe (ZETIA) 10 mg tablet Take 10 mg by mouth daily      famotidine-calcium carbonate-magnesium hydroxide (PEPCID COMPLETE) -165 MG CHEW Chew 1 tablet daily as needed for heartburn      fluticasone (FLONASE) 50 mcg/act nasal spray 1 spray into each nostril daily      glucose blood (OneTouch Ultra) test strip Use to test blood sugar two times every other day 90 each 3    Invokana 300 MG TABS TAKE 1 TABLET BY MOUTH  DAILY 90 tablet 3    levocetirizine (XYZAL) 5 MG tablet Take 1 tablet (5 mg total) by mouth every evening 90 tablet 0    levothyroxine 112 mcg tablet Take 1 tablet (112 mcg total) by mouth daily 90 tablet 1    nebivolol (Bystolic) 5 mg tablet Take 0 5 tablets (2 5 mg total) by mouth in the morning   90 tablet 0    OneTouch Delica Lancets 31P MISC Use to check sugars twice every other day 90 each 3    potassium chloride (Klor-Con) 10 mEq tablet Take 1 tablet (10 mEq total) by mouth 2 (two) times a day 180 tablet 0    pramipexole (MIRAPEX) 0 25 mg tablet Take 1 tablet (0 25 mg total) by mouth daily at bedtime 90 tablet 0    ramipril (ALTACE) 2 5 mg capsule TAKE 1 CAPSULE BY MOUTH  DAILY 90 capsule 3    solifenacin (VESICARE) 5 mg tablet Take 1 tablet (5 mg total) by mouth daily 90 tablet 3    benzonatate (TESSALON PERLES) 100 mg capsule Take 2 capsules (200 mg total) by mouth 3 (three) times a day as needed for cough (Patient not taking: No sig reported) 40 capsule 1    omeprazole (PriLOSEC) 40 MG capsule TAKE 1 CAPSULE BY MOUTH  DAILY (Patient not taking: Reported on 9/21/2022) 90 capsule 3    Semaglutide,0 25 or 0 5MG/DOS, (Ozempic, 0 25 or 0 5 MG/DOSE,) 2 MG/1 5ML SOPN Inject 0 25 mg once a week for 4 weeks, increase the dose to 0 5 mg after 4 week 6 mL 5     No current facility-administered medications for this visit  Allergies   Allergen Reactions    Other Sneezing     Seasonal, dust, Cat dander, mold    Penicillins Hives       Objective   Vitals: Blood pressure 122/84, pulse 70, weight 115 kg (253 lb 12 8 oz), last menstrual period 01/01/2004  Physical Exam    The history was obtained from the review of the chart, patient  Lab Results:   Lab Results   Component Value Date/Time    TSH 3RD GENERATON 2 121 09/13/2022 06:47 AM    TSH 3RD GENERATON 1 996 03/08/2022 06:35 AM    Free T4 1 07 09/13/2022 06:47 AM    Free T4 1 10 03/08/2022 06:35 AM       Imaging Studies:   Results for orders placed in visit on 11/16/21    US thyroid      I have personally reviewed pertinent reports  Portions of the record may have been created with voice recognition software  Occasional wrong word or "sound a like" substitutions may have occurred due to the inherent limitations of voice recognition software  Read the chart carefully and recognize, using context, where substitutions have occurred

## 2022-09-22 DIAGNOSIS — E11.65 TYPE 2 DIABETES MELLITUS WITH HYPERGLYCEMIA, WITHOUT LONG-TERM CURRENT USE OF INSULIN (HCC): ICD-10-CM

## 2022-09-22 RX ORDER — SEMAGLUTIDE 1.34 MG/ML
INJECTION, SOLUTION SUBCUTANEOUS
Qty: 6 ML | Refills: 5 | Status: SHIPPED | OUTPATIENT
Start: 2022-09-22

## 2022-09-26 ENCOUNTER — OFFICE VISIT (OUTPATIENT)
Dept: FAMILY MEDICINE CLINIC | Facility: OTHER | Age: 72
End: 2022-09-26
Payer: COMMERCIAL

## 2022-09-26 VITALS
BODY MASS INDEX: 44.97 KG/M2 | WEIGHT: 253.8 LBS | HEIGHT: 63 IN | RESPIRATION RATE: 18 BRPM | OXYGEN SATURATION: 98 % | HEART RATE: 66 BPM | DIASTOLIC BLOOD PRESSURE: 88 MMHG | TEMPERATURE: 97.8 F | SYSTOLIC BLOOD PRESSURE: 126 MMHG

## 2022-09-26 DIAGNOSIS — Z12.31 ENCOUNTER FOR SCREENING MAMMOGRAM FOR BREAST CANCER: ICD-10-CM

## 2022-09-26 DIAGNOSIS — Z23 ENCOUNTER FOR IMMUNIZATION: ICD-10-CM

## 2022-09-26 DIAGNOSIS — Z00.00 MEDICARE ANNUAL WELLNESS VISIT, SUBSEQUENT: Primary | ICD-10-CM

## 2022-09-26 PROBLEM — I35.0 MODERATE AORTIC STENOSIS: Status: ACTIVE | Noted: 2019-09-17

## 2022-09-26 PROCEDURE — G0439 PPPS, SUBSEQ VISIT: HCPCS | Performed by: FAMILY MEDICINE

## 2022-09-26 PROCEDURE — 1160F RVW MEDS BY RX/DR IN RCRD: CPT | Performed by: FAMILY MEDICINE

## 2022-09-26 PROCEDURE — 3074F SYST BP LT 130 MM HG: CPT | Performed by: FAMILY MEDICINE

## 2022-09-26 PROCEDURE — 3288F FALL RISK ASSESSMENT DOCD: CPT | Performed by: FAMILY MEDICINE

## 2022-09-26 PROCEDURE — 1170F FXNL STATUS ASSESSED: CPT | Performed by: FAMILY MEDICINE

## 2022-09-26 PROCEDURE — 1090F PRES/ABSN URINE INCON ASSESS: CPT | Performed by: FAMILY MEDICINE

## 2022-09-26 PROCEDURE — 1003F LEVEL OF ACTIVITY ASSESS: CPT | Performed by: FAMILY MEDICINE

## 2022-09-26 PROCEDURE — G0008 ADMIN INFLUENZA VIRUS VAC: HCPCS

## 2022-09-26 PROCEDURE — 3725F SCREEN DEPRESSION PERFORMED: CPT | Performed by: FAMILY MEDICINE

## 2022-09-26 PROCEDURE — 90662 IIV NO PRSV INCREASED AG IM: CPT

## 2022-09-26 PROCEDURE — 3079F DIAST BP 80-89 MM HG: CPT | Performed by: FAMILY MEDICINE

## 2022-09-26 PROCEDURE — 1125F AMNT PAIN NOTED PAIN PRSNT: CPT | Performed by: FAMILY MEDICINE

## 2022-09-26 NOTE — PROGRESS NOTES
Assessment and Plan:     Problem List Items Addressed This Visit    None     Visit Diagnoses     Medicare annual wellness visit, subsequent    -  Primary    Encounter for immunization        Relevant Orders    influenza vaccine, high-dose, PF 0 7 mL (FLUZONE HIGH-DOSE) (Completed)    Encounter for screening mammogram for breast cancer            BMI Counseling: Body mass index is 44 96 kg/m²  The BMI is above normal  Nutrition recommendations include decreasing portion sizes, encouraging healthy choices of fruits and vegetables, decreasing fast food intake, consuming healthier snacks, limiting drinks that contain sugar, moderation in carbohydrate intake, increasing intake of lean protein, reducing intake of saturated and trans fat and reducing intake of cholesterol  Exercise recommendations include moderate physical activity 150 minutes/week  Rationale for BMI follow-up plan is due to patient being overweight or obese  Depression Screening and Follow-up Plan: Patient was screened for depression during today's encounter  They screened negative with a PHQ-2 score of 0  Urinary Incontinence Plan of Care: counseling topics discussed: practice Kegel (pelvic floor strengthening) exercises, limit alcohol, caffeine, spicy foods, and acidic foods, limiting fluid intake 3-4 hours before bed, preventing constipation and improving blood sugar control  Continue Vesicare as prescribed by specialist        Preventive health issues were discussed with patient, and age appropriate screening tests were ordered as noted in patient's After Visit Summary  Personalized health advice and appropriate referrals for health education or preventive services given if needed, as noted in patient's After Visit Summary  Return in about 6 months (around 3/26/2023) for Recheck T2DM, HTN, HLD             History of Present Illness:     Patient presents for a Medicare Wellness Visit    Pt presents for AWV  Last DM eye exam was January 2022 with Dr Kalyan Bess ([facility])  Following closely with Endo and Cardio  No concerns today     Patient Care Team:  Flaco Villasenor DO as PCP - General (Family Medicine)  Nisreen Amaya MD as PCP - Endocrinology (Endocrinology)  Sujit Sams MD as Care Coordinator (Gastroenterology)  Kaur Galarza MD as Care Coordinator (Ophthalmology)  Macy Duenas MD as Care Coordinator (Otolaryngology)  Ronaldo Lockwood MD as Care Coordinator (Cardiology)  Kaur Galarza MD (Ophthalmology)     Review of Systems:     Review of Systems   Constitutional: Negative for appetite change, fatigue, fever and unexpected weight change  HENT: Negative for congestion, dental problem, ear pain, postnasal drip, sore throat and tinnitus  Eyes: Negative for pain, discharge and visual disturbance  Respiratory: Negative for cough, shortness of breath and wheezing  Cardiovascular: Negative for chest pain, palpitations and leg swelling  Gastrointestinal: Negative for abdominal pain, constipation, diarrhea, nausea and vomiting  Endocrine: Negative for cold intolerance and heat intolerance  Genitourinary: Negative for difficulty urinating, dysuria, flank pain and urgency  Musculoskeletal: Negative for arthralgias, back pain, joint swelling and myalgias  Skin: Negative for rash and wound  Allergic/Immunologic: Negative for immunocompromised state  Neurological: Negative for dizziness, syncope, speech difficulty, weakness and numbness  Hematological: Negative for adenopathy  Does not bruise/bleed easily  Psychiatric/Behavioral: Negative for confusion, dysphoric mood and sleep disturbance  The patient is not nervous/anxious           Problem List:     Patient Active Problem List   Diagnosis    Acquired hypothyroidism    Nontoxic multinodular goiter    Type 2 diabetes mellitus with hyperglycemia, without long-term current use of insulin ([facility] [address] )    Essential hypertension    Mixed hyperlipidemia    Vitamin D deficiency    Allergic rhinitis    Coronary artery disease involving native coronary artery of native heart without angina pectoris    Moderate aortic stenosis    [facility] obesity (BMI >= 40) ([facility])    Seasonal allergies    Gastroesophageal reflux disease without esophagitis    COVID      Past Medical and Surgical History:     Past Medical History:   Diagnosis Date    Allergic 2018   weekly inject      Allergic rhinitis     Allergies     Anemia     Anxiety     Arthritis     Chronic constipation     Diabetes ([address]     Diabetes mellitus ([facility] [address] )     Disease of thyroid gland     GERD (gastroesophageal reflux disease)     Headache(784 0)     Heart murmur     Hyperlipidemia     Hypertension     Hypothyroidism     Obesity     Obesity     Restless legs     Vitamin D deficiency      Past Surgical History:   Procedure Laterality Date    BREAST BIOPSY Right 2001    CATARACT EXTRACTION      CATARACT EXTRACTION, BILATERAL  05/2021    CHOLECYSTECTOMY      COLONOSCOPY  06/01/2018    goes q 5 yr-due in Summer 2018-Dr Ayala Sample  2015    Dr Sam Ring  01/01/2000    Dr Carlos Grey  9/26/2013    [address]  10/17/2016    [address] [place] [address] ([employer])  10/15/2013      Family History:     Family History   Problem Relation Age of Onset    Multiple sclerosis Brother     Multiple sclerosis Paternal Uncle     Diabetes Paternal Uncle     Hypertension Father     Diabetes Father     Heart disease Father     Multiple sclerosis Family     Diabetes Paternal Aunt     Cancer Neg Hx     Stroke Neg Hx     Obesity Neg Hx       Social History:     Social History     Socioeconomic History    Marital status: Single     Spouse name: None    Number of children: None    Years of education: None    Highest education level: None   Occupational History    None   Tobacco Use    Smoking status: Never Smoker    Smokeless tobacco: Never Used   Vaping Use    Vaping Use: Never used   Substance and Sexual Activity    Alcohol use: Yes     Comment: on special occasions    Drug use: Never    Sexual activity: Not Currently   Other Topics Concern    None   Social History Narrative    · Most recent tobacco use screenin2018      · Do you currently or have you served in the [employer]:   No      · Were you activated, into active duty, as a member of the [employer] or as a Reservist:   No      · Advance directive:   No      · Live alone or with others:   alone       · Caffeine intake:    Moderate, 2 cups daily      Social Determinants of Health     Financial Resource Strain: Not on file   Food Insecurity: Not on file   Transportation Needs: Not on file   Physical Activity: Not on file   Stress: Not on file   Social Connections: Not on file   Intimate Partner Violence: Not on file   Housing Stability: Not on file      Medications and Allergies:     Current Outpatient Medications   Medication Sig Dispense Refill    aspirin (ECOTRIN LOW STRENGTH) 81 mg EC tablet Take 81 mg by mouth daily      atorvastatin (LIPITOR) 80 mg tablet Take 1 tablet (80 mg total) by mouth daily 90 tablet 0    Blood Glucose Monitoring Suppl (ONE TOUCH ULTRA MINI) w/Device KIT Please dispense 1 Kit 1 each 0    Cholecalciferol (Vitamin D3) 25 MCG (1000 UT) CAPS Take 2000 IU with dinner      ezetimibe (ZETIA) 10 mg tablet Take 10 mg by mouth daily      famotidine-calcium carbonate-magnesium hydroxide (PEPCID COMPLETE) -165 MG CHEW Chew 1 tablet daily as needed for heartburn      fluticasone (FLONASE) 50 mcg/act nasal spray 1 spray into each nostril daily      glucose blood (OneTouch Ultra) test strip Use to test blood sugar two times every other day 90 each 3    Invokana 300 MG TABS TAKE 1 TABLET BY MOUTH  DAILY 90 tablet 3    levocetirizine (XYZAL) 5 MG tablet Take 1 tablet (5 mg total) by mouth every evening 90 tablet 0    levothyroxine 112 mcg tablet Take 1 tablet (112 mcg total) by mouth daily 90 tablet 1    nebivolol (Bystolic) 5 mg tablet Take 0 5 tablets (2 5 mg total) by mouth in the morning  90 tablet 0    omeprazole (PriLOSEC) 40 MG capsule TAKE 1 CAPSULE BY MOUTH  DAILY 90 capsule 3    OneTouch Delica Lancets 56M MISC Use to check sugars twice every other day 90 each 3    potassium chloride (Klor-Con) 10 mEq tablet Take 1 tablet (10 mEq total) by mouth 2 (two) times a day 180 tablet 0    pramipexole (MIRAPEX) 0 25 mg tablet Take 1 tablet (0 25 mg total) by mouth daily at bedtime 90 tablet 0    ramipril (ALTACE) 2 5 mg capsule TAKE 1 CAPSULE BY MOUTH  DAILY 90 capsule 3    Semaglutide,0 25 or 0 5MG/DOS, (Ozempic, 0 25 or 0 5 MG/DOSE,) 2 MG/1 5ML SOPN Inject 0 25 mg once a week for 4 weeks, increase the dose to 0 5 mg after 4 week 6 mL 5    solifenacin (VESICARE) 5 mg tablet Take 1 tablet (5 mg total) by mouth daily 90 tablet 3     No current facility-administered medications for this visit       Allergies   Allergen Reactions    Other Sneezing     Seasonal, dust, Cat dander, mold    Penicillins Hives      Immunizations:     Immunization History   Administered Date(s) Administered    COVID-19 PFIZER VACCINE 0 3 ML IM 02/17/2021, 03/10/2021, 10/09/2021, 04/30/2022    COVID-19 Pfizer Vac BIVALENT Bhavin-sucrose 12 Yr+ IM (BOOSTER ONLY) 09/13/2022    INFLUENZA 10/25/2010, 11/14/2011, 10/01/2012, 10/16/2013, 11/20/2014, 11/10/2015, 10/03/2016, 09/20/2017, 09/11/2018, 11/10/2018    Influenza, high dose seasonal 0 7 mL 09/09/2019, 08/17/2020, 09/23/2021, 09/26/2022    Pneumococcal Conjugate 13-Valent 03/17/2014, 11/10/2018    Pneumococcal Conjugate PCV 7 10/01/2003    Pneumococcal Polysaccharide PPV23 05/03/2018    Tdap 10/25/2010    Zoster 02/07/2013    Zoster Vaccine Recombinant 09/11/2018, 11/10/2018      Health Maintenance:         Topic Date Due    Breast Cancer Screening: Mammogram  11/11/2022    Colorectal Cancer Screening  05/16/2023    DXA SCAN  10/11/2023    Hepatitis C Screening  Completed     There are no preventive care reminders to display for this patient  Medicare Screening Tests and Risk Assessments:     Jami Nath is here for her Subsequent Wellness visit  Last Medicare Wellness visit information reviewed, patient interviewed and updates made to the record today  Health Risk Assessment:   Patient rates overall health as very good  Patient feels that their physical health rating is much better  Patient is very satisfied with their life  Eyesight was rated as same  Hearing was rated as same  Patient feels that their emotional and mental health rating is same  Patients states they are never, rarely angry  Patient states they are never, rarely unusually tired/fatigued  Pain experienced in the last 7 days has been some  Patient's pain rating has been 2/10  Patient states that she has experienced no weight loss or gain in last 6 months  Depression Screening:   PHQ-2 Score: 0      Fall Risk Screening: In the past year, patient has experienced: no history of falling in past year      Urinary Incontinence Screening:   Patient has leaked urine accidently in the last six months  Trying Vesicare now -- has f/u in coming months    Home Safety:  Patient does not have trouble with stairs inside or outside of their home  Patient has working smoke alarms and has no working carbon monoxide detector  Home safety hazards include: none  Nutrition:   Current diet is Diabetic  Medications:   Patient is currently taking over-the-counter supplements  OTC medications include: Vit  D,aspirin 81,Pipid Complete  Patient is able to manage medications  Activities of Daily Living (ADLs)/Instrumental Activities of Daily Living (IADLs):   Walk and transfer into and out of bed and chair?: Yes  Dress and groom yourself?: Yes    Bathe or shower yourself?: Yes    Feed yourself?  Yes  Do your laundry/housekeeping?: Yes  Manage your money, pay your bills and track your expenses?: Yes  Make your own meals?: Yes    Do your own shopping?: Yes    Previous Hospitalizations:   Any hospitalizations or ED visits within the last 12 months?: No      Advance Care Planning:   Living will: Yes    Durable POA for healthcare: Yes    Advanced directive: Yes      Cognitive Screening:   Provider or family/friend/caregiver concerned regarding cognition?: No    PREVENTIVE SCREENINGS      Cardiovascular Screening:    General: Screening Not Indicated and History Lipid Disorder      Diabetes Screening:     General: Screening Not Indicated and History Diabetes      Colorectal Cancer Screening:     General: Screening Current      Breast Cancer Screening:     General: Screening Current    Due for: Mammogram        Cervical Cancer Screening:    General: Screening Not Indicated      Osteoporosis Screening:    General: Screening Current      Abdominal Aortic Aneurysm (AAA) Screening:        General: Screening Not Indicated      Lung Cancer Screening:     General: Screening Not Indicated      Hepatitis C Screening:    General: Screening Current    Screening, Brief Intervention, and Referral to Treatment (SBIRT)    Screening  Typical number of drinks in a day: 1  Typical number of drinks in a week: 1  Interpretation: Low risk drinking behavior  AUDIT-C Screenin) How often did you have a drink containing alcohol in the past year? monthly or less  2) How many drinks did you have on a typical day when you were drinking in the past year?  1 to 2  3) How often did you have 6 or more drinks on one occasion in the past year? never    AUDIT-C Score: 1  Interpretation: Score 0-2 (female): Negative screen for alcohol misuse    Single Item Drug Screening:  How often have you used an illegal drug (including marijuana) or a prescription medication for non-medical reasons in the past year? never    Single Item Drug Screen Score: 0  Interpretation: Negative screen for possible drug use disorder    Brief Intervention  Alcohol & drug use screenings were reviewed  No concerns regarding substance use disorder identified  Other Counseling Topics:   Car/seat belt/driving safety, skin self-exam, sunscreen and regular weightbearing exercise and calcium and vitamin D intake  No exam data present     Physical Exam:     /88   Pulse 66   Temp 97 8 °F (36 6 °C)   Resp 18   Ht 5' 3" (1 6 m)   Wt 115 kg (253 lb 12 8 oz)   LMP 01/01/2004   SpO2 98%   BMI 44 96 kg/m²     Physical Exam  Vitals and nursing note reviewed  Constitutional:       General: She is not in acute distress  Appearance: Normal appearance  She is well-developed  She is not ill-appearing  Comments: Body mass index is 44 96 kg/m²  HENT:      Head: Normocephalic and atraumatic  Right Ear: Hearing, tympanic membrane, ear canal and external ear normal       Left Ear: Hearing, tympanic membrane, ear canal and external ear normal       Nose: Nose normal       Mouth/Throat:      Mouth: Mucous membranes are moist       Pharynx: Oropharynx is clear  Uvula midline  Eyes:      General: No scleral icterus  Conjunctiva/sclera: Conjunctivae normal       Pupils: Pupils are equal, round, and reactive to light  Neck:      Thyroid: No thyromegaly  Cardiovascular:      Rate and Rhythm: Normal rate and regular rhythm  Heart sounds: Murmur (2-3/6 JAMEY over RUSB (aortic stenosis)) heard  Pulmonary:      Effort: Pulmonary effort is normal  No respiratory distress  Breath sounds: Normal breath sounds  No wheezing  Abdominal:      General: Bowel sounds are normal  There is no distension  Palpations: Abdomen is soft  Tenderness: There is no abdominal tenderness  Musculoskeletal:         General: No tenderness  Normal range of motion  Cervical back: Normal range of motion and neck supple  Right lower leg: No edema  Left lower leg: No edema     Lymphadenopathy: Cervical: No cervical adenopathy  Skin:     General: Skin is warm and dry  Coloration: Skin is not jaundiced  Findings: No erythema or rash  Neurological:      General: No focal deficit present  Mental Status: She is alert and oriented to person, place, and time  Cranial Nerves: No cranial nerve deficit     Psychiatric:         Mood and Affect: Mood normal          Behavior: Behavior normal           Terese Alford, DO

## 2022-09-26 NOTE — PATIENT INSTRUCTIONS

## 2022-09-29 ENCOUNTER — OFFICE VISIT (OUTPATIENT)
Dept: DIABETES SERVICES | Facility: CLINIC | Age: 72
End: 2022-09-29

## 2022-09-29 VITALS — WEIGHT: 253 LBS | BODY MASS INDEX: 44.82 KG/M2

## 2022-09-29 DIAGNOSIS — E11.65 TYPE 2 DIABETES MELLITUS WITH HYPERGLYCEMIA, WITHOUT LONG-TERM CURRENT USE OF INSULIN (HCC): ICD-10-CM

## 2022-09-29 NOTE — PROGRESS NOTES
Ozempic Education    Met with Edward Baires for [employer]  Kanika Bearden was instructed on site selection and rotation; safe needle disposal; medication storage; side effects and precautions of Ozempic  Discused primary side effects of nausea/vomiting, and risk of pancreatitis  Patient knows to call their doctor with abdominal pain  Kanika Bearden understands this is a once a week injection and should be taken the same day every week  Suggested marking the calendar for injection day for the first few weeks so the medication is not forgotten  We completed training with demo pens, Kanika Bearden demonstrated proper pen usage in office today  Instructed that steady states of the medication can be up to 6 weeks, therefore Kanika Bearden may not notice much change in blood sugars during the first few weeks, this is normal  Discussed that it can be normal to feel a small ball/lump under the skin where injection occurred for a few weeks, this is normal as well  Kanika Bearden verbalized understanding of material covered today, knows to call with any questions or concerns  Patient instruction completed on Hypoglycemia: definition/Risk, causes/symptoms, treatment, prevention of hypoglycemia, when to notify physician and EMS  Comments: Kanika Bearden has good understanding of hypoglycemia and treatment  Diabetes Education Record: Kanika Bearden was given the following education material: [employer] from the company  Please Note:  Kanika Bearden did not bring her Ozempic with her today for injection today, however, Kanika Bearden stated she will start her 0 25 mg dose on this Sunday, October 2, 2022  Patient response to instruction  Comprehension: good  Motivation: good  Expected Compliance: good  Readiness: action  Barriers to Learning: none known     Begin Time: 9:15AM  End Time: 10:00AM  Referring Provider: Dr Jodi Mitchell    Thank you for referring your patient to [facility], it was a pleasure working with them today   Please feel free to call with any questions or concerns      Joni Martinez  [address] [facility] [id number] [phone]

## 2022-10-27 DIAGNOSIS — E78.2 MIXED HYPERLIPIDEMIA: ICD-10-CM

## 2022-10-27 DIAGNOSIS — G47.61 PERIODIC LIMB MOVEMENT DISORDER (PLMD): ICD-10-CM

## 2022-10-27 RX ORDER — ATORVASTATIN CALCIUM 80 MG/1
TABLET, FILM COATED ORAL
Qty: 90 TABLET | Refills: 1 | Status: SHIPPED | OUTPATIENT
Start: 2022-10-27

## 2022-10-27 RX ORDER — PRAMIPEXOLE DIHYDROCHLORIDE 0.25 MG/1
TABLET ORAL
Qty: 90 TABLET | Refills: 1 | Status: SHIPPED | OUTPATIENT
Start: 2022-10-27

## 2022-11-02 DIAGNOSIS — I10 ESSENTIAL HYPERTENSION: ICD-10-CM

## 2022-11-02 RX ORDER — POTASSIUM CHLORIDE 750 MG/1
TABLET, FILM COATED, EXTENDED RELEASE ORAL
Qty: 180 TABLET | Refills: 3 | Status: SHIPPED | OUTPATIENT
Start: 2022-11-02

## 2022-11-08 DIAGNOSIS — K21.9 GASTROESOPHAGEAL REFLUX DISEASE WITHOUT ESOPHAGITIS: ICD-10-CM

## 2022-11-08 RX ORDER — OMEPRAZOLE 40 MG/1
40 CAPSULE, DELAYED RELEASE ORAL DAILY
Qty: 90 CAPSULE | Refills: 0 | Status: SHIPPED | OUTPATIENT
Start: 2022-11-08

## 2022-11-10 ENCOUNTER — TELEMEDICINE (OUTPATIENT)
Dept: DIABETES SERVICES | Facility: HOSPITAL | Age: 72
End: 2022-11-10

## 2022-11-10 DIAGNOSIS — E11.65 TYPE 2 DIABETES MELLITUS WITH HYPERGLYCEMIA, WITHOUT LONG-TERM CURRENT USE OF INSULIN (HCC): Primary | ICD-10-CM

## 2022-11-11 DIAGNOSIS — E03.9 HYPOTHYROIDISM (ACQUIRED): ICD-10-CM

## 2022-11-11 RX ORDER — LEVOTHYROXINE SODIUM 112 UG/1
112 TABLET ORAL DAILY
Qty: 90 TABLET | Refills: 0 | Status: SHIPPED | OUTPATIENT
Start: 2022-11-11

## 2022-11-14 ENCOUNTER — HOSPITAL ENCOUNTER (OUTPATIENT)
Dept: RADIOLOGY | Facility: HOSPITAL | Age: 72
Discharge: HOME | End: 2022-11-14

## 2022-11-14 VITALS — WEIGHT: 253 LBS | BODY MASS INDEX: 44.83 KG/M2 | HEIGHT: 63 IN

## 2022-11-14 DIAGNOSIS — I10 ESSENTIAL HYPERTENSION: ICD-10-CM

## 2022-11-14 DIAGNOSIS — Z12.31 ENCOUNTER FOR SCREENING MAMMOGRAM FOR BREAST CANCER: ICD-10-CM

## 2022-11-14 RX ORDER — POTASSIUM CHLORIDE 750 MG/1
10 TABLET, FILM COATED, EXTENDED RELEASE ORAL 2 TIMES DAILY
Qty: 180 TABLET | Refills: 0 | Status: SHIPPED | OUTPATIENT
Start: 2022-11-14 | End: 2022-11-16 | Stop reason: SDUPTHER

## 2022-11-15 ENCOUNTER — TELEMEDICINE (OUTPATIENT)
Dept: DIABETES SERVICES | Facility: HOSPITAL | Age: 72
End: 2022-11-15

## 2022-11-15 DIAGNOSIS — E11.65 TYPE 2 DIABETES MELLITUS WITH HYPERGLYCEMIA, WITHOUT LONG-TERM CURRENT USE OF INSULIN (HCC): Primary | ICD-10-CM

## 2022-11-15 NOTE — PROGRESS NOTES
Virtual Regular Visit    Verification of patient location:    Patient is located in the following state in which I hold an active license [place]      Assessment/Plan:    Problem List Items Addressed This Visit    None              Reason for visit is   Chief Complaint   Patient presents with   • Virtual Regular Visit        Encounter provider Prasanna Austin RD    Provider located at [place] [address] [address] [place] [address]      Recent Visits  No visits were found meeting these conditions  Showing recent visits within past 7 days and meeting all other requirements  Future Appointments  No visits were found meeting these conditions  Showing future appointments within next 150 days and meeting all other requirements       The patient was identified by name and date of birth  Monisha Baumann was informed that this is a telemedicine visit and that the visit is being conducted through the Minded  She agrees to proceed     My office door was closed  No one else was in the room  She acknowledged consent and understanding of privacy and security of the video platform  The patient has agreed to participate and understands they can discontinue the visit at any time  Patient is aware this is a billable service  Living Well with Diabetes Group Class #2    Monisha Baumann attended the Living Well with Diabetes Group Class #2  During class, Pretty Neo was instructed on the following topics: Macronutrients, Carbohydrate sources, What one serving of carbohydrate equals, effects of diet on blood glucose levels, effect of carbohydrates on blood glucose levels, basics of meal planning: balance, portions, meal times, measurements, reading food labels to determine carbohydrates  Pretty Noe participated in group activities of reading labels together and completing the meal planning activity  Pretty Noe will follow up with class #3   Will call with any questions or concerns prior to next session  The patient's history was reviewed and updated as appropriate: allergies, current medications  Lab Results   Component Value Date    HGBA1C 8 0 (H) 09/13/2022       Diabetes Education Record  Esther Sahu was provided Living Well with Diabetes Class #2 book - sent via email      Patient response to instruction    Comprehensiongood  Motivationgood  Expected Compliancegood    Begin Time: 6pm  End Time: 7:30pm  Referring Provider: Sissy Hollins you for referring your patient to [address], it was a pleasure working with them today  Please feel free to call with any questions or concerns  Bina Earl, RD  [address] [facility] [address]      Vannessa Kevin is a 71 y o  female see notes above   HPI     Past Medical History:   Diagnosis Date   • Allergic 2018   weekly inject     • Allergic rhinitis    • Allergies    • Anemia    • Anxiety    • Arthritis    • Chronic constipation    • Diabetes ([address] )    • Diabetes mellitus ([address] )    • Disease of thyroid gland    • GERD (gastroesophageal reflux disease)    • Headache(784 0)    • Heart murmur    • Hyperlipidemia    • Hypertension    • Hypothyroidism    • Obesity    • Obesity    • Restless legs    • Vitamin D deficiency        Past Surgical History:   Procedure Laterality Date   • BREAST BIOPSY Right 2001   • CATARACT EXTRACTION     • CATARACT EXTRACTION, BILATERAL  05/2021   • CHOLECYSTECTOMY     • COLONOSCOPY  06/01/2018    goes q 5 yr-due in Summer 2018-Nuno Jiang   • CORONARY ANGIOPLASTY  2015    Dr Leslye Hickman   • GASTRIC BYPASS  01/01/2000    Dr Teo Agudelo   • LYMPH NODE BIOPSY  2001   • [address]  9/26/2013   • US GUIDANCE  10/17/2016   • US GUIDED FINE NEEDLE ASPIRATION (ALL INC)  10/15/2013       Current Outpatient Medications   Medication Sig Dispense Refill   • aspirin (ECOTRIN LOW STRENGTH) 81 mg EC tablet Take 81 mg by mouth daily     • atorvastatin (LIPITOR) 80 mg tablet TAKE 1 TABLET BY MOUTH  DAILY 90 tablet 1   • Blood Glucose Monitoring Suppl (ONE TOUCH ULTRA MINI) w/Device KIT Please dispense 1 Kit 1 each 0   • Cholecalciferol (Vitamin D3) 25 MCG (1000 UT) CAPS Take 2000 IU with dinner     • ezetimibe (ZETIA) 10 mg tablet Take 10 mg by mouth daily     • famotidine-calcium carbonate-magnesium hydroxide (PEPCID COMPLETE) -165 MG CHEW Chew 1 tablet daily as needed for heartburn     • fluticasone (FLONASE) 50 mcg/act nasal spray 1 spray into each nostril daily     • glucose blood (OneTouch Ultra) test strip Use to test blood sugar two times every other day 90 each 3   • Invokana 300 MG TABS TAKE 1 TABLET BY MOUTH  DAILY 90 tablet 3   • levocetirizine (XYZAL) 5 MG tablet Take 1 tablet (5 mg total) by mouth every evening 90 tablet 0   • levothyroxine 112 mcg tablet Take 1 tablet (112 mcg total) by mouth daily 90 tablet 0   • nebivolol (Bystolic) 5 mg tablet Take 0 5 tablets (2 5 mg total) by mouth in the morning  90 tablet 0   • omeprazole (PriLOSEC) 40 MG capsule Take 1 capsule (40 mg total) by mouth daily 90 capsule 0   • OneTouch Delica Lancets 82U MISC Use to check sugars twice every other day 90 each 3   • potassium chloride (Klor-Con) 10 mEq tablet Take 1 tablet (10 mEq total) by mouth 2 (two) times a day 180 tablet 0   • pramipexole (MIRAPEX) 0 25 mg tablet TAKE 1 TABLET BY MOUTH  DAILY AT BEDTIME 90 tablet 1   • ramipril (ALTACE) 2 5 mg capsule TAKE 1 CAPSULE BY MOUTH  DAILY 90 capsule 3   • Semaglutide,0 25 or 0 5MG/DOS, (Ozempic, 0 25 or 0 5 MG/DOSE,) 2 MG/1 5ML SOPN Inject 0 25 mg once a week for 4 weeks, increase the dose to 0 5 mg after 4 week 6 mL 5   • solifenacin (VESICARE) 5 mg tablet Take 1 tablet (5 mg total) by mouth daily 90 tablet 3     No current facility-administered medications for this visit          Allergies   Allergen Reactions   • Other Sneezing     Seasonal, dust, Cat dander, mold   • Penicillins Hives       Review of Systems    Video Exam    There were no vitals filed for this visit      Physical Exam     I spent 90 minutes with patient today in which greater than 50% of the time was spent in counseling/coordination of care regarding diabetes

## 2022-11-16 DIAGNOSIS — J30.2 SEASONAL ALLERGIES: ICD-10-CM

## 2022-11-16 DIAGNOSIS — I10 ESSENTIAL HYPERTENSION: ICD-10-CM

## 2022-11-16 RX ORDER — LEVOCETIRIZINE DIHYDROCHLORIDE 5 MG/1
5 TABLET, FILM COATED ORAL EVERY EVENING
Qty: 90 TABLET | Refills: 0 | Status: SHIPPED | OUTPATIENT
Start: 2022-11-16

## 2022-11-16 RX ORDER — POTASSIUM CHLORIDE 750 MG/1
10 TABLET, FILM COATED, EXTENDED RELEASE ORAL 2 TIMES DAILY
Qty: 180 TABLET | Refills: 0 | Status: SHIPPED | OUTPATIENT
Start: 2022-11-16

## 2022-11-16 NOTE — PROGRESS NOTES
Virtual Regular Visit    Verification of patient location:    Patient is located in the following state in which I hold an active license [place]      Assessment/Plan:    Problem List Items Addressed This Visit    None           Reason for visit is   Chief Complaint   Patient presents with   • Virtual Regular Visit        Encounter provider Carolyn Han RD    Provider located at [place] [address] [address] [place] [address]      Recent Visits  Date Type Provider Dept   11/10/22 Telemedicine Carolyn Han RD P[facility] Diabetic Education Jagdish Rivera recent visits within past 7 days and meeting all other requirements  Future Appointments  No visits were found meeting these conditions  Showing future appointments within next 150 days and meeting all other requirements       The patient was identified by name and date of birth  Wenceslao Fonseca was informed that this is a telemedicine visit and that the visit is being conducted through the [employer]  She agrees to proceed     My office door was closed  No one else was in the room  She acknowledged consent and understanding of privacy and security of the video platform  The patient has agreed to participate and understands they can discontinue the visit at any time  Patient is aware this is a billable service  Living Well with Diabetes Group Class #3    Wenceslao Fonseca attended the Living Well with Diabetes Group Class #3  During class, Katya Ayala was instructed on the following topics: Oral and injectable medications, short term complications of diabetes, long term complications of diabetes, prevention of complications, foot care, sick day management, stress management, and traveling with diabetes  Katya Ayala participated in group activities  Katya Ayala will follow up with class #4  Will call with any questions or concerns prior to next session       The patient's history was reviewed and updated as appropriate: allergies, current medications  Lab Results   Component Value Date    HGBA1C 8 0 (H) 09/13/2022       Diabetes Education Record  Joseline Blazing was provided Living Well with Diabetes Class #3 book- sent via email      Patient response to instruction    Comprehensiongood  Motivationgood  Expected Compliancegood    Begin Time: 6pm  End Time: 8pm  Referring Provider: Sally Encarnacion    Thank you for referring your patient to [address], it was a pleasure working with them today  Please feel free to call with any questions or concerns  Kika Jackson, RD  [address] [facility] [address] [address]      Subjective  Olive Nando is a 71 y o  female see notes above   HPI     Past Medical History:   Diagnosis Date   • Allergic 2018   weekly inject     • Allergic rhinitis    • Allergies    • Anemia    • Anxiety    • Arthritis    • Chronic constipation    • Diabetes ([address] )    • Diabetes mellitus ([address] )    • Disease of thyroid gland    • GERD (gastroesophageal reflux disease)    • Headache(784 0)    • Heart murmur    • Hyperlipidemia    • Hypertension    • Hypothyroidism    • Obesity    • Obesity    • Restless legs    • Vitamin D deficiency        Past Surgical History:   Procedure Laterality Date   • BREAST BIOPSY Right 2001   • CATARACT EXTRACTION     • CATARACT EXTRACTION, BILATERAL  05/2021   • CHOLECYSTECTOMY     • COLONOSCOPY  06/01/2018    goes q 5 yr-due in Summer 2018-Liya Giron   • CORONARY ANGIOPLASTY  2015    Dr Elgin Parrish   • GASTRIC BYPASS  01/01/2000    Dr Fabián Crespo   • LYMPH NODE BIOPSY  2001   • [address]  9/26/2013   • US GUIDANCE  10/17/2016   • US GUIDED FINE NEEDLE ASPIRATION (ALL INC)  10/15/2013       Current Outpatient Medications   Medication Sig Dispense Refill   • aspirin (ECOTRIN LOW STRENGTH) 81 mg EC tablet Take 81 mg by mouth daily     • atorvastatin (LIPITOR) 80 mg tablet TAKE 1 TABLET BY MOUTH  DAILY 90 tablet 1   • Blood Glucose Monitoring Suppl (ONE TOUCH ULTRA MINI) w/Device KIT Please dispense 1 Kit 1 each 0   • Cholecalciferol (Vitamin D3) 25 MCG (1000 UT) CAPS Take 2000 IU with dinner     • ezetimibe (ZETIA) 10 mg tablet Take 10 mg by mouth daily     • famotidine-calcium carbonate-magnesium hydroxide (PEPCID COMPLETE) -165 MG CHEW Chew 1 tablet daily as needed for heartburn     • fluticasone (FLONASE) 50 mcg/act nasal spray 1 spray into each nostril daily     • glucose blood (OneTouch Ultra) test strip Use to test blood sugar two times every other day 90 each 3   • Invokana 300 MG TABS TAKE 1 TABLET BY MOUTH  DAILY 90 tablet 3   • levocetirizine (XYZAL) 5 MG tablet Take 1 tablet (5 mg total) by mouth every evening 90 tablet 0   • levothyroxine 112 mcg tablet Take 1 tablet (112 mcg total) by mouth daily 90 tablet 0   • nebivolol (Bystolic) 5 mg tablet Take 0 5 tablets (2 5 mg total) by mouth in the morning  90 tablet 0   • omeprazole (PriLOSEC) 40 MG capsule Take 1 capsule (40 mg total) by mouth daily 90 capsule 0   • OneTouch Delica Lancets 32T MISC Use to check sugars twice every other day 90 each 3   • potassium chloride (Klor-Con) 10 mEq tablet Take 1 tablet (10 mEq total) by mouth 2 (two) times a day 180 tablet 0   • pramipexole (MIRAPEX) 0 25 mg tablet TAKE 1 TABLET BY MOUTH  DAILY AT BEDTIME 90 tablet 1   • ramipril (ALTACE) 2 5 mg capsule TAKE 1 CAPSULE BY MOUTH  DAILY 90 capsule 3   • Semaglutide,0 25 or 0 5MG/DOS, (Ozempic, 0 25 or 0 5 MG/DOSE,) 2 MG/1 5ML SOPN Inject 0 25 mg once a week for 4 weeks, increase the dose to 0 5 mg after 4 week 6 mL 5   • solifenacin (VESICARE) 5 mg tablet Take 1 tablet (5 mg total) by mouth daily 90 tablet 3     No current facility-administered medications for this visit  Allergies   Allergen Reactions   • Other Sneezing     Seasonal, dust, Cat dander, mold   • Penicillins Hives       Review of Systems    Video Exam    There were no vitals filed for this visit      Physical Exam     I spent 120 minutes with patient today in which greater than 50% of the time was spent in counseling/coordination of care regarding diabetes

## 2022-11-17 ENCOUNTER — TELEMEDICINE (OUTPATIENT)
Dept: DIABETES SERVICES | Facility: HOSPITAL | Age: 72
End: 2022-11-17

## 2022-11-17 DIAGNOSIS — E11.65 TYPE 2 DIABETES MELLITUS WITH HYPERGLYCEMIA, WITHOUT LONG-TERM CURRENT USE OF INSULIN (HCC): Primary | ICD-10-CM

## 2022-11-22 NOTE — PROGRESS NOTES
Virtual Regular Visit    Verification of patient location:    Patient is located in the following state in which I hold an active license [place]      Assessment/Plan:    Problem List Items Addressed This Visit    None           Reason for visit is   Chief Complaint   Patient presents with   • Virtual Regular Visit        Encounter provider Fouzia Reece RD    Provider located at [place] [address] [address] [place] [address]      Recent Visits  Date Type Provider Dept   11/15/22 Telemedicine Fouzia Reece RD P[facility] Diabetic Education [facility] recent visits within past 7 days and meeting all other requirements  Future Appointments  No visits were found meeting these conditions  Showing future appointments within next 150 days and meeting all other requirements       The patient was identified by name and date of birth  Kayy Andersdiana was informed that this is a telemedicine visit and that the visit is being conducted through the [employer]  She agrees to proceed     My office door was closed  No one else was in the room  She acknowledged consent and understanding of privacy and security of the video platform  The patient has agreed to participate and understands they can discontinue the visit at any time  Patient is aware this is a billable service  Living Well with Diabetes Group Class #4    Kayy Anna attended the Living Well with Diabetes Group Class #4  During class, Lorri Roberts was instructed on the following topics: Types of cholesterol, dietary sources of cholesterol, types of fat, types of fiber, reading food labels- in depth, healthy choices when dining out  Lorri Roberts participated in group activities of reading labels together and completed the dining out activity  Lorri Roberts will follow up with class #5 or additional DSMT/MNT as desired  Will call with any questions or concerns prior to next session       The patient's history was reviewed and updated as appropriate: allergies, current medications  Lab Results   Component Value Date    HGBA1C 8 0 (H) 09/13/2022       Diabetes Education Record  Corine Carson was provided Living Well with Diabetes Class #4 book- sent via email      Patient response to instruction    Comprehensiongood  Motivationgood  Expected Compliancegood    Begin Time: 6pm  End Time: 8pm  Referring Provider: Marcie Wang    Thank you for referring your patient to [address], it was a pleasure working with them today  Please feel free to call with any questions or concerns  Naida , RD  [address] [facility] [phone]      Subjective Lennox Pour is a 71 y o  female see notes above   HPI     Past Medical History:   Diagnosis Date   • Allergic 2018   weekly inject     • Allergic rhinitis    • Allergies    • Anemia    • Anxiety    • Arthritis    • Chronic constipation    • Diabetes ([address] )    • Diabetes mellitus ([address] )    • Disease of thyroid gland    • GERD (gastroesophageal reflux disease)    • Headache(784 0)    • Heart murmur    • Hyperlipidemia    • Hypertension    • Hypothyroidism    • Obesity    • Obesity    • Restless legs    • Vitamin D deficiency        Past Surgical History:   Procedure Laterality Date   • BREAST BIOPSY Right 2001   • CATARACT EXTRACTION     • CATARACT EXTRACTION, BILATERAL  05/2021   • CHOLECYSTECTOMY     • COLONOSCOPY  06/01/2018    goes q 5 yr-due in Summer 2018-Markus Griffith   • CORONARY ANGIOPLASTY  2015    Dr Claudeen Last   • GASTRIC BYPASS  01/01/2000    Dr Michelle Castrejon   • LYMPH NODE BIOPSY  2001   • [address]  9/26/2013   • US GUIDANCE  10/17/2016   • US GUIDED FINE NEEDLE ASPIRATION (ALL INC)  10/15/2013       Current Outpatient Medications   Medication Sig Dispense Refill   • levocetirizine (XYZAL) 5 MG tablet Take 1 tablet (5 mg total) by mouth every evening 90 tablet 0   • potassium chloride (Klor-Con) 10 mEq tablet Take 1 tablet (10 mEq total) by mouth 2 (two) times a day 180 tablet 0   • aspirin (ECOTRIN LOW STRENGTH) 81 mg EC tablet Take 81 mg by mouth daily     • atorvastatin (LIPITOR) 80 mg tablet TAKE 1 TABLET BY MOUTH  DAILY 90 tablet 1   • Blood Glucose Monitoring Suppl (ONE TOUCH ULTRA MINI) w/Device KIT Please dispense 1 Kit 1 each 0   • Cholecalciferol (Vitamin D3) 25 MCG (1000 UT) CAPS Take 2000 IU with dinner     • ezetimibe (ZETIA) 10 mg tablet Take 10 mg by mouth daily     • famotidine-calcium carbonate-magnesium hydroxide (PEPCID COMPLETE) -165 MG CHEW Chew 1 tablet daily as needed for heartburn     • fluticasone (FLONASE) 50 mcg/act nasal spray 1 spray into each nostril daily     • glucose blood (OneTouch Ultra) test strip Use to test blood sugar two times every other day 90 each 3   • Invokana 300 MG TABS TAKE 1 TABLET BY MOUTH  DAILY 90 tablet 3   • levothyroxine 112 mcg tablet Take 1 tablet (112 mcg total) by mouth daily 90 tablet 0   • nebivolol (Bystolic) 5 mg tablet Take 0 5 tablets (2 5 mg total) by mouth in the morning  90 tablet 0   • omeprazole (PriLOSEC) 40 MG capsule Take 1 capsule (40 mg total) by mouth daily 90 capsule 0   • OneTouch Delica Lancets 95S MISC Use to check sugars twice every other day 90 each 3   • pramipexole (MIRAPEX) 0 25 mg tablet TAKE 1 TABLET BY MOUTH  DAILY AT BEDTIME 90 tablet 1   • ramipril (ALTACE) 2 5 mg capsule TAKE 1 CAPSULE BY MOUTH  DAILY 90 capsule 3   • Semaglutide,0 25 or 0 5MG/DOS, (Ozempic, 0 25 or 0 5 MG/DOSE,) 2 MG/1 5ML SOPN Inject 0 25 mg once a week for 4 weeks, increase the dose to 0 5 mg after 4 week 6 mL 5   • solifenacin (VESICARE) 5 mg tablet Take 1 tablet (5 mg total) by mouth daily 90 tablet 3     No current facility-administered medications for this visit          Allergies   Allergen Reactions   • Other Sneezing     Seasonal, dust, Cat dander, mold   • Penicillins Hives       Review of Systems    Video Exam    There were no vitals filed for this visit     Physical Exam     I spent 120 minutes with patient today in which greater than 50% of the time was spent in counseling/coordination of care regarding diabetes

## 2022-12-27 ENCOUNTER — TELEPHONE (OUTPATIENT)
Dept: ENDOCRINOLOGY | Facility: CLINIC | Age: 72
End: 2022-12-27

## 2022-12-27 NOTE — TELEPHONE ENCOUNTER
She can call other close by pharmacies to see if they have supply, otherwise it is OK to hold until it is available     [facility]

## 2022-12-27 NOTE — TELEPHONE ENCOUNTER
Pt can not get ozempic she wants to know if it ok to not take it until the pharmacy can get it in for her?

## 2022-12-30 DIAGNOSIS — K21.9 GASTROESOPHAGEAL REFLUX DISEASE WITHOUT ESOPHAGITIS: ICD-10-CM

## 2022-12-30 DIAGNOSIS — I10 ESSENTIAL HYPERTENSION: ICD-10-CM

## 2022-12-30 DIAGNOSIS — E03.9 HYPOTHYROIDISM (ACQUIRED): ICD-10-CM

## 2022-12-30 DIAGNOSIS — J30.2 SEASONAL ALLERGIES: ICD-10-CM

## 2022-12-30 RX ORDER — OMEPRAZOLE 40 MG/1
40 CAPSULE, DELAYED RELEASE ORAL DAILY
Qty: 90 CAPSULE | Refills: 0 | Status: SHIPPED | OUTPATIENT
Start: 2022-12-30

## 2022-12-30 RX ORDER — NEBIVOLOL 5 MG/1
2.5 TABLET ORAL DAILY
Qty: 90 TABLET | Refills: 0 | Status: SHIPPED | OUTPATIENT
Start: 2022-12-30

## 2022-12-30 RX ORDER — POTASSIUM CHLORIDE 750 MG/1
10 TABLET, FILM COATED, EXTENDED RELEASE ORAL 2 TIMES DAILY
Qty: 180 TABLET | Refills: 0 | Status: SHIPPED | OUTPATIENT
Start: 2022-12-30

## 2022-12-30 RX ORDER — LEVOCETIRIZINE DIHYDROCHLORIDE 5 MG/1
5 TABLET, FILM COATED ORAL EVERY EVENING
Qty: 90 TABLET | Refills: 0 | Status: SHIPPED | OUTPATIENT
Start: 2022-12-30

## 2022-12-30 RX ORDER — LEVOTHYROXINE SODIUM 112 UG/1
112 TABLET ORAL DAILY
Qty: 90 TABLET | Refills: 0 | Status: SHIPPED | OUTPATIENT
Start: 2022-12-30

## 2023-01-18 ENCOUNTER — LAB (OUTPATIENT)
Dept: LAB | Facility: HOSPITAL | Age: 73
End: 2023-01-18
Attending: INTERNAL MEDICINE

## 2023-01-18 DIAGNOSIS — E11.65 TYPE 2 DIABETES MELLITUS WITH HYPERGLYCEMIA, WITHOUT LONG-TERM CURRENT USE OF INSULIN (HCC): ICD-10-CM

## 2023-01-18 DIAGNOSIS — E03.9 ACQUIRED HYPOTHYROIDISM: ICD-10-CM

## 2023-01-18 LAB
ANION GAP SERPL CALCULATED.3IONS-SCNC: 11 MMOL/L (ref 4–13)
BUN SERPL-MCNC: 17 MG/DL (ref 5–25)
CALCIUM SERPL-MCNC: 9.5 MG/DL (ref 8.4–10.2)
CHLORIDE SERPL-SCNC: 104 MMOL/L (ref 96–108)
CO2 SERPL-SCNC: 25 MMOL/L (ref 21–32)
CREAT SERPL-MCNC: 0.81 MG/DL (ref 0.6–1.3)
EST. AVERAGE GLUCOSE BLD GHB EST-MCNC: 163 MG/DL
GFR SERPL CREATININE-BSD FRML MDRD: 73 ML/MIN/1.73SQ M
GLUCOSE P FAST SERPL-MCNC: 154 MG/DL (ref 65–99)
HBA1C MFR BLD: 7.3 %
POTASSIUM SERPL-SCNC: 4.3 MMOL/L (ref 3.5–5.3)
SODIUM SERPL-SCNC: 140 MMOL/L (ref 135–147)
T4 FREE SERPL-MCNC: 1.02 NG/DL (ref 0.76–1.46)
TSH SERPL DL<=0.05 MIU/L-ACNC: 3.17 UIU/ML (ref 0.45–4.5)

## 2023-01-31 ENCOUNTER — OFFICE VISIT (OUTPATIENT)
Dept: ENDOCRINOLOGY | Facility: CLINIC | Age: 73
End: 2023-01-31

## 2023-01-31 VITALS
BODY MASS INDEX: 44.3 KG/M2 | HEIGHT: 63 IN | HEART RATE: 60 BPM | SYSTOLIC BLOOD PRESSURE: 120 MMHG | WEIGHT: 250 LBS | DIASTOLIC BLOOD PRESSURE: 78 MMHG | TEMPERATURE: 97.6 F

## 2023-01-31 DIAGNOSIS — E11.65 TYPE 2 DIABETES MELLITUS WITH HYPERGLYCEMIA, WITHOUT LONG-TERM CURRENT USE OF INSULIN (HCC): Primary | ICD-10-CM

## 2023-01-31 DIAGNOSIS — E78.2 MIXED HYPERLIPIDEMIA: ICD-10-CM

## 2023-01-31 DIAGNOSIS — E03.9 ACQUIRED HYPOTHYROIDISM: ICD-10-CM

## 2023-01-31 DIAGNOSIS — I10 ESSENTIAL HYPERTENSION: ICD-10-CM

## 2023-01-31 DIAGNOSIS — E11.59 TYPE 2 DIABETES MELLITUS WITH OTHER CIRCULATORY COMPLICATION, WITHOUT LONG-TERM CURRENT USE OF INSULIN (HCC): ICD-10-CM

## 2023-01-31 DIAGNOSIS — E04.2 NONTOXIC MULTINODULAR GOITER: ICD-10-CM

## 2023-01-31 RX ORDER — LANCETS 33 GAUGE
EACH MISCELLANEOUS
Qty: 100 EACH | Refills: 1 | Status: SHIPPED | OUTPATIENT
Start: 2023-01-31

## 2023-01-31 RX ORDER — LANCETS 21 GAUGE
EACH MISCELLANEOUS
Qty: 1 EACH | Refills: 0 | Status: SHIPPED | OUTPATIENT
Start: 2023-01-31

## 2023-01-31 NOTE — PROGRESS NOTES
Patient Progress Note      CC: DM      Referring Provider  No referring provider defined for this encounter  History of Present Illness:   Refugio Carlson is a 71 y o  female here for follow-up hypothyroidism, goiter, type 2 diabetes  Hypothyroidism/goiter: Patient is on levothyroxine 112 mcg 1 tablet daily  Patient is taking it 1 hr before breakfast on an empty stomach and she does wait 4 hrs to take supplements  Tolerating medication well  No history of external radiation to head/neck/chest  No family history of thyroid cancer  No recent Iodine loading in form of medication, biotin or kelp supplements or radiological diagnostic studies  Most recent thyroid ultrasound results: June 2021  FINDINGS:  Thyroid parenchyma is diffusely heterogeneous in echotexture with focal nodule(s) as described below      Right lobe:  5 1 x 2 1 x 3 3 cm   16 8 mL  Left lobe:  4 3 x 1 9 x 1 8 cm   0 9  Isthmus:  0 1 cm      Nodule #1  Image 30  RIGHT midgland nodule measuring 2 4 x 1 4 x 1 8 cm  Given differences in measuring technique, no significant change from prior  COMPOSITION:  2 points, solid or almost completely solid   ECHOGENICITY:  1 point, hyperechoic or isoechoic  SHAPE:  0 points, wider-than-tall  MARGIN: 0 points, smooth  ECHOGENIC FOCI:  0 points, none or large comet-tail artifacts  TI-RADS Classification: TR 3 (3 points), FNA if >2 5 cm  Follow if >1 5 cm  This nodule remains stable for greater than 5 years and has had a prior benign biopsy  If there is a high level of clinical concern, continued surveillance at 2 year intervals could be considered, otherwise no additional workup recommended      Nodule #2  Image 37  RIGHT lower pole nodule measuring 1 6 x 1 4 x 1 2 cm  Unchanged from prior  COMPOSITION:  2 points, solid or almost completely solid   ECHOGENICITY:  1 point, hyperechoic or isoechoic  SHAPE:  0 points, wider-than-tall  MARGIN: 0 points, smooth    ECHOGENIC FOCI:  0 points, none or large comet-tail artifacts  TI-RADS Classification: TR 3 (3 points), FNA if >2 5 cm  Follow if >1 5 cm  This nodule remains stable for greater than 5 years and has had a prior benign biopsy  If there is a high level of clinical concern, continued surveillance at 2 year intervals could be considered, otherwise no additional workup recommended      Nodule #3  Image 26  RIGHT upper pole nodule measuring 1 9 x 1 3 x 1 8 cm  Given differences in measuring technique, no significant change from prior  COMPOSITION:  2 points, solid or almost completely solid   ECHOGENICITY:  1 point, hyperechoic or isoechoic  SHAPE:  0 points, wider-than-tall  MARGIN: 0 points, smooth  ECHOGENIC FOCI:  0 points, none or large comet-tail artifacts  TI-RADS Classification: TR 3 (3 points), FNA if >2 5 cm  Follow if >1 5 cm  This nodule remains stable for greater than 5 years and has had a prior benign biopsy  If there is a high level of clinical concern, continued surveillance at 2 year intervals could be considered, otherwise no additional workup recommended      Nodule #4  Image 68  LEFT midgland nodule measuring 1 7 x 1 1 x 0 8 cm  Given differences in measuring technique, no significant change from prior  COMPOSITION:  2 points, solid or almost completely solid   ECHOGENICITY:  1 point, hyperechoic or isoechoic  SHAPE:  0 points, wider-than-tall  MARGIN: 0 points, smooth  ECHOGENIC FOCI:  0 points, none or large comet-tail artifacts  TI-RADS Classification: TR 3 (3 points), FNA if >2 5 cm  Follow if >1 5 cm  This nodule remains stable for greater than 5 years and has had a prior benign biopsy  If there is a high level of clinical concern, continued surveillance at 2 year intervals could be considered, otherwise no additional workup recommended      IMPRESSION:     These are stable with previous non-malignant biopsy results as above   Based on 2015 EMY guidelines, additional followup ultrasound should be performed in 12 to 24 months  Type 2 diabetes: Patient is currently taking Invokana 300 mg daily and Ozempic 0 5 mg weekly  She states she has been out of it as its on back order  She is tolerating medications well  In the past she has tried metformin but did not tolerate it  Patient is taking a statin and ACE-inhibitor  Her eye (Dr Gene Mcconnell) and foot exams are up-to-date  Most recent hemoglobin A1c improved to 7 3% from 8 0%  She is checking blood glucose levels most days and readings range in the mid 100s mg/dl  She just started going back to the gym and start weight watchers  Patient Active Problem List   Diagnosis   • Acquired hypothyroidism   • Nontoxic multinodular goiter   • Type 2 diabetes mellitus with hyperglycemia, without long-term current use of insulin ([facility] [address] )   • Essential hypertension   • Mixed hyperlipidemia   • Vitamin D deficiency   • Allergic rhinitis   • Coronary artery disease involving native coronary artery of native heart without angina pectoris   • Moderate aortic stenosis   • Severe obesity (BMI >= 40) ([facility])   • Seasonal allergies   • Gastroesophageal reflux disease without esophagitis   • COVID      Past Medical History:   Diagnosis Date   • Allergic 2018   weekly inject     • Allergic rhinitis    • Allergies    • Anemia    • Anxiety    • Arthritis    • Chronic constipation    • Diabetes ([address] )    • Diabetes mellitus ([address] )    • Disease of thyroid gland    • GERD (gastroesophageal reflux disease)    • Headache(784 0)    • Heart murmur    • Hyperlipidemia    • Hypertension    • Hypothyroidism    • Obesity    • Obesity    • Restless legs    • Vitamin D deficiency       Past Surgical History:   Procedure Laterality Date   • BREAST BIOPSY Right 2001   • CATARACT EXTRACTION     • CATARACT EXTRACTION, BILATERAL  05/2021   • CHOLECYSTECTOMY     • COLONOSCOPY  06/01/2018    goes q 5 yr-due in Summer 2018-Mariah Pual   • CORONARY ANGIOPLASTY  2015    Dr Greenwood Deagilbert   • GASTRIC BYPASS  01/01/2000    Dr Adelaide Bland   • LYMPH NODE BIOPSY  2001   • US GUIDANCE  9/26/2013   • US GUIDANCE  10/17/2016   • [facility]  10/15/2013      Family History   Problem Relation Age of Onset   • Multiple sclerosis Brother    • Multiple sclerosis Paternal Uncle    • Diabetes Paternal Uncle    • Hypertension Father    • Diabetes Father    • Heart disease Father    • Multiple sclerosis Family    • Diabetes Paternal Aunt    • Cancer Neg Hx    • Stroke Neg Hx    • Obesity Neg Hx      Social History     Tobacco Use   • Smoking status: Never   • Smokeless tobacco: Never   Substance Use Topics   • Alcohol use: Yes     Comment: on special occasions     Allergies   Allergen Reactions   • Other Sneezing     Seasonal, dust, Cat dander, mold   • Penicillins Hives         Current Outpatient Medications:   •  aspirin (ECOTRIN LOW STRENGTH) 81 mg EC tablet, Take 81 mg by mouth daily, Disp: , Rfl:   •  atorvastatin (LIPITOR) 80 mg tablet, TAKE 1 TABLET BY MOUTH  DAILY, Disp: 90 tablet, Rfl: 1  •  Blood Glucose Monitoring Suppl (ONE TOUCH ULTRA MINI) w/Device KIT, Please dispense 1 Kit, Disp: 1 each, Rfl: 0  •  Cholecalciferol (Vitamin D3) 25 MCG (1000 UT) CAPS, Take 2000 IU with dinner, Disp: , Rfl:   •  ezetimibe (ZETIA) 10 mg tablet, Take 10 mg by mouth daily, Disp: , Rfl:   •  famotidine-calcium carbonate-magnesium hydroxide (PEPCID COMPLETE) -165 MG CHEW, Chew 1 tablet daily as needed for heartburn, Disp: , Rfl:   •  fluticasone (FLONASE) 50 mcg/act nasal spray, 1 spray into each nostril daily, Disp: , Rfl:   •  glucose blood (OneTouch Ultra) test strip, Use to test blood sugar two times every other day, Disp: 90 each, Rfl: 3  •  Invokana 300 MG TABS, TAKE 1 TABLET BY MOUTH  DAILY, Disp: 90 tablet, Rfl: 3  •  Lancets (OneTouch Delica Plus [id number]) MISC, USE TO CHECK SUGARS TWICE EVERY OTHER DAY, Disp: 100 each, Rfl: 1  •  Lancets Misc  (ONE TOUCH SURESOFT) MISC, Use lancet to check sugar, Disp: 1 each, Rfl: 0  •  levocetirizine (XYZAL) 5 MG tablet, Take 1 tablet (5 mg total) by mouth every evening, Disp: 90 tablet, Rfl: 0  •  levothyroxine 112 mcg tablet, Take 1 tablet (112 mcg total) by mouth daily, Disp: 90 tablet, Rfl: 0  •  nebivolol (Bystolic) 5 mg tablet, Take 0 5 tablets (2 5 mg total) by mouth daily, Disp: 90 tablet, Rfl: 0  •  omeprazole (PriLOSEC) 40 MG capsule, Take 1 capsule (40 mg total) by mouth daily, Disp: 90 capsule, Rfl: 0  •  potassium chloride (Klor-Con) 10 mEq tablet, Take 1 tablet (10 mEq total) by mouth 2 (two) times a day, Disp: 180 tablet, Rfl: 0  •  pramipexole (MIRAPEX) 0 25 mg tablet, TAKE 1 TABLET BY MOUTH  DAILY AT BEDTIME, Disp: 90 tablet, Rfl: 1  •  ramipril (ALTACE) 2 5 mg capsule, TAKE 1 CAPSULE BY MOUTH  DAILY, Disp: 90 capsule, Rfl: 3  •  semaglutide, 0 25 or 0 5 mg/dose, (Ozempic, 0 25 or 0 5 MG/DOSE,) 2 mg/1 5 mL injection pen, Inject 0 25 mg once a week for 4 weeks, increase the dose to 0 5 mg after 4 week, Disp: 6 mL, Rfl: 1  •  solifenacin (VESICARE) 5 mg tablet, Take 1 tablet (5 mg total) by mouth daily, Disp: 90 tablet, Rfl: 3  Review of Systems   Constitutional: Negative for activity change, appetite change, fatigue and unexpected weight change  HENT: Negative for trouble swallowing  Eyes: Negative for visual disturbance  Respiratory: Negative for shortness of breath  Cardiovascular: Negative for chest pain and palpitations  Gastrointestinal: Negative for constipation and diarrhea  Endocrine: Negative for polydipsia and polyuria  Musculoskeletal: Positive for arthralgias (arthritis)  Skin: Negative for wound  Neurological: Negative for numbness  Psychiatric/Behavioral: Negative  Physical Exam:  Body mass index is 44 29 kg/m²    /78   Pulse 60   Temp 97 6 °F (36 4 °C) (Skin)   Ht 5' 3" (1 6 m)   Wt 113 kg (250 lb)   LMP 01/01/2004   BMI 44 29 kg/m²    Wt Readings from Last 3 Encounters:   01/31/23 113 kg (250 lb)   11/14/22 115 kg (253 lb)   09/29/22 115 kg (253 lb)       Physical Exam  Vitals and nursing note reviewed  Constitutional:       Appearance: She is well-developed  HENT:      Head: Normocephalic  Eyes:      General: No scleral icterus  Pupils: Pupils are equal, round, and reactive to light  Neck:      Thyroid: No thyromegaly  Cardiovascular:      Rate and Rhythm: Normal rate and regular rhythm  Pulses: no weak pulses          Radial pulses are 2+ on the right side and 2+ on the left side  Dorsalis pedis pulses are 2+ on the right side and 2+ on the left side  Heart sounds: Murmur heard  Pulmonary:      Effort: Pulmonary effort is normal  No respiratory distress  Breath sounds: Normal breath sounds  No wheezing  Musculoskeletal:      Cervical back: Neck supple  Feet:      Right foot:      Skin integrity: No ulcer, skin breakdown, erythema, warmth, callus or dry skin  Left foot:      Skin integrity: No ulcer, skin breakdown, erythema, warmth, callus or dry skin  Skin:     General: Skin is warm and dry  Neurological:      Mental Status: She is alert  Patient's shoes and socks removed  Right Foot/Ankle   Right Foot Inspection  Skin Exam: skin normal and skin intact  No dry skin, no warmth, no callus, no erythema, no maceration, no abnormal color, no pre-ulcer, no ulcer and no callus  Sensory   Vibration: intact  Monofilament testing: intact    Vascular  The right DP pulse is 2+  Left Foot/Ankle  Left Foot Inspection  Skin Exam: skin normal and skin intact  No dry skin, no warmth, no erythema, no maceration, normal color, no pre-ulcer, no ulcer and no callus  Sensory   Vibration: intact  Monofilament testing: intact    Vascular  The left DP pulse is 2+       Assign Risk Category  No deformity present  No loss of protective sensation  No weak pulses  Risk: 0      Labs:   Lab Results   Component Value Date    HGBA1C 7 3 (H) 01/18/2023     Lab Results   Component Value Date    CALCIUM 9 5 01/18/2023    K 4 3 01/18/2023    CO2 25 01/18/2023     01/18/2023    BUN 17 01/18/2023    CREATININE 0 81 01/18/2023     No results found for: Lynne Qureshi  eGFR   Date Value Ref Range Status   01/18/2023 73 ml/min/1 73sq m Final     No components found for: [facility]  Lab Results   Component Value Date    HDL 49 (L) 03/08/2022    TRIG 152 (H) 03/08/2022     Lab Results   Component Value Date    ALT 19 03/08/2022    AST 17 03/08/2022    ALKPHOS 65 03/08/2022     Lab Results   Component Value Date    WWW1GCUXSVEY 3 173 01/18/2023       Plan:    Diagnoses and all orders for this visit:    Type 2 diabetes mellitus with hyperglycemia, without long-term current use of insulin ([facility])  HGA1C 7 3%  Improved  Treatment regimen: continue Invokana  Restart Ozempic  Episodes of hypoglycemia can lead to permanent disability and death  Discussed risks/complications associated with uncontrolled diabetes  Advised to adhere to diabetic diet, and recommended staying active/exercising routinely as tolerated  Keep carbohydrates consistent to limit blood glucose fluctuations  Advised to call if blood sugars less than 70 mg/dl or over 300 mg/dl  Check blood glucose 1-2 times a day  Discussed symptoms and treatment of hypoglycemia  Recommended routine follow-up with podiatry and ophthalmology  Send log in 2 weeks  Ordered blood work to complete prior to next visit  -     Lancets Misc  (ONE TOUCH SURESOFT) MISC; Use lancet to check sugar  -     semaglutide, 0 25 or 0 5 mg/dose, (Ozempic, 0 25 or 0 5 MG/DOSE,) 2 mg/1 5 mL injection pen; Inject 0 25 mg once a week for 4 weeks, increase the dose to 0 5 mg after 4 week  -     Hemoglobin A1C; Future  -     Basic metabolic panel; Future  -     Microalbumin / creatinine urine ratio;  Future    Acquired hypothyroidism  TSH 3 173 and free T4 1 02  Continue current dose of levothyroxine  Monitor labs  -     T4, free; Future  -     TSH, 3rd generation; Future    Nontoxic multinodular goiter  Ultrasound done in June 2021 showed stable nodules with previous nonmalignant biopsy results  Repeat follow-up ultrasound was recommended in 12 to 24 months   -     US thyroid; Future    Essential hypertension  Blood pressure adequately controlled, continue current treatment  -     Basic metabolic panel; Future    Mixed hyperlipidemia  LDL previously 106  Continue statin therapy  Managed by PCP             Discussed with the patient diagnosis and treatment and all questions fully answered  She will call me if any problems arise  Counseled patient on diagnostic results, prognosis, risk and benefit of treatment options, instruction for management, importance of treatment compliance, risk factor reduction and impressions        Elizabeth Ortega PA-C

## 2023-01-31 NOTE — PATIENT INSTRUCTIONS
Hypoglycemia instructions   [facility]  1/31/2023  [id number]    Low Blood Sugar    Steps to treat low blood sugar  1  Test blood sugar if you have symptoms of low blood sugar:   Low Blood Sugar Symptoms:  o Sweaty  o Dizzy  o Rapid heartbeat  o Shaky  o Bad mood  o Hungry      2  Treat blood sugar less than 70 with 15 grams of fast-acting carbohydrate:   Examples of 15 grams Fast-Acting Carbohydrate:  o 4 oz juice  o 4 oz regular soda  o 3-4 glucose tablets (chew)  o 3-4 hard candies (chew)          3  Wait 15 minutes and test your blood sugar again     4   If blood sugar is less than 100, repeat steps 2-3     5  When your blood sugar is 100 or more, eat a snack if it will be longer than one hour until your next meal  The snack should be 15 grams of carbohydrate and a protein:   Examples of snacks:  o ½ sandwich  o 6 crackers with cheese  o Piece of fruit with cheese or peanut butter  o 6 crackers with peanut butter

## 2023-02-06 ENCOUNTER — TELEPHONE (OUTPATIENT)
Dept: ENDOCRINOLOGY | Facility: CLINIC | Age: 73
End: 2023-02-06

## 2023-02-06 NOTE — TELEPHONE ENCOUNTER
Tried doing auth for ozempic twice on cover my meds but medicare is not responding will have to call tomorrow

## 2023-03-07 DIAGNOSIS — E11.65 TYPE 2 DIABETES MELLITUS WITH HYPERGLYCEMIA, WITHOUT LONG-TERM CURRENT USE OF INSULIN (HCC): ICD-10-CM

## 2023-03-07 RX ORDER — SEMAGLUTIDE 1.34 MG/ML
INJECTION, SOLUTION SUBCUTANEOUS
Qty: 1.5 ML | Refills: 1 | Status: SHIPPED | OUTPATIENT
Start: 2023-03-07

## 2023-03-13 LAB
LEFT EYE DIABETIC RETINOPATHY: NORMAL
RIGHT EYE DIABETIC RETINOPATHY: NORMAL

## 2023-03-14 ENCOUNTER — OFFICE VISIT (OUTPATIENT)
Dept: FAMILY MEDICINE CLINIC | Facility: OTHER | Age: 73
End: 2023-03-14

## 2023-03-14 VITALS
DIASTOLIC BLOOD PRESSURE: 76 MMHG | BODY MASS INDEX: 43.94 KG/M2 | HEIGHT: 63 IN | OXYGEN SATURATION: 96 % | HEART RATE: 72 BPM | SYSTOLIC BLOOD PRESSURE: 108 MMHG | TEMPERATURE: 98 F | WEIGHT: 248 LBS | RESPIRATION RATE: 16 BRPM

## 2023-03-14 DIAGNOSIS — S39.012A STRAIN OF LUMBAR REGION, INITIAL ENCOUNTER: ICD-10-CM

## 2023-03-14 DIAGNOSIS — E03.9 HYPOTHYROIDISM (ACQUIRED): ICD-10-CM

## 2023-03-14 DIAGNOSIS — S39.012A STRAIN OF LUMBAR REGION, INITIAL ENCOUNTER: Primary | ICD-10-CM

## 2023-03-14 RX ORDER — LANCING DEVICE/LANCETS
KIT MISCELLANEOUS
COMMUNITY
Start: 2023-01-31

## 2023-03-14 RX ORDER — BACLOFEN 10 MG/1
10 TABLET ORAL 3 TIMES DAILY
Qty: 30 TABLET | Refills: 0 | Status: SHIPPED | OUTPATIENT
Start: 2023-03-14 | End: 2023-03-24

## 2023-03-14 RX ORDER — BACLOFEN 10 MG/1
10 TABLET ORAL 3 TIMES DAILY
Qty: 30 TABLET | Refills: 0 | Status: SHIPPED | OUTPATIENT
Start: 2023-03-14 | End: 2023-03-14 | Stop reason: SDUPTHER

## 2023-03-14 NOTE — TELEPHONE ENCOUNTER
Pt was seen this morning for back pain    baclofen was sent to [facility] but she needs it sent to [facility] on [address]

## 2023-03-14 NOTE — PROGRESS NOTES
Name: Babita Tavarez      : 1950      MRN: [id number]  Encounter Provider: Cheryle Fulling, DO  Encounter Date: 3/14/2023   Encounter department: [address] [place] [address] Dr Trinidad  Strain of lumbar region, initial encounter  Assessment & Plan:  Complaining of 2 weeks of ongoing lumbar back pain initially midline and then migrating to the right  Pain waxes/wanes with sharp exacerbation when sitting with poor posture and leaning back  Pain come on while in [place]  Patient endorses waling a lot with new shoes and sleeping on a new bed with poor cushioning  On physical examination hypertonicity of the muscles in the back noted  Straight leg test negative  Patient without any weakness or paresthesias of the lower extremities  No red flag symptoms  Suspect muscle strain 2/2 to reasons noted above  Plan  - Start Baclofen 10 mg TID PRN for pain  - Encourage use of NSAID and/or Tylenol   - Warm compress to lower back when able           Subjective      Babita Player is a 71 yoF PMHx of GERD, T2DM, HLD, CAD, mod AS presenting with acute onset of right lower lumbar back pain  Patient reports pain came on 2 weeks ago while she was in [place]  Patient states she has been trying to pinpoint the etiology of the pain and notes increased walking and new shoes while in [place]  Patient also notes sleeping in a new bed with little cushioning  She notes the back pain came on gradually and has been present now for over a week  Denies any trauma  Pain is described as initially being present in the middle lumbar region and then migrating over to the right side  Pain described as a waxing/waning dull ache with sharp exacerbations at times that will radiate to the left side  She is unaware of any provacative movements other than sitting with poor posture and leaning back  She denies any leg weakness, paresthesias, bowel/bladder incontinence       Review of Systems   Constitutional: Negative for chills and fever  HENT: Negative for ear pain and sore throat  Eyes: Negative for pain and visual disturbance  Respiratory: Negative for cough and shortness of breath  Cardiovascular: Negative for chest pain and palpitations  Gastrointestinal: Negative for abdominal pain and vomiting  Genitourinary: Negative for dysuria and hematuria  Musculoskeletal: Positive for arthralgias (hands; osteoarthritis ), back pain and myalgias (back; right side lumbar region)  Negative for gait problem  Skin: Negative for color change, rash and wound  Neurological: Negative for dizziness, weakness, light-headedness and headaches  All other systems reviewed and are negative        Current Outpatient Medications on File Prior to Visit   Medication Sig   • aspirin (ECOTRIN LOW STRENGTH) 81 mg EC tablet Take 81 mg by mouth daily   • atorvastatin (LIPITOR) 80 mg tablet TAKE 1 TABLET BY MOUTH  DAILY   • Blood Glucose Monitoring Suppl (ONE TOUCH ULTRA MINI) w/Device KIT Please dispense 1 Kit   • Cholecalciferol (Vitamin D3) 25 MCG (1000 UT) CAPS Take 2000 IU with dinner   • ezetimibe (ZETIA) 10 mg tablet Take 10 mg by mouth daily   • famotidine-calcium carbonate-magnesium hydroxide (PEPCID COMPLETE) -165 MG CHEW Chew 1 tablet daily as needed for heartburn   • fluticasone (FLONASE) 50 mcg/act nasal spray 1 spray into each nostril daily   • glucose blood (OneTouch Ultra) test strip Use to test blood sugar two times every other day   • Invokana 300 MG TABS TAKE 1 TABLET BY MOUTH  DAILY   • Lancet Devices (OneTouch Delica Plus Lancing) MISC USE WITH LANCETS TO CHECK SUGAR   • Lancets (OneTouch Delica Plus [id number]) MISC USE TO CHECK SUGARS TWICE EVERY OTHER DAY   • Lancets Misc  (ONE TOUCH SURESOFT) MISC Use lancet to check sugar   • levocetirizine (XYZAL) 5 MG tablet Take 1 tablet (5 mg total) by mouth every evening   • levothyroxine 112 mcg tablet Take 1 tablet (112 mcg total) by mouth daily   • nebivolol (Bystolic) 5 mg tablet Take 0 5 tablets (2 5 mg total) by mouth daily   • omeprazole (PriLOSEC) 40 MG capsule Take 1 capsule (40 mg total) by mouth daily   • Ozempic, 0 25 or 0 5 MG/DOSE, 2 MG/1 5ML injection pen INCREASE TO 0 5MG AFTER 4WKS   • potassium chloride (Klor-Con) 10 mEq tablet Take 1 tablet (10 mEq total) by mouth 2 (two) times a day   • pramipexole (MIRAPEX) 0 25 mg tablet TAKE 1 TABLET BY MOUTH  DAILY AT BEDTIME   • ramipril (ALTACE) 2 5 mg capsule TAKE 1 CAPSULE BY MOUTH  DAILY   • solifenacin (VESICARE) 5 mg tablet Take 1 tablet (5 mg total) by mouth daily       Objective     /76   Pulse 72   Temp 98 °F (36 7 °C)   Resp 16   Ht 5' 3" (1 6 m)   Wt 112 kg (248 lb)   LMP 01/01/2004   SpO2 96%   BMI 43 93 kg/m²     Physical Exam  Constitutional:       General: She is not in acute distress  Appearance: Normal appearance  She is not ill-appearing  HENT:      Head: Normocephalic and atraumatic  Right Ear: External ear normal       Left Ear: External ear normal       Nose: Nose normal    Eyes:      Extraocular Movements: Extraocular movements intact  Conjunctiva/sclera: Conjunctivae normal    Cardiovascular:      Rate and Rhythm: Normal rate and regular rhythm  Pulses: Normal pulses  Heart sounds: Normal heart sounds  Pulmonary:      Effort: Pulmonary effort is normal       Breath sounds: Normal breath sounds  No wheezing, rhonchi or rales  Abdominal:      General: Abdomen is flat  There is no distension  Palpations: Abdomen is soft  There is no mass  Tenderness: There is no abdominal tenderness  There is no guarding  Musculoskeletal:      Lumbar back: Spasms present  Decreased range of motion  Negative right straight leg raise test and negative left straight leg raise test       Right lower leg: No edema  Left lower leg: No edema  Skin:     General: Skin is warm and dry  Neurological:      Mental Status: She is alert  Motor: No weakness  Gait: Gait normal        Lemont Master, DO

## 2023-03-14 NOTE — PATIENT INSTRUCTIONS
Low Back Strain   WHAT YOU NEED TO KNOW:   Low back strain is an injury to your lower back muscles or tendons  Tendons are strong tissues that connect muscles to bones  The lower back supports most of your body weight and helps you move, twist, and bend  DISCHARGE INSTRUCTIONS:   Return to the emergency department if:   You hear or feel a pop in your lower back  You have increased swelling or pain in your lower back  You have trouble moving your legs  Your legs are numb  Call your doctor if:   You have a fever  Your pain does not go away, even after treatment  You have questions or concerns about your condition or care  Medicines: The following medicines may be ordered by your healthcare provider:  Acetaminophen  decreases pain and fever  It is available without a doctor's order  Ask how much to take and how often to take it  Follow directions  Read the labels of all other medicines you are using to see if they also contain acetaminophen, or ask your doctor or pharmacist  Acetaminophen can cause liver damage if not taken correctly  NSAIDs , such as ibuprofen, help decrease swelling, pain, and fever  This medicine is available with or without a doctor's order  NSAIDs can cause stomach bleeding or kidney problems in certain people  If you take blood thinner medicine, always ask your healthcare provider if NSAIDs are safe for you  Always read the medicine label and follow directions  Muscle relaxers  help decrease pain and muscle spasms  Prescription pain medicine  may be given  Ask your healthcare provider how to take this medicine safely  Some prescription pain medicines contain acetaminophen  Do not take other medicines that contain acetaminophen without talking to your healthcare provider  Too much acetaminophen may cause liver damage  Prescription pain medicine may cause constipation  Ask your healthcare provider how to prevent or treat constipation       Take your medicine as directed  Contact your healthcare provider if you think your medicine is not helping or if you have side effects  Tell your provider if you are allergic to any medicine  Keep a list of the medicines, vitamins, and herbs you take  Include the amounts, and when and why you take them  Bring the list or the pill bottles to follow-up visits  Carry your medicine list with you in case of an emergency  Self-care:   Rest  as directed  You may need to rest in bed for a period of time after your injury  Do not lift heavy objects  Apply ice  on your back for 15 to 20 minutes every hour or as directed  Use an ice pack, or put crushed ice in a plastic bag  Cover it with a towel  Ice helps prevent tissue damage and decreases swelling and pain  Apply heat  on your lower back for 20 to 30 minutes every 2 hours for as many days as directed  Heat helps decrease pain and muscle spasms  Slowly start to increase your activity  as the pain decreases, or as directed  Prevent another low back strain:   Use correct body movements  Bend at the hips and knees when you  objects  Do not bend from the waist  Use your leg muscles as you lift the load  Do not use your back  Keep the object close to your chest as you lift it  Try not to twist or lift anything above your waist          Change your position often when you stand for long periods of time  Rest one foot on a small box or footrest, and then switch to the other foot often  Try not to sit for long periods of time  When you do, sit in a straight-backed chair with your feet flat on the floor  Never reach, pull, or push while you are sitting  Warm up before you exercise  Do exercises that strengthen your back muscles  Ask your healthcare provider about the best exercise plan for you  Maintain a healthy weight  Ask your healthcare provider how much you should weigh  Ask him to help you create a weight loss plan if you are overweight      © Copyright [employer] 2022 Information is for End User's use only and may not be sold, redistributed or otherwise used for commercial purposes  The above information is an  only  It is not intended as medical advice for individual conditions or treatments  Talk to your doctor, nurse or pharmacist before following any medical regimen to see if it is safe and effective for you

## 2023-03-23 NOTE — ASSESSMENT & PLAN NOTE
Complaining of 2 weeks of ongoing lumbar back pain initially midline and then migrating to the right  Pain waxes/wanes with sharp exacerbation when sitting with poor posture and leaning back  Pain come on while in [place]  Patient endorses waling a lot with new shoes and sleeping on a new bed with poor cushioning  On physical examination hypertonicity of the muscles in the back noted  Straight leg test negative  Patient without any weakness or paresthesias of the lower extremities  No red flag symptoms  Suspect muscle strain 2/2 to reasons noted above      Plan  - Start Baclofen 10 mg TID PRN for pain  - Encourage use of NSAID and/or Tylenol   - Warm compress to lower back when able

## 2023-03-28 DIAGNOSIS — E03.9 HYPOTHYROIDISM (ACQUIRED): ICD-10-CM

## 2023-03-29 RX ORDER — LEVOTHYROXINE SODIUM 112 UG/1
TABLET ORAL
Qty: 90 TABLET | Refills: 1 | Status: SHIPPED | OUTPATIENT
Start: 2023-03-29 | End: 2023-03-31 | Stop reason: SDUPTHER

## 2023-03-30 ENCOUNTER — OFFICE VISIT (OUTPATIENT)
Dept: FAMILY MEDICINE CLINIC | Facility: OTHER | Age: 73
End: 2023-03-30

## 2023-03-30 VITALS
HEART RATE: 77 BPM | OXYGEN SATURATION: 96 % | DIASTOLIC BLOOD PRESSURE: 82 MMHG | RESPIRATION RATE: 14 BRPM | TEMPERATURE: 97.6 F | HEIGHT: 63 IN | BODY MASS INDEX: 43.94 KG/M2 | WEIGHT: 248 LBS | SYSTOLIC BLOOD PRESSURE: 122 MMHG

## 2023-03-30 DIAGNOSIS — I10 ESSENTIAL HYPERTENSION: ICD-10-CM

## 2023-03-30 DIAGNOSIS — Z12.11 SCREEN FOR COLON CANCER: ICD-10-CM

## 2023-03-30 DIAGNOSIS — E78.2 MIXED HYPERLIPIDEMIA: ICD-10-CM

## 2023-03-30 DIAGNOSIS — B00.1 RECURRENT COLD SORES: ICD-10-CM

## 2023-03-30 DIAGNOSIS — E66.01 SEVERE OBESITY (BMI >= 40) (HCC): ICD-10-CM

## 2023-03-30 DIAGNOSIS — E11.65 TYPE 2 DIABETES MELLITUS WITH HYPERGLYCEMIA, WITHOUT LONG-TERM CURRENT USE OF INSULIN (HCC): Primary | ICD-10-CM

## 2023-03-30 DIAGNOSIS — D22.5 NEVUS OF BACK: ICD-10-CM

## 2023-03-30 RX ORDER — VALACYCLOVIR HYDROCHLORIDE 1 G/1
2000 TABLET, FILM COATED ORAL 2 TIMES DAILY
Qty: 8 TABLET | Refills: 2 | Status: SHIPPED | OUTPATIENT
Start: 2023-03-30 | End: 2023-03-31

## 2023-03-30 NOTE — ASSESSMENT & PLAN NOTE
Normotensive at 122/82 in clinic today, counseled to continue on her lifestyle changes and continue on current dosage of bystolic 9 4QM daily and Altace 2 5mg

## 2023-03-30 NOTE — ASSESSMENT & PLAN NOTE
counseled to continue on her lifestyle changes and continue on current dosage of Zetia 10mg daily and Atorvastatin 80mg daily

## 2023-03-30 NOTE — ASSESSMENT & PLAN NOTE
Continue on current dosages of Invokana 300mg and Ozempic 0 25 or 0 5mg/dose  Lab Results   Component Value Date    HGBA1C 7 3 (H) 01/18/2023

## 2023-03-30 NOTE — PROGRESS NOTES
Name: Sujey Anders      : 1950      MRN: [id number]  Encounter Provider: Saad Meier DO  Encounter Date: 3/30/2023   Encounter department: [address] [place] [address] Dr Trinidad  Type 2 diabetes mellitus with hyperglycemia, without long-term current use of insulin ([facility])  Comments:  Continue to follow closely with Endocrine  Invokana, ozempic well tolerated  A1c goal around 7%  Assessment & Plan:  Continue on current dosages of Invokana 300mg and Ozempic 0 25 or 0 5mg/dose  Lab Results   Component Value Date    HGBA1C 7 3 (H) 2023         2  Essential hypertension  Comments:  HTN goal <130/80  Cardiology following  Nebivolol, ramipril well tolerated  Assessment & Plan:  Normotensive at 122/82 in clinic today, counseled to continue on her lifestyle changes and continue on current dosage of bystolic 2 5CG daily and Altace 2 5mg      3  Mixed hyperlipidemia  Comments:  LDL goal <70  Continue atorvastatin 80mg and zetia per Cards  Assessment & Plan:  counseled to continue on her lifestyle changes and continue on current dosage of Zetia 10mg daily and Atorvastatin 80mg daily      4  Recurrent cold sores  Comments:  Valtrex abortive therapy prescribed today  Orders:  -     valACYclovir (VALTREX) 1,000 mg tablet; Take 2 tablets (2,000 mg total) by mouth 2 (two) times a day for 1 day    5  Screen for colon cancer  -     Ambulatory referral for colonoscopy; Future    6  Severe obesity (BMI >= 40) ([facility])  Comments:  Lifestyle Modifications advised  Consider Wt Mgmt referral in future if pt interested    7  Nevus of back  Comments:  Benign appearing  Continue to pursue watchful waiting  Able to remove in office if desired by pt    BMI Counseling: Body mass index is 43 93 kg/m²   The BMI is above normal  Nutrition recommendations include decreasing portion sizes, encouraging healthy choices of fruits and vegetables, decreasing fast food intake, consuming healthier snacks, limiting drinks "that contain sugar, moderation in carbohydrate intake, increasing intake of lean protein, reducing intake of saturated and trans fat and reducing intake of cholesterol  Exercise recommendations include moderate physical activity 150 minutes/week  Rationale for BMI follow-up plan is due to patient being overweight or obese  Depression Screening and Follow-up Plan: Patient was screened for depression during today's encounter  They screened negative with a PHQ-2 score of 0  Return in about 6 months (around 9/30/2023) for AWV  The patient indicates understanding of these issues and agrees with the plan  Subjective      HPI   Christine Ring is a 71 yoF PMHx of GERD, T2DM, HLD, CAD, moderate AS presenting for a routine well visit to discuss her HTN, DM, and HLD  She regularly follows up with Endocrinology and Cardiology  She notes that she has been feeling well over all and has no major complaints or concerns that need to be addressed  The lower back pain, for which she was recently seen here by Dr Miryam Hill, has since subsided and she is no longer experiencing those symptoms Sympomatically, she only complains of seasonal allergies with some rhinorrhea, sneezing, and coughing, all of which are typical for her in the spring  Regarding her DM, HTN, and HLD, she has been making efforts to improve her diet by reducing fatty foods and red meats, and additionally she uses floor exercise pedals to get in some mild exercise  She was normotensive at 122/82 and her hgbA1C has decreased from 8 0 to 7 3 between Sept and Jan      Of note she wanted us to examine a mole on the posterior aspect of her left shoulder because \"it is not the normal brown color of most moles  \"        Review of Systems   Constitutional: Negative  Negative for activity change, fatigue and fever  HENT: Positive for rhinorrhea and sneezing  Negative for congestion, ear pain, sinus pain and sore throat  Eyes: Negative    Negative for pain " and itching  Respiratory: Positive for cough  Negative for chest tightness and shortness of breath  Cardiovascular: Negative  Negative for chest pain and palpitations  Gastrointestinal: Negative  Negative for abdominal pain, constipation, diarrhea, nausea and vomiting  Endocrine: Negative  Negative for cold intolerance and heat intolerance  Genitourinary: Negative  Negative for dysuria  Musculoskeletal: Negative  Negative for myalgias  Skin: Negative  Negative for color change and rash  Allergic/Immunologic: Positive for environmental allergies  Neurological: Negative  Negative for dizziness, syncope and headaches  Hematological: Negative  Negative for adenopathy  Psychiatric/Behavioral: Negative  Negative for behavioral problems, dysphoric mood and sleep disturbance  The patient is not nervous/anxious          Current Outpatient Medications on File Prior to Visit   Medication Sig   • aspirin (ECOTRIN LOW STRENGTH) 81 mg EC tablet Take 81 mg by mouth daily   • atorvastatin (LIPITOR) 80 mg tablet TAKE 1 TABLET BY MOUTH  DAILY   • Blood Glucose Monitoring Suppl (ONE TOUCH ULTRA MINI) w/Device KIT Please dispense 1 Kit   • Cholecalciferol (Vitamin D3) 25 MCG (1000 UT) CAPS Take 2000 IU with dinner   • ezetimibe (ZETIA) 10 mg tablet Take 10 mg by mouth daily   • famotidine-calcium carbonate-magnesium hydroxide (PEPCID COMPLETE) -165 MG CHEW Chew 1 tablet daily as needed for heartburn   • fluticasone (FLONASE) 50 mcg/act nasal spray 1 spray into each nostril daily   • glucose blood (OneTouch Ultra) test strip Use to test blood sugar two times every other day   • Invokana 300 MG TABS TAKE 1 TABLET BY MOUTH  DAILY   • Lancet Devices (OneTouch Delica Plus Lancing) MISC USE WITH LANCETS TO CHECK SUGAR   • Lancets (OneTouch Delica Plus [id number]) MISC USE TO CHECK SUGARS TWICE EVERY OTHER DAY   • Lancets Misc  (ONE TOUCH SURESOFT) MISC Use lancet to check sugar   • levocetirizine (XYZAL) 5 "MG tablet Take 1 tablet (5 mg total) by mouth every evening   • nebivolol (Bystolic) 5 mg tablet Take 0 5 tablets (2 5 mg total) by mouth daily   • omeprazole (PriLOSEC) 40 MG capsule Take 1 capsule (40 mg total) by mouth daily   • Ozempic, 0 25 or 0 5 MG/DOSE, 2 MG/1 5ML injection pen INCREASE TO 0 5MG AFTER 4WKS   • pramipexole (MIRAPEX) 0 25 mg tablet TAKE 1 TABLET BY MOUTH  DAILY AT BEDTIME   • ramipril (ALTACE) 2 5 mg capsule TAKE 1 CAPSULE BY MOUTH  DAILY   • solifenacin (VESICARE) 5 mg tablet Take 1 tablet (5 mg total) by mouth daily   • [DISCONTINUED] levothyroxine 112 mcg tablet TAKE 1 TABLET BY MOUTH DAILY   • [DISCONTINUED] potassium chloride (Klor-Con) 10 mEq tablet Take 1 tablet (10 mEq total) by mouth 2 (two) times a day   • baclofen 10 mg tablet Take 1 tablet (10 mg total) by mouth 3 (three) times a day for 10 days       Objective     /82   Pulse 77   Temp 97 6 °F (36 4 °C)   Resp 14   Ht 5' 3\" (1 6 m)   Wt 112 kg (248 lb)   LMP 01/01/2004   SpO2 96%   BMI 43 93 kg/m²     Physical Exam  Constitutional:       General: She is not in acute distress  Appearance: Normal appearance  She is not ill-appearing  HENT:      Head: Normocephalic and atraumatic  Eyes:      Extraocular Movements: Extraocular movements intact  Pupils: Pupils are equal, round, and reactive to light  Cardiovascular:      Rate and Rhythm: Normal rate and regular rhythm  Pulses: Normal pulses  Pulmonary:      Effort: Pulmonary effort is normal       Breath sounds: Normal breath sounds  No wheezing, rhonchi or rales  Abdominal:      General: Bowel sounds are normal  There is no distension  Palpations: Abdomen is soft  Tenderness: There is no abdominal tenderness  Musculoskeletal:         General: Normal range of motion  Cervical back: Normal range of motion and neck supple  Skin:     General: Skin is warm and dry  Neurological:      General: No focal deficit present        Mental " Status: She is alert and oriented to person, place, and time  Mental status is at baseline  Psychiatric:         Mood and Affect: Mood normal          Behavior: Behavior normal          Thought Content:  Thought content normal          Judgment: Judgment normal                 Terese Alford, DO

## 2023-03-31 DIAGNOSIS — K21.9 GASTROESOPHAGEAL REFLUX DISEASE WITHOUT ESOPHAGITIS: ICD-10-CM

## 2023-03-31 DIAGNOSIS — E03.9 HYPOTHYROIDISM (ACQUIRED): ICD-10-CM

## 2023-03-31 DIAGNOSIS — I10 ESSENTIAL HYPERTENSION: ICD-10-CM

## 2023-03-31 RX ORDER — POTASSIUM CHLORIDE 750 MG/1
10 TABLET, FILM COATED, EXTENDED RELEASE ORAL 2 TIMES DAILY
Qty: 180 TABLET | Refills: 0 | Status: SHIPPED | OUTPATIENT
Start: 2023-03-31

## 2023-03-31 RX ORDER — OMEPRAZOLE 40 MG/1
40 CAPSULE, DELAYED RELEASE ORAL DAILY
Qty: 90 CAPSULE | Refills: 0 | Status: SHIPPED | OUTPATIENT
Start: 2023-03-31 | End: 2023-09-27

## 2023-03-31 RX ORDER — LEVOTHYROXINE SODIUM 112 UG/1
112 TABLET ORAL DAILY
Qty: 90 TABLET | Refills: 0 | Status: SHIPPED | OUTPATIENT
Start: 2023-03-31

## 2023-04-05 ENCOUNTER — OFFICE VISIT (OUTPATIENT)
Dept: UROLOGY | Facility: CLINIC | Age: 73
End: 2023-04-05

## 2023-04-05 VITALS
RESPIRATION RATE: 18 BRPM | HEART RATE: 72 BPM | HEIGHT: 63 IN | SYSTOLIC BLOOD PRESSURE: 130 MMHG | WEIGHT: 248 LBS | BODY MASS INDEX: 43.94 KG/M2 | OXYGEN SATURATION: 96 % | DIASTOLIC BLOOD PRESSURE: 82 MMHG

## 2023-04-05 DIAGNOSIS — N32.81 OVERACTIVE BLADDER: Primary | ICD-10-CM

## 2023-04-05 LAB
POST-VOID RESIDUAL VOLUME, ML POC: 90 ML
SL AMB  POCT GLUCOSE, UA: NORMAL
SL AMB LEUKOCYTE ESTERASE,UA: NORMAL
SL AMB POCT BILIRUBIN,UA: NORMAL
SL AMB POCT BLOOD,UA: NORMAL
SL AMB POCT CLARITY,UA: CLEAR
SL AMB POCT COLOR,UA: YELLOW
SL AMB POCT KETONES,UA: NORMAL
SL AMB POCT NITRITE,UA: NORMAL
SL AMB POCT PH,UA: 5
SL AMB POCT SPECIFIC GRAVITY,UA: 1.01
SL AMB POCT URINE PROTEIN: NORMAL
SL AMB POCT UROBILINOGEN: 0.2

## 2023-04-05 RX ORDER — SOLIFENACIN SUCCINATE 10 MG/1
10 TABLET, FILM COATED ORAL DAILY
Qty: 30 TABLET | Refills: 4 | Status: SHIPPED | OUTPATIENT
Start: 2023-04-05 | End: 2023-04-06 | Stop reason: SDUPTHER

## 2023-04-05 NOTE — PROGRESS NOTES
1  Overactive bladder  POCT urine dip    POCT Measure PVR    solifenacin (VESICARE) 10 MG tablet          Assessment and plan:     1  Overactive bladder  2  Urge urinary incontinence  - increase vesicare to 10mg  - if no improvement, may need to discuss with PCP possibly changing her SGLT2 inhibitor versus pursing botox      Patricia Calvo      Chief Complaint     Overactive bladder    History of Present Illness     Oswaldo Booth is a 71 y o  female presenting today for follow up of overactive bladder  Patient reports over the past 1-2 years she has been having increasing urinary frequency, urgecy, and urge urinary incontinence  Her primary care provider had referred her to pelvic floor physical therapy in which she noticed improvement however ongoing symptoms  Started on vesicare 5mg with good response  Denies any adverse side effects  Increasing incontinence over the past year now  Still wearing depends and pads    Patient denies any dysuria, gross hematuria, flank pain, nausea, vomiting, fevers, or chills  Denies any prior  surgical manipulation  Medical comorbidities include type 2 diabetes, goiter, hypothyroidism, hypertension, obesity  Patient is on SGLT 2 inhibitor for her diabetic management  Urine dip leukocyte, nitrite, blood negative  + Glucosuria  PVR 90mL    Laboratory     Lab Results   Component Value Date    CREATININE 0 81 01/18/2023       Review of Systems     Review of Systems   Constitutional: Negative for activity change, appetite change, chills, diaphoresis, fatigue, fever and unexpected weight change  Respiratory: Negative for chest tightness and shortness of breath  Cardiovascular: Negative for chest pain, palpitations and leg swelling  Gastrointestinal: Negative for abdominal distention, abdominal pain, constipation, diarrhea, nausea and vomiting  Genitourinary: Positive for frequency and urgency   Negative for decreased urine volume, difficulty "urinating, dysuria, enuresis, flank pain, genital sores and hematuria  Musculoskeletal: Negative for back pain, gait problem and myalgias  Skin: Negative for color change, pallor, rash and wound  Psychiatric/Behavioral: Negative for behavioral problems  The patient is not nervous/anxious  Allergies     Allergies   Allergen Reactions   • Other Sneezing     Seasonal, dust, Cat dander, mold   • Penicillins Hives       Physical Exam     Physical Exam  Constitutional:       General: She is not in acute distress  Appearance: Normal appearance  She is obese  She is not ill-appearing, toxic-appearing or diaphoretic  HENT:      Head: Normocephalic and atraumatic  Eyes:      General:         Right eye: No discharge  Conjunctiva/sclera: Conjunctivae normal    Pulmonary:      Effort: Pulmonary effort is normal  No respiratory distress  Musculoskeletal:         General: No swelling or tenderness  Normal range of motion  Skin:     General: Skin is warm and dry  Coloration: Skin is not jaundiced or pale  Neurological:      General: No focal deficit present  Mental Status: She is alert and oriented to person, place, and time  Psychiatric:         Mood and Affect: Mood normal          Behavior: Behavior normal          Thought Content:  Thought content normal          Judgment: Judgment normal            Vital Signs     Vitals:    04/05/23 1000   BP: 130/82   BP Location: Right arm   Patient Position: Sitting   Cuff Size: Standard   Pulse: 72   Resp: 18   SpO2: 96%   Weight: 112 kg (248 lb)   Height: 5' 3\" (1 6 m)         Current Medications       Current Outpatient Medications:   •  aspirin (ECOTRIN LOW STRENGTH) 81 mg EC tablet, Take 81 mg by mouth daily, Disp: , Rfl:   •  atorvastatin (LIPITOR) 80 mg tablet, TAKE 1 TABLET BY MOUTH  DAILY, Disp: 90 tablet, Rfl: 1  •  Blood Glucose Monitoring Suppl (ONE TOUCH ULTRA MINI) w/Device KIT, Please dispense 1 Kit, Disp: 1 each, Rfl: 0  •  " Cholecalciferol (Vitamin D3) 25 MCG (1000 UT) CAPS, Take 2000 IU with dinner, Disp: , Rfl:   •  ezetimibe (ZETIA) 10 mg tablet, Take 10 mg by mouth daily, Disp: , Rfl:   •  famotidine-calcium carbonate-magnesium hydroxide (PEPCID COMPLETE) -165 MG CHEW, Chew 1 tablet daily as needed for heartburn, Disp: , Rfl:   •  fluticasone (FLONASE) 50 mcg/act nasal spray, 1 spray into each nostril daily, Disp: , Rfl:   •  glucose blood (OneTouch Ultra) test strip, Use to test blood sugar two times every other day, Disp: 90 each, Rfl: 3  •  Invokana 300 MG TABS, TAKE 1 TABLET BY MOUTH  DAILY, Disp: 90 tablet, Rfl: 3  •  Lancet Devices (OneTouch Delica Plus Lancing) MISC, USE WITH LANCETS TO CHECK SUGAR, Disp: , Rfl:   •  Lancets (OneTouch Delica Plus [id number]) MISC, USE TO CHECK SUGARS TWICE EVERY OTHER DAY, Disp: 100 each, Rfl: 1  •  Lancets Misc  (ONE TOUCH SURESOFT) MISC, Use lancet to check sugar, Disp: 1 each, Rfl: 0  •  levocetirizine (XYZAL) 5 MG tablet, Take 1 tablet (5 mg total) by mouth every evening, Disp: 90 tablet, Rfl: 0  •  levothyroxine 112 mcg tablet, Take 1 tablet (112 mcg total) by mouth daily, Disp: 90 tablet, Rfl: 0  •  nebivolol (Bystolic) 5 mg tablet, Take 0 5 tablets (2 5 mg total) by mouth daily, Disp: 90 tablet, Rfl: 0  •  omeprazole (PriLOSEC) 40 MG capsule, Take 1 capsule (40 mg total) by mouth daily, Disp: 90 capsule, Rfl: 0  •  Ozempic, 0 25 or 0 5 MG/DOSE, 2 MG/1 5ML injection pen, INCREASE TO 0 5MG AFTER 4WKS, Disp: 1 5 mL, Rfl: 1  •  potassium chloride (Klor-Con) 10 mEq tablet, Take 1 tablet (10 mEq total) by mouth 2 (two) times a day, Disp: 180 tablet, Rfl: 0  •  pramipexole (MIRAPEX) 0 25 mg tablet, TAKE 1 TABLET BY MOUTH  DAILY AT BEDTIME, Disp: 90 tablet, Rfl: 1  •  ramipril (ALTACE) 2 5 mg capsule, TAKE 1 CAPSULE BY MOUTH  DAILY, Disp: 90 capsule, Rfl: 3  •  solifenacin (VESICARE) 10 MG tablet, Take 1 tablet (10 mg total) by mouth daily, Disp: 30 tablet, Rfl: 4  •  baclofen 10 mg tablet, Take 1 tablet (10 mg total) by mouth 3 (three) times a day for 10 days, Disp: 30 tablet, Rfl: 0  •  valACYclovir (VALTREX) 1,000 mg tablet, Take 2 tablets (2,000 mg total) by mouth 2 (two) times a day for 1 day, Disp: 8 tablet, Rfl: 2      Active Problems     Patient Active Problem List   Diagnosis   • Acquired hypothyroidism   • Nontoxic multinodular goiter   • Type 2 diabetes mellitus with hyperglycemia, without long-term current use of insulin ([facility])   • Essential hypertension   • Mixed hyperlipidemia   • Vitamin D deficiency   • Allergic rhinitis   • Coronary artery disease involving native coronary artery of native heart without angina pectoris   • Moderate aortic stenosis   • Severe obesity (BMI >= 40) ([facility])   • Seasonal allergies   • Gastroesophageal reflux disease without esophagitis   • COVID   • Strain of lumbar region         Past Medical History     Past Medical History:   Diagnosis Date   • Allergic 2018   weekly inject     • Allergic rhinitis    • Allergies    • Anemia    • Anxiety    • Arthritis    • Chronic constipation    • Diabetes ([address] )    • Diabetes mellitus ([facility] [address] )    • Disease of thyroid gland    • GERD (gastroesophageal reflux disease)    • Headache(784 0)    • Heart murmur    • Hyperlipidemia    • Hypertension    • Hypothyroidism    • Obesity    • Obesity    • Restless legs    • Vitamin D deficiency          Surgical History     Past Surgical History:   Procedure Laterality Date   • BREAST BIOPSY Right 2001   • CATARACT EXTRACTION     • CATARACT EXTRACTION, BILATERAL  05/2021   • CHOLECYSTECTOMY     • COLONOSCOPY  06/01/2018    goes q 5 yr-due in Summer 2018-Hector Moon   • CORONARY ANGIOPLASTY  2015    Dr Amparo Mark   • GASTRIC BYPASS  01/01/2000    Dr Shimon Bowers   • [facility] BIOPSY  2001   • US GUIDANCE  9/26/2013   • US GUIDANCE  10/17/2016   • [facility]  10/15/2013         Family History     Family History   Problem Relation Age of Onset   • Multiple sclerosis Brother    • Multiple sclerosis Paternal Uncle    • Diabetes Paternal Uncle    • Hypertension Father    • Diabetes Father    • Heart disease Father    • Multiple sclerosis Family    • Diabetes Paternal Aunt    • Cancer Neg Hx    • Stroke Neg Hx    • Obesity Neg Hx          Social History     Social History       Radiology

## 2023-04-06 DIAGNOSIS — N32.81 OVERACTIVE BLADDER: ICD-10-CM

## 2023-04-06 RX ORDER — SOLIFENACIN SUCCINATE 10 MG/1
10 TABLET, FILM COATED ORAL DAILY
Qty: 90 TABLET | Refills: 3 | Status: SHIPPED | OUTPATIENT
Start: 2023-04-06

## 2023-04-06 NOTE — TELEPHONE ENCOUNTER
Pharmacy sent request for patient to have 90 day supply instead of 30 day supply  Order pended in chart

## 2023-05-22 ENCOUNTER — LAB (OUTPATIENT)
Dept: LAB | Facility: HOSPITAL | Age: 73
End: 2023-05-22

## 2023-05-22 DIAGNOSIS — E11.65 TYPE 2 DIABETES MELLITUS WITH HYPERGLYCEMIA, WITHOUT LONG-TERM CURRENT USE OF INSULIN (HCC): ICD-10-CM

## 2023-05-22 DIAGNOSIS — I10 ESSENTIAL HYPERTENSION: ICD-10-CM

## 2023-05-22 DIAGNOSIS — E03.9 ACQUIRED HYPOTHYROIDISM: ICD-10-CM

## 2023-05-22 DIAGNOSIS — E78.2 MIXED HYPERLIPIDEMIA: ICD-10-CM

## 2023-05-22 LAB
ALBUMIN SERPL BCP-MCNC: 4 G/DL (ref 3.5–5)
ALP SERPL-CCNC: 78 U/L (ref 34–104)
ALT SERPL W P-5'-P-CCNC: 19 U/L (ref 7–52)
ANION GAP SERPL CALCULATED.3IONS-SCNC: 9 MMOL/L (ref 4–13)
AST SERPL W P-5'-P-CCNC: 17 U/L (ref 13–39)
BILIRUB SERPL-MCNC: 0.63 MG/DL (ref 0.2–1)
BUN SERPL-MCNC: 17 MG/DL (ref 5–25)
CALCIUM SERPL-MCNC: 9.3 MG/DL (ref 8.4–10.2)
CHLORIDE SERPL-SCNC: 101 MMOL/L (ref 96–108)
CHOLEST SERPL-MCNC: 134 MG/DL
CO2 SERPL-SCNC: 28 MMOL/L (ref 21–32)
CREAT SERPL-MCNC: 0.89 MG/DL (ref 0.6–1.3)
CREAT UR-MCNC: 85.6 MG/DL
EST. AVERAGE GLUCOSE BLD GHB EST-MCNC: 148 MG/DL
GFR SERPL CREATININE-BSD FRML MDRD: 64 ML/MIN/1.73SQ M
GLUCOSE P FAST SERPL-MCNC: 137 MG/DL (ref 65–99)
HBA1C MFR BLD: 6.8 %
HDLC SERPL-MCNC: 46 MG/DL
LDLC SERPL CALC-MCNC: 51 MG/DL (ref 0–100)
MICROALBUMIN UR-MCNC: 6.4 MG/L (ref 0–20)
MICROALBUMIN/CREAT 24H UR: 7 MG/G CREATININE (ref 0–30)
NONHDLC SERPL-MCNC: 88 MG/DL
POTASSIUM SERPL-SCNC: 4.3 MMOL/L (ref 3.5–5.3)
PROT SERPL-MCNC: 7 G/DL (ref 6.4–8.4)
SODIUM SERPL-SCNC: 138 MMOL/L (ref 135–147)
T4 FREE SERPL-MCNC: 0.88 NG/DL (ref 0.61–1.12)
TRIGL SERPL-MCNC: 186 MG/DL
TSH SERPL DL<=0.05 MIU/L-ACNC: 3.69 UIU/ML (ref 0.45–4.5)

## 2023-06-02 ENCOUNTER — HOSPITAL ENCOUNTER (OUTPATIENT)
Dept: ULTRASOUND IMAGING | Facility: HOSPITAL | Age: 73
End: 2023-06-02
Payer: COMMERCIAL

## 2023-06-02 DIAGNOSIS — E78.2 MIXED HYPERLIPIDEMIA: ICD-10-CM

## 2023-06-02 DIAGNOSIS — E04.2 NONTOXIC MULTINODULAR GOITER: ICD-10-CM

## 2023-06-02 PROCEDURE — 76536 US EXAM OF HEAD AND NECK: CPT

## 2023-06-02 RX ORDER — ATORVASTATIN CALCIUM 80 MG/1
80 TABLET, FILM COATED ORAL DAILY
Qty: 90 TABLET | Refills: 0 | Status: SHIPPED | OUTPATIENT
Start: 2023-06-02

## 2023-06-06 ENCOUNTER — OFFICE VISIT (OUTPATIENT)
Dept: ENDOCRINOLOGY | Facility: CLINIC | Age: 73
End: 2023-06-06
Payer: COMMERCIAL

## 2023-06-06 VITALS
WEIGHT: 249 LBS | SYSTOLIC BLOOD PRESSURE: 118 MMHG | OXYGEN SATURATION: 98 % | HEART RATE: 74 BPM | BODY MASS INDEX: 44.12 KG/M2 | DIASTOLIC BLOOD PRESSURE: 76 MMHG | HEIGHT: 63 IN

## 2023-06-06 DIAGNOSIS — I10 ESSENTIAL HYPERTENSION: ICD-10-CM

## 2023-06-06 DIAGNOSIS — E03.9 ACQUIRED HYPOTHYROIDISM: Primary | ICD-10-CM

## 2023-06-06 DIAGNOSIS — E11.65 TYPE 2 DIABETES MELLITUS WITH HYPERGLYCEMIA, WITHOUT LONG-TERM CURRENT USE OF INSULIN (HCC): Primary | ICD-10-CM

## 2023-06-06 DIAGNOSIS — E04.2 NONTOXIC MULTINODULAR GOITER: ICD-10-CM

## 2023-06-06 DIAGNOSIS — E78.2 MIXED HYPERLIPIDEMIA: ICD-10-CM

## 2023-06-06 DIAGNOSIS — E03.9 ACQUIRED HYPOTHYROIDISM: ICD-10-CM

## 2023-06-06 PROCEDURE — 99214 OFFICE O/P EST MOD 30 MIN: CPT | Performed by: PHYSICIAN ASSISTANT

## 2023-06-06 RX ORDER — BLOOD SUGAR DIAGNOSTIC
STRIP MISCELLANEOUS
Qty: 100 EACH | Refills: 3 | Status: SHIPPED | OUTPATIENT
Start: 2023-06-06

## 2023-06-06 NOTE — PATIENT INSTRUCTIONS
Please wait at least 4 hours to take supplements / vitamins and antacids after taking levothyroxine in the mornings  Hypoglycemia instructions   Andrea Lott  6/6/2023  [id number]    Low Blood Sugar    Steps to treat low blood sugar  1  Test blood sugar if you have symptoms of low blood sugar:   Low Blood Sugar Symptoms:  o Sweaty  o Dizzy  o Rapid heartbeat  o Shaky  o Bad mood  o Hungry      2  Treat blood sugar less than 70 with 15 grams of fast-acting carbohydrate:   Examples of 15 grams Fast-Acting Carbohydrate:  o 4 oz juice  o 4 oz regular soda  o 3-4 glucose tablets (chew)  o 3-4 hard candies (chew)          3  Wait 15 minutes and test your blood sugar again     4   If blood sugar is less than 100, repeat steps 2-3     5  When your blood sugar is 100 or more, eat a snack if it will be longer than one hour until your next meal  The snack should be 15 grams of carbohydrate and a protein:   Examples of snacks:  o ½ sandwich  o 6 crackers with cheese  o Piece of fruit with cheese or peanut butter  o 6 crackers with peanut butter

## 2023-06-06 NOTE — PROGRESS NOTES
Patient Progress Note      CC: DM      Referring Provider  Goldenregulo Sanchez, [address] [facility] [address] [facility],  [address] [place] [address]      History of Present Illness:   Annabel Nash is a 68 y o  female here for follow-up hypothyroidism, goiter, type 2 diabetes  Hypothyroidism/goiter: Patient is on levothyroxine 112 mcg 1 tablet daily  Patient is taking it 1 hr before breakfast on an empty stomach and she does not wait a full 4 hrs to take supplements  Tolerating medication well  No history of external radiation to head/neck/chest  No family history of thyroid cancer  No recent Iodine loading in form of medication, biotin or kelp supplements or radiological diagnostic studies  Most recent thyroid ultrasound results: June 2021  Did repeat US in June 2023 but result is pending  FINDINGS:  Thyroid parenchyma is diffusely heterogeneous in echotexture with focal nodule(s) as described below  Right lobe:  5 1 x 2 1 x 3 3 cm   16 8 mL  Left lobe:  4 3 x 1 9 x 1 8 cm   0 9  Isthmus:  0 1 cm  Nodule #1  Image 30  RIGHT midgland nodule measuring 2 4 x 1 4 x 1 8 cm  Given differences in measuring technique, no significant change from prior  COMPOSITION:  2 points, solid or almost completely solid   ECHOGENICITY:  1 point, hyperechoic or isoechoic  SHAPE:  0 points, wider-than-tall  MARGIN: 0 points, smooth  ECHOGENIC FOCI:  0 points, none or large comet-tail artifacts  TI-RADS Classification: TR 3 (3 points), FNA if >2 5 cm  Follow if >1 5 cm  This nodule remains stable for greater than 5 years and has had a prior benign biopsy  If there is a high level of clinical concern, continued surveillance at 2 year intervals could be considered, otherwise no additional workup recommended  Nodule #2  Image 37  RIGHT lower pole nodule measuring 1 6 x 1 4 x 1 2 cm  Unchanged from prior  COMPOSITION:  2 points, solid or almost completely solid   ECHOGENICITY:  1 point, hyperechoic or isoechoic  SHAPE:  0 points, wider-than-tall  MARGIN: 0 points, smooth  ECHOGENIC FOCI:  0 points, none or large comet-tail artifacts  TI-RADS Classification: TR 3 (3 points), FNA if >2 5 cm  Follow if >1 5 cm  This nodule remains stable for greater than 5 years and has had a prior benign biopsy  If there is a high level of clinical concern, continued surveillance at 2 year intervals could be considered, otherwise no additional workup recommended  Nodule #3  Image 26  RIGHT upper pole nodule measuring 1 9 x 1 3 x 1 8 cm  Given differences in measuring technique, no significant change from prior  COMPOSITION:  2 points, solid or almost completely solid   ECHOGENICITY:  1 point, hyperechoic or isoechoic  SHAPE:  0 points, wider-than-tall  MARGIN: 0 points, smooth  ECHOGENIC FOCI:  0 points, none or large comet-tail artifacts  TI-RADS Classification: TR 3 (3 points), FNA if >2 5 cm  Follow if >1 5 cm  This nodule remains stable for greater than 5 years and has had a prior benign biopsy  If there is a high level of clinical concern, continued surveillance at 2 year intervals could be considered, otherwise no additional workup recommended  Nodule #4  Image 68  LEFT midgland nodule measuring 1 7 x 1 1 x 0 8 cm  Given differences in measuring technique, no significant change from prior  COMPOSITION:  2 points, solid or almost completely solid   ECHOGENICITY:  1 point, hyperechoic or isoechoic  SHAPE:  0 points, wider-than-tall  MARGIN: 0 points, smooth  ECHOGENIC FOCI:  0 points, none or large comet-tail artifacts  TI-RADS Classification: TR 3 (3 points), FNA if >2 5 cm  Follow if >1 5 cm  This nodule remains stable for greater than 5 years and has had a prior benign biopsy  If there is a high level of clinical concern, continued surveillance at 2 year intervals could be considered, otherwise no additional workup recommended       IMPRESSION:     These are stable with previous non-malignant biopsy results as above  Based on 2015 EMY guidelines, additional followup ultrasound should be performed in 12 to 24 months  Type 2 diabetes: Patient is currently taking Invokana 300 mg daily and Ozempic 0 5 mg weekly  She is tolerating medications well  In the past she has tried metformin but did not tolerate it  Patient is taking a statin and ACE-inhibitor  Her eye (Dr Thompson Adjutant) and foot exams are up-to-date  Most recent hemoglobin A1c improved to 6 8% from 7 3%  She is checking blood glucose levels most days and readings range in the low to mid 100s mg/dl  She does try to eat healthy and exercises 3 times a week  Patient Active Problem List   Diagnosis   • Acquired hypothyroidism   • Nontoxic multinodular goiter   • Type 2 diabetes mellitus with hyperglycemia, without long-term current use of insulin ([address] )   • Essential hypertension   • Mixed hyperlipidemia   • Vitamin D deficiency   • Allergic rhinitis   • Coronary artery disease involving native coronary artery of native heart without angina pectoris   • Moderate aortic stenosis   • Severe obesity (BMI >= 40) ([facility])   • Seasonal allergies   • Gastroesophageal reflux disease without esophagitis   • COVID   • Strain of lumbar region      Past Medical History:   Diagnosis Date   • Allergic 2018   weekly inject     • Allergic rhinitis    • Allergies    • Anemia    • Anxiety    • Arthritis    • Chronic constipation    • Diabetes ([address] )    • Diabetes mellitus ([address] )    • Disease of thyroid gland    • GERD (gastroesophageal reflux disease)    • Headache(784 0)    • Heart murmur    • Hyperlipidemia    • Hypertension    • Hypothyroidism    • Obesity    • Obesity    • Restless legs    • Vitamin D deficiency       Past Surgical History:   Procedure Laterality Date   • BREAST BIOPSY Right 2001   • CATARACT EXTRACTION     • CATARACT EXTRACTION, BILATERAL  05/2021   • CHOLECYSTECTOMY     • COLONOSCOPY  06/01/2018    goes q 5 yr-due in Summer 2018-Dr Glenda Vargas • CORONARY ANGIOPLASTY      Dr Karyle Dec   • GASTRIC BYPASS  2000    Dr Grace Wilkinson   • LYMPH NODE BIOPSY     • US GUIDANCE  2013   • US GUIDANCE  10/17/2016   • [facility]  10/15/2013      Family History   Problem Relation Age of Onset   • Multiple sclerosis Brother    • Thyroid disease unspecified Brother    • Multiple sclerosis Paternal Uncle    • Diabetes Paternal Uncle    • Hypertension Father            • Diabetes Father    • Heart disease Father    • Diabetes type II Father            • Multiple sclerosis Family    • Diabetes Paternal Aunt    • Cancer Neg Hx    • Stroke Neg Hx    • Obesity Neg Hx      Social History     Tobacco Use   • Smoking status: Never   • Smokeless tobacco: Never   Substance Use Topics   • Alcohol use: Yes     Comment: on special occasions     Allergies   Allergen Reactions   • Other Sneezing     Seasonal, dust, Cat dander, mold   • Penicillins Hives         Current Outpatient Medications:   •  aspirin (ECOTRIN LOW STRENGTH) 81 mg EC tablet, Take 81 mg by mouth daily, Disp: , Rfl:   •  atorvastatin (LIPITOR) 80 mg tablet, Take 1 tablet (80 mg total) by mouth daily, Disp: 90 tablet, Rfl: 0  •  Blood Glucose Monitoring Suppl (ONE TOUCH ULTRA MINI) w/Device KIT, Please dispense 1 Kit, Disp: 1 each, Rfl: 0  •  Cholecalciferol (Vitamin D3) 25 MCG (1000 UT) CAPS, Take 2000 IU with dinner, Disp: , Rfl:   •  ezetimibe (ZETIA) 10 mg tablet, Take 10 mg by mouth daily, Disp: , Rfl:   •  famotidine-calcium carbonate-magnesium hydroxide (PEPCID COMPLETE) -165 MG CHEW, Chew 1 tablet daily as needed for heartburn, Disp: , Rfl:   •  fluticasone (FLONASE) 50 mcg/act nasal spray, 1 spray into each nostril daily, Disp: , Rfl:   •  glucose blood (OneTouch Ultra) test strip, Use once a day, Disp: 100 each, Rfl: 3  •  Invokana 300 MG TABS, TAKE 1 TABLET BY MOUTH  DAILY, Disp: 90 tablet, Rfl: 3  •  Lancet Devices (OneTouch Delica Plus Lancing) MISC, USE WITH LANCETS TO CHECK SUGAR, Disp: , Rfl:   •  Lancets (OneTouch Delica Plus [id number]) MISC, USE TO CHECK SUGARS TWICE EVERY OTHER DAY, Disp: 100 each, Rfl: 1  •  Lancets Misc  (ONE TOUCH SURESOFT) MISC, Use lancet to check sugar, Disp: 1 each, Rfl: 0  •  levocetirizine (XYZAL) 5 MG tablet, Take 1 tablet (5 mg total) by mouth every evening, Disp: 90 tablet, Rfl: 0  •  levothyroxine 112 mcg tablet, Take 1 tablet (112 mcg total) by mouth daily, Disp: 90 tablet, Rfl: 0  •  nebivolol (Bystolic) 5 mg tablet, Take 0 5 tablets (2 5 mg total) by mouth daily, Disp: 90 tablet, Rfl: 0  •  omeprazole (PriLOSEC) 40 MG capsule, Take 1 capsule (40 mg total) by mouth daily, Disp: 90 capsule, Rfl: 0  •  potassium chloride (Klor-Con) 10 mEq tablet, Take 1 tablet (10 mEq total) by mouth 2 (two) times a day, Disp: 180 tablet, Rfl: 0  •  pramipexole (MIRAPEX) 0 25 mg tablet, Take 1 tablet (0 25 mg total) by mouth daily at bedtime, Disp: 90 tablet, Rfl: 1  •  ramipril (ALTACE) 2 5 mg capsule, TAKE 1 CAPSULE BY MOUTH  DAILY, Disp: 90 capsule, Rfl: 3  •  semaglutide, 0 25 or 0 5 mg/dose, (Ozempic, 0 25 or 0 5 MG/DOSE,) 2 mg/1 5 mL injection pen, Inject 0 38 mL (0 5 mg total) under the skin every 7 days, Disp: 4 5 mL, Rfl: 1  •  solifenacin (VESICARE) 10 MG tablet, Take 1 tablet (10 mg total) by mouth daily, Disp: 90 tablet, Rfl: 3  •  valACYclovir (VALTREX) 1,000 mg tablet, Take 2 tablets (2,000 mg total) by mouth 2 (two) times a day for 1 day, Disp: 8 tablet, Rfl: 2  •  baclofen 10 mg tablet, Take 1 tablet (10 mg total) by mouth 3 (three) times a day for 10 days (Patient not taking: Reported on 6/6/2023), Disp: 30 tablet, Rfl: 0  Review of Systems   Constitutional: Negative for activity change, appetite change, fatigue and unexpected weight change  HENT: Negative for trouble swallowing  Eyes: Negative for visual disturbance  Respiratory: Negative for shortness of breath      Cardiovascular: Negative for chest "pain and palpitations  Gastrointestinal: Negative for constipation and diarrhea  Endocrine: Negative for polydipsia and polyuria  Musculoskeletal: Positive for arthralgias  Skin: Negative for wound  Neurological: Negative for numbness  Psychiatric/Behavioral: Negative  Physical Exam:  Body mass index is 44 12 kg/m²  /76   Pulse 74   Ht 5' 2 99\" (1 6 m)   Wt 113 kg (249 lb)   LMP 01/01/2004   SpO2 98%   BMI 44 12 kg/m²    Wt Readings from Last 3 Encounters:   06/06/23 113 kg (249 lb)   04/05/23 112 kg (248 lb)   03/30/23 112 kg (248 lb)       Physical Exam  Vitals and nursing note reviewed  Constitutional:       Appearance: She is well-developed  HENT:      Head: Normocephalic  Eyes:      General: No scleral icterus  Pupils: Pupils are equal, round, and reactive to light  Neck:      Thyroid: No thyromegaly  Cardiovascular:      Rate and Rhythm: Normal rate and regular rhythm  Pulses:           Radial pulses are 2+ on the right side and 2+ on the left side  Heart sounds: Murmur heard  Pulmonary:      Effort: Pulmonary effort is normal  No respiratory distress  Breath sounds: Normal breath sounds  No wheezing  Musculoskeletal:      Cervical back: Neck supple  Skin:     General: Skin is warm and dry  Neurological:      Mental Status: She is alert  Patient's shoes and socks were not removed            Labs:   Component      Latest Ref Rng & Units 1/18/2023 5/22/2023   Sodium      135 - 147 mmol/L 140 138   Potassium      3 5 - 5 3 mmol/L 4 3 4 3   Chloride      96 - 108 mmol/L 104 101   CO2      21 - 32 mmol/L 25 28   Anion Gap      4 - 13 mmol/L 11 9   BUN      5 - 25 mg/dL 17 17   Creatinine      0 60 - 1 30 mg/dL 0 81 0 89   GLUCOSE FASTING      65 - 99 mg/dL 154 (H) 137 (H)   Calcium      8 4 - 10 2 mg/dL 9 5 9 3   AST      13 - 39 U/L  17   ALT      7 - 52 U/L  19   Alkaline Phosphatase      34 - 104 U/L  78   Total Protein      6 4 - 8 4 g/dL " 7 0   Albumin      3 5 - 5 0 g/dL  4 0   TOTAL BILIRUBIN      0 20 - 1 00 mg/dL  0 63   eGFR      ml/min/1 73sq m 73 64   Cholesterol      See Comment mg/dL  134   Triglycerides      See Comment mg/dL  186 (H)   HDL      >=50 mg/dL  46 (L)   LDL Calculated      0 - 100 mg/dL  51   Non-HDL Cholesterol      mg/dl  88   EXT Creatinine Urine      mg/dL  85 6   MICROALBUM ,U,RANDOM      0 0 - 20 0 mg/L  6 4   MICROALBUMIN/CREATININE RATIO      0 - 30 mg/g creatinine  7   Hemoglobin A1C      Normal 3 8-5 6%; PreDiabetic 5 7-6 4%; Diabetic >=6 5%; Glycemic control for adults with diabetes <7 0% % 7 3 (H) 6 8 (H)   eAG, EST AVG Glucose      mg/dl 163 148   Free T4      0 61 - 1 12 ng/dL 1 02 0 88   TSH 3RD GENERATON      0 450 - 4 500 uIU/mL 3 173 3 692       Plan:    Diagnoses and all orders for this visit:    Type 2 diabetes mellitus with hyperglycemia, without long-term current use of insulin ([facility])  HGA1C 6 8%  Improved  Treatment regimen: continue current treatment  Episodes of hypoglycemia can lead to permanent disability and death  Discussed risks/complications associated with uncontrolled diabetes  Advised to adhere to diabetic diet, and recommended staying active/exercising routinely as tolerated  Keep carbohydrates consistent to limit blood glucose fluctuations  Advised to call if blood sugars less than 70 mg/dl or over 300 mg/dl  Check blood glucose 1 time a day  Discussed symptoms and treatment of hypoglycemia  Recommended routine follow-up with podiatry and ophthalmology  Ordered blood work to complete prior to next visit  -     Hemoglobin A1C; Future  -     Basic metabolic panel; Future  -     Albumin / creatinine urine ratio; Future  -     semaglutide, 0 25 or 0 5 mg/dose, (Ozempic, 0 25 or 0 5 MG/DOSE,) 2 mg/1 5 mL injection pen;  Inject 0 38 mL (0 5 mg total) under the skin every 7 days  -     glucose blood (OneTouch Ultra) test strip; Use once a day    Acquired hypothyroidism  TSH 3 692 and free T4 0 88  Continue current dose of levothyroxine  Monitor labs   -     T4, free; Future  -     TSH, 3rd generation; Future    Nontoxic multinodular goiter  Ultrasound done in June 2021 showed stable nodules with previous nonmalignant biopsy results  Repeat US done this month is pending result  Essential hypertension  Blood pressure well controlled, continue current treatment   -     Basic metabolic panel; Future    Mixed hyperlipidemia  LDL 51  Continue statin therapy   Managed by PCP           Discussed with the patient diagnosis and treatment and all questions fully answered  She will call me if any problems arise  Counseled patient on diagnostic results, prognosis, risk and benefit of treatment options, instruction for management, importance of treatment compliance, risk factor reduction and impressions        Elizabeth Ortega PA-C

## 2023-06-16 DIAGNOSIS — I10 ESSENTIAL HYPERTENSION: ICD-10-CM

## 2023-06-16 RX ORDER — POTASSIUM CHLORIDE 750 MG/1
TABLET, FILM COATED, EXTENDED RELEASE ORAL
Qty: 180 TABLET | Refills: 3 | Status: SHIPPED | OUTPATIENT
Start: 2023-06-16 | End: 2023-06-19

## 2023-06-19 RX ORDER — POTASSIUM CHLORIDE 750 MG/1
TABLET, FILM COATED, EXTENDED RELEASE ORAL
Qty: 180 TABLET | Refills: 3 | Status: SHIPPED | OUTPATIENT
Start: 2023-06-19

## 2023-06-22 DIAGNOSIS — E03.9 HYPOTHYROIDISM (ACQUIRED): ICD-10-CM

## 2023-06-22 RX ORDER — LEVOTHYROXINE SODIUM 112 UG/1
TABLET ORAL
Qty: 90 TABLET | Refills: 3 | Status: SHIPPED | OUTPATIENT
Start: 2023-06-22

## 2023-07-05 DIAGNOSIS — E11.9 TYPE 2 DIABETES MELLITUS WITHOUT COMPLICATION, WITHOUT LONG-TERM CURRENT USE OF INSULIN (HCC): ICD-10-CM

## 2023-07-06 RX ORDER — CANAGLIFLOZIN 300 MG/1
TABLET, FILM COATED ORAL
Qty: 90 TABLET | Refills: 3 | Status: SHIPPED | OUTPATIENT
Start: 2023-07-06

## 2023-07-17 DIAGNOSIS — E11.65 TYPE 2 DIABETES MELLITUS WITH HYPERGLYCEMIA, WITHOUT LONG-TERM CURRENT USE OF INSULIN (HCC): Primary | ICD-10-CM

## 2023-07-31 ENCOUNTER — OFFICE VISIT (OUTPATIENT)
Dept: FAMILY MEDICINE CLINIC | Facility: OTHER | Age: 73
End: 2023-07-31
Payer: COMMERCIAL

## 2023-07-31 VITALS
DIASTOLIC BLOOD PRESSURE: 68 MMHG | RESPIRATION RATE: 16 BRPM | OXYGEN SATURATION: 97 % | WEIGHT: 248 LBS | TEMPERATURE: 97.8 F | HEIGHT: 63 IN | SYSTOLIC BLOOD PRESSURE: 104 MMHG | HEART RATE: 81 BPM | BODY MASS INDEX: 43.94 KG/M2

## 2023-07-31 DIAGNOSIS — U07.1 LAB TEST POSITIVE FOR DETECTION OF COVID-19 VIRUS: Primary | ICD-10-CM

## 2023-07-31 DIAGNOSIS — J02.9 SORE THROAT: ICD-10-CM

## 2023-07-31 DIAGNOSIS — R05.9 COUGH, UNSPECIFIED TYPE: ICD-10-CM

## 2023-07-31 LAB
S PYO AG THROAT QL: NEGATIVE
SARS-COV-2 AG UPPER RESP QL IA: POSITIVE
VALID CONTROL: ABNORMAL

## 2023-07-31 PROCEDURE — 99213 OFFICE O/P EST LOW 20 MIN: CPT

## 2023-07-31 PROCEDURE — 87811 SARS-COV-2 COVID19 W/OPTIC: CPT

## 2023-07-31 PROCEDURE — 87880 STREP A ASSAY W/OPTIC: CPT

## 2023-07-31 RX ORDER — NIRMATRELVIR AND RITONAVIR 300-100 MG
3 KIT ORAL 2 TIMES DAILY
Qty: 30 TABLET | Refills: 0 | Status: SHIPPED | OUTPATIENT
Start: 2023-07-31 | End: 2023-08-05

## 2023-07-31 NOTE — PROGRESS NOTES
Assessment/Plan:    No problem-specific Assessment & Plan notes found for this encounter. There are no diagnoses linked to this encounter. Subjective:      Patient ID: Ishmael Freire is a 68 y.o. female.     HPI  66yoF pmhx allergies, anxiety, DM, GERD, HLD, HTN, hypothyroidism, obesity, RLS, vitamin D dfx presents acutely for     Sx scratchy throat started sat  Sneezing coughing runny nose      The following portions of the patient's history were reviewed and updated as appropriate: allergies, current medications, past family history, past medical history, past social history, past surgical history and problem list.    Review of Systems      Objective:      LMP 01/01/2004          Physical Exam

## 2023-07-31 NOTE — PATIENT INSTRUCTIONS
Paxlovid may increase the effects of your atorvastatin and solifenacin (vesicare)  If you start having muscle/body aches or issues with urination, pleas call the office. Stop atorvastatin if having muscle/body aches. Stop vesicare for about a week while taking Paxlovid. Recommend 5 days of isolation from first day of symptoms 7/29/31  Then recommend 5 days of masking when around others  If fevers (anything over 100.4) occur, recommend restarting the isolation.

## 2023-07-31 NOTE — PROGRESS NOTES
COVID-19 Outpatient Progress Note    Assessment/Plan:  21-year-old female presents on day 2 of COVID-like symptoms, tested positive for COVID in the office. Agreeable to starting Paxlovid. Recommend holding Vesicare for a week while on Paxlovid due to DDI. Follow-up as needed. Problem List Items Addressed This Visit    None  Visit Diagnoses     Lab test positive for detection of COVID-19 virus    -  Primary    Relevant Medications    nirmatrelvir & ritonavir (Paxlovid, 300/100,) tablet therapy pack    Cough, unspecified type        Relevant Medications    nirmatrelvir & ritonavir (Paxlovid, 300/100,) tablet therapy pack    Other Relevant Orders    POCT Rapid Covid Ag (Completed)    Sore throat        Relevant Medications    nirmatrelvir & ritonavir (Paxlovid, 300/100,) tablet therapy pack    Other Relevant Orders    POCT rapid strepA (Completed)         Disposition:     Rapid antigen testing was performed and the result is POSITIVE for COVID-19. Patient has asymptomatic or mild COVID-19 infection. Based off CDC guidelines, they were recommended to isolate for 5 days. If they are asymptomatic or symptoms are improving with no fevers in the past 24 hours, isolation may be ended followed by 5 days of wearing a mask when around othes to minimize risk of infecting others. If still have a fever or other symptoms have not improved, continue to isolate until they improve. Regardless of when they end isolation, avoid being around people who are more likely to get very sick from COVID-19 until at least day 11. Discussed symptom directed medication options with patient. Patient meets criteria for PAXLOVID and they have been counseled appropriately according to EUA documentation released by the FDA. After discussion, patient agrees to treatment.     Mustapha Delong is an investigational medicine used to treat mild-to-moderate COVID-19 in adults and children (15years of age and older weighing at least 80 pounds (40 kg)) with positive results of direct SARS-CoV-2 viral testing, and who are at high risk for progression to severe COVID-19, including hospitalization or death. PAXLOVID is investigational because it is still being studied. There is limited information about the safety and effectiveness of using PAXLOVID to treat people with mild-to-moderate COVID-19. The FDA has authorized the emergency use of PAXLOVID for the treatment of mild-tomoderate COVID-19 in adults and children (15years of age and older weighing at least 80 pounds (40 kg)) with a positive test for the virus that causes COVID-19, and who are at high risk for progression to severe COVID-19, including hospitalization or death, under an EUA. What should I tell my healthcare provider before I take PAXLOVID? Tell your healthcare provider if you:  - Have any allergies  - Have liver or kidney disease  - Are pregnant or plan to become pregnant  - Are breastfeeding a child  - Have any serious illnesses    Tell your healthcare provider about all the medicines you take, including prescription and over-the-counter medicines, vitamins, and herbal supplements. Some medicines may interact with PAXLOVID and may cause serious side effects. Keep a list of your medicines to show your healthcare provider and pharmacist when you get a new medicine. You can ask your healthcare provider or pharmacist for a list of medicines that interact with PAXLOVID. Do not start taking a new medicine without telling your healthcare provider. Your healthcare provider can tell you if it is safe to take PAXLOVID with other medicines. Tell your healthcare provider if you are taking combined hormonal contraceptive. PAXLOVID may affect how your birth control pills work. Females who are able to become pregnant should use another effective alternative form of contraception or an additional barrier method of contraception.  Talk to your healthcare provider if you have any questions about contraceptive methods that might be right for you. How do I take PAXLOVID? PAXLOVID consists of 2 medicines: nirmatrelvir and ritonavir. - Take 2 pink tablets of nirmatrelvir with 1 white tablet of ritonavir by mouth 2 times each day (in the morning and in the evening) for 5 days. For each dose, take all 3 tablets at the same time. - If you have kidney disease, talk to your healthcare provider. You may need a different dose. - Swallow the tablets whole. Do not chew, break, or crush the tablets  - Take PAXLOVID with or without food. - Do not stop taking PAXLOVID without talking to your healthcare provider, even if you feel better. - If you miss a dose of PAXLOVID within 8 hours of the time it is usually taken, take it as soon as you remember. If you miss a dose by more than 8 hours, skip the missed dose and take the next dose at your regular time. Do not take 2 doses of PAXLOVID at the same time. - If you take too much PAXLOVID, call your healthcare provider or go to the nearest hospital emergency room right away. - If you are taking a ritonavir- or cobicistat-containing medicine to treat hepatitis C or Human Immunodeficiency Virus (HIV), you should continue to take your medicine as prescribed by your healthcare provider.  - Talk to your healthcare provider if you do not feel better or if you feel worse after 5 days. Who should generally not take PAXLOVID? Do not take PAXLOVID if:  You are allergic to nirmatrelvir, ritonavir, or any of the ingredients in PAXLOVID.     You are taking any of the following medicines:  - Alfuzosin  - Pethidine, piroxicam, propoxyphene  - Ranolazine  - Amiodarone, dronedarone, flecainide, propafenone, quinidine  - Colchicine  - Lurasidone, pimozide, clozapine  - Dihydroergotamine, ergotamine, methylergonovine  - Lovastatin, simvastatin  - Sildenafil (Revatio®) for pulmonary arterial hypertension (PAH)  - Triazolam, oral midazolam  - Apalutamide  - Carbamazepine, phenobarbital, phenytoin  - Rifampin  - Potlatch’s Wort (hypericum perforatum)    What are the important possible side effects of PAXLOVID? Possible side effects of PAXLOVID are:  - Liver Problems. Tell your healthcare provider right away if you have any of these signs and symptoms of liver problems: loss of appetite, yellowing of your skin and the whites of eyes (jaundice), dark-colored urine, pale colored stools and itchy skin, stomach area (abdominal) pain. - Resistance to HIV Medicines. If you have untreated HIV infection, PAXLOVID may lead to some HIV medicines not working as well in the future. - Other possible side effects include: altered sense of taste, diarrhea, high blood pressure, or muscle aches    These are not all the possible side effects of PAXLOVID. Not many people have taken PAXLOVID. Serious and unexpected side effects may happen. Mustapha Delong is still being studied, so it is possible that all of the risks are not known at this time. What other treatment choices are there? Like [employer] Notice may allow for the emergency use of other medicines to treat people with COVID-19. Go to https://[employer]/ for information on the emergency use of other medicines that are authorized by FDA to treat people with COVID-19. Your healthcare provider may talk with you about clinical trials for which you may be eligible. It is your choice to be treated or not to be treated with PAXLOVID. Should you decide not to receive it or for your child not to receive it, it will not change your standard medical care. What if I am pregnant or breastfeeding? There is no experience treating pregnant women or breastfeeding mothers with PAXLOVID. For a mother and unborn baby, the benefit of taking PAXLOVID may be greater than the risk from the treatment.  If you are pregnant, discuss your options and specific situation with your healthcare provider. It is recommended that you use effective barrier contraception or do not have sexual activity while taking PAXLOVID. If you are breastfeeding, discuss your options and specific situation with your healthcare provider. How do I report side effects with PAXLOVID? Contact your healthcare provider if you have any side effects that bother you or do not go away. Report side effects to FDA MedWatch at www.fda.gov/medwatch or call 1-954-GBL5641 or you can report side effects to [facility]. at the contact information provided below. Website Fax number Telephone number   [employer] [phone]     How should I store [address] [place] [address]? Store PAXLOVID tablets at room temperature between 68°F to 77°F (20°C to 25°C). Full fact sheet for patients, parents, and caregivers can be found at: [employer]    I have spent a total time of 20 minutes on the day of the encounter for this patient including risks and benefits of treatment options, instructions for management, patient and family education, importance of treatment compliance, risk factor reductions and reviewing/ordering tests, medicine, procedures. Encounter provider: Maciej Lemons MD     Provider located at: [address] [address]     Recent Visits  No visits were found meeting these conditions. Showing recent visits within past 7 days and meeting all other requirements  Today's Visits  Date Type Provider Dept   07/31/23 Office Visit Maciej Lemons MD [facility]   Showing today's visits and meeting all other requirements  Future Appointments  No visits were found meeting these conditions. Showing future appointments within next 150 days and meeting all other requirements     Subjective:   Wendi Mancini is a 68 y.o. female who is concerned about COVID-19.  Patient's symptoms include nasal congestion, rhinorrhea, sore throat and cough. Patient denies fever, chills, fatigue, malaise, anosmia, loss of taste, shortness of breath, chest tightness, abdominal pain, nausea, vomiting, diarrhea, myalgias and headaches. - Date of symptom onset: 7/29/2023      COVID-19 vaccination status: Fully vaccinated with booster    Exposure:   Contact with a person who is under investigation (PUI) for or who is positive for COVID-19 within the last 14 days?: No    Hospitalized recently for fever and/or lower respiratory symptoms?: No      Currently a healthcare worker that is involved in direct patient care?: No      Works in a special setting where the risk of COVID-19 transmission may be high? (this may include long-term care, correctional and assisted facilities; homeless shelters; assisted-living facilities and group homes.): No      Resident in a special setting where the risk of COVID-19 transmission may be high? (this may include long-term care, correctional and assisted facilities; homeless shelters; assisted-living facilities and group homes.): No      73yoF pmhx allergies, anxiety, DM, GERD, HLD, HTN, hypothyroidism, obesity, RLS, vitamin D dfx presents acutely for scratchy throat that started on Saturday followed by sneezing, coughing and runny nose with productive cough with light yellow phlegm. Patient does not complain of pleuritic chest pain today. Denies any fevers. No known recent sick contacts. Says she has never had COVID before. Also recent COVID boosters for 30th. Lab Results   Component Value Date    SARSCOVAG Positive (A) 07/31/2023       Review of Systems   Constitutional: Negative for chills, fatigue and fever. HENT: Positive for congestion, rhinorrhea and sore throat. Respiratory: Positive for cough. Negative for chest tightness and shortness of breath. Gastrointestinal: Negative for abdominal pain, diarrhea, nausea and vomiting.    Musculoskeletal: Negative for myalgias. Neurological: Negative for headaches.      Current Outpatient Medications on File Prior to Visit   Medication Sig   • aspirin (ECOTRIN LOW STRENGTH) 81 mg EC tablet Take 81 mg by mouth daily   • atorvastatin (LIPITOR) 80 mg tablet Take 1 tablet (80 mg total) by mouth daily   • Blood Glucose Monitoring Suppl (ONE TOUCH ULTRA MINI) w/Device KIT Please dispense 1 Kit   • Cholecalciferol (Vitamin D3) 25 MCG (1000 UT) CAPS Take 2000 IU with dinner   • ezetimibe (ZETIA) 10 mg tablet Take 10 mg by mouth daily   • famotidine-calcium carbonate-magnesium hydroxide (PEPCID COMPLETE) -165 MG CHEW Chew 1 tablet daily as needed for heartburn   • fluticasone (FLONASE) 50 mcg/act nasal spray 1 spray into each nostril daily   • glucose blood (OneTouch Ultra) test strip Use once a day   • Invokana 300 MG TABS TAKE 1 TABLET BY MOUTH  DAILY   • Lancet Devices (OneTouch Delica Plus Lancing) MISC USE WITH LANCETS TO CHECK SUGAR   • Lancets (OneTouch Delica Plus [id number]) MISC USE TO CHECK SUGARS TWICE EVERY OTHER DAY   • Lancets Misc. (ONE TOUCH SURESOFT) MISC Use lancet to check sugar   • levocetirizine (XYZAL) 5 MG tablet Take 1 tablet (5 mg total) by mouth every evening   • levothyroxine 112 mcg tablet TAKE 1 TABLET BY MOUTH DAILY   • nebivolol (Bystolic) 5 mg tablet Take 0.5 tablets (2.5 mg total) by mouth daily   • omeprazole (PriLOSEC) 40 MG capsule Take 1 capsule (40 mg total) by mouth daily   • potassium chloride (Klor-Con) 10 mEq tablet TAKE 1 TABLET BY MOUTH TWICE  DAILY   • pramipexole (MIRAPEX) 0.25 mg tablet Take 1 tablet (0.25 mg total) by mouth daily at bedtime   • ramipril (ALTACE) 2.5 mg capsule Take 1 capsule (2.5 mg total) by mouth daily   • semaglutide, 0.25 or 0.5 mg/dose, (Ozempic, 0.25 or 0.5 MG/DOSE,) 2 mg/3 mL injection pen Inject 0.75 mL (0.5 mg total) under the skin every 7 days   • solifenacin (VESICARE) 10 MG tablet Take 1 tablet (10 mg total) by mouth daily   • valACYclovir (VALTREX) 1,000 mg tablet Take 2 tablets (2,000 mg total) by mouth 2 (two) times a day for 1 day   • baclofen 10 mg tablet Take 1 tablet (10 mg total) by mouth 3 (three) times a day for 10 days (Patient not taking: Reported on 6/6/2023)       Objective:    /68   Pulse 81   Temp 97.8 °F (36.6 °C)   Resp 16   Ht 5' 2.99" (1.6 m)   Wt 112 kg (248 lb)   LMP 01/01/2004   SpO2 97%   BMI 43.95 kg/m²        Physical Exam  Constitutional:       Appearance: Normal appearance. HENT:      Head: Normocephalic and atraumatic. Right Ear: Tympanic membrane, ear canal and external ear normal.      Left Ear: Tympanic membrane, ear canal and external ear normal.      Nose: Nose normal.      Mouth/Throat:      Mouth: Mucous membranes are moist.      Pharynx: Oropharynx is clear. No posterior oropharyngeal erythema. Eyes:      Conjunctiva/sclera: Conjunctivae normal.   Cardiovascular:      Rate and Rhythm: Normal rate and regular rhythm. Pulses: Normal pulses. Heart sounds: Murmur (known) heard. Pulmonary:      Effort: Pulmonary effort is normal. No respiratory distress. Breath sounds: Normal breath sounds. Neurological:      Mental Status: She is alert and oriented to person, place, and time.    Psychiatric:         Mood and Affect: Mood normal.         Behavior: Behavior normal.       Aureliano Gunderson MD

## 2023-08-01 DIAGNOSIS — K21.9 GASTROESOPHAGEAL REFLUX DISEASE WITHOUT ESOPHAGITIS: ICD-10-CM

## 2023-08-01 RX ORDER — OMEPRAZOLE 40 MG/1
40 CAPSULE, DELAYED RELEASE ORAL DAILY
Qty: 90 CAPSULE | Refills: 1 | Status: SHIPPED | OUTPATIENT
Start: 2023-08-01

## 2023-08-14 DIAGNOSIS — J30.2 SEASONAL ALLERGIES: ICD-10-CM

## 2023-08-14 DIAGNOSIS — E78.2 MIXED HYPERLIPIDEMIA: ICD-10-CM

## 2023-08-14 DIAGNOSIS — E11.65 TYPE 2 DIABETES MELLITUS WITH HYPERGLYCEMIA, WITHOUT LONG-TERM CURRENT USE OF INSULIN (HCC): ICD-10-CM

## 2023-08-14 DIAGNOSIS — I25.10 CORONARY ARTERY DISEASE INVOLVING NATIVE CORONARY ARTERY OF NATIVE HEART WITHOUT ANGINA PECTORIS: Primary | ICD-10-CM

## 2023-08-14 RX ORDER — EZETIMIBE 10 MG/1
10 TABLET ORAL DAILY
Qty: 90 TABLET | Refills: 1 | Status: SHIPPED | OUTPATIENT
Start: 2023-08-14

## 2023-08-14 RX ORDER — LEVOCETIRIZINE DIHYDROCHLORIDE 5 MG/1
5 TABLET, FILM COATED ORAL EVERY EVENING
Qty: 90 TABLET | Refills: 0 | Status: SHIPPED | OUTPATIENT
Start: 2023-08-14

## 2023-08-14 RX ORDER — SEMAGLUTIDE 0.68 MG/ML
INJECTION, SOLUTION SUBCUTANEOUS
Qty: 3 ML | Refills: 1 | Status: SHIPPED | OUTPATIENT
Start: 2023-08-14

## 2023-08-31 ENCOUNTER — TELEPHONE (OUTPATIENT)
Dept: GASTROENTEROLOGY | Facility: CLINIC | Age: 73
End: 2023-08-31

## 2023-08-31 ENCOUNTER — OFFICE VISIT (OUTPATIENT)
Dept: GASTROENTEROLOGY | Facility: CLINIC | Age: 73
End: 2023-08-31
Payer: COMMERCIAL

## 2023-08-31 VITALS
DIASTOLIC BLOOD PRESSURE: 88 MMHG | SYSTOLIC BLOOD PRESSURE: 124 MMHG | HEIGHT: 63 IN | BODY MASS INDEX: 44.12 KG/M2 | HEART RATE: 81 BPM | WEIGHT: 249 LBS

## 2023-08-31 DIAGNOSIS — K21.9 GASTROESOPHAGEAL REFLUX DISEASE WITHOUT ESOPHAGITIS: ICD-10-CM

## 2023-08-31 DIAGNOSIS — Z12.11 COLON CANCER SCREENING: ICD-10-CM

## 2023-08-31 DIAGNOSIS — Z86.010 HX OF ADENOMATOUS COLONIC POLYPS: Primary | ICD-10-CM

## 2023-08-31 PROCEDURE — 99204 OFFICE O/P NEW MOD 45 MIN: CPT | Performed by: NURSE PRACTITIONER

## 2023-08-31 NOTE — TELEPHONE ENCOUNTER
Scheduled date of colonoscopy (as of today):09/13/23  Physician performing colonoscopy:Dr. Alexia Culver  Location of colonoscopy:[facility]  Bowel prep reviewed with patient:Gabriel  Instructions reviewed with patient by:ti  Clearances: Cardiac clearance by Dr. Christelle Edmonds. Will check next week if not returned.

## 2023-08-31 NOTE — H&P (VIEW-ONLY)
[facility] - Outpatient Consultation  Parag Pickett 68 y.o. female MRN: [id number]  Encounter: [id number]          ASSESSMENT AND PLAN:      1. Hx of adenomatous colonic polyps  Patient has history of adenomatous colon polyps. Last colonoscopy done 5/16/2018 which showed internal hemorrhoids and no polyps found at that time. Patient denies any GI issues. Patient denies blood in stool, blood from rectal area, black tarry stool, abdominal pain, or change in bowel habits.  - Colonoscopy; schedule for colonoscopy. Prep and procedure explained to patient in detail. Further recommendations pending results of colonoscopy. - polyethylene glycol (GOLYTELY) 4000 mL solution; Take 4,000 mL by mouth once for 1 dose Take as directed by office prior to procedure  Dispense: 4000 mL; Refill: 0  -Patient follows with cardiologist Dr. Melo Score -will obtain cardiac clearance prior to procedure. 2. Colon cancer screening  Up-to-date. 3. Gastroesophageal reflux disease without esophagitis  Reports GERD is   controlled with omeprazole. Patient denies nausea, vomiting, acid reflux, heartburn, epigastric or abdominal pain. Continue omeprazole 40 Mg daily  Continue antireflux diet and measures. ______________________________________________________________________    HPI:  Parag Pickett is a 41-year-old female with type 2 DM,  hypothyroidism, CAD, hypertension, aortic stenosis, GERD, colon polyps patient has history of colon polyps who presents to office as consult. .  Patient is due for surveillance of colon polyps. Patient denies any GI issues. Reports GERD is   controlled with omeprazole. Patient denies nausea, vomiting, acid reflux, heartburn, epigastric or abdominal pain. Patient denies blood in stool, blood from rectal area, or black tarry stool. Bowel patterns are regular. Denies diarrhea or constipation. Abdomen exam benign.     Patient was positive for Covid in July 2023 and treated with Paxlovid. Patient does not smoke. No family history of gastric or colon cancer. Patient is on no blood thinners except for aspirin. Colonoscopy done 5/16/2018 Showed internal hemorrhoids no polyps found at that time. Patient follows with cardiologist Dr. Tina Ceron. REVIEW OF SYSTEMS:    CONSTITUTIONAL: Denies any fever, chills, rigors, and weight loss. HEENT: No earache or tinnitus. Denies hearing loss or visual disturbances. CARDIOVASCULAR: No chest pain or palpitations. RESPIRATORY: Denies any cough, hemoptysis, shortness of breath or dyspnea on exertion. GASTROINTESTINAL: As noted in the History of Present Illness. GENITOURINARY: No problems with urination. Denies any hematuria or dysuria. NEUROLOGIC: No dizziness or vertigo, denies headaches. MUSCULOSKELETAL: Denies any muscle or joint pain. SKIN: Denies skin rashes or itching. ENDOCRINE: Denies excessive thirst. Denies intolerance to heat or cold. PSYCHOSOCIAL: Denies depression or anxiety. Denies any recent memory loss. Historical Information   Past Medical History:   Diagnosis Date   • Allergic 2018   weekly inject.    • Allergic rhinitis    • Allergies    • Anemia    • Anxiety    • Arthritis    • Chronic constipation    • Diabetes ([address])    • Diabetes mellitus ([address])    • Disease of thyroid gland    • GERD (gastroesophageal reflux disease)    • Headache(784.0)    • Heart murmur    • Hyperlipidemia    • Hypertension    • Hypothyroidism    • Obesity    • Obesity    • Restless legs    • Vitamin D deficiency      Past Surgical History:   Procedure Laterality Date   • BREAST BIOPSY Right 2001   • CATARACT EXTRACTION     • CATARACT EXTRACTION, BILATERAL  05/2021   • CHOLECYSTECTOMY     • COLONOSCOPY  06/01/2018    goes q 5 yr-due in Summer 2018-DrElta Cure   • CORONARY ANGIOPLASTY  2015    Dr Sapna Barragan   • GASTRIC BYPASS  01/01/2000    Dr. Gerson Ramos   • [place] BIOPSY  2001   • [address]  9/26/2013   • [address]  10/17/2016   • US GUIDED FINE NEEDLE ASPIRATION (ALL INC)  10/15/2013     Social History   Social History     Substance and Sexual Activity   Alcohol Use Yes    Comment: on special occasions     Social History     Substance and Sexual Activity   Drug Use Never     Social History     Tobacco Use   Smoking Status Never   Smokeless Tobacco Never     Family History   Problem Relation Age of Onset   • Multiple sclerosis Brother    • Thyroid disease unspecified Brother    • Multiple sclerosis Paternal Uncle    • Diabetes Paternal Uncle    • Hypertension Father            • Diabetes Father    • Heart disease Father    • Diabetes type II Father            • Multiple sclerosis Family    • Diabetes Paternal Aunt    • Cancer Neg Hx    • Stroke Neg Hx    • Obesity Neg Hx        Meds/Allergies       Current Outpatient Medications:   •  aspirin (ECOTRIN LOW STRENGTH) 81 mg EC tablet  •  atorvastatin (LIPITOR) 80 mg tablet  •  Blood Glucose Monitoring Suppl (ONE TOUCH ULTRA MINI) w/Device KIT  •  Cholecalciferol (Vitamin D3) 25 MCG (1000 UT) CAPS  •  ezetimibe (ZETIA) 10 mg tablet  •  famotidine-calcium carbonate-magnesium hydroxide (PEPCID COMPLETE) -165 MG CHEW  •  fluticasone (FLONASE) 50 mcg/act nasal spray  •  glucose blood (OneTouch Ultra) test strip  •  Invokana 300 MG TABS  •  Lancet Devices (OneTouch Delica Plus Lancing) MISC  •  Lancets (OneTouch Delica Plus [id number]) MISC  •  Lancets Misc. (ONE TOUCH SURESOFT) MISC  •  levocetirizine (XYZAL) 5 MG tablet  •  levothyroxine 112 mcg tablet  •  nebivolol (Bystolic) 5 mg tablet  •  omeprazole (PriLOSEC) 40 MG capsule  •  Ozempic, 0.25 or 0.5 MG/DOSE, 2 MG/3ML injection pen  •  polyethylene glycol (GOLYTELY) 4000 mL solution  •  potassium chloride (Klor-Con) 10 mEq tablet  •  pramipexole (MIRAPEX) 0.25 mg tablet  •  ramipril (ALTACE) 2.5 mg capsule  •  solifenacin (VESICARE) 10 MG tablet  •  valACYclovir (VALTREX) 1,000 mg tablet  •  baclofen 10 mg tablet    Allergies Allergen Reactions   • Other Sneezing     Seasonal, dust, Cat dander, mold   • Penicillins Hives           Objective     Blood pressure 124/88, pulse 81, height 5' 3" (1.6 m), weight 113 kg (249 lb), last menstrual period 01/01/2004. Body mass index is 44.11 kg/m². PHYSICAL EXAM:      General Appearance:   Alert, cooperative, no distress   HEENT:   Normocephalic, atraumatic, anicteric.     Neck:  Supple, symmetrical, trachea midline   Lungs:   Clear to auscultation bilaterally; no rales, rhonchi or wheezing; respirations unlabored    Heart[de-identified]   Regular rate and rhythm; positive murmur,  No rub, or gallop. Abdomen:   Soft, non-tender, non-distended; normal bowel sounds; no masses, no organomegaly    Genitalia:   Deferred    Rectal:   Deferred    Extremities:  No cyanosis, clubbing or edema    Pulses:  2+ and symmetric    Skin:  No jaundice, rashes, or lesions    Lymph nodes:  No palpable cervical lymphadenopathy        Lab Results:   No visits with results within 1 Day(s) from this visit. Latest known visit with results is:   Office Visit on 07/31/2023   Component Date Value   • POCT SARS-CoV-2 Ag 07/31/2023 Positive (A)    • VALID CONTROL 07/31/2023 Valid    •  RAPID STREP A 07/31/2023 Negative          Radiology Results:   No results found.

## 2023-08-31 NOTE — TELEPHONE ENCOUNTER
[address] [facility] Assessment    Name: Kamryn Cerna  YOB: 1950  Last Height: 5' 3" (1.6 m)  Last weight: 113 kg (249 lb)  BMI: 44.11 kg/m²  Procedure: Colon  Diagnosis:   Date of procedure: 09/13/23  Prep: Golytely  Responsible : Edd Riley  Phone#: [phone]  Name completing form: Elex Never  Date form completed: 08/31/23      If the patient answers yes to any of these questions, schedule in a hospital  Are you pregnant: No  Do you rely on a wheelchair for mobility: No  Have you been diagnosed with End Stage Renal Disease (ESRD): No  Do you need oxygen during the day: No  Have you had a heart attack or stroke within the past three months: No  Have you had a seizure within the past three months: No  Have you ever been informed by anesthesia that you have a difficult airway: No  Additional Questions  Have you had any cardiac testing or are under the care of a Cardiologist (see cardiac list): Yes (Comment: Obtain Cardiac Clearance)  Cardiac list:   Do you have an implanted cardiac defibrillator: No (Comment:  This patient should be scheduled in the hospital)    Have any bleeding problems, such as anemia or hemophilia (If patient has H&H result below 8, schedule in hospital.  H&H must be within 30 days of procedure): No    Had an organ transplant within the past 3 months: No    Do you have any present infections: No  Do you get short of breath when walking a few blocks: No  Have you been diagnosed with diabetes: Yes  Comments (provide cardiac provider information if applicable): Dr Rukhsana Galarza, blockage opened up 6-7 yrs ago, sees every 6 months

## 2023-09-06 ENCOUNTER — TELEPHONE (OUTPATIENT)
Dept: GASTROENTEROLOGY | Facility: AMBULATORY SURGERY CENTER | Age: 73
End: 2023-09-06

## 2023-09-06 NOTE — TELEPHONE ENCOUNTER
Patients GI provider:  Dr. De Leon Rita    Number to return call: [phone]    Reason for call: Pt contacting office regarding Diabetic Medication hold. Patient states message was left on voicemail but she was unable to understanding. Please contact patient to further discuss.      Scheduled procedure/appointment date if applicable: 7/57/6343 COLON DR De Leon Rita

## 2023-09-06 NOTE — TELEPHONE ENCOUNTER
I returned her call about the diabetic medications. She wasn't even aware of the Ozempic. She will not take Sunday, last taken 9/3 and the Invokana she will hold 4 days. I suggested for her to monitor her sugars more frequently those days and to call her endocrinologist to see if they have any recommendations if her sugar was to get too high. She will call them.

## 2023-09-07 ENCOUNTER — TELEPHONE (OUTPATIENT)
Dept: ENDOCRINOLOGY | Facility: CLINIC | Age: 73
End: 2023-09-07

## 2023-09-08 NOTE — TELEPHONE ENCOUNTER
Patient called for instructions for before and after her colonoscopy.  Please advise
Patient notified and verbalized understanding
The Ozempic she can continue weekly. The Invokana she can stop for 2 days prior to colonoscopy and then resume once her colonoscopy is completed.
no

## 2023-09-11 ENCOUNTER — OFFICE VISIT (OUTPATIENT)
Dept: UROLOGY | Facility: CLINIC | Age: 73
End: 2023-09-11
Payer: COMMERCIAL

## 2023-09-11 VITALS
BODY MASS INDEX: 43.77 KG/M2 | SYSTOLIC BLOOD PRESSURE: 120 MMHG | DIASTOLIC BLOOD PRESSURE: 80 MMHG | HEART RATE: 71 BPM | RESPIRATION RATE: 18 BRPM | OXYGEN SATURATION: 96 % | WEIGHT: 247 LBS | HEIGHT: 63 IN

## 2023-09-11 DIAGNOSIS — N32.81 OVERACTIVE BLADDER: Primary | ICD-10-CM

## 2023-09-11 PROCEDURE — 99213 OFFICE O/P EST LOW 20 MIN: CPT | Performed by: PHYSICIAN ASSISTANT

## 2023-09-11 NOTE — PROGRESS NOTES
1. Overactive bladder            Assessment and plan:     1. Overactive bladder  2. Urge urinary incontinence. - proper hdyration, timed voiding, minimize bladder irritants  - RTC 1 year    [address]       Chief Complaint     Overactive bladder    History of Present Illness     Chivo Lucas is a 68 y.o. female presenting today for follow up of overactive bladder. Patient reports over the past 1-2 years she has been having increasing urinary frequency, urgecy, and urge urinary incontinence. Her primary care provider had referred her to pelvic floor physical therapy in which she noticed improvement however ongoing symptoms. Started on vesicare 5mg with good response. Denies any adverse side effects. Increasing incontinence over the past year now. Still wearing depends and pads. At last visit increased to vesicare 10mg. Noticing good urinary improvement. Patient denies any dysuria, gross hematuria, flank pain, nausea, vomiting, fevers, or chills. Denies any prior  surgical manipulation. Medical comorbidities include type 2 diabetes, goiter, hypothyroidism, hypertension, obesity. Patient is on SGLT 2 inhibitor for her diabetic management. Laboratory     Lab Results   Component Value Date    CREATININE 0.89 05/22/2023       Review of Systems     Review of Systems   Constitutional: Negative for activity change, appetite change, chills, diaphoresis, fatigue, fever and unexpected weight change. Respiratory: Negative for chest tightness and shortness of breath. Cardiovascular: Negative for chest pain, palpitations and leg swelling. Gastrointestinal: Negative for abdominal distention, abdominal pain, constipation, diarrhea, nausea and vomiting. Genitourinary: Positive for frequency and urgency. Negative for decreased urine volume, difficulty urinating, dysuria, enuresis, flank pain, genital sores and hematuria. Musculoskeletal: Negative for back pain, gait problem and myalgias. Skin: Negative for color change, pallor, rash and wound. Psychiatric/Behavioral: Negative for behavioral problems. The patient is not nervous/anxious. Allergies     Allergies   Allergen Reactions   • Other Sneezing     Seasonal, dust, Cat dander, mold   • Penicillins Hives       Physical Exam     Physical Exam  Constitutional:       General: She is not in acute distress. Appearance: Normal appearance. She is obese. She is not ill-appearing, toxic-appearing or diaphoretic. HENT:      Head: Normocephalic and atraumatic. Eyes:      General:         Right eye: No discharge. Conjunctiva/sclera: Conjunctivae normal.   Pulmonary:      Effort: Pulmonary effort is normal. No respiratory distress. Musculoskeletal:         General: No swelling or tenderness. Normal range of motion. Skin:     General: Skin is warm and dry. Coloration: Skin is not jaundiced or pale. Neurological:      General: No focal deficit present. Mental Status: She is alert and oriented to person, place, and time. Psychiatric:         Mood and Affect: Mood normal.         Behavior: Behavior normal.         Thought Content:  Thought content normal.         Judgment: Judgment normal.           Vital Signs     Vitals:    09/11/23 1010   BP: 120/80   BP Location: Left arm   Patient Position: Sitting   Cuff Size: Adult   Pulse: 71   Resp: 18   SpO2: 96%   Weight: 112 kg (247 lb)   Height: 5' 3" (1.6 m)         Current Medications       Current Outpatient Medications:   •  aspirin (ECOTRIN LOW STRENGTH) 81 mg EC tablet, Take 81 mg by mouth daily, Disp: , Rfl:   •  atorvastatin (LIPITOR) 80 mg tablet, Take 1 tablet (80 mg total) by mouth daily, Disp: 90 tablet, Rfl: 0  •  Blood Glucose Monitoring Suppl (ONE TOUCH ULTRA MINI) w/Device KIT, Please dispense 1 Kit, Disp: 1 each, Rfl: 0  •  Cholecalciferol (Vitamin D3) 25 MCG (1000 UT) CAPS, Take 2000 IU with dinner, Disp: , Rfl:   •  ezetimibe (ZETIA) 10 mg tablet, Take 1 tablet (10 mg total) by mouth daily, Disp: 90 tablet, Rfl: 1  •  famotidine-calcium carbonate-magnesium hydroxide (PEPCID COMPLETE) -165 MG CHEW, Chew 1 tablet daily as needed for heartburn, Disp: , Rfl:   •  fluticasone (FLONASE) 50 mcg/act nasal spray, 1 spray into each nostril daily, Disp: , Rfl:   •  glucose blood (OneTouch Ultra) test strip, Use once a day, Disp: 100 each, Rfl: 3  •  Invokana 300 MG TABS, TAKE 1 TABLET BY MOUTH  DAILY, Disp: 90 tablet, Rfl: 3  •  Lancet Devices (OneTouch Delica Plus Lancing) MISC, USE WITH LANCETS TO CHECK SUGAR, Disp: , Rfl:   •  Lancets (OneTouch Delica Plus [id number]) MISC, USE TO CHECK SUGARS TWICE EVERY OTHER DAY, Disp: 100 each, Rfl: 1  •  Lancets Misc. (ONE TOUCH SURESOFT) MISC, Use lancet to check sugar, Disp: 1 each, Rfl: 0  •  levocetirizine (XYZAL) 5 MG tablet, Take 1 tablet (5 mg total) by mouth every evening, Disp: 90 tablet, Rfl: 0  •  levothyroxine 112 mcg tablet, TAKE 1 TABLET BY MOUTH DAILY, Disp: 90 tablet, Rfl: 3  •  nebivolol (Bystolic) 5 mg tablet, Take 0.5 tablets (2.5 mg total) by mouth daily, Disp: 90 tablet, Rfl: 0  •  omeprazole (PriLOSEC) 40 MG capsule, TAKE 1 CAPSULE BY MOUTH DAILY, Disp: 90 capsule, Rfl: 1  •  Ozempic, 0.25 or 0.5 MG/DOSE, 2 MG/3ML injection pen, INJECT 0.38 ML (0.5 MG TOTAL) UNDER THE SKIN EVERY 7 DAYS, Disp: 3 mL, Rfl: 1  •  potassium chloride (Klor-Con) 10 mEq tablet, TAKE 1 TABLET BY MOUTH TWICE  DAILY, Disp: 180 tablet, Rfl: 3  •  pramipexole (MIRAPEX) 0.25 mg tablet, Take 1 tablet (0.25 mg total) by mouth daily at bedtime, Disp: 90 tablet, Rfl: 1  •  ramipril (ALTACE) 2.5 mg capsule, Take 1 capsule (2.5 mg total) by mouth daily, Disp: 90 capsule, Rfl: 1  •  solifenacin (VESICARE) 10 MG tablet, Take 1 tablet (10 mg total) by mouth daily, Disp: 90 tablet, Rfl: 3  •  baclofen 10 mg tablet, Take 1 tablet (10 mg total) by mouth 3 (three) times a day for 10 days (Patient not taking: Reported on 6/6/2023), Disp: 30 tablet, Rfl: 0  •  polyethylene glycol (GOLYTELY) 4000 mL solution, Take 4,000 mL by mouth once for 1 dose Take as directed by office prior to procedure, Disp: 4000 mL, Rfl: 0  •  valACYclovir (VALTREX) 1,000 mg tablet, Take 2 tablets (2,000 mg total) by mouth 2 (two) times a day for 1 day, Disp: 8 tablet, Rfl: 2      Active Problems     Patient Active Problem List   Diagnosis   • Acquired hypothyroidism   • Nontoxic multinodular goiter   • Type 2 diabetes mellitus with hyperglycemia, without long-term current use of insulin ([address])   • Essential hypertension   • Mixed hyperlipidemia   • Vitamin D deficiency   • Allergic rhinitis   • Coronary artery disease involving native coronary artery of native heart without angina pectoris   • Moderate aortic stenosis   • Severe obesity (BMI >= 40) ([facility])   • Seasonal allergies   • Gastroesophageal reflux disease without esophagitis   • COVID   • Strain of lumbar region         Past Medical History     Past Medical History:   Diagnosis Date   • Allergic 2018   weekly inject.    • Allergic rhinitis    • Allergies    • Anemia    • Anxiety    • Arthritis    • Chronic constipation    • Diabetes ([address])    • Diabetes mellitus ([address])    • Disease of thyroid gland    • GERD (gastroesophageal reflux disease)    • Headache(784.0)    • Heart murmur    • Hyperlipidemia    • Hypertension    • Hypothyroidism    • Obesity    • Obesity    • Restless legs    • Vitamin D deficiency          Surgical History     Past Surgical History:   Procedure Laterality Date   • BREAST BIOPSY Right 2001   • CATARACT EXTRACTION     • CATARACT EXTRACTION, BILATERAL  05/2021   • CHOLECYSTECTOMY     • COLONOSCOPY  06/01/2018    goes q 5 yr-due in Summer 2018-Giancarlo Rosenthal   • CORONARY ANGIOPLASTY  2015    Dr Ena Davies   • GASTRIC BYPASS  01/01/2000    Dr. Chiki Reddy   • [facility] BIOPSY  2001   • [address] [facility] [address]  9/26/2013   • US GUIDANCE  10/17/2016   • [facility] [employer]  10/15/2013         Family History Family History   Problem Relation Age of Onset   • Multiple sclerosis Brother    • Thyroid disease unspecified Brother    • Multiple sclerosis Paternal Uncle    • Diabetes Paternal Uncle    • Hypertension Father            • Diabetes Father    • Heart disease Father    • Diabetes type II Father            • Multiple sclerosis Family    • Diabetes Paternal Aunt    • Cancer Neg Hx    • Stroke Neg Hx    • Obesity Neg Hx          Social History     Social History       Radiology Number Of Freeze-Thaw Cycles: 1 freeze-thaw cycle Render Post-Care Instructions In Note?: yes Post-Care Instructions: Given wound care instructions. Duration Of Freeze Thaw-Cycle (Seconds): 0 Total Number Of Aks Treated: 17 Detail Level: Zone Consent: The patient's consent was obtained including but not limited to risks of crusting, scabbing, blistering, scarring, darker or lighter pigmentary change, recurrence, incomplete removal and infection. Post-Care Instructions: I reviewed with the patient in detail post-care instructions. Patient is to wear sunprotection, and avoid picking at any of the treated lesions. Pt may apply Vaseline to crusted or scabbing areas.\\nDone at no charge. Total Number Of Lesions Treated: 1 Consent: The patient's consent was obtained including but not limited to risks of crusting, scabbing, blistering, scarring, darker or lighter pigmentary change, recurrence, incomplete removal and infection.\\nDone at no charge. Include Z78.9 (Other Specified Conditions Influencing Health Status) As An Associated Diagnosis?: No Medical Necessity Clause: This procedure was medically necessary because the lesions that were treated were: Detail Level: Simple Medical Necessity Information: It is in your best interest to select a reason for this procedure from the list below. All of these items fulfill various CMS LCD requirements except the new and changing color options. Spray Paint Text: The liquid nitrogen was applied to the skin utilizing a spray paint frosting technique.

## 2023-09-12 ENCOUNTER — ANESTHESIA EVENT (OUTPATIENT)
Dept: GASTROENTEROLOGY | Facility: AMBULATORY SURGERY CENTER | Age: 73
End: 2023-09-12

## 2023-09-13 ENCOUNTER — ANESTHESIA (OUTPATIENT)
Dept: GASTROENTEROLOGY | Facility: AMBULATORY SURGERY CENTER | Age: 73
End: 2023-09-13

## 2023-09-13 ENCOUNTER — HOSPITAL ENCOUNTER (OUTPATIENT)
Dept: GASTROENTEROLOGY | Facility: AMBULATORY SURGERY CENTER | Age: 73
Discharge: HOME | End: 2023-09-13
Payer: COMMERCIAL

## 2023-09-13 VITALS
BODY MASS INDEX: 43.77 KG/M2 | HEIGHT: 63 IN | SYSTOLIC BLOOD PRESSURE: 137 MMHG | RESPIRATION RATE: 18 BRPM | DIASTOLIC BLOOD PRESSURE: 77 MMHG | HEART RATE: 65 BPM | WEIGHT: 247 LBS | OXYGEN SATURATION: 95 % | TEMPERATURE: 98.4 F

## 2023-09-13 DIAGNOSIS — Z86.010 HX OF ADENOMATOUS COLONIC POLYPS: ICD-10-CM

## 2023-09-13 PROCEDURE — 45380 COLONOSCOPY AND BIOPSY: CPT | Performed by: INTERNAL MEDICINE

## 2023-09-13 PROCEDURE — 45385 COLONOSCOPY W/LESION REMOVAL: CPT | Performed by: INTERNAL MEDICINE

## 2023-09-13 RX ORDER — PROPOFOL 10 MG/ML
INJECTION, EMULSION INTRAVENOUS AS NEEDED
Status: DISCONTINUED | OUTPATIENT
Start: 2023-09-13 | End: 2023-09-13

## 2023-09-13 RX ORDER — SODIUM CHLORIDE, SODIUM LACTATE, POTASSIUM CHLORIDE, CALCIUM CHLORIDE 600; 310; 30; 20 MG/100ML; MG/100ML; MG/100ML; MG/100ML
INJECTION, SOLUTION INTRAVENOUS CONTINUOUS PRN
Status: DISCONTINUED | OUTPATIENT
Start: 2023-09-13 | End: 2023-09-13

## 2023-09-13 RX ADMIN — SODIUM CHLORIDE, SODIUM LACTATE, POTASSIUM CHLORIDE, CALCIUM CHLORIDE: 600; 310; 30; 20 INJECTION, SOLUTION INTRAVENOUS at 06:59

## 2023-09-13 RX ADMIN — PROPOFOL 100 MG: 10 INJECTION, EMULSION INTRAVENOUS at 07:37

## 2023-09-13 RX ADMIN — PROPOFOL 100 MG: 10 INJECTION, EMULSION INTRAVENOUS at 07:42

## 2023-09-13 RX ADMIN — SODIUM CHLORIDE, SODIUM LACTATE, POTASSIUM CHLORIDE, CALCIUM CHLORIDE: 600; 310; 30; 20 INJECTION, SOLUTION INTRAVENOUS at 07:58

## 2023-09-13 RX ADMIN — PROPOFOL 100 MG: 10 INJECTION, EMULSION INTRAVENOUS at 07:47

## 2023-09-13 RX ADMIN — PROPOFOL 100 MG: 10 INJECTION, EMULSION INTRAVENOUS at 07:40

## 2023-09-13 RX ADMIN — PROPOFOL 50 MG: 10 INJECTION, EMULSION INTRAVENOUS at 07:51

## 2023-09-13 NOTE — ANESTHESIA PREPROCEDURE EVALUATION
Procedure:  COLONOSCOPY    Relevant Problems   CARDIO   (+) Coronary artery disease involving native coronary artery of native heart without angina pectoris   (+) Essential hypertension   (+) Mixed hyperlipidemia   (+) Moderate aortic stenosis      ENDO   (+) Acquired hypothyroidism   (+) Type 2 diabetes mellitus with hyperglycemia, without long-term current use of insulin (HCC)      GI/HEPATIC   (+) Gastroesophageal reflux disease without esophagitis      MUSCULOSKELETAL   (+) Strain of lumbar region        Physical Exam    Airway    Mallampati score: III  TM Distance: >3 FB  Neck ROM: full     Dental   No notable dental hx     Cardiovascular  Cardiovascular exam normal    Pulmonary  Pulmonary exam normal     Other Findings      Impression: This technically difficult but fair study which showed an   nondilated LV cavity, there is mild concentric LVH with no significant   regional wall motion abnormalities and an estimated LVEF of 60-65%.  Left   atrium is in the upper limits of normal in size.  The aortic   root/ascending aorta are grossly normal in size and function.  There is   mild-moderate mitral annulus calcification.  The aortic valve leaflets are   heavily calcified and has significant restriction in motion, with peak   gradient of 39 and mean gradient of 19 mmHg across this valve, and an   estimated aortic valve area of 1.1 cm².  Color-flow Doppler studies are   significant for trivial aortic valve insufficiency, trace/mild mitral and   tricuspid valve insufficiency.  There is no significant pericardial     Anesthesia Plan  ASA Score- 3     Anesthesia Type- IV sedation with anesthesia with ASA Monitors. Additional Monitors:   Airway Plan:           Plan Factors-Exercise tolerance (METS): >4 METS. Chart reviewed. Patient summary reviewed. Patient is not a current smoker. Induction- intravenous.     Postoperative Plan-     Informed Consent- Anesthetic plan and risks discussed with patient. I personally reviewed this patient with the CRNA. Discussed and agreed on the Anesthesia Plan with the CRNA. Celeste Cosme

## 2023-09-13 NOTE — INTERVAL H&P NOTE
H&P reviewed. After examining the patient I find no changes in the patients condition since the H&P had been written.     Vitals:    09/13/23 0700   BP: 147/76   Pulse: 79   Resp: 18   Temp: 98.4 °F (36.9 °C)   SpO2: 95%

## 2023-09-13 NOTE — ANESTHESIA POSTPROCEDURE EVALUATION
Post-Op Assessment Note    CV Status:  Stable  Pain Score: 0    Pain management: adequate     Mental Status:  Alert and awake   Hydration Status:  Euvolemic   PONV Controlled:  Controlled   Airway Patency:  Patent      Post Op Vitals Reviewed: Yes      Staff: CRNA         No notable events documented.     BP   133/73   Temp   98   Pulse  70   Resp   16   SpO2   98

## 2023-09-21 DIAGNOSIS — I10 ESSENTIAL HYPERTENSION: ICD-10-CM

## 2023-09-21 RX ORDER — POTASSIUM CHLORIDE 750 MG/1
10 TABLET, FILM COATED, EXTENDED RELEASE ORAL 2 TIMES DAILY
Qty: 180 TABLET | Refills: 0 | Status: SHIPPED | OUTPATIENT
Start: 2023-09-21

## 2023-09-28 ENCOUNTER — OFFICE VISIT (OUTPATIENT)
Dept: FAMILY MEDICINE CLINIC | Facility: OTHER | Age: 73
End: 2023-09-28
Payer: COMMERCIAL

## 2023-09-28 VITALS
WEIGHT: 250.6 LBS | HEART RATE: 80 BPM | HEIGHT: 63 IN | OXYGEN SATURATION: 97 % | DIASTOLIC BLOOD PRESSURE: 84 MMHG | SYSTOLIC BLOOD PRESSURE: 138 MMHG | TEMPERATURE: 98.2 F | RESPIRATION RATE: 18 BRPM | BODY MASS INDEX: 44.4 KG/M2

## 2023-09-28 DIAGNOSIS — Z78.0 ASYMPTOMATIC AGE-RELATED POSTMENOPAUSAL STATE: ICD-10-CM

## 2023-09-28 DIAGNOSIS — Z23 ENCOUNTER FOR IMMUNIZATION: ICD-10-CM

## 2023-09-28 DIAGNOSIS — N32.81 OVERACTIVE BLADDER: ICD-10-CM

## 2023-09-28 DIAGNOSIS — Z12.31 ENCOUNTER FOR SCREENING MAMMOGRAM FOR BREAST CANCER: ICD-10-CM

## 2023-09-28 DIAGNOSIS — I35.0 MODERATE AORTIC STENOSIS: ICD-10-CM

## 2023-09-28 DIAGNOSIS — E78.2 MIXED HYPERLIPIDEMIA: ICD-10-CM

## 2023-09-28 DIAGNOSIS — E11.65 TYPE 2 DIABETES MELLITUS WITH HYPERGLYCEMIA, WITHOUT LONG-TERM CURRENT USE OF INSULIN (HCC): ICD-10-CM

## 2023-09-28 DIAGNOSIS — E66.01 SEVERE OBESITY (BMI >= 40) (HCC): ICD-10-CM

## 2023-09-28 DIAGNOSIS — Z00.00 MEDICARE ANNUAL WELLNESS VISIT, SUBSEQUENT: Primary | ICD-10-CM

## 2023-09-28 DIAGNOSIS — G47.61 PERIODIC LIMB MOVEMENT DISORDER (PLMD): ICD-10-CM

## 2023-09-28 LAB — SL AMB POCT HEMOGLOBIN AIC: 6.9 (ref ?–6.5)

## 2023-09-28 PROCEDURE — G0439 PPPS, SUBSEQ VISIT: HCPCS

## 2023-09-28 PROCEDURE — 90662 IIV NO PRSV INCREASED AG IM: CPT

## 2023-09-28 PROCEDURE — G0008 ADMIN INFLUENZA VIRUS VAC: HCPCS

## 2023-09-28 PROCEDURE — G0444 DEPRESSION SCREEN ANNUAL: HCPCS

## 2023-09-28 PROCEDURE — 83036 HEMOGLOBIN GLYCOSYLATED A1C: CPT

## 2023-09-28 RX ORDER — PRAMIPEXOLE DIHYDROCHLORIDE 0.25 MG/1
0.25 TABLET ORAL
Qty: 90 TABLET | Refills: 3 | Status: SHIPPED | OUTPATIENT
Start: 2023-09-28

## 2023-09-28 RX ORDER — ATORVASTATIN CALCIUM 80 MG/1
80 TABLET, FILM COATED ORAL DAILY
Qty: 90 TABLET | Refills: 3 | Status: SHIPPED | OUTPATIENT
Start: 2023-09-28

## 2023-09-28 NOTE — PATIENT INSTRUCTIONS
Medicare Preventive Visit Patient Instructions  Thank you for completing your Welcome to Medicare Visit or Medicare Annual Wellness Visit today. Your next wellness visit will be due in one year (9/28/2024). The screening/preventive services that you may require over the next 5-10 years are detailed below. Some tests may not apply to you based off risk factors and/or age. Screening tests ordered at today's visit but not completed yet may show as past due. Also, please note that scanned in results may not display below. Preventive Screenings:  Service Recommendations Previous Testing/Comments   Colorectal Cancer Screening  * Colonoscopy    * Fecal Occult Blood Test (FOBT)/Fecal Immunochemical Test (FIT)  * Fecal DNA/Cologuard Test  * Flexible Sigmoidoscopy Age: 43-73 years old   Colonoscopy: every 10 years (may be performed more frequently if at higher risk)  OR  FOBT/FIT: every 1 year  OR  Cologuard: every 3 years  OR  Sigmoidoscopy: every 5 years  Screening may be recommended earlier than age 39 if at higher risk for colorectal cancer. Also, an individualized decision between you and your healthcare provider will decide whether screening between the ages of 77-80 would be appropriate. Colonoscopy: 09/13/2023  FOBT/FIT: Not on file  Cologuard: Not on file  Sigmoidoscopy: Not on file    Screening Current     Breast Cancer Screening Age: 36 years old  Frequency: every 1-2 years  Not required if history of left and right mastectomy Mammogram: 11/14/2022    Screening Current   Cervical Cancer Screening Between the ages of 21-29, pap smear recommended once every 3 years. Between the ages of 32-69, can perform pap smear with HPV co-testing every 5 years.    Recommendations may differ for women with a history of total hysterectomy, cervical cancer, or abnormal pap smears in past. Pap Smear: 09/10/2020    Screening Not Indicated   Hepatitis C Screening Once for adults born between 1945 and 1965  More frequently in patients at high risk for Hepatitis C Hep C Antibody: 01/24/2019    Screening Current   Diabetes Screening 1-2 times per year if you're at risk for diabetes or have pre-diabetes Fasting glucose: 137 mg/dL (5/22/2023)  A1C: 6.8 % (5/22/2023)  Screening Not Indicated  History Diabetes   Cholesterol Screening Once every 5 years if you don't have a lipid disorder. May order more often based on risk factors. Lipid panel: 05/22/2023    Screening Not Indicated  History Lipid Disorder     Other Preventive Screenings Covered by Medicare:  1. Abdominal Aortic Aneurysm (AAA) Screening: covered once if your at risk. You're considered to be at risk if you have a family history of AAA. 2. Lung Cancer Screening: covers low dose CT scan once per year if you meet all of the following conditions: (1) Age 48-67; (2) No signs or symptoms of lung cancer; (3) Current smoker or have quit smoking within the last 15 years; (4) You have a tobacco smoking history of at least 20 pack years (packs per day multiplied by number of years you smoked); (5) You get a written order from a healthcare provider. 3. Glaucoma Screening: covered annually if you're considered high risk: (1) You have diabetes OR (2) Family history of glaucoma OR (3)  aged 48 and older OR (3)  American aged 72 and older  3. Osteoporosis Screening: covered every 2 years if you meet one of the following conditions: (1) You're estrogen deficient and at risk for osteoporosis based off medical history and other findings; (2) Have a vertebral abnormality; (3) On glucocorticoid therapy for more than 3 months; (4) Have primary hyperparathyroidism; (5) On osteoporosis medications and need to assess response to drug therapy. · Last bone density test (DXA Scan): 10/11/2021.  5. HIV Screening: covered annually if you're between the age of 15-65. Also covered annually if you are younger than 13 and older than 72 with risk factors for HIV infection.  For pregnant patients, it is covered up to 3 times per pregnancy. Immunizations:  Immunization Recommendations   Influenza Vaccine Annual influenza vaccination during flu season is recommended for all persons aged >= 6 months who do not have contraindications   Pneumococcal Vaccine   * Pneumococcal conjugate vaccine = PCV13 (Prevnar 13), PCV15 (Vaxneuvance), PCV20 (Prevnar 20)  * Pneumococcal polysaccharide vaccine = PPSV23 (Pneumovax) Adults 20-63 years old: 1-3 doses may be recommended based on certain risk factors  Adults 72 years old: 1-2 doses may be recommended based off what pneumonia vaccine you previously received   Hepatitis B Vaccine 3 dose series if at intermediate or high risk (ex: diabetes, end stage renal disease, liver disease)   Tetanus (Td) Vaccine - COST NOT COVERED BY MEDICARE PART B Following completion of primary series, a booster dose should be given every 10 years to maintain immunity against tetanus. Td may also be given as tetanus wound prophylaxis. Tdap Vaccine - COST NOT COVERED BY MEDICARE PART B Recommended at least once for all adults. For pregnant patients, recommended with each pregnancy. Shingles Vaccine (Shingrix) - COST NOT COVERED BY MEDICARE PART B  2 shot series recommended in those aged 48 and above     Health Maintenance Due:      Topic Date Due   • DXA SCAN  10/11/2023   • Breast Cancer Screening: Mammogram  11/14/2023   • Colorectal Cancer Screening  09/11/2030   • Hepatitis C Screening  Completed     Immunizations Due:      Topic Date Due   • Influenza Vaccine (1) 09/01/2023     Advance Directives   What are advance directives? Advance directives are legal documents that state your wishes and plans for medical care. These plans are made ahead of time in case you lose your ability to make decisions for yourself. Advance directives can apply to any medical decision, such as the treatments you want, and if you want to donate organs. What are the types of advance directives? There are many types of advance directives, and each state has rules about how to use them. You may choose a combination of any of the following:  · Living will: This is a written record of the treatment you want. You can also choose which treatments you do not want, which to limit, and which to stop at a certain time. This includes surgery, medicine, IV fluid, and tube feedings. · Durable power of  for healthcare [facility]): This is a written record that states who you want to make healthcare choices for you when you are unable to make them for yourself. This person, called a proxy, is usually a family member or a friend. You may choose more than 1 proxy. · Do not resuscitate (DNR) order:  A DNR order is used in case your heart stops beating or you stop breathing. It is a request not to have certain forms of treatment, such as CPR. A DNR order may be included in other types of advance directives. · Medical directive: This covers the care that you want if you are in a coma, near death, or unable to make decisions for yourself. You can list the treatments you want for each condition. Treatment may include pain medicine, surgery, blood transfusions, dialysis, IV or tube feedings, and a ventilator (breathing machine). · Values history: This document has questions about your views, beliefs, and how you feel and think about life. This information can help others choose the care that you would choose. Why are advance directives important? An advance directive helps you control your care. Although spoken wishes may be used, it is better to have your wishes written down. Spoken wishes can be misunderstood, or not followed. Treatments may be given even if you do not want them. An advance directive may make it easier for your family to make difficult choices about your care. Urinary Incontinence   Urinary incontinence (UI)  is when you lose control of your bladder.  UI develops because your bladder cannot store or empty urine properly. The 3 most common types of UI are stress incontinence, urge incontinence, or both. Medicines:   · May be given to help strengthen your bladder control. Report any side effects of medication to your healthcare provider. Do pelvic muscle exercises often:  Your pelvic muscles help you stop urinating. Squeeze these muscles tight for 5 seconds, then relax for 5 seconds. Gradually work up to squeezing for 10 seconds. Do 3 sets of 15 repetitions a day, or as directed. This will help strengthen your pelvic muscles and improve bladder control. Train your bladder:  Go to the bathroom at set times, such as every 2 hours, even if you do not feel the urge to go. You can also try to hold your urine when you feel the urge to go. For example, hold your urine for 5 minutes when you feel the urge to go. As that becomes easier, hold your urine for 10 minutes. Self-care:   · Keep a UI record. Write down how often you leak urine and how much you leak. Make a note of what you were doing when you leaked urine. · Drink liquids as directed. You may need to limit the amount of liquid you drink to help control your urine leakage. Do not drink any liquid right before you go to bed. Limit or do not have drinks that contain caffeine or alcohol. · Prevent constipation. Eat a variety of high-fiber foods. Good examples are high-fiber cereals, beans, vegetables, and whole-grain breads. Walking is the best way to trigger your intestines to have a bowel movement. · Exercise regularly and maintain a healthy weight. Weight loss and exercise will decrease pressure on your bladder and help you control your leakage. · Use a catheter as directed  to help empty your bladder. A catheter is a tiny, plastic tube that is put into your bladder to drain your urine. · Go to behavior therapy as directed. Behavior therapy may be used to help you learn to control your urge to urinate.     Weight Management   Why it is important to manage your weight:  Being overweight increases your risk of health conditions such as heart disease, high blood pressure, type 2 diabetes, and certain types of cancer. It can also increase your risk for osteoarthritis, sleep apnea, and other respiratory problems. Aim for a slow, steady weight loss. Even a small amount of weight loss can lower your risk of health problems. How to lose weight safely:  A safe and healthy way to lose weight is to eat fewer calories and get regular exercise. You can lose up about 1 pound a week by decreasing the number of calories you eat by 500 calories each day. Healthy meal plan for weight management:  A healthy meal plan includes a variety of foods, contains fewer calories, and helps you stay healthy. A healthy meal plan includes the following:  · Eat whole-grain foods more often. A healthy meal plan should contain fiber. Fiber is the part of grains, fruits, and vegetables that is not broken down by your body. Whole-grain foods are healthy and provide extra fiber in your diet. Some examples of whole-grain foods are whole-wheat breads and pastas, oatmeal, brown rice, and bulgur. · Eat a variety of vegetables every day. Include dark, leafy greens such as spinach, kale, dylan greens, and mustard greens. Eat yellow and orange vegetables such as carrots, sweet potatoes, and winter squash. · Eat a variety of fruits every day. Choose fresh or canned fruit (canned in its own juice or light syrup) instead of juice. Fruit juice has very little or no fiber. · Eat low-fat dairy foods. Drink fat-free (skim) milk or 1% milk. Eat fat-free yogurt and low-fat cottage cheese. Try low-fat cheeses such as mozzarella and other reduced-fat cheeses. · Choose meat and other protein foods that are low in fat. Choose beans or other legumes such as split peas or lentils. Choose fish, skinless poultry (chicken or turkey), or lean cuts of red meat (beef or pork).  Before you cook meat or poultry, cut off any visible fat. · Use less fat and oil. Try baking foods instead of frying them. Add less fat, such as margarine, sour cream, regular salad dressing and mayonnaise to foods. Eat fewer high-fat foods. Some examples of high-fat foods include french fries, doughnuts, ice cream, and cakes. · Eat fewer sweets. Limit foods and drinks that are high in sugar. This includes candy, cookies, regular soda, and sweetened drinks. Exercise:  Exercise at least 30 minutes per day on most days of the week. Some examples of exercise include walking, biking, dancing, and swimming. You can also fit in more physical activity by taking the stairs instead of the elevator or parking farther away from stores. Ask your healthcare provider about the best exercise plan for you. © Copyright [employer] 2018 Information is for End User's use only and may not be sold, redistributed or otherwise used for commercial purposes. All illustrations and images included in CareNotes® are the copyrighted property of [employer] or [facility] Preventive Visit Patient Instructions  Thank you for completing your Welcome to Medicare Visit or Medicare Annual Wellness Visit today. Your next wellness visit will be due in one year (9/28/2024). The screening/preventive services that you may require over the next 5-10 years are detailed below. Some tests may not apply to you based off risk factors and/or age. Screening tests ordered at today's visit but not completed yet may show as past due. Also, please note that scanned in results may not display below.   Preventive Screenings:  Service Recommendations Previous Testing/Comments   Colorectal Cancer Screening  * Colonoscopy    * Fecal Occult Blood Test (FOBT)/Fecal Immunochemical Test (FIT)  * Fecal DNA/Cologuard Test  * Flexible Sigmoidoscopy Age: 43-73 years old   Colonoscopy: every 10 years (may be performed more frequently if at higher risk) OR  FOBT/FIT: every 1 year  OR  Cologuard: every 3 years  OR  Sigmoidoscopy: every 5 years  Screening may be recommended earlier than age 39 if at higher risk for colorectal cancer. Also, an individualized decision between you and your healthcare provider will decide whether screening between the ages of 77-80 would be appropriate. Colonoscopy: 09/13/2023  FOBT/FIT: Not on file  Cologuard: Not on file  Sigmoidoscopy: Not on file    Screening Current     Breast Cancer Screening Age: 36 years old  Frequency: every 1-2 years  Not required if history of left and right mastectomy Mammogram: 11/14/2022    Screening Current   Cervical Cancer Screening Between the ages of 21-29, pap smear recommended once every 3 years. Between the ages of 32-69, can perform pap smear with HPV co-testing every 5 years. Recommendations may differ for women with a history of total hysterectomy, cervical cancer, or abnormal pap smears in past. Pap Smear: 09/10/2020    Screening Not Indicated   Hepatitis C Screening Once for adults born between 1945 and 1965  More frequently in patients at high risk for Hepatitis C Hep C Antibody: 01/24/2019    Screening Current   Diabetes Screening 1-2 times per year if you're at risk for diabetes or have pre-diabetes Fasting glucose: 137 mg/dL (5/22/2023)  A1C: 6.8 % (5/22/2023)  Screening Not Indicated  History Diabetes   Cholesterol Screening Once every 5 years if you don't have a lipid disorder. May order more often based on risk factors. Lipid panel: 05/22/2023    Screening Not Indicated  History Lipid Disorder     Other Preventive Screenings Covered by Medicare:  6. Abdominal Aortic Aneurysm (AAA) Screening: covered once if your at risk. You're considered to be at risk if you have a family history of AAA.   7. Lung Cancer Screening: covers low dose CT scan once per year if you meet all of the following conditions: (1) Age 48-67; (2) No signs or symptoms of lung cancer; (3) Current smoker or have quit smoking within the last 15 years; (4) You have a tobacco smoking history of at least 20 pack years (packs per day multiplied by number of years you smoked); (5) You get a written order from a healthcare provider. 8. Glaucoma Screening: covered annually if you're considered high risk: (1) You have diabetes OR (2) Family history of glaucoma OR (3)  aged 48 and older OR (3)  American aged 72 and older  5. Osteoporosis Screening: covered every 2 years if you meet one of the following conditions: (1) You're estrogen deficient and at risk for osteoporosis based off medical history and other findings; (2) Have a vertebral abnormality; (3) On glucocorticoid therapy for more than 3 months; (4) Have primary hyperparathyroidism; (5) On osteoporosis medications and need to assess response to drug therapy. · Last bone density test (DXA Scan): 10/11/2021. 10. HIV Screening: covered annually if you're between the age of 14-79. Also covered annually if you are younger than 13 and older than 72 with risk factors for HIV infection. For pregnant patients, it is covered up to 3 times per pregnancy.     Immunizations:  Immunization Recommendations   Influenza Vaccine Annual influenza vaccination during flu season is recommended for all persons aged >= 6 months who do not have contraindications   Pneumococcal Vaccine   * Pneumococcal conjugate vaccine = PCV13 (Prevnar 13), PCV15 (Vaxneuvance), PCV20 (Prevnar 20)  * Pneumococcal polysaccharide vaccine = PPSV23 (Pneumovax) Adults 20-63 years old: 1-3 doses may be recommended based on certain risk factors  Adults 72 years old: 1-2 doses may be recommended based off what pneumonia vaccine you previously received   Hepatitis B Vaccine 3 dose series if at intermediate or high risk (ex: diabetes, end stage renal disease, liver disease)   Tetanus (Td) Vaccine - COST NOT COVERED BY MEDICARE PART B Following completion of primary series, a booster dose should be given every 10 years to maintain immunity against tetanus. Td may also be given as tetanus wound prophylaxis. Tdap Vaccine - COST NOT COVERED BY MEDICARE PART B Recommended at least once for all adults. For pregnant patients, recommended with each pregnancy. Shingles Vaccine (Shingrix) - COST NOT COVERED BY MEDICARE PART B  2 shot series recommended in those aged 48 and above     Health Maintenance Due:      Topic Date Due   • DXA SCAN  10/11/2023   • Breast Cancer Screening: Mammogram  11/14/2023   • Colorectal Cancer Screening  09/11/2030   • Hepatitis C Screening  Completed     Immunizations Due:      Topic Date Due   • Influenza Vaccine (1) 09/01/2023     Advance Directives   What are advance directives? Advance directives are legal documents that state your wishes and plans for medical care. These plans are made ahead of time in case you lose your ability to make decisions for yourself. Advance directives can apply to any medical decision, such as the treatments you want, and if you want to donate organs. What are the types of advance directives? There are many types of advance directives, and each state has rules about how to use them. You may choose a combination of any of the following:  · Living will: This is a written record of the treatment you want. You can also choose which treatments you do not want, which to limit, and which to stop at a certain time. This includes surgery, medicine, IV fluid, and tube feedings. · Durable power of  for healthcare [facility] [place]): This is a written record that states who you want to make healthcare choices for you when you are unable to make them for yourself. This person, called a proxy, is usually a family member or a friend. You may choose more than 1 proxy. · Do not resuscitate (DNR) order:  A DNR order is used in case your heart stops beating or you stop breathing. It is a request not to have certain forms of treatment, such as CPR.  A DNR order may be included in other types of advance directives. · Medical directive: This covers the care that you want if you are in a coma, near death, or unable to make decisions for yourself. You can list the treatments you want for each condition. Treatment may include pain medicine, surgery, blood transfusions, dialysis, IV or tube feedings, and a ventilator (breathing machine). · Values history: This document has questions about your views, beliefs, and how you feel and think about life. This information can help others choose the care that you would choose. Why are advance directives important? An advance directive helps you control your care. Although spoken wishes may be used, it is better to have your wishes written down. Spoken wishes can be misunderstood, or not followed. Treatments may be given even if you do not want them. An advance directive may make it easier for your family to make difficult choices about your care. Urinary Incontinence   Urinary incontinence (UI)  is when you lose control of your bladder. UI develops because your bladder cannot store or empty urine properly. The 3 most common types of UI are stress incontinence, urge incontinence, or both. Medicines:   · May be given to help strengthen your bladder control. Report any side effects of medication to your healthcare provider. Do pelvic muscle exercises often:  Your pelvic muscles help you stop urinating. Squeeze these muscles tight for 5 seconds, then relax for 5 seconds. Gradually work up to squeezing for 10 seconds. Do 3 sets of 15 repetitions a day, or as directed. This will help strengthen your pelvic muscles and improve bladder control. Train your bladder:  Go to the bathroom at set times, such as every 2 hours, even if you do not feel the urge to go. You can also try to hold your urine when you feel the urge to go. For example, hold your urine for 5 minutes when you feel the urge to go.  As that becomes easier, hold your urine for 10 minutes. Self-care:   · Keep a UI record. Write down how often you leak urine and how much you leak. Make a note of what you were doing when you leaked urine. · Drink liquids as directed. You may need to limit the amount of liquid you drink to help control your urine leakage. Do not drink any liquid right before you go to bed. Limit or do not have drinks that contain caffeine or alcohol. · Prevent constipation. Eat a variety of high-fiber foods. Good examples are high-fiber cereals, beans, vegetables, and whole-grain breads. Walking is the best way to trigger your intestines to have a bowel movement. · Exercise regularly and maintain a healthy weight. Weight loss and exercise will decrease pressure on your bladder and help you control your leakage. · Use a catheter as directed  to help empty your bladder. A catheter is a tiny, plastic tube that is put into your bladder to drain your urine. · Go to behavior therapy as directed. Behavior therapy may be used to help you learn to control your urge to urinate. Weight Management   Why it is important to manage your weight:  Being overweight increases your risk of health conditions such as heart disease, high blood pressure, type 2 diabetes, and certain types of cancer. It can also increase your risk for osteoarthritis, sleep apnea, and other respiratory problems. Aim for a slow, steady weight loss. Even a small amount of weight loss can lower your risk of health problems. How to lose weight safely:  A safe and healthy way to lose weight is to eat fewer calories and get regular exercise. You can lose up about 1 pound a week by decreasing the number of calories you eat by 500 calories each day. Healthy meal plan for weight management:  A healthy meal plan includes a variety of foods, contains fewer calories, and helps you stay healthy. A healthy meal plan includes the following:  · Eat whole-grain foods more often.   A healthy meal plan should contain fiber. Fiber is the part of grains, fruits, and vegetables that is not broken down by your body. Whole-grain foods are healthy and provide extra fiber in your diet. Some examples of whole-grain foods are whole-wheat breads and pastas, oatmeal, brown rice, and bulgur. · Eat a variety of vegetables every day. Include dark, leafy greens such as spinach, kale, dylan greens, and mustard greens. Eat yellow and orange vegetables such as carrots, sweet potatoes, and winter squash. · Eat a variety of fruits every day. Choose fresh or canned fruit (canned in its own juice or light syrup) instead of juice. Fruit juice has very little or no fiber. · Eat low-fat dairy foods. Drink fat-free (skim) milk or 1% milk. Eat fat-free yogurt and low-fat cottage cheese. Try low-fat cheeses such as mozzarella and other reduced-fat cheeses. · Choose meat and other protein foods that are low in fat. Choose beans or other legumes such as split peas or lentils. Choose fish, skinless poultry (chicken or turkey), or lean cuts of red meat (beef or pork). Before you cook meat or poultry, cut off any visible fat. · Use less fat and oil. Try baking foods instead of frying them. Add less fat, such as margarine, sour cream, regular salad dressing and mayonnaise to foods. Eat fewer high-fat foods. Some examples of high-fat foods include french fries, doughnuts, ice cream, and cakes. · Eat fewer sweets. Limit foods and drinks that are high in sugar. This includes candy, cookies, regular soda, and sweetened drinks. Exercise:  Exercise at least 30 minutes per day on most days of the week. Some examples of exercise include walking, biking, dancing, and swimming. You can also fit in more physical activity by taking the stairs instead of the elevator or parking farther away from stores. Ask your healthcare provider about the best exercise plan for you.       © Copyright [employer] 2018 Information is for End User's use only and may not be sold, redistributed or otherwise used for commercial purposes.  All illustrations and images included in CareNotes® are the copyrighted property of [employer].D.A[employer] Inc. or [address] [place] [address]

## 2023-09-28 NOTE — ASSESSMENT & PLAN NOTE
Stable A1c -- following closely with Endo  Next visit with them scheduled for Jan '24  Lab Results   Component Value Date    HGBA1C 6.9 (A) 09/28/2023

## 2023-09-28 NOTE — PROGRESS NOTES
Assessment and Plan:     Problem List Items Addressed This Visit        Endocrine    Type 2 diabetes mellitus with hyperglycemia, without long-term current use of insulin (HCC)     Stable A1c -- following closely with Endo  Next visit with them scheduled for Jan '24  Lab Results   Component Value Date    HGBA1C 6.9 (A) 09/28/2023            Relevant Orders    POCT hemoglobin A1c (Completed)       Cardiovascular and Mediastinum    Moderate aortic stenosis     Following with Dr Yrn Gtz at [facility]  Next echo scheduled for Feb '24 and f/u in March '24            Genitourinary    Overactive bladder       Other    Severe obesity (BMI >= 40) ([address])   Other Visit Diagnoses     Medicare annual wellness visit, subsequent    -  Primary    Encounter for screening mammogram for breast cancer        Relevant Orders    Mammo screening bilateral w 3d & cad    Asymptomatic age-related postmenopausal state        Relevant Orders    DXA bone density spine hip and pelvis    Encounter for immunization        Relevant Orders    influenza vaccine, high-dose, PF 0.7 mL (FLUZONE HIGH-DOSE) (Completed)        BMI Counseling: Body mass index is 44.39 kg/m². The BMI is above normal. Nutrition recommendations include decreasing portion sizes, encouraging healthy choices of fruits and vegetables, decreasing fast food intake, consuming healthier snacks, limiting drinks that contain sugar, moderation in carbohydrate intake, increasing intake of lean protein, reducing intake of saturated and trans fat and reducing intake of cholesterol. Exercise recommendations include exercising 3-5 times per week. Rationale for BMI follow-up plan is due to patient being overweight or obese. Depression Screening and Follow-up Plan: Patient was screened for depression during today's encounter. They screened negative with a PHQ-2 score of 0.     Urinary Incontinence Plan of Care: counseling topics discussed: practice Kegel (pelvic floor strengthening) exercises, use restroom every 2 hours, limit alcohol, caffeine, spicy foods, and acidic foods, keeping a bladder diary, limiting fluid intake 3-4 hours before bed and preventing constipation. Preventive health issues were discussed with patient, and age appropriate screening tests were ordered as noted in patient's After Visit Summary. Personalized health advice and appropriate referrals for health education or preventive services given if needed, as noted in patient's After Visit Summary. Return in about 6 months (around 3/28/2024) for Recheck T2DM, HTN. History of Present Illness:     Patient presents for a Medicare Wellness Visit    Patient Care Team:  Zeynep Calderon DO as PCP - General (Family Medicine)  Eusebio Pimentel MD as PCP - Endocrinology (Endocrinology)  Pankaj Olmstead MD as Care Coordinator (Gastroenterology)  Yarelis Urbano MD as Care Coordinator (Ophthalmology)  Rogers Ding MD as Care Coordinator (Otolaryngology)  Shahab Prasad MD as Care Coordinator (Cardiology)  Yarelis Urbano MD (Ophthalmology)     Review of Systems:     Review of Systems   Constitutional: Negative for appetite change, fatigue, fever and unexpected weight change. HENT: Negative for congestion, dental problem, ear pain, postnasal drip, sore throat and tinnitus. Eyes: Negative for pain, discharge and visual disturbance. Respiratory: Negative for cough, shortness of breath and wheezing. Cardiovascular: Negative for chest pain, palpitations and leg swelling. Gastrointestinal: Negative for abdominal pain, constipation, diarrhea, nausea and vomiting. Endocrine: Negative for cold intolerance and heat intolerance. Genitourinary: Negative for difficulty urinating, dysuria, flank pain and urgency. Musculoskeletal: Negative for arthralgias, back pain, joint swelling and myalgias. Skin: Negative for rash and wound. Allergic/Immunologic: Negative for immunocompromised state.    Neurological: Negative for dizziness, syncope, speech difficulty, weakness and numbness. Hematological: Negative for adenopathy. Does not bruise/bleed easily. Psychiatric/Behavioral: Negative for confusion, dysphoric mood and sleep disturbance. The patient is not nervous/anxious. Problem List:     Patient Active Problem List   Diagnosis   • Acquired hypothyroidism   • Nontoxic multinodular goiter   • Type 2 diabetes mellitus with hyperglycemia, without long-term current use of insulin ([address])   • Essential hypertension   • Mixed hyperlipidemia   • Vitamin D deficiency   • Allergic rhinitis   • Coronary artery disease involving native coronary artery of native heart without angina pectoris   • Moderate aortic stenosis   • Severe obesity (BMI >= 40) ([facility])   • Seasonal allergies   • Gastroesophageal reflux disease without esophagitis   • COVID   • Strain of lumbar region   • Overactive bladder      Past Medical and Surgical History:     Past Medical History:   Diagnosis Date   • Allergic 2018   weekly inject.    • Allergic rhinitis    • Allergies    • Anemia    • Anxiety    • Arthritis    • Chronic constipation    • Colon polyp    • Diabetes ([address])    • Diabetes mellitus ([address])    • Disease of thyroid gland    • GERD (gastroesophageal reflux disease)    • Headache(784.0)    • Heart murmur    • Hyperlipidemia    • Hypertension    • Hypothyroidism    • Obesity    • Obesity    • Restless legs    • Vitamin D deficiency      Past Surgical History:   Procedure Laterality Date   • BREAST BIOPSY Right 2001   • CATARACT EXTRACTION     • CATARACT EXTRACTION, BILATERAL  05/2021   • CHOLECYSTECTOMY     • COLONOSCOPY  06/01/2018    goes q 5 yr-due in Summer 2018-Malika Wilson   • CORONARY ANGIOPLASTY  2015    Dr Cedric Arguello   • GASTRIC BYPASS  01/01/2000    Dr. Bharathi Vallejo   • [place] BIOPSY  2001   • [id number]  9/26/2013   • US GUIDANCE  10/17/2016   • [employer]  10/15/2013      Family History:     Family History Problem Relation Age of Onset   • Multiple sclerosis Brother    • Thyroid disease unspecified Brother    • Multiple sclerosis Paternal Uncle    • Diabetes Paternal Uncle    • Hypertension Father            • Diabetes Father    • Heart disease Father    • Diabetes type II Father            • Multiple sclerosis Family    • Diabetes Paternal Aunt    • Cancer Neg Hx    • Stroke Neg Hx    • Obesity Neg Hx       Social History:     Social History     Socioeconomic History   • Marital status: Single     Spouse name: None   • Number of children: None   • Years of education: None   • Highest education level: None   Occupational History   • None   Tobacco Use   • Smoking status: Never   • Smokeless tobacco: Never   Vaping Use   • Vaping Use: Never used   Substance and Sexual Activity   • Alcohol use: Yes     Comment: on special occasions   • Drug use: Never   • Sexual activity: Not Currently   Other Topics Concern   • None   Social History Narrative    · Most recent tobacco use screenin2018      · Do you currently or have you served in the [address] [place] [address]:   No      · Were you activated, into active duty, as a member of the [employer] or as a Reservist:   No      · Advance directive:   No      · Live alone or with others:   alone       · Caffeine intake: Moderate, 2 cups daily      Social Determinants of Health     Financial Resource Strain: Low Risk  (2023)    Overall Financial Resource Strain (CARDIA)    • Difficulty of Paying Living Expenses: Not hard at all   Food Insecurity: Not on file   Transportation Needs: No Transportation Needs (2023)    PRAPARE - Transportation    • Lack of Transportation (Medical): No    • Lack of Transportation (Non-Medical): No   Physical Activity: Not on file   Stress: No Stress Concern Present (2020)    [address] [place] [address]    • Feeling of Stress :  Only a little   Social Connections: Not on file   Intimate Partner Violence: Not on file   Housing Stability: Not on file      Medications and Allergies:     Current Outpatient Medications   Medication Sig Dispense Refill   • aspirin (ECOTRIN LOW STRENGTH) 81 mg EC tablet Take 81 mg by mouth daily     • atorvastatin (LIPITOR) 80 mg tablet TAKE 1 TABLET BY MOUTH DAILY 90 tablet 3   • Blood Glucose Monitoring Suppl (ONE TOUCH ULTRA MINI) w/Device KIT Please dispense 1 Kit 1 each 0   • Cholecalciferol (Vitamin D3) 25 MCG (1000 UT) CAPS Take 2000 IU with dinner (Patient taking differently: Take by mouth in the morning Take 2000 IU with dinner)     • ezetimibe (ZETIA) 10 mg tablet Take 1 tablet (10 mg total) by mouth daily 90 tablet 1   • famotidine-calcium carbonate-magnesium hydroxide (PEPCID COMPLETE) -165 MG CHEW Chew 1 tablet daily as needed for heartburn     • fluticasone (FLONASE) 50 mcg/act nasal spray 1 spray into each nostril if needed     • glucose blood (OneTouch Ultra) test strip Use once a day 100 each 3   • Invokana 300 MG TABS TAKE 1 TABLET BY MOUTH  DAILY 90 tablet 3   • Lancet Devices (OneTouch Delica Plus Lancing) MISC USE WITH LANCETS TO CHECK SUGAR     • Lancets (OneTouch Delica Plus [id number]) MISC USE TO CHECK SUGARS TWICE EVERY OTHER  each 1   • Lancets Misc. (ONE TOUCH SURESOFT) MISC Use lancet to check sugar 1 each 0   • levocetirizine (XYZAL) 5 MG tablet Take 1 tablet (5 mg total) by mouth every evening 90 tablet 0   • levothyroxine 112 mcg tablet TAKE 1 TABLET BY MOUTH DAILY 90 tablet 3   • nebivolol (Bystolic) 5 mg tablet Take 0.5 tablets (2.5 mg total) by mouth daily 90 tablet 0   • omeprazole (PriLOSEC) 40 MG capsule TAKE 1 CAPSULE BY MOUTH DAILY 90 capsule 1   • Ozempic, 0.25 or 0.5 MG/DOSE, 2 MG/3ML injection pen INJECT 0.38 ML (0.5 MG TOTAL) UNDER THE SKIN EVERY 7 DAYS 3 mL 1   • potassium chloride (Klor-Con) 10 mEq tablet Take 1 tablet (10 mEq total) by mouth 2 (two) times a day 180 tablet 0   • pramipexole (MIRAPEX) 0.25 mg tablet TAKE 1 TABLET BY MOUTH DAILY AT  BEDTIME 90 tablet 3   • ramipril (ALTACE) 2.5 mg capsule Take 1 capsule (2.5 mg total) by mouth daily 90 capsule 1   • solifenacin (VESICARE) 10 MG tablet Take 1 tablet (10 mg total) by mouth daily 90 tablet 3   • valACYclovir (VALTREX) 1,000 mg tablet Take 2 tablets (2,000 mg total) by mouth 2 (two) times a day for 1 day 8 tablet 2     No current facility-administered medications for this visit. Allergies   Allergen Reactions   • Other Sneezing     Seasonal, dust, Cat dander, mold   • Penicillins Hives      Immunizations:     Immunization History   Administered Date(s) Administered   • COVID-19 PFIZER VACCINE 0.3 ML IM 02/17/2021, 03/10/2021, 10/09/2021, 04/30/2022   • COVID-19 Pfizer Vac BIVALENT Bhavin-sucrose 12 Yr+ IM 09/13/2022, 05/31/2023   • INFLUENZA 10/25/2010, 11/14/2011, 10/01/2012, 10/16/2013, 11/20/2014, 11/10/2015, 10/03/2016, 09/20/2017, 09/11/2018, 11/10/2018, 09/26/2022   • Influenza, high dose seasonal 0.7 mL 09/09/2019, 08/17/2020, 09/23/2021, 09/26/2022, 09/28/2023   • Pneumococcal Conjugate 13-Valent 03/17/2014, 11/10/2018   • Pneumococcal Conjugate PCV 7 10/01/2003   • Pneumococcal Polysaccharide PPV23 05/03/2018   • Tdap 10/25/2010, 09/28/2022, 01/04/2023   • Zoster 02/07/2013   • Zoster Vaccine Recombinant 09/11/2018, 11/10/2018      Health Maintenance:         Topic Date Due   • DXA SCAN  10/11/2023   • Breast Cancer Screening: Mammogram  11/14/2023   • Colorectal Cancer Screening  09/11/2030   • Hepatitis C Screening  Completed     There are no preventive care reminders to display for this patient. Medicare Screening Tests and Risk Assessments:     Kendrick Estrada is here for her Subsequent Wellness visit. Last Medicare Wellness visit information reviewed, patient interviewed and updates made to the record today. Health Risk Assessment:   Patient rates overall health as very good.  Patient feels that their physical health rating is same. Patient is very satisfied with their life. Eyesight was rated as same. Hearing was rated as same. Patient feels that their emotional and mental health rating is same. Patients states they are never, rarely angry. Patient states they are sometimes unusually tired/fatigued. Pain experienced in the last 7 days has been none. Patient states that she has experienced no weight loss or gain in last 6 months. Depression Screening:   PHQ-2 Score: 0      Fall Risk Screening: In the past year, patient has experienced: no history of falling in past year      Urinary Incontinence Screening:   Patient has leaked urine accidently in the last six months. Home Safety:  Patient does not have trouble with stairs inside or outside of their home. Patient has working smoke alarms and has no working carbon monoxide detector. Home safety hazards include: none. Nutrition:   Current diet is Diabetic. Medications:   Patient is currently taking over-the-counter supplements. OTC medications include: Nature Made D3,Pepcid Complete, Yue 81mg. Patient is able to manage medications. Activities of Daily Living (ADLs)/Instrumental Activities of Daily Living (IADLs):   Walk and transfer into and out of bed and chair?: Yes  Dress and groom yourself?: Yes    Bathe or shower yourself?: Yes    Feed yourself? Yes  Do your laundry/housekeeping?: Yes  Manage your money, pay your bills and track your expenses?: Yes  Make your own meals?: Yes    Do your own shopping?: Yes    Durable Medical Equipment Suppliers  none    Previous Hospitalizations:   Any hospitalizations or ED visits within the last 12 months?: No      Advance Care Planning:   Living will: No    Durable POA for healthcare:  Yes    Advanced directive: Yes      Cognitive Screening:   Provider or family/friend/caregiver concerned regarding cognition?: No    PREVENTIVE SCREENINGS      Cardiovascular Screening:    General: Screening Not Indicated and History Lipid Disorder      Diabetes Screening:     General: Screening Not Indicated and History Diabetes      Colorectal Cancer Screening:     General: Screening Current      Breast Cancer Screening:     General: Screening Current and Risks and Benefits Discussed    Due for: Mammogram        Cervical Cancer Screening:    General: Screening Not Indicated      Osteoporosis Screening:    General: Screening Current and Risks and Benefits Discussed    Due for: DXA Axial      Abdominal Aortic Aneurysm (AAA) Screening:        General: Screening Not Indicated      Lung Cancer Screening:     General: Screening Not Indicated      Hepatitis C Screening:    General: Screening Current    Screening, Brief Intervention, and Referral to Treatment (SBIRT)    Screening  Typical number of drinks in a day: 0  Typical number of drinks in a week: 0  Interpretation: Low risk drinking behavior. AUDIT-C Screenin) How often did you have a drink containing alcohol in the past year? never  2) How many drinks did you have on a typical day when you were drinking in the past year? 0  3) How often did you have 6 or more drinks on one occasion in the past year? never    AUDIT-C Score: 0  Interpretation: Score 0-2 (female): Negative screen for alcohol misuse    Single Item Drug Screening:  How often have you used an illegal drug (including marijuana) or a prescription medication for non-medical reasons in the past year? never    Single Item Drug Screen Score: 0  Interpretation: Negative screen for possible drug use disorder    Brief Intervention  Alcohol & drug use screenings were reviewed. No concerns regarding substance use disorder identified. Annual Depression Screening  Time spent screening and evaluating the patient for depression during today's encounter was 5 minutes. Other Counseling Topics:   Car/seat belt/driving safety, skin self-exam, sunscreen and calcium and vitamin D intake and regular weightbearing exercise.      No results found.     Physical Exam:     /84   Pulse 80   Temp 98.2 °F (36.8 °C)   Resp 18   Ht 5' 3" (1.6 m)   Wt 114 kg (250 lb 9.6 oz)   LMP 01/01/2004   SpO2 97%   BMI 44.39 kg/m²     Physical Exam  Vitals and nursing note reviewed. Constitutional:       General: She is not in acute distress. Appearance: Normal appearance. She is well-developed. She is not ill-appearing. Comments: Body mass index is 44.39 kg/m². HENT:      Head: Normocephalic and atraumatic. Right Ear: Hearing, tympanic membrane, ear canal and external ear normal.      Left Ear: Hearing, tympanic membrane, ear canal and external ear normal.      Nose: Nose normal.      Mouth/Throat:      Mouth: Mucous membranes are moist.      Pharynx: Oropharynx is clear. Uvula midline. Eyes:      General: No scleral icterus. Conjunctiva/sclera: Conjunctivae normal.      Pupils: Pupils are equal, round, and reactive to light. Neck:      Thyroid: No thyromegaly. Cardiovascular:      Rate and Rhythm: Normal rate and regular rhythm. Heart sounds: Murmur (4/6 JAMEY over the LUSB) heard. Pulmonary:      Effort: Pulmonary effort is normal. No respiratory distress. Breath sounds: Normal breath sounds. No wheezing. Abdominal:      General: Bowel sounds are normal. There is no distension. Palpations: Abdomen is soft. Tenderness: There is no abdominal tenderness. Musculoskeletal:         General: No tenderness. Normal range of motion. Cervical back: Normal range of motion and neck supple. Right lower leg: No edema. Left lower leg: No edema. Lymphadenopathy:      Cervical: No cervical adenopathy. Skin:     General: Skin is warm and dry. Coloration: Skin is not jaundiced. Findings: No erythema or rash. Neurological:      General: No focal deficit present. Mental Status: She is alert and oriented to person, place, and time. Cranial Nerves: No cranial nerve deficit. Psychiatric:         Mood and Affect: Mood normal.         Behavior: Behavior normal.          Terese Alford, DO

## 2023-09-28 NOTE — ASSESSMENT & PLAN NOTE
Following with Dr Tina Ceron at [facility]  Next echo scheduled for Feb '24 and f/u in March '24

## 2023-10-02 ENCOUNTER — HOSPITAL ENCOUNTER (OUTPATIENT)
Dept: RADIOLOGY | Facility: HOSPITAL | Age: 73
Discharge: HOME | End: 2023-10-02
Payer: COMMERCIAL

## 2023-10-02 VITALS — WEIGHT: 250 LBS | HEIGHT: 63 IN | BODY MASS INDEX: 44.3 KG/M2

## 2023-10-02 DIAGNOSIS — Z78.0 ASYMPTOMATIC AGE-RELATED POSTMENOPAUSAL STATE: ICD-10-CM

## 2023-10-02 PROCEDURE — 77080 DXA BONE DENSITY AXIAL: CPT

## 2023-10-03 NOTE — RESULT ENCOUNTER NOTE
Kameron Callahan,    Your DXA Scan showed normal bone density! We will plan to repeat this in 2 years.     Take care,  Dr Carson Pineda

## 2023-10-20 ENCOUNTER — OFFICE VISIT (OUTPATIENT)
Dept: FAMILY MEDICINE CLINIC | Facility: OTHER | Age: 73
End: 2023-10-20
Payer: COMMERCIAL

## 2023-10-20 ENCOUNTER — TELEPHONE (OUTPATIENT)
Dept: ENDOCRINOLOGY | Facility: CLINIC | Age: 73
End: 2023-10-20

## 2023-10-20 VITALS
BODY MASS INDEX: 43.77 KG/M2 | OXYGEN SATURATION: 97 % | WEIGHT: 247 LBS | SYSTOLIC BLOOD PRESSURE: 122 MMHG | HEIGHT: 63 IN | DIASTOLIC BLOOD PRESSURE: 80 MMHG | HEART RATE: 73 BPM | RESPIRATION RATE: 16 BRPM | TEMPERATURE: 98.2 F

## 2023-10-20 DIAGNOSIS — R30.0 DYSURIA: Primary | ICD-10-CM

## 2023-10-20 DIAGNOSIS — E11.65 TYPE 2 DIABETES MELLITUS WITH HYPERGLYCEMIA, WITHOUT LONG-TERM CURRENT USE OF INSULIN (HCC): ICD-10-CM

## 2023-10-20 DIAGNOSIS — R82.90 CLOUDY URINE: ICD-10-CM

## 2023-10-20 LAB
SL AMB  POCT GLUCOSE, UA: 1000
SL AMB LEUKOCYTE ESTERASE,UA: 15
SL AMB POCT BILIRUBIN,UA: NORMAL
SL AMB POCT BLOOD,UA: NORMAL
SL AMB POCT CLARITY,UA: NORMAL
SL AMB POCT COLOR,UA: YELLOW
SL AMB POCT KETONES,UA: NORMAL
SL AMB POCT NITRITE,UA: NORMAL
SL AMB POCT PH,UA: 6
SL AMB POCT SPECIFIC GRAVITY,UA: 1.01
SL AMB POCT URINE PROTEIN: NORMAL
SL AMB POCT UROBILINOGEN: 0.2

## 2023-10-20 PROCEDURE — 87086 URINE CULTURE/COLONY COUNT: CPT

## 2023-10-20 PROCEDURE — 99213 OFFICE O/P EST LOW 20 MIN: CPT

## 2023-10-20 PROCEDURE — 81002 URINALYSIS NONAUTO W/O SCOPE: CPT

## 2023-10-20 NOTE — PROGRESS NOTES
Name: Chivo Lucas      : 1950      MRN: [id number]  Encounter Provider: Mirza Franklin MD  Encounter Date: 10/20/2023   Encounter department: [address]     1. Dysuria  Comments:  Cloudy urine + burning w/ urination x 2 days  Urine dip with trace leukocytes and neg nitrites, neg blood, neg ketones but glucose 1,000  Will send Urine Cx  Orders:  -     POCT urine dip  -     Urine culture    2. Cloudy urine  Comments:  Last recent Hba1c 6.9  Last UACR (2023) wnl  Continue regular T2DM surveillance and mgmt  Orders:  -     POCT urine dip  -     Urine culture     F/U as needed if symptoms worsen or fail to improve    Subjective      HPI  Patient presents with a complaint of cloudy urine that she noticed 2 days ago. This morning, she developed symptoms of burning with urination. She denies increased frequency or urgency as well as abdominal pain, back pain, N/V/D, fever/chills or decreased appetite. No hx of UTI noted in the past and no hx of kidney issues. She endorses drinking plenty of water daily and offers no other complaints at this time. Of note, the patient does have a history of T2DM. She is planning a trip for 2 months starting next Tuesday. Review of Systems   Constitutional:  Negative for chills and fever. Gastrointestinal:  Negative for abdominal pain, diarrhea and vomiting. Genitourinary:  Positive for dysuria. Negative for frequency, urgency, vaginal bleeding, vaginal discharge and vaginal pain. Musculoskeletal:  Negative for back pain.        Current Outpatient Medications on File Prior to Visit   Medication Sig    aspirin (ECOTRIN LOW STRENGTH) 81 mg EC tablet Take 81 mg by mouth daily    atorvastatin (LIPITOR) 80 mg tablet TAKE 1 TABLET BY MOUTH DAILY    Blood Glucose Monitoring Suppl (ONE TOUCH ULTRA MINI) w/Device KIT Please dispense 1 Kit    Cholecalciferol (Vitamin D3) 25 MCG (1000 UT) CAPS Take 2000 IU with dinner (Patient taking differently: Take by mouth in the morning Take 2000 IU with dinner)    ezetimibe (ZETIA) 10 mg tablet Take 1 tablet (10 mg total) by mouth daily    famotidine-calcium carbonate-magnesium hydroxide (PEPCID COMPLETE) -165 MG CHEW Chew 1 tablet daily as needed for heartburn    fluticasone (FLONASE) 50 mcg/act nasal spray 1 spray into each nostril if needed    glucose blood (OneTouch Ultra) test strip Use once a day    Invokana 300 MG TABS TAKE 1 TABLET BY MOUTH  DAILY    Lancet Devices (OneTouch Delica Plus Lancing) MISC USE WITH LANCETS TO CHECK SUGAR    Lancets (OneTouch Delica Plus [id number]) MISC USE TO CHECK SUGARS TWICE EVERY OTHER DAY    Lancets Misc. (ONE TOUCH SURESOFT) MISC Use lancet to check sugar    nebivolol (Bystolic) 5 mg tablet Take 0.5 tablets (2.5 mg total) by mouth daily    pramipexole (MIRAPEX) 0.25 mg tablet TAKE 1 TABLET BY MOUTH DAILY AT  BEDTIME    ramipril (ALTACE) 2.5 mg capsule Take 1 capsule (2.5 mg total) by mouth daily    solifenacin (VESICARE) 10 MG tablet Take 1 tablet (10 mg total) by mouth daily    valACYclovir (VALTREX) 1,000 mg tablet Take 2 tablets (2,000 mg total) by mouth 2 (two) times a day for 1 day       Objective     /80   Pulse 73   Temp 98.2 °F (36.8 °C)   Resp 16   Ht 5' 3" (1.6 m)   Wt 112 kg (247 lb)   LMP 01/01/2004   SpO2 97%   BMI 43.75 kg/m²     Physical Exam  Vitals and nursing note reviewed. Constitutional:       General: She is not in acute distress. Appearance: Normal appearance. She is not ill-appearing. HENT:      Head: Normocephalic and atraumatic. Right Ear: External ear normal.      Left Ear: External ear normal.   Eyes:      Extraocular Movements: Extraocular movements intact. Conjunctiva/sclera: Conjunctivae normal.   Cardiovascular:      Rate and Rhythm: Normal rate and regular rhythm. Pulses: Normal pulses. Heart sounds: Normal heart sounds.    Pulmonary:      Effort: Pulmonary effort is normal. No respiratory distress. Breath sounds: Normal breath sounds. Abdominal:      Palpations: Abdomen is soft. Tenderness: There is no abdominal tenderness. There is no right CVA tenderness, left CVA tenderness, guarding or rebound. Musculoskeletal:         General: Normal range of motion. Cervical back: Normal range of motion and neck supple. Skin:     General: Skin is warm and dry. Neurological:      General: No focal deficit present. Mental Status: She is alert and oriented to person, place, and time. Cranial Nerves: No cranial nerve deficit. Sensory: No sensory deficit. Motor: No weakness. Psychiatric:         Mood and Affect: Mood is not anxious.          Behavior: Behavior normal.       Clair Jeronimo MD

## 2023-10-22 LAB — BACTERIA UR CULT: NORMAL

## 2023-11-05 DIAGNOSIS — K21.9 GASTROESOPHAGEAL REFLUX DISEASE WITHOUT ESOPHAGITIS: ICD-10-CM

## 2023-11-05 DIAGNOSIS — E03.9 HYPOTHYROIDISM (ACQUIRED): ICD-10-CM

## 2023-11-06 RX ORDER — LEVOTHYROXINE SODIUM 112 UG/1
112 TABLET ORAL DAILY
Qty: 90 TABLET | Refills: 0 | Status: SHIPPED | OUTPATIENT
Start: 2023-11-06

## 2023-11-06 RX ORDER — OMEPRAZOLE 40 MG/1
40 CAPSULE, DELAYED RELEASE ORAL DAILY
Qty: 90 CAPSULE | Refills: 0 | Status: SHIPPED | OUTPATIENT
Start: 2023-11-06

## 2023-11-27 DIAGNOSIS — I10 ESSENTIAL HYPERTENSION: ICD-10-CM

## 2023-11-27 RX ORDER — POTASSIUM CHLORIDE 750 MG/1
10 TABLET, FILM COATED, EXTENDED RELEASE ORAL 2 TIMES DAILY
Qty: 180 TABLET | Refills: 3 | Status: SHIPPED | OUTPATIENT
Start: 2023-11-27

## 2023-11-29 DIAGNOSIS — J30.2 SEASONAL ALLERGIES: ICD-10-CM

## 2023-11-30 RX ORDER — LEVOCETIRIZINE DIHYDROCHLORIDE 5 MG/1
5 TABLET, FILM COATED ORAL EVERY EVENING
Qty: 90 TABLET | Refills: 0 | Status: SHIPPED | OUTPATIENT
Start: 2023-11-30

## 2023-12-04 DIAGNOSIS — E11.59 TYPE 2 DIABETES MELLITUS WITH OTHER CIRCULATORY COMPLICATION, WITHOUT LONG-TERM CURRENT USE OF INSULIN (HCC): ICD-10-CM

## 2023-12-04 RX ORDER — LANCETS 33 GAUGE
EACH MISCELLANEOUS
Qty: 100 EACH | Refills: 1 | Status: SHIPPED | OUTPATIENT
Start: 2023-12-04

## 2023-12-15 DIAGNOSIS — I10 ESSENTIAL HYPERTENSION: ICD-10-CM

## 2023-12-18 RX ORDER — POTASSIUM CHLORIDE 750 MG/1
10 TABLET, FILM COATED, EXTENDED RELEASE ORAL 2 TIMES DAILY
Qty: 180 TABLET | Refills: 0 | Status: SHIPPED | OUTPATIENT
Start: 2023-12-18

## 2023-12-21 DIAGNOSIS — I10 ESSENTIAL HYPERTENSION: ICD-10-CM

## 2023-12-22 RX ORDER — RAMIPRIL 2.5 MG/1
2.5 CAPSULE ORAL DAILY
Qty: 90 CAPSULE | Refills: 3 | Status: SHIPPED | OUTPATIENT
Start: 2023-12-22

## 2024-01-10 ENCOUNTER — VBI (OUTPATIENT)
Dept: ADMINISTRATIVE | Facility: OTHER | Age: 74
End: 2024-01-10

## 2024-01-10 NOTE — TELEPHONE ENCOUNTER
01/10/24 11:13 AM    Patient contacted (spoke with patient) to bring Advance Directive, POLST, or Living Will document to next scheduled pcp visit.    Thank you.  Alisha Morgan MA  [facility]

## 2024-01-11 ENCOUNTER — TELEPHONE (OUTPATIENT)
Dept: PULMONOLOGY | Facility: MEDICAL CENTER | Age: 74
End: 2024-01-11

## 2024-01-11 ENCOUNTER — OFFICE VISIT (OUTPATIENT)
Dept: FAMILY MEDICINE CLINIC | Facility: OTHER | Age: 74
End: 2024-01-11
Payer: COMMERCIAL

## 2024-01-11 VITALS
OXYGEN SATURATION: 96 % | DIASTOLIC BLOOD PRESSURE: 80 MMHG | BODY MASS INDEX: 43.23 KG/M2 | SYSTOLIC BLOOD PRESSURE: 124 MMHG | RESPIRATION RATE: 18 BRPM | HEART RATE: 73 BPM | HEIGHT: 63 IN | TEMPERATURE: 98 F | WEIGHT: 244 LBS

## 2024-01-11 DIAGNOSIS — R07.2 SUBSTERNAL CHEST PAIN: Primary | ICD-10-CM

## 2024-01-11 DIAGNOSIS — E66.01 SEVERE OBESITY (BMI >= 40) (HCC): ICD-10-CM

## 2024-01-11 DIAGNOSIS — E11.65 TYPE 2 DIABETES MELLITUS WITH HYPERGLYCEMIA, WITHOUT LONG-TERM CURRENT USE OF INSULIN (HCC): ICD-10-CM

## 2024-01-11 DIAGNOSIS — R06.02 SHORTNESS OF BREATH: ICD-10-CM

## 2024-01-11 PROCEDURE — 99214 OFFICE O/P EST MOD 30 MIN: CPT | Performed by: FAMILY MEDICINE

## 2024-01-11 NOTE — TELEPHONE ENCOUNTER
LM for patient to call office back to schedule consult appointment for SOB, schedule next available.

## 2024-01-11 NOTE — PROGRESS NOTES
Name: Sindy Beckford      : 1950      MRN: [id number]  Encounter Provider: Terese Alford DO  Encounter Date: 2024   Encounter department: [facility]    Assessment & Plan     1. Substernal chest pain  -     Stress test only, exercise; Future; Expected date: 2024    2. Shortness of breath  -     Stress test only, exercise; Future; Expected date: 2024  -     Complete PFT with post bronchodilator; Future  -     Ambulatory Referral to Pulmonology; Future    3. Type 2 diabetes mellitus with hyperglycemia, without long-term current use of insulin (HCC)  -     Stress test only, exercise; Future; Expected date: 2024    4. Severe obesity (BMI >= 40) ([facility])  -     Stress test only, exercise; Future; Expected date: 2024  -     Complete PFT with post bronchodilator; Future         73-year-old female with past medical history type 2 diabetes, coronary artery disease, hyperlipidemia with ASCVD risk score 24%, hypothyroidism and GERD presents for emergency department follow-up.  Patient was seen in [place] ED in 2023 for nonexertional, substernal chest pain accompanied by shortness of breath.  Given her risk factors, I think it would be prudent to check an exercise stress test and have ordered such.  Patient has been advised to call her cardiologist today to see if he would like to amend this order or his standing order for echocardiogram prior to her visit with him on 3/4/2024.  She and I discussed that the etiology of her symptoms could be cardiac, pulmonary, gastrointestinal, or psychological.  She does not believe she is under any significant stress and does not recall having to deal with any acute stressors during the events in question.  Complete PFTs have been ordered to thoroughly workup any obstructive/restrictive pulmonary etiology that may be contributing to symptoms.  At patient's request, a referral to a [facility] pulmonologist has  been entered.  She has been advised to proceed to the emergency department for any recurrent symptoms      Return for Next scheduled follow up.    The patient indicates understanding of these issues and agrees with the plan.      Subjective     Chief Complaint   Patient presents with   • Follow-up     ER - chest pain, SOB - feeling good now; sometimes the breathing will start randomly (thinks possibly allergies) - ER mentioned pulmonology - referral to friend's Dr (wants your opinion)        HPI  Patient presents status post ER follow-up in [place] for chest pain or shortness of breath  She was evaluated for this in mid December 2023, ER documentation is available for review in epic  She recounts 2 separate episodes of substernal chest pain that was nonexertional in nature and accompanied by profound shortness of breath  She denied any concurrent dizziness, change in vision, nausea/vomiting, near syncope  On both instances, she was transported to the emergency department via EMS and cardiopulmonary workup was negative including evaluation for pulmonary emboli and infectious processes  She denies any symptoms of acid reflux and states that she has been taking her omeprazole regularly  Since her return home from [place], she reports continued paroxysmal shortness of breath however chest pain is no longer present  Patient has an appointment with her cardiologist, Dr. Lindquist in early March 2024      Review of Systems   Constitutional:  Negative for activity change, fatigue and fever.   HENT:  Negative for congestion, ear pain, sinus pain and sore throat.    Eyes:  Negative for pain and itching.   Respiratory:  Positive for shortness of breath. Negative for cough, chest tightness and wheezing.    Cardiovascular:  Positive for chest pain (per HPI). Negative for palpitations.   Gastrointestinal:  Negative for abdominal pain, constipation, diarrhea, nausea and vomiting.   Endocrine: Negative for cold  intolerance and heat intolerance.   Genitourinary:  Negative for dysuria.   Musculoskeletal:  Negative for myalgias.   Skin:  Negative for color change and rash.   Neurological:  Negative for dizziness, syncope and headaches.   Hematological:  Negative for adenopathy.   Psychiatric/Behavioral:  Negative for behavioral problems, dysphoric mood and sleep disturbance. The patient is not nervous/anxious.        Current Outpatient Medications on File Prior to Visit   Medication Sig   • aspirin (ECOTRIN LOW STRENGTH) 81 mg EC tablet Take 81 mg by mouth daily   • atorvastatin (LIPITOR) 80 mg tablet TAKE 1 TABLET BY MOUTH DAILY   • Blood Glucose Monitoring Suppl (ONE TOUCH ULTRA MINI) w/Device KIT Please dispense 1 Kit   • Cholecalciferol (Vitamin D3) 25 MCG (1000 UT) CAPS Take 2000 IU with dinner (Patient taking differently: Take by mouth in the morning Take 2000 IU with dinner)   • ezetimibe (ZETIA) 10 mg tablet Take 1 tablet (10 mg total) by mouth daily   • famotidine-calcium carbonate-magnesium hydroxide (PEPCID COMPLETE) -165 MG CHEW Chew 1 tablet daily as needed for heartburn   • fluticasone (FLONASE) 50 mcg/act nasal spray 1 spray into each nostril if needed   • glucose blood (OneTouch Ultra) test strip Use once a day   • Invokana 300 MG TABS TAKE 1 TABLET BY MOUTH  DAILY   • Lancet Devices (OneTouch Delica Plus Lancing) MISC USE WITH LANCETS TO CHECK SUGAR   • Lancets (OneTouch Delica Plus [id number]) MISC USE AS DIRECTED TO CHECK SUGARS TWICE EVERY OTHER DAY   • Lancets Misc. (ONE TOUCH SURESOFT) MISC Use lancet to check sugar   • levocetirizine (XYZAL) 5 MG tablet Take 1 tablet (5 mg total) by mouth every evening   • levothyroxine 112 mcg tablet Take 1 tablet (112 mcg total) by mouth daily   • nebivolol (Bystolic) 5 mg tablet Take 0.5 tablets (2.5 mg total) by mouth daily   • omeprazole (PriLOSEC) 40 MG capsule Take 1 capsule (40 mg total) by mouth daily   • potassium chloride (Klor-Con) 10 mEq tablet Take 1  "tablet (10 mEq total) by mouth 2 (two) times a day   • pramipexole (MIRAPEX) 0.25 mg tablet TAKE 1 TABLET BY MOUTH DAILY AT  BEDTIME   • ramipril (ALTACE) 2.5 mg capsule TAKE 1 CAPSULE BY MOUTH DAILY   • semaglutide, 0.25 or 0.5 mg/dose, (Ozempic, 0.25 or 0.5 MG/DOSE,) 2 mg/3 mL injection pen Inject 0.75 mL (0.5 mg total) under the skin every 7 days Inject 0.5 under the skin once every 7 days   • solifenacin (VESICARE) 10 MG tablet Take 1 tablet (10 mg total) by mouth daily   • valACYclovir (VALTREX) 1,000 mg tablet Take 2 tablets (2,000 mg total) by mouth 2 (two) times a day for 1 day       Objective     /80   Pulse 73   Temp 98 °F (36.7 °C)   Resp 18   Ht 5' 3\" (1.6 m)   Wt 111 kg (244 lb)   LMP 01/01/2004   SpO2 96%   BMI 43.22 kg/m²     Physical Exam  Vitals and nursing note reviewed.   Constitutional:       General: She is not in acute distress.     Appearance: Normal appearance. She is well-developed. She is not ill-appearing.      Comments: Body mass index is 43.22 kg/m².    HENT:      Head: Normocephalic and atraumatic.      Right Ear: External ear normal.      Left Ear: External ear normal.      Nose: Nose normal.   Eyes:      General: No scleral icterus.     Conjunctiva/sclera: Conjunctivae normal.      Pupils: Pupils are equal, round, and reactive to light.   Neck:      Thyroid: No thyromegaly.   Cardiovascular:      Rate and Rhythm: Normal rate and regular rhythm.      Heart sounds: Murmur heard.      Systolic murmur is present with a grade of 3/6.      No friction rub. No gallop.   Pulmonary:      Effort: Pulmonary effort is normal. No respiratory distress.      Breath sounds: Normal breath sounds. No stridor. No wheezing, rhonchi or rales.   Abdominal:      General: Bowel sounds are normal. There is no distension.      Palpations: Abdomen is soft.      Tenderness: There is no abdominal tenderness.   Musculoskeletal:         General: No tenderness. Normal range of motion.      Cervical " back: Normal range of motion and neck supple.      Right lower le+ Pitting Edema present.      Left lower le+ Pitting Edema present.   Lymphadenopathy:      Cervical: No cervical adenopathy.   Skin:     General: Skin is warm and dry.      Findings: No rash.   Neurological:      General: No focal deficit present.      Mental Status: She is alert and oriented to person, place, and time.      Cranial Nerves: No cranial nerve deficit.   Psychiatric:         Mood and Affect: Mood normal.         Behavior: Behavior normal.       Terese Alford, DO

## 2024-01-12 DIAGNOSIS — K21.9 GASTROESOPHAGEAL REFLUX DISEASE WITHOUT ESOPHAGITIS: ICD-10-CM

## 2024-01-12 RX ORDER — OMEPRAZOLE 40 MG/1
40 CAPSULE, DELAYED RELEASE ORAL DAILY
Qty: 90 CAPSULE | Refills: 3 | Status: SHIPPED | OUTPATIENT
Start: 2024-01-12

## 2024-01-18 ENCOUNTER — HOSPITAL ENCOUNTER (OUTPATIENT)
Dept: PULMONOLOGY | Facility: HOSPITAL | Age: 74
End: 2024-01-18
Payer: COMMERCIAL

## 2024-01-18 DIAGNOSIS — R06.02 SHORTNESS OF BREATH: ICD-10-CM

## 2024-01-18 DIAGNOSIS — E66.01 SEVERE OBESITY (BMI >= 40) (HCC): ICD-10-CM

## 2024-01-18 PROCEDURE — 94726 PLETHYSMOGRAPHY LUNG VOLUMES: CPT | Performed by: INTERNAL MEDICINE

## 2024-01-18 PROCEDURE — 94729 DIFFUSING CAPACITY: CPT

## 2024-01-18 PROCEDURE — 94760 N-INVAS EAR/PLS OXIMETRY 1: CPT

## 2024-01-18 PROCEDURE — 94060 EVALUATION OF WHEEZING: CPT | Performed by: INTERNAL MEDICINE

## 2024-01-18 PROCEDURE — 94060 EVALUATION OF WHEEZING: CPT

## 2024-01-18 PROCEDURE — 94726 PLETHYSMOGRAPHY LUNG VOLUMES: CPT

## 2024-01-18 PROCEDURE — 94729 DIFFUSING CAPACITY: CPT | Performed by: INTERNAL MEDICINE

## 2024-01-18 RX ORDER — ALBUTEROL SULFATE 2.5 MG/3ML
2.5 SOLUTION RESPIRATORY (INHALATION) ONCE
Status: COMPLETED | OUTPATIENT
Start: 2024-01-18 | End: 2024-01-18

## 2024-01-18 RX ADMIN — ALBUTEROL SULFATE 2.5 MG: 2.5 SOLUTION RESPIRATORY (INHALATION) at 13:47

## 2024-01-22 NOTE — RESULT ENCOUNTER NOTE
Sindy,    Your PFT's look normal -- that's great news!  I'm looking forward to seeing what Dr Berger has to say next week.    Take care,  Dr Alford

## 2024-01-29 ENCOUNTER — CONSULT (OUTPATIENT)
Dept: PULMONOLOGY | Facility: MEDICAL CENTER | Age: 74
End: 2024-01-29
Payer: COMMERCIAL

## 2024-01-29 VITALS
TEMPERATURE: 98.4 F | HEART RATE: 75 BPM | HEIGHT: 63 IN | WEIGHT: 248 LBS | SYSTOLIC BLOOD PRESSURE: 122 MMHG | DIASTOLIC BLOOD PRESSURE: 80 MMHG | RESPIRATION RATE: 16 BRPM | BODY MASS INDEX: 43.94 KG/M2 | OXYGEN SATURATION: 95 %

## 2024-01-29 DIAGNOSIS — E66.01 SEVERE OBESITY (BMI >= 40) (HCC): ICD-10-CM

## 2024-01-29 DIAGNOSIS — R06.02 SHORTNESS OF BREATH: Primary | ICD-10-CM

## 2024-01-29 DIAGNOSIS — I35.0 MODERATE AORTIC STENOSIS: ICD-10-CM

## 2024-01-29 DIAGNOSIS — J30.2 SEASONAL ALLERGIES: ICD-10-CM

## 2024-01-29 PROCEDURE — 99204 OFFICE O/P NEW MOD 45 MIN: CPT | Performed by: INTERNAL MEDICINE

## 2024-01-29 NOTE — PATIENT INSTRUCTIONS
Monitor your and oxygen saturation when you have any episodes of shortness of breath.  Your O2 saturation should always be above 90%.  Generally speaking heart rate should be between 60 and 100 bpm    Call me if you have any further episodes of Difficulty breathing.  Echocardiogram scheduled for February 1 Thursday 9:30 in the morning

## 2024-01-29 NOTE — PROGRESS NOTES
Assessment/Plan:       Problem List Items Addressed This Visit          Cardiovascular and Mediastinum    Moderate aortic stenosis     Last echocardiogram done in May 2022 showed evidence of at least moderate aortic stenosis with valve area 1.1 cm and normal LV systolic function.  No evidence of any pulmonary artery hypertension.    Her episodic shortness of breath may be related to worsening aortic stenosis.  She is scheduled to have an echocardiogram in February 1 through [facility] and then has appointment with her cardiologist Dr. Lindquist on 2/21/24    No evidence to suggest that she has any type of reactive airways disease.  CTA PE study of chest done at [facility] emergency room in December showed no evidence of any parenchymal lung disease or pulmonary embolism.  No pleural effusions.            Other    Severe obesity (BMI >= 40) ([facility])     Sindy is overweight.  She denies any loud snoring or excessive daytime somnolence to indicate ANA.         Seasonal allergies     Does have some mild seasonal allergies.  He does take Zyrtec and Flonase when needed.  Does not have any history of asthma         Shortness of breath - Primary     Sindy had 2 episodes of shortness of breath in December while visiting her brother in [facility].  These were about a week apart.  First time she did have some tingling in her chest but evaluation in the ER did not reveal any evidence of PE or MI.  She had a second episode about a week later and again no acute findings were determined.  She only had KGS mild shortness of breath now.    She did have complete PFT done 1/18/2024 which showed lung volumes to be normal.  Total lung capacity was borderline decreased but forced vital capacity was normal.  Diffusion capacity was only minimally decreased at 72% of predicted and adjusted for alveolar volume was normal.    PFT results are as follows;    FVC -   2.30 L      85%  FEV1 - 1.86 L      89%  FEV1/FVC% - 81%    RV -  "76%  TLC - 79%    DLCO - 72%  DLCO/VA - 96%              Plan for follow up:    -Return if symptoms worsen or fail to improve.  All questions are answered to the patient's satisfaction and understanding.  She verbalizes understanding.  She is encouraged to call with any further questions or concerns.    Portions of the record may have been created with voice recognition software.  Occasional wrong word or \"sound a like\" substitutions may have occurred due to the inherent limitations of voice recognition software.  Read the chart carefully and recognize, using context, where substitutions have occurred.a    Electronically Signed by Danielito Berger,     ______________________________________________________________________    Chief Complaint: had 2 episodes of shortness of breath in December       Patient ID: Sindy is a 73 y.o. y.o. female has a past medical history of Allergic (2018   weekly inject.), Allergic rhinitis, Allergies, Anemia, Anxiety, Arthritis, Chronic constipation, Colon polyp, Diabetes (HCC), Diabetes mellitus (HCC), Disease of thyroid gland, GERD (gastroesophageal reflux disease), Headache(784.0), Heart murmur, Hyperlipidemia, Hypertension, Hypothyroidism, Obesity, Obesity, Restless legs, and Vitamin D deficiency.    1/29/2024  Patient presents today for initial visit.for evaluation of recent episodes of shortness of breath    MARLEY Callahan is 73 years old presents for evaluation of 2 episodes of shortness of breath she experienced an early December 2023.    First episode was December 3  and she got short of breath just after she got done showering.  Second episode was when she was helping her brother make  the bed.  During first episode she states she felt like she had some tingling in her sternal region particular the left side.  This pain was not pleuritic in nature.  It was nonradiating.  She was having some persistent shortness of breath afterwards and thus she was brought to the emergency " room by EMS.  She was visiting her brother in [facility] and was brought to [facility] emergency room in [place].  Her room air O2 saturations ranged from 94 to 96%.  Her serum creatinine was normal at 0.76 and potassium was normal at 3.9.  White blood cell count was 6.7 with hemoglobin 14.3 and platelet count of 218.  Troponin enzymes were done and no evidence of any elevation.  A CT a PE study of chest was done on the first ER visit 12 3.  There is question of possible mild interstitial edema on chest x-ray but on CT scan no mention of any parenchymal lung abnormalities.  No evidence of any pulmonary embolism or pneumonia.  EKG showed sinus rhythm with no ischemic changes. Serum BNP eas normal at 66.  She was monitored for several hours and then discharged home.    On December 7 she was helping her brother make his bed and she started to feel short of breath.  Shortness of breath persisted for least a couple hours was worse with activity.  She thus went to ER at [facility] emergency room.  She was visiting her brother in the area for couple weeks.  Her room air O2 saturation on presentation was normal and blood pressure was 148/73.  Chest x-ray done and was unremarkable.  Serum brain-peptide level was normal at 25 troponin again was negative.  She was discharged home.    In the last 2 weeks her breathing has been good.  Not having shortness of breath with her usual activity.  Not have any chest discomfort.  No cough or wheezing.    Sindy does have moderate aortic stenosis as evidenced by echo May 2022.  Her LV systolic function was normal at this time.  She is scheduled to have a echocardiogram Friday, February 1.  She then has an appointment to see her cardiologist Dr. Lindquist on February 21.  She did have a cardiac catheterization done in 2014 which showed 70% RCA narrowing.  She did have Lexiscan Cardiolite stress test SPECT test done July 2022 which showed normal LV systolic function no evidence of  ischemia    She did have a sleep study done 10 years ago which she states was negative.  She does not have any snoring or excessive daytime somnolence.  Does not wake up short of breath at night    Does not have any chest pain with activity.  She has history of GERD and does take omeprazole which controls her symptoms.  She did have COVID infection July 2023.  Does not have any history of asthma or COPD.    She does not have any wheezing and presently in the last couple weeks not had any shortness of breath.    Sindy never smoked.  She has no history of asthma.  Does have history of hyperlipidemia and hypertension.  She also has some mild seasonal allergies.        Review of Systems   Constitutional:  Negative for chills, fever and unexpected weight change.   HENT:  Negative for congestion, rhinorrhea and sore throat.    Eyes:  Negative for discharge and redness.   Respiratory:  Negative for cough, chest tightness and wheezing.    Cardiovascular:  Negative for chest pain, palpitations and leg swelling.   Gastrointestinal:  Negative for abdominal distention, abdominal pain and nausea.   Endocrine: Negative for polydipsia and polyphagia.   Genitourinary:  Negative for dysuria.   Musculoskeletal:  Negative for joint swelling and myalgias.   Skin:  Negative for rash.   Neurological:  Negative for light-headedness.   Psychiatric/Behavioral:  Negative for confusion.        Social history: She reports that she has never smoked. She has never used smokeless tobacco. She reports current alcohol use. She reports that she does not use drugs.    Past surgical history:   Past Surgical History:   Procedure Laterality Date    ABDOMINAL SURGERY  01/2001    Dr. Raúl LEW    BREAST BIOPSY Right 2001    CATARACT EXTRACTION      CATARACT EXTRACTION, BILATERAL  05/2021    CHOLECYSTECTOMY      COLONOSCOPY  06/01/2018    goes q 5 yr-due in Summer 2018-Dr. Lam    CORONARY ANGIOPLASTY  2015    Dr Bateman    LYMPH NODE BIOPSY  2001      GUIDANCE  2013    US GUIDANCE  10/17/2016    US GUIDED FINE NEEDLE ASPIRATION (ALL INC)  10/15/2013     Family history:   Family History   Problem Relation Age of Onset    Multiple sclerosis Brother     Thyroid disease unspecified Brother     Multiple sclerosis Paternal Uncle     Diabetes Paternal Uncle     Hypertension Father             Diabetes Father     Heart disease Father     Diabetes type II Father             Multiple sclerosis Family     Diabetes Paternal Aunt     Cancer Neg Hx     Stroke Neg Hx     Obesity Neg Hx        Immunization History   Administered Date(s) Administered    COVID-19 PFIZER VACCINE 0.3 ML IM 2021, 03/10/2021, 10/09/2021, 2022    COVID-19 Pfizer Vac BIVALENT Bhavin-sucrose 12 Yr+ IM 2022, 2023    INFLUENZA 10/25/2010, 2011, 10/01/2012, 10/16/2013, 2014, 11/10/2015, 10/03/2016, 2017, 2018, 11/10/2018, 2022    Influenza, high dose seasonal 0.7 mL 2019, 2020, 2021, 2022, 2023    Pneumococcal Conjugate 13-Valent 2014, 11/10/2018    Pneumococcal Conjugate PCV 7 10/01/2003    Pneumococcal Polysaccharide PPV23 2018    Tdap 10/25/2010, 2022, 2023    Zoster 2013    Zoster Vaccine Recombinant 2018, 11/10/2018     Current Outpatient Medications   Medication Sig Dispense Refill    aspirin (ECOTRIN LOW STRENGTH) 81 mg EC tablet Take 81 mg by mouth daily      atorvastatin (LIPITOR) 80 mg tablet TAKE 1 TABLET BY MOUTH DAILY 90 tablet 3    Blood Glucose Monitoring Suppl (ONE TOUCH ULTRA MINI) w/Device KIT Please dispense 1 Kit 1 each 0    Cholecalciferol (Vitamin D3) 25 MCG (1000 UT) CAPS Take 2000 IU with dinner (Patient taking differently: Take by mouth in the morning Take 2000 IU with dinner)      ezetimibe (ZETIA) 10 mg tablet Take 1 tablet (10 mg total) by mouth daily 90 tablet 1    famotidine-calcium carbonate-magnesium hydroxide (PEPCID COMPLETE) -776  MG CHEW Chew 1 tablet daily as needed for heartburn      fluticasone (FLONASE) 50 mcg/act nasal spray 1 spray into each nostril if needed      glucose blood (OneTouch Ultra) test strip Use once a day 100 each 3    Invokana 300 MG TABS TAKE 1 TABLET BY MOUTH  DAILY 90 tablet 3    Lancet Devices (OneTouch Delica Plus Lancing) MISC USE WITH LANCETS TO CHECK SUGAR      Lancets (OneTouch Delica Plus [id number]) MISC USE AS DIRECTED TO CHECK SUGARS TWICE EVERY OTHER  each 1    Lancets Misc. (ONE TOUCH SURESOFT) MISC Use lancet to check sugar 1 each 0    levocetirizine (XYZAL) 5 MG tablet Take 1 tablet (5 mg total) by mouth every evening 90 tablet 0    levothyroxine 112 mcg tablet Take 1 tablet (112 mcg total) by mouth daily 90 tablet 0    nebivolol (Bystolic) 5 mg tablet Take 0.5 tablets (2.5 mg total) by mouth daily 90 tablet 0    omeprazole (PriLOSEC) 40 MG capsule TAKE 1 CAPSULE BY MOUTH DAILY 90 capsule 3    potassium chloride (Klor-Con) 10 mEq tablet Take 1 tablet (10 mEq total) by mouth 2 (two) times a day 180 tablet 0    pramipexole (MIRAPEX) 0.25 mg tablet TAKE 1 TABLET BY MOUTH DAILY AT  BEDTIME 90 tablet 3    ramipril (ALTACE) 2.5 mg capsule TAKE 1 CAPSULE BY MOUTH DAILY 90 capsule 3    semaglutide, 0.25 or 0.5 mg/dose, (Ozempic, 0.25 or 0.5 MG/DOSE,) 2 mg/3 mL injection pen Inject 0.75 mL (0.5 mg total) under the skin every 7 days Inject 0.5 under the skin once every 7 days 3 mL 1    solifenacin (VESICARE) 10 MG tablet Take 1 tablet (10 mg total) by mouth daily 90 tablet 3    valACYclovir (VALTREX) 1,000 mg tablet Take 2 tablets (2,000 mg total) by mouth 2 (two) times a day for 1 day 8 tablet 2     No current facility-administered medications for this visit.     Allergies: Other and Penicillins    Objective:  Vitals:    01/29/24 1059   BP: 122/80   BP Location: Left arm   Patient Position: Sitting   Cuff Size: Large   Pulse: 75   Resp: 16   Temp: 98.4 °F (36.9 °C)   TempSrc: Temporal   SpO2: 95%   Weight:  "112 kg (248 lb)   Height: 5' 3\" (1.6 m)   Oxygen Therapy  SpO2: 95 %  .  Wt Readings from Last 3 Encounters:   01/29/24 112 kg (248 lb)   01/11/24 111 kg (244 lb)   10/20/23 112 kg (247 lb)     Body mass index is 43.93 kg/m².    Physical Exam  Vitals reviewed.   Constitutional:       General: She is not in acute distress.     Appearance: Normal appearance. She is well-developed. She is obese.   HENT:      Head: Normocephalic.      Right Ear: External ear normal.      Left Ear: External ear normal.      Nose: Nose normal.      Mouth/Throat:      Mouth: Mucous membranes are moist.      Pharynx: No oropharyngeal exudate.      Comments: Mallampati score is 2  Eyes:      Conjunctiva/sclera: Conjunctivae normal.      Pupils: Pupils are equal, round, and reactive to light.   Neck:      Vascular: No JVD.      Trachea: No tracheal deviation.   Cardiovascular:      Rate and Rhythm: Normal rate and regular rhythm.      Heart sounds: Normal heart sounds.   Pulmonary:      Effort: Pulmonary effort is normal.      Comments: Lung sounds are clear.  No wheezes, crackles or rhonchi  Abdominal:      General: There is no distension.      Palpations: Abdomen is soft.      Tenderness: There is no abdominal tenderness. There is no guarding.   Musculoskeletal:      Cervical back: Neck supple.      Comments: No edema, cyanosis or clubbing   Lymphadenopathy:      Cervical: No cervical adenopathy.   Skin:     General: Skin is warm and dry.      Findings: No rash.   Neurological:      General: No focal deficit present.      Mental Status: She is alert and oriented to person, place, and time.   Psychiatric:         Mood and Affect: Mood normal.         Behavior: Behavior normal.         Thought Content: Thought content normal.         Lab Review:     Done 12/7/23 showed sodium of 136, potassium 4.3 BUN 14 and creatinine 0.75, BNP 25  White blood cell count was 7.4, hemoglobin 15.2 and platelet count 229    Diagnostics:  I have personally " reviewed pertinent films in PACS    CTA PE study of chest done on 12/3/23 showed no evidence of PE or parenchymal lung disease.    Echo done 5/10/22 revealed normal LV systolic function and right ventricular systolic function was normal as well.  There was evidence of at least moderate aortic stenosis with valve area of 1.1 cm²       Complete PFT with post bronchodilator    Result Date: 1/18/2024  Narrative: PULMONARY FUNCTION TEST Date of service: 01/18/24 Physician requesting PFT: Terese Alford DO Reason for PFT: Shortness of breath; obesity INTERPRETATION:  Good patient effort and cooperation. the oxygen saturation 99% on room air at rest. The results of this test meet the ATS standards for acceptability and repeatability.  The spirometry showed normal FEV1, normal FVC and increased FEV1/FVC ratio.  There was no significant response to inhaled bronchodilator.  The FEF 25-75 was normal.  The lung volumes were borderline reduced.  The ERV was severely reduced.    The diffusion capacity corrected for hemoglobin was reduced (69% of the predicted).  However when adjusted volume, the reduction in diffusion capacity improved to normal range.  The flow-volume loop was unremarkable.     Impression: The results of these pulmonary function tests are grossly within normal limits.  The decreased ERV could be secondary to patient's body habitus. Gopal Harrell M.D.

## 2024-01-30 NOTE — ASSESSMENT & PLAN NOTE
Sindy is overweight.  She denies any loud snoring or excessive daytime somnolence to indicate ANA.

## 2024-01-30 NOTE — ASSESSMENT & PLAN NOTE
Last echocardiogram done in May 2022 showed evidence of at least moderate aortic stenosis with valve area 1.1 cm and normal LV systolic function.  No evidence of any pulmonary artery hypertension.    Her episodic shortness of breath may be related to worsening aortic stenosis.  She is scheduled to have an echocardiogram in February 1 through [facility] and then has appointment with her cardiologist Dr. Lindquist on 2/21/24    No evidence to suggest that she has any type of reactive airways disease.  CTA PE study of chest done at [facility] emergency room in December showed no evidence of any parenchymal lung disease or pulmonary embolism.  No pleural effusions.

## 2024-01-30 NOTE — ASSESSMENT & PLAN NOTE
Does have some mild seasonal allergies.  He does take Zyrtec and Flonase when needed.  Does not have any history of asthma

## 2024-01-30 NOTE — ASSESSMENT & PLAN NOTE
Sindy had 2 episodes of shortness of breath in December while visiting her brother in [facility].  These were about a week apart.  First time she did have some tingling in her chest but evaluation in the ER did not reveal any evidence of PE or MI.  She had a second episode about a week later and again no acute findings were determined.  She only had KGS mild shortness of breath now.    She did have complete PFT done 1/18/2024 which showed lung volumes to be normal.  Total lung capacity was borderline decreased but forced vital capacity was normal.  Diffusion capacity was only minimally decreased at 72% of predicted and adjusted for alveolar volume was normal.    PFT results are as follows;    FVC -   2.30 L      85%  FEV1 - 1.86 L      89%  FEV1/FVC% - 81%    RV - 76%  TLC - 79%    DLCO - 72%  DLCO/VA - 96%

## 2024-02-07 ENCOUNTER — LAB (OUTPATIENT)
Dept: LAB | Facility: HOSPITAL | Age: 74
End: 2024-02-07
Payer: COMMERCIAL

## 2024-02-07 DIAGNOSIS — I10 ESSENTIAL HYPERTENSION: ICD-10-CM

## 2024-02-07 DIAGNOSIS — E03.9 ACQUIRED HYPOTHYROIDISM: ICD-10-CM

## 2024-02-07 DIAGNOSIS — E11.65 TYPE 2 DIABETES MELLITUS WITH HYPERGLYCEMIA, WITHOUT LONG-TERM CURRENT USE OF INSULIN (HCC): ICD-10-CM

## 2024-02-07 LAB
ANION GAP SERPL CALCULATED.3IONS-SCNC: 11 MMOL/L
BUN SERPL-MCNC: 16 MG/DL (ref 5–25)
CALCIUM SERPL-MCNC: 9.2 MG/DL (ref 8.4–10.2)
CHLORIDE SERPL-SCNC: 105 MMOL/L (ref 96–108)
CO2 SERPL-SCNC: 25 MMOL/L (ref 21–32)
CREAT SERPL-MCNC: 0.75 MG/DL (ref 0.6–1.3)
CREAT UR-MCNC: 78.1 MG/DL
EST. AVERAGE GLUCOSE BLD GHB EST-MCNC: 183 MG/DL
GFR SERPL CREATININE-BSD FRML MDRD: 79 ML/MIN/1.73SQ M
GLUCOSE P FAST SERPL-MCNC: 157 MG/DL (ref 65–99)
HBA1C MFR BLD: 8 %
MICROALBUMIN UR-MCNC: 7.3 MG/L
MICROALBUMIN/CREAT 24H UR: 9 MG/G CREATININE (ref 0–30)
POTASSIUM SERPL-SCNC: 4.2 MMOL/L (ref 3.5–5.3)
SODIUM SERPL-SCNC: 141 MMOL/L (ref 135–147)
T4 FREE SERPL-MCNC: 0.73 NG/DL (ref 0.61–1.12)
TSH SERPL DL<=0.05 MIU/L-ACNC: 3.42 UIU/ML (ref 0.45–4.5)

## 2024-02-07 PROCEDURE — 82570 ASSAY OF URINE CREATININE: CPT

## 2024-02-07 PROCEDURE — 84439 ASSAY OF FREE THYROXINE: CPT

## 2024-02-07 PROCEDURE — 80048 BASIC METABOLIC PNL TOTAL CA: CPT

## 2024-02-07 PROCEDURE — 36415 COLL VENOUS BLD VENIPUNCTURE: CPT

## 2024-02-07 PROCEDURE — 83036 HEMOGLOBIN GLYCOSYLATED A1C: CPT

## 2024-02-07 PROCEDURE — 82043 UR ALBUMIN QUANTITATIVE: CPT

## 2024-02-07 PROCEDURE — 84443 ASSAY THYROID STIM HORMONE: CPT

## 2024-02-10 DIAGNOSIS — J30.2 SEASONAL ALLERGIES: ICD-10-CM

## 2024-02-10 DIAGNOSIS — K21.9 GASTROESOPHAGEAL REFLUX DISEASE WITHOUT ESOPHAGITIS: ICD-10-CM

## 2024-02-12 ENCOUNTER — HOSPITAL ENCOUNTER (OUTPATIENT)
Dept: RADIOLOGY | Facility: HOSPITAL | Age: 74
Discharge: HOME | End: 2024-02-12
Payer: COMMERCIAL

## 2024-02-12 ENCOUNTER — OFFICE VISIT (OUTPATIENT)
Dept: ENDOCRINOLOGY | Facility: CLINIC | Age: 74
End: 2024-02-12
Payer: COMMERCIAL

## 2024-02-12 VITALS
WEIGHT: 245 LBS | OXYGEN SATURATION: 95 % | BODY MASS INDEX: 43.4 KG/M2 | DIASTOLIC BLOOD PRESSURE: 78 MMHG | SYSTOLIC BLOOD PRESSURE: 128 MMHG | HEART RATE: 71 BPM

## 2024-02-12 VITALS — BODY MASS INDEX: 43.41 KG/M2 | WEIGHT: 245 LBS | HEIGHT: 63 IN

## 2024-02-12 DIAGNOSIS — I10 ESSENTIAL HYPERTENSION: ICD-10-CM

## 2024-02-12 DIAGNOSIS — E55.9 VITAMIN D DEFICIENCY: ICD-10-CM

## 2024-02-12 DIAGNOSIS — E11.59 TYPE 2 DIABETES MELLITUS WITH OTHER CIRCULATORY COMPLICATION, WITHOUT LONG-TERM CURRENT USE OF INSULIN (HCC): Primary | ICD-10-CM

## 2024-02-12 DIAGNOSIS — E66.01 SEVERE OBESITY (BMI >= 40) (HCC): ICD-10-CM

## 2024-02-12 DIAGNOSIS — E78.2 MIXED HYPERLIPIDEMIA: ICD-10-CM

## 2024-02-12 DIAGNOSIS — Z12.31 ENCOUNTER FOR SCREENING MAMMOGRAM FOR BREAST CANCER: ICD-10-CM

## 2024-02-12 DIAGNOSIS — E03.9 HYPOTHYROIDISM (ACQUIRED): ICD-10-CM

## 2024-02-12 DIAGNOSIS — I25.10 CORONARY ARTERY DISEASE INVOLVING NATIVE CORONARY ARTERY OF NATIVE HEART WITHOUT ANGINA PECTORIS: ICD-10-CM

## 2024-02-12 DIAGNOSIS — E04.2 NONTOXIC MULTINODULAR GOITER: ICD-10-CM

## 2024-02-12 DIAGNOSIS — E11.65 TYPE 2 DIABETES MELLITUS WITH HYPERGLYCEMIA, WITHOUT LONG-TERM CURRENT USE OF INSULIN (HCC): ICD-10-CM

## 2024-02-12 PROCEDURE — 99214 OFFICE O/P EST MOD 30 MIN: CPT | Performed by: INTERNAL MEDICINE

## 2024-02-12 PROCEDURE — 77063 BREAST TOMOSYNTHESIS BI: CPT

## 2024-02-12 PROCEDURE — 77067 SCR MAMMO BI INCL CAD: CPT

## 2024-02-12 RX ORDER — LEVOCETIRIZINE DIHYDROCHLORIDE 5 MG/1
5 TABLET, FILM COATED ORAL EVERY EVENING
Qty: 90 TABLET | Refills: 0 | Status: SHIPPED | OUTPATIENT
Start: 2024-02-12

## 2024-02-12 RX ORDER — OMEPRAZOLE 40 MG/1
40 CAPSULE, DELAYED RELEASE ORAL DAILY
Qty: 90 CAPSULE | Refills: 0 | Status: SHIPPED | OUTPATIENT
Start: 2024-02-12

## 2024-02-12 NOTE — PROGRESS NOTES
Sindy Beckford 73 y.o. female MRN: [id number]    Encounter: [id number]      Assessment/Plan     Assessment:  This is a 73 y.o.-year-old female with diabetes with hyperglycemia.    Plan:    Diagnoses and all orders for this visit:    Type 2 diabetes mellitus with other circulatory complication, without long-term current use of insulin (HCC)  Lab Results   Component Value Date    HGBA1C 8.0 (H) 02/07/2024   Hba1c uncontrolled   Increase OZempic to 1 mg once a week   Continue Invokana 300 mg po daily   Discussed to keep carbohydrate consistent with meals avoid sweets desserts  Discussed the importance of exercise routine such as walking at least 30 minutes daily  Educated to check blood sugar twice daily and submit log in 4 weeks for review    -     semaglutide, 1 mg/dose, (Ozempic, 1 MG/DOSE,) 4 mg/3 mL injection pen; Inject 0.75 mL (1 mg total) under the skin every 7 days  -     Hemoglobin A1C; Future  -     Basic metabolic panel; Future    Type 2 diabetes mellitus with hyperglycemia, without long-term current use of insulin (HCC)  Increase Ozempic to 1 mg once a week  Hypothyroidism (acquired)  Lab Results   Component Value Date    [id number] 3.418 02/07/2024   Patient is clinically and biochemically euthyroid, continue current dose of levothyroxine    -     T4, free; Future  -     TSH, 3rd generation; Future    Nontoxic multinodular goiter  Thyroid ultrasound was done in June 2023 which showed multiple thyroid nodules not significantly changed.  Thyroid nodules are stable and were biopsied previously with benign results which is assuring  Will obtain an next ultrasound in June 2025  Mixed hyperlipidemia  Maintained on statins  Essential hypertension  Blood pressure well-controlled, continue current management as per PCP  Coronary artery disease involving native coronary artery of native heart without angina pectoris  Stable findings  Vitamin D deficiency  Continue vitamin D3 supplementation 2000 IU daily  Severe  obesity (BMI >= 40) ([facility])  Created about importance of weight loss and dietary modifications.  Continue Ozempic.  Dose of Ozempic was increased to 1 mg once a week       CC: Diabetes    History of Present Illness     HPI:    Sindy Beckford 73-year-old female with medical history of hypothyroidism, type 2 diabetes, goiter is here for follow-up.  For hypothyroidism she takes levothyroxine 112 mcg daily on empty stomach 1 hour before breakfast.  She also has history of thyroid nodules, thyroid ultrasound in June 2023, showed right upper pole nodule measuring 2 x 1.6 x 1.4 cm not significantly changed, right mid gland nodule measuring 2.2 x 1.8 x 1.4 cm not significantly changed, right lower pole nodule measuring 1.8 x 1.6 x 1.3 cm no significant change, left mid gland nodule measuring 1.7 x 0.9 x 1.1 cm not significantly changed.  Patient has stable thyroid nodules out of which right mid gland nodule, right lower pole nodule and left mid gland nodule was biopsied with benign results previously     For type 2 diabetes management, she takes Ozempic 0.5 mg once a week, Invokana 300 mg daily  Patient denies any side effects    For hyperlipidemia, she takes Lipitor 80 mg po daily   Lab Results   Component Value Date    HGBA1C 8.0 (H) 02/07/2024     Diabetic Foot Exam    Patient's shoes and socks removed.    Right Foot/Ankle   Right Foot Inspection  Skin Exam: skin normal and skin intact. No dry skin, no warmth, no callus, no erythema, no maceration, no abnormal color, no pre-ulcer, no ulcer and no callus.     Toe Exam: ROM and strength within normal limits and right toe deformity.     Sensory   Vibration: intact  Monofilament testing: intact    Vascular  The right DP pulse is 2+. The right PT pulse is 2+.     Left Foot/Ankle  Left Foot Inspection  Skin Exam: skin normal and skin intact. No dry skin, no warmth, no erythema, no maceration, normal color, no pre-ulcer, no ulcer and no callus.     Toe Exam: ROM and strength  within normal limits and left toe deformity.     Sensory   Vibration: intact  Monofilament testing: intact    Vascular  The left DP pulse is 2+. The left PT pulse is 2+.     Assign Risk Category  Deformity present  No loss of protective sensation  No weak pulses  Risk: 0    Component      Latest Ref Rng 2/7/2024   Sodium      135 - 147 mmol/L 141    Potassium      3.5 - 5.3 mmol/L 4.2    Chloride      96 - 108 mmol/L 105    Carbon Dioxide      21 - 32 mmol/L 25    ANION GAP      mmol/L 11    BUN      5 - 25 mg/dL 16    Creatinine      0.60 - 1.30 mg/dL 0.75    GLUCOSE, FASTING      65 - 99 mg/dL 157 (H)    Calcium      8.4 - 10.2 mg/dL 9.2    GFR, Calculated      ml/min/1.73sq m 79    EXT Creatinine Urine      Reference range not established. mg/dL 78.1    Albumin,U,Random      <20.0 mg/L 7.3    Albumin Creat Ratio      0 - 30 mg/g creatinine 9    Hemoglobin A1C      Normal 4.0-5.6%; PreDiabetic 5.7-6.4%; Diabetic >=6.5%; Glycemic control for adults with diabetes <7.0% % 8.0 (H)    eAG, EST AVG Glucose      mg/dl 183    Free T4      0.61 - 1.12 ng/dL 0.73    TSH 3RD GENERATON      0.450 - 4.500 uIU/mL 3.418           Review of Systems   Constitutional:  Negative for activity change, diaphoresis, fatigue, fever and unexpected weight change.   HENT: Negative.     Eyes:  Negative for visual disturbance.   Respiratory:  Negative for cough, chest tightness and shortness of breath.    Cardiovascular:  Negative for chest pain, palpitations and leg swelling.   Gastrointestinal:  Negative for abdominal pain, blood in stool, constipation, diarrhea, nausea and vomiting.   Endocrine: Negative for cold intolerance, heat intolerance, polydipsia, polyphagia and polyuria.   Genitourinary:  Negative for dysuria, enuresis, frequency and urgency.   Musculoskeletal:  Negative for arthralgias and myalgias.   Skin:  Negative for pallor, rash and wound.   Allergic/Immunologic: Negative.    Neurological:  Negative for dizziness, tremors,  weakness and numbness.   Hematological: Negative.    Psychiatric/Behavioral: Negative.         Historical Information   Past Medical History:   Diagnosis Date    Allergic 2018   weekly inject.    Allergic rhinitis     Allergies     Anemia     Anxiety     Arthritis     Chronic constipation     Colon polyp     Diabetes (HCC)     Diabetes mellitus (HCC)     Disease of thyroid gland     GERD (gastroesophageal reflux disease)     Headache(784.0)     Heart murmur     Hyperlipidemia     Hypertension     Hypothyroidism     Obesity     Obesity     Restless legs     Vitamin D deficiency      Past Surgical History:   Procedure Laterality Date    ABDOMINAL SURGERY  2001    Dr. Raúl LEW    BREAST BIOPSY Right     CATARACT EXTRACTION      CATARACT EXTRACTION, BILATERAL  2021    CHOLECYSTECTOMY      COLONOSCOPY  2018    goes q 5 yr-due in Summer 2018-Dr. Lam    CORONARY ANGIOPLASTY      Dr Bateman    LYMPH NODE BIOPSY      US GUIDANCE  2013    US GUIDANCE  10/17/2016    US GUIDED FINE NEEDLE ASPIRATION (ALL INC)  10/15/2013     Social History   Social History     Substance and Sexual Activity   Alcohol Use Yes    Comment: on special occasions     Social History     Substance and Sexual Activity   Drug Use Never     Social History     Tobacco Use   Smoking Status Never   Smokeless Tobacco Never     Family History:   Family History   Problem Relation Age of Onset    Multiple sclerosis Brother     Thyroid disease unspecified Brother     Multiple sclerosis Paternal Uncle     Diabetes Paternal Uncle     Hypertension Father             Diabetes Father     Heart disease Father     Diabetes type II Father             Multiple sclerosis Family     Diabetes Paternal Aunt     Cancer Neg Hx     Stroke Neg Hx     Obesity Neg Hx        Meds/Allergies   Current Outpatient Medications   Medication Sig Dispense Refill    aspirin (ECOTRIN LOW STRENGTH) 81 mg EC tablet Take 81 mg by mouth daily       atorvastatin (LIPITOR) 80 mg tablet TAKE 1 TABLET BY MOUTH DAILY 90 tablet 3    Blood Glucose Monitoring Suppl (ONE TOUCH ULTRA MINI) w/Device KIT Please dispense 1 Kit 1 each 0    Cholecalciferol (Vitamin D3) 25 MCG (1000 UT) CAPS Take 2000 IU with dinner (Patient taking differently: Take by mouth in the morning Take 2000 IU with dinner)      ezetimibe (ZETIA) 10 mg tablet Take 1 tablet (10 mg total) by mouth daily 90 tablet 1    famotidine-calcium carbonate-magnesium hydroxide (PEPCID COMPLETE) -165 MG CHEW Chew 1 tablet daily as needed for heartburn      fluticasone (FLONASE) 50 mcg/act nasal spray 1 spray into each nostril if needed      glucose blood (OneTouch Ultra) test strip Use once a day 100 each 3    Invokana 300 MG TABS TAKE 1 TABLET BY MOUTH  DAILY 90 tablet 3    Lancet Devices (Seesearchuch Delica Plus Lancing) MISC USE WITH LANCETS TO CHECK SUGAR      Lancets (OneTouch Delica Plus [id number]) MISC USE AS DIRECTED TO CHECK SUGARS TWICE EVERY OTHER  each 1    Lancets Misc. (ONE TOUCH SURESOFT) MISC Use lancet to check sugar 1 each 0    levocetirizine (XYZAL) 5 MG tablet Take 1 tablet (5 mg total) by mouth every evening 90 tablet 0    levothyroxine 112 mcg tablet Take 1 tablet (112 mcg total) by mouth daily 90 tablet 0    nebivolol (Bystolic) 5 mg tablet Take 0.5 tablets (2.5 mg total) by mouth daily 90 tablet 0    omeprazole (PriLOSEC) 40 MG capsule Take 1 capsule (40 mg total) by mouth daily 90 capsule 0    potassium chloride (Klor-Con) 10 mEq tablet Take 1 tablet (10 mEq total) by mouth 2 (two) times a day 180 tablet 0    pramipexole (MIRAPEX) 0.25 mg tablet TAKE 1 TABLET BY MOUTH DAILY AT  BEDTIME 90 tablet 3    ramipril (ALTACE) 2.5 mg capsule TAKE 1 CAPSULE BY MOUTH DAILY 90 capsule 3    semaglutide, 1 mg/dose, (Ozempic, 1 MG/DOSE,) 4 mg/3 mL injection pen Inject 0.75 mL (1 mg total) under the skin every 7 days 9 mL 1    solifenacin (VESICARE) 10 MG tablet Take 1 tablet (10 mg total) by  mouth daily 90 tablet 3    valACYclovir (VALTREX) 1,000 mg tablet Take 2 tablets (2,000 mg total) by mouth 2 (two) times a day for 1 day 8 tablet 2     No current facility-administered medications for this visit.     Allergies   Allergen Reactions    Other Sneezing     Seasonal, dust, Cat dander, mold    Penicillins Hives       Objective   Vitals: Blood pressure 128/78, pulse 71, weight 111 kg (245 lb), last menstrual period 01/01/2004, SpO2 95%.    Physical Exam  Vitals reviewed.   Constitutional:       General: She is not in acute distress.     Appearance: Normal appearance. She is not ill-appearing.   HENT:      Head: Normocephalic and atraumatic.      Nose: Nose normal.   Eyes:      Extraocular Movements: Extraocular movements intact.      Conjunctiva/sclera: Conjunctivae normal.   Cardiovascular:      Pulses: no weak pulses          Dorsalis pedis pulses are 2+ on the right side and 2+ on the left side.        Posterior tibial pulses are 2+ on the right side and 2+ on the left side.   Pulmonary:      Effort: No respiratory distress.   Musculoskeletal:      Cervical back: Normal range of motion.   Feet:      Right foot:      Skin integrity: No ulcer, skin breakdown, erythema, warmth, callus or dry skin.      Left foot:      Skin integrity: No ulcer, skin breakdown, erythema, warmth, callus or dry skin.   Neurological:      General: No focal deficit present.      Mental Status: She is alert and oriented to person, place, and time.   Psychiatric:         Mood and Affect: Mood normal.         Behavior: Behavior normal.         The history was obtained from the review of the chart, patient.    Lab Results:   Lab Results   Component Value Date/Time    Hemoglobin A1C 8.0 (H) 02/07/2024 06:31 AM    Hemoglobin A1C 6.9 (A) 09/28/2023 10:37 AM    Hemoglobin A1C 6.8 (H) 05/22/2023 06:39 AM    BUN 16 02/07/2024 06:31 AM    BUN 17 05/22/2023 06:39 AM    Potassium 4.2 02/07/2024 06:31 AM    Potassium 4.3 05/22/2023 06:39 AM  "   Chloride 105 02/07/2024 06:31 AM    Chloride 101 05/22/2023 06:39 AM    CO2 25 02/07/2024 06:31 AM    CO2 28 05/22/2023 06:39 AM    Creatinine 0.75 02/07/2024 06:31 AM    Creatinine 0.89 05/22/2023 06:39 AM    AST 17 05/22/2023 06:39 AM    ALT 19 05/22/2023 06:39 AM    Total Protein 7.0 05/22/2023 06:39 AM    Albumin 4.0 05/22/2023 06:39 AM    HDL, Direct 46 (L) 05/22/2023 06:39 AM    Triglycerides 186 (H) 05/22/2023 06:39 AM           Imaging Studies: I have personally reviewed pertinent reports.      Portions of the record may have been created with voice recognition software. Occasional wrong word or \"sound a like\" substitutions may have occurred due to the inherent limitations of voice recognition software. Read the chart carefully and recognize, using context, where substitutions have occurred.    "

## 2024-02-14 NOTE — RESULT ENCOUNTER NOTE
Sindy Beckford,     Your mammogram looks good.  We will plan to repeat this screening in 1 year.    Take care,  Terese Alford, DO

## 2024-02-17 DIAGNOSIS — E03.9 HYPOTHYROIDISM (ACQUIRED): ICD-10-CM

## 2024-02-19 RX ORDER — LEVOTHYROXINE SODIUM 112 UG/1
112 TABLET ORAL DAILY
Qty: 90 TABLET | Refills: 3 | Status: SHIPPED | OUTPATIENT
Start: 2024-02-19

## 2024-02-23 DIAGNOSIS — I10 ESSENTIAL HYPERTENSION: ICD-10-CM

## 2024-02-23 RX ORDER — POTASSIUM CHLORIDE 750 MG/1
10 TABLET, FILM COATED, EXTENDED RELEASE ORAL 2 TIMES DAILY
Qty: 180 TABLET | Refills: 3 | Status: SHIPPED | OUTPATIENT
Start: 2024-02-23

## 2024-03-04 DIAGNOSIS — E78.2 MIXED HYPERLIPIDEMIA: ICD-10-CM

## 2024-03-04 DIAGNOSIS — I25.10 CORONARY ARTERY DISEASE INVOLVING NATIVE CORONARY ARTERY OF NATIVE HEART WITHOUT ANGINA PECTORIS: ICD-10-CM

## 2024-03-04 RX ORDER — EZETIMIBE 10 MG/1
10 TABLET ORAL DAILY
Qty: 90 TABLET | Refills: 0 | Status: SHIPPED | OUTPATIENT
Start: 2024-03-04

## 2024-03-05 ENCOUNTER — OFFICE VISIT (OUTPATIENT)
Dept: FAMILY MEDICINE CLINIC | Facility: OTHER | Age: 74
End: 2024-03-05
Payer: COMMERCIAL

## 2024-03-05 ENCOUNTER — HOSPITAL ENCOUNTER (OUTPATIENT)
Dept: RADIOLOGY | Facility: HOSPITAL | Age: 74
Discharge: HOME | End: 2024-03-05
Payer: COMMERCIAL

## 2024-03-05 VITALS
OXYGEN SATURATION: 97 % | WEIGHT: 245 LBS | HEIGHT: 63 IN | SYSTOLIC BLOOD PRESSURE: 108 MMHG | HEART RATE: 76 BPM | TEMPERATURE: 98 F | RESPIRATION RATE: 17 BRPM | DIASTOLIC BLOOD PRESSURE: 64 MMHG | BODY MASS INDEX: 43.41 KG/M2

## 2024-03-05 DIAGNOSIS — M25.561 ACUTE PAIN OF RIGHT KNEE: ICD-10-CM

## 2024-03-05 DIAGNOSIS — M25.561 ACUTE PAIN OF RIGHT KNEE: Primary | ICD-10-CM

## 2024-03-05 PROCEDURE — G2211 COMPLEX E/M VISIT ADD ON: HCPCS

## 2024-03-05 PROCEDURE — 99213 OFFICE O/P EST LOW 20 MIN: CPT

## 2024-03-05 PROCEDURE — 73564 X-RAY EXAM KNEE 4 OR MORE: CPT

## 2024-03-05 RX ORDER — NAPROXEN 500 MG/1
500 TABLET ORAL 2 TIMES DAILY WITH MEALS
Qty: 14 TABLET | Refills: 0 | Status: SHIPPED | OUTPATIENT
Start: 2024-03-05 | End: 2024-03-12

## 2024-03-05 NOTE — ASSESSMENT & PLAN NOTE
Patient with PMHx osteoarthritis presents to the office after 2 recent episodes of sharp and severe right knee pain with a popping sensation. She has tenderness with weight bearing and bending her knee, but denies any catching or locking sensations. She has tenderness to palpation in the popliteal region. The right knee is mildly swollen compared to the left knee.     Plan:   -Xray to rule out acute osseous abnormalities   -Conservative treatment for pain control (NSAIDs, rest, ice)  -Follow up in 1 week, consider advanced imaging if no improvement in symptoms

## 2024-03-05 NOTE — PROGRESS NOTES
Name: Sindy Beckford      : 1950      MRN: [id number]  Encounter Provider: Evelyn Luna MD  Encounter Date: 3/5/2024   Encounter department: [facility]    Assessment & Plan     1. Acute pain of right knee  Assessment & Plan:  Patient with PMHx osteoarthritis presents to the office after 2 recent episodes of sharp and severe right knee pain with a popping sensation. She has tenderness with weight bearing and bending her knee, but denies any catching or locking sensations. She has tenderness to palpation in the popliteal region. The right knee is mildly swollen compared to the left knee.     Plan:   -Xray to rule out acute osseous abnormalities   -Conservative treatment for pain control (NSAIDs, rest, ice)  -Follow up in 1 week, consider advanced imaging if no improvement in symptoms    Orders:  -     XR knee 4+ vw right injury; Future; Expected date: 2024  -     naproxen (Naprosyn) 500 mg tablet; Take 1 tablet (500 mg total) by mouth 2 (two) times a day with meals for 7 days           Subjective      Patient with PMHx osteoarthritis presents to the office with right knee pain. Last Wednesday, the patient was getting out of bed and had sharp pain (10/10) in her knee with a pop and radiation down her shin. She did not feel any knee instability, locking, or catching sensations. She felt worse with bending her knee and weight bearing. She took tylenol and icy-hot with some pain relief. However, her pain was exacerbated this morning while stepping down from a curb. She did not take any medications and came to the office for evaluation. In the office today, her knee pain is 8/10 when she bends her knee, and feels sharp point tenderness to palpation in the popliteal region. She does not have any pain in her calf at rest or to palpation. She has not had any knee imaging done in the past. She states she has never had knee injury or trauma in the past, and has never had pain like this  before.       Review of Systems   Constitutional:  Negative for chills and fever.   Cardiovascular:  Negative for leg swelling.   Musculoskeletal:  Positive for arthralgias (pt has joint pains in hands and knees at baseline due to osteoarthritis; her acute R knee pain is much worse than baseline) and gait problem.   Skin:  Negative for color change and wound.   Neurological:  Negative for weakness and numbness.       Current Outpatient Medications on File Prior to Visit   Medication Sig    aspirin (ECOTRIN LOW STRENGTH) 81 mg EC tablet Take 81 mg by mouth daily    atorvastatin (LIPITOR) 80 mg tablet TAKE 1 TABLET BY MOUTH DAILY    Blood Glucose Monitoring Suppl (ONE TOUCH ULTRA MINI) w/Device KIT Please dispense 1 Kit    Cholecalciferol (Vitamin D3) 25 MCG (1000 UT) CAPS Take 2000 IU with dinner (Patient taking differently: Take by mouth in the morning Take 2000 IU with dinner)    ezetimibe (ZETIA) 10 mg tablet Take 1 tablet (10 mg total) by mouth daily    famotidine-calcium carbonate-magnesium hydroxide (PEPCID COMPLETE) -165 MG CHEW Chew 1 tablet daily as needed for heartburn    fluticasone (FLONASE) 50 mcg/act nasal spray 1 spray into each nostril if needed    glucose blood (OneTouch Ultra) test strip Use once a day    Invokana 300 MG TABS TAKE 1 TABLET BY MOUTH  DAILY    Lancet Devices (OneTouch Delica Plus Lancing) MISC USE WITH LANCETS TO CHECK SUGAR    Lancets (OneTouch Delica Plus [id number]) MISC USE AS DIRECTED TO CHECK SUGARS TWICE EVERY OTHER DAY    Lancets Misc. (ONE TOUCH SURESOFT) MISC Use lancet to check sugar    levocetirizine (XYZAL) 5 MG tablet Take 1 tablet (5 mg total) by mouth every evening    levothyroxine 112 mcg tablet TAKE 1 TABLET BY MOUTH DAILY    nebivolol (Bystolic) 5 mg tablet Take 0.5 tablets (2.5 mg total) by mouth daily    omeprazole (PriLOSEC) 40 MG capsule Take 1 capsule (40 mg total) by mouth daily    potassium chloride (Klor-Con) 10 mEq tablet TAKE 1 TABLET BY MOUTH TWICE   "DAILY    pramipexole (MIRAPEX) 0.25 mg tablet TAKE 1 TABLET BY MOUTH DAILY AT  BEDTIME    ramipril (ALTACE) 2.5 mg capsule TAKE 1 CAPSULE BY MOUTH DAILY    semaglutide, 1 mg/dose, (Ozempic, 1 MG/DOSE,) 4 mg/3 mL injection pen Inject 0.75 mL (1 mg total) under the skin every 7 days    solifenacin (VESICARE) 10 MG tablet Take 1 tablet (10 mg total) by mouth daily    valACYclovir (VALTREX) 1,000 mg tablet Take 2 tablets (2,000 mg total) by mouth 2 (two) times a day for 1 day       Objective     /64   Pulse 76   Temp 98 °F (36.7 °C)   Resp 17   Ht 5' 3\" (1.6 m)   Wt 111 kg (245 lb)   LMP 01/01/2004   SpO2 97%   BMI 43.40 kg/m²     Physical Exam  Vitals reviewed.   Constitutional:       General: She is not in acute distress.     Appearance: She is obese. She is not ill-appearing or toxic-appearing.   HENT:      Head: Atraumatic.   Eyes:      Extraocular Movements: Extraocular movements intact.      Conjunctiva/sclera: Conjunctivae normal.   Pulmonary:      Effort: Pulmonary effort is normal. No respiratory distress.   Musculoskeletal:      Right knee: Swelling present. No deformity, erythema, ecchymosis, bony tenderness or crepitus. Decreased range of motion (2/2 pain). Tenderness (to palpation in popliteal region) present.      Left knee: Normal. No swelling or bony tenderness. Normal range of motion. No tenderness.      Right lower leg: No tenderness.   Neurological:      Mental Status: She is alert.      Sensory: Sensation is intact.      Motor: Motor function is intact.       Evelyn Luna MD    "

## 2024-03-11 ENCOUNTER — OFFICE VISIT (OUTPATIENT)
Dept: PODIATRY | Facility: CLINIC | Age: 74
End: 2024-03-11
Payer: COMMERCIAL

## 2024-03-11 VITALS
DIASTOLIC BLOOD PRESSURE: 77 MMHG | SYSTOLIC BLOOD PRESSURE: 115 MMHG | HEIGHT: 63 IN | HEART RATE: 89 BPM | BODY MASS INDEX: 43.41 KG/M2 | OXYGEN SATURATION: 97 % | WEIGHT: 245 LBS

## 2024-03-11 DIAGNOSIS — E11.59 TYPE 2 DIABETES MELLITUS WITH OTHER CIRCULATORY COMPLICATION, WITHOUT LONG-TERM CURRENT USE OF INSULIN (HCC): ICD-10-CM

## 2024-03-11 PROCEDURE — 99203 OFFICE O/P NEW LOW 30 MIN: CPT | Performed by: PODIATRIST

## 2024-03-11 NOTE — PROGRESS NOTES
[facility] - Clinic Visit  Sindy Beckford 73 y.o. female MRN: [id number]  Encounter: [id number]    ASSESSMENT:       Diagnoses and all orders for this visit:    Type 2 diabetes mellitus with other circulatory complication, without long-term current use of insulin ([facility])  -     Ambulatory referral to Podiatry         PLAN:    Patient was examined, evaluated, and treated with all questions and concerns addressed  Reviewed recent labwork. Last HbA1c was 8% on 2/7/24, which is elevated from 6.9% on 9/28/23    Performed annual diabetic foot exam  Encouraged tight glycemic control, proper diet, and adequate exercise.  Continue use of supportive shoe gear with wide toebox. Advised patient to visit specialized shoe store, such as [employer], for proper fitting of appropriate shoe wear   Encouraged daily at home foot checks  Advised patient to apply lotion to feet bilaterally on a daily basis to address xerotic skin.  RTC in 1 year. Jenna risk group 0.    - Dr. Guerra  was present for entirety of patient encounter.    SUBJECTIVE:    History of Present Illness     Sindy Beckford is a 73 y.o. female who presents for a diabetic risk assessment and to establish care. She denies any specific pedal complaints, she states that she wears supportive shoegear such a sneakers on a daily basis and has had no symptoms of neuropathy - no numbness, tingling, or pain in her feet. She is here for a diabetic risk assessment and to establish care    Patient denies N/V/F/chills/SOB/CP.     Review of Systems   Constitutional: Negative.    HENT: Negative.    Eyes: Negative.    Respiratory: Negative.    Cardiovascular: Negative.    Gastrointestinal: Negative.    Musculoskeletal: Negative   Skin: Negative  Neurological: Negative        Historical Information   Past Medical History:   Diagnosis Date    Allergic 2018   weekly inject.    Allergic rhinitis     Allergies     Anemia     Anxiety     Arthritis     Chronic constipation      "Colon polyp     Diabetes (HCC)     Diabetes mellitus (HCC)     Disease of thyroid gland     GERD (gastroesophageal reflux disease)     Headache(784.0)     Heart murmur     Hyperlipidemia     Hypertension     Hypothyroidism     Obesity     Obesity     Restless legs     Vitamin D deficiency      Past Surgical History:   Procedure Laterality Date    ABDOMINAL SURGERY  2001    Dr. Raúl LEW    BREAST BIOPSY Right 2001    CATARACT EXTRACTION      CATARACT EXTRACTION, BILATERAL  2021    CHOLECYSTECTOMY      COLONOSCOPY  2018    goes q 5 yr-due in Summer 2018-Dr. Lam    CORONARY ANGIOPLASTY      Dr Bateman    LYMPH NODE BIOPSY      US GUIDANCE  2013    US GUIDANCE  10/17/2016    US GUIDED FINE NEEDLE ASPIRATION (ALL INC)  10/15/2013     Social History   Social History     Substance and Sexual Activity   Alcohol Use Yes    Comment: on special occasions     Social History     Substance and Sexual Activity   Drug Use Never     Social History     Tobacco Use   Smoking Status Never   Smokeless Tobacco Never     Family History:   Family History   Problem Relation Age of Onset    Hypertension Father             Diabetes Father     Heart disease Father     Diabetes type II Father             Multiple sclerosis Brother     Thyroid disease unspecified Brother     Diabetes Paternal Aunt     Multiple sclerosis Paternal Uncle     Diabetes Paternal Uncle     Multiple sclerosis Family     Cancer Neg Hx     Stroke Neg Hx     Obesity Neg Hx        Meds/Allergies   (Not in a hospital admission)    Allergies   Allergen Reactions    Other Sneezing     Seasonal, dust, Cat dander, mold    Penicillins Hives         OBJECTIVE:    Current Vitals:   Blood Pressure: 115/77 (24 1112)  Pulse: 89 (24 1112)  Height: 5' 3\" (160 cm) (24 1112)  Weight - Scale: 111 kg (245 lb) (24 1112)  SpO2: 97 % (24 1112)    /77 (BP Location: Right arm, Patient Position: Sitting, Cuff Size: " "Standard)   Pulse 89   Ht 5' 3\" (1.6 m)   Wt 111 kg (245 lb)   LMP 01/01/2004   SpO2 97%   BMI 43.40 kg/m²       Lower Extremity Exam:    Physical Exam  Cardiovascular:      Pulses: no weak pulses.           Dorsalis pedis pulses are 1+ on the right side and 1+ on the left side.        Posterior tibial pulses are 1+ on the right side and 1+ on the left side.   Feet:      Right foot:      Skin integrity: No ulcer, skin breakdown, erythema, warmth, callus or dry skin.      Left foot:      Skin integrity: No ulcer, skin breakdown, erythema, warmth, callus or dry skin.     :    Musculoskeletal:  MMT is 5/5 to all compartments B/L, Hallux ROM is < 65 degrees B/L, Ankle dorsiflexion < 10 degrees from neutral with leg extended B/L, no pain or guarding during passive joint ROM. Active ROM to the digits intact. No gross deformities noted.     Cardiovascular:  Right DP pulse 1/4, Right PT pulse 1/4, Left DP pulse 1/4, Left PT pulse 1/4, +2/4 edema B/L, CFT< 3 sec to digits. Pedal hair is Absent. Skin temperature warm to warm B/L. No calf tenderness.     Dermatological:  No open Lesions. Skin of the LE is normal temperature, texture, turgor. Toenails are painted, unable to assess for underlying pathology although normal in thickness       Neurological:  AAOx3. Gross sensation is intact. Monofilament sensation is Intact. Protective sensation is Intact.             Imaging: I have personally reviewed pertinent films in PACS  EKG, Pathology, and Other Studies: I have personally reviewed pertinent reports.    Patient's shoes and socks removed.    Right Foot/Ankle   Right Foot Inspection  Skin Exam: skin normal and skin intact. No dry skin, no warmth, no callus, no erythema, no maceration, no abnormal color, no pre-ulcer, no ulcer and no callus.     Toe Exam: ROM and strength within normal limits.     Sensory   Vibration: intact  Proprioception: intact  Monofilament testing: intact    Vascular  Capillary refills: < 3 " "seconds  The right DP pulse is 1+. The right PT pulse is 1+.     Left Foot/Ankle  Left Foot Inspection  Skin Exam: skin normal and skin intact. No dry skin, no warmth, no erythema, no maceration, normal color, no pre-ulcer, no ulcer and no callus.     Toe Exam: ROM and strength within normal limits.     Sensory   Vibration: intact  Proprioception: intact  Monofilament testing: intact    Vascular  Capillary refills: < 3 seconds  The left DP pulse is 1+. The left PT pulse is 1+.     Assign Risk Category  No deformity present  No loss of protective sensation  No weak pulses  Risk: 0      ** Please Note: Portions of the record may have been created with voice recognition software. Occasional wrong word or \"sound a like\" substitutions may have occurred due to the inherent limitations of voice recognition software. Read the chart carefully and recognize, using context, where substitutions have occurred. **      Biomechanical Exam of LE:  Hip ROM WNL Bilateral, external and internal rotation about equal at 45° b/l  Knee ROM WNL Bilateral, no hyperextension noted b/l, no genu varum/valgum noted b/l  Ankle dorsiflexion with knee extended is limited and unable to get pass vertical on right and limited and unable to get pass vertical on left  Ankle dorsiflexion with knee flexed is limited and unable to get pass vertical on right and limited and unable to get pass vertical on left  Malleolar positioning is WNL b/l  STJ ROM WNL b/l, heel inversion is approximately 20° on right and 20° on left; heel eversion is approximately 10° on right and 10° on left; neutral calcaneal stance position is 0   1st ray ROM reduced to RLE, WNL to LLE, able to dorsiflex/plantarflex b/l  Tibial varum is 0° b/l, Resting calcaneal stance position is valgus and approximately 2° on right and valgus and approximately 2° on left  Gait analysis: WNL  Gross deformity noted:  None    "

## 2024-03-11 NOTE — PATIENT INSTRUCTIONS
Recommend quality over the counter arch supports:    - Powerstep [place] series insoles  - Spenco Orthotic Arch insoles  - Superfeet insoles for high arches  - PCSsole 3/4 length insoles

## 2024-03-15 ENCOUNTER — OFFICE VISIT (OUTPATIENT)
Dept: FAMILY MEDICINE CLINIC | Facility: OTHER | Age: 74
End: 2024-03-15
Payer: COMMERCIAL

## 2024-03-15 VITALS
RESPIRATION RATE: 18 BRPM | OXYGEN SATURATION: 96 % | DIASTOLIC BLOOD PRESSURE: 70 MMHG | BODY MASS INDEX: 43.38 KG/M2 | WEIGHT: 244.8 LBS | SYSTOLIC BLOOD PRESSURE: 128 MMHG | HEIGHT: 63 IN | HEART RATE: 72 BPM | TEMPERATURE: 97.2 F

## 2024-03-15 DIAGNOSIS — M25.561 ACUTE PAIN OF RIGHT KNEE: Primary | ICD-10-CM

## 2024-03-15 PROCEDURE — 99213 OFFICE O/P EST LOW 20 MIN: CPT

## 2024-03-15 PROCEDURE — G2211 COMPLEX E/M VISIT ADD ON: HCPCS

## 2024-03-15 NOTE — ASSESSMENT & PLAN NOTE
Almost complete resolution of right knee pain with conservative measures (anti-inflammatories, rest, ice application) and no longer using cane for ambulation.  Patient is able to bear weight and ambulate without difficulty  Fall precautions discussed  Last DEXA scan completed in 10/2023 revealed normal bone density  Patient due for follow-up appointment next month to recheck chronic medical conditions

## 2024-03-15 NOTE — PROGRESS NOTES
Name: Sindy Beckford      : 1950      MRN: [id number]  Encounter Provider: Evelyn Luna MD  Encounter Date: 3/15/2024   Encounter department: [facility]    Assessment & Plan     1. Acute pain of right knee  Assessment & Plan:  Almost complete resolution of right knee pain with conservative measures (anti-inflammatories, rest, ice application) and no longer using cane for ambulation.  Patient is able to bear weight and ambulate without difficulty  Fall precautions discussed  Last DEXA scan completed in 10/2023 revealed normal bone density  Patient due for follow-up appointment next month to recheck chronic medical conditions           Subjective      HPI  Patient returns for a follow-up visit to reassess right knee pain.  She was seen in the office just over 1 week ago for acute knee pain that started following a twisting maneuver while she was getting out of bed which worsened while stepping down from a curb. Pain was worse with ambulation and certain maneuvers as well as pressure application to the popliteal region.  Recommendation for treatment plan included conservative measures (ibuprofen, rest, ice)   Today, the patient reports almost complete resolution of pain in her right knee.  She continues to use icy/hot periodically.  She has completed a course of anti-inflammatories she is no longer ambulating with a cane.  At times, the patient continues to feel a twinge of pain in the popliteal region but otherwise feels well.  She has no difficulty bearing weight.  She continues to deny any tingling/numbness, weakness of her lower extremities.    Follow up scheduled next monthf or recheck chronic medical issues      Review of Systems   Constitutional:  Negative for chills and fever.   Cardiovascular:  Negative for leg swelling.   Musculoskeletal:  Positive for arthralgias (knee pain significantly improved). Negative for gait problem.   Skin:  Negative for color change and wound.    Neurological:  Negative for weakness and numbness.       Current Outpatient Medications on File Prior to Visit   Medication Sig    aspirin (ECOTRIN LOW STRENGTH) 81 mg EC tablet Take 81 mg by mouth daily    atorvastatin (LIPITOR) 80 mg tablet TAKE 1 TABLET BY MOUTH DAILY    Blood Glucose Monitoring Suppl (ONE TOUCH ULTRA MINI) w/Device KIT Please dispense 1 Kit    Cholecalciferol (Vitamin D3) 25 MCG (1000 UT) CAPS Take 2000 IU with dinner (Patient taking differently: Take by mouth in the morning Take 2000 IU with dinner)    ezetimibe (ZETIA) 10 mg tablet Take 1 tablet (10 mg total) by mouth daily    famotidine-calcium carbonate-magnesium hydroxide (PEPCID COMPLETE) -165 MG CHEW Chew 1 tablet daily as needed for heartburn    fluticasone (FLONASE) 50 mcg/act nasal spray 1 spray into each nostril if needed    glucose blood (OneTouch Ultra) test strip Use once a day    Invokana 300 MG TABS TAKE 1 TABLET BY MOUTH  DAILY    Lancet Devices ([employer] Delica Plus Lancing) MISC USE WITH LANCETS TO CHECK SUGAR    Lancets (OneTouch Delica Plus [id number]) MISC USE AS DIRECTED TO CHECK SUGARS TWICE EVERY OTHER DAY    Lancets Misc. (ONE TOUCH SURESOFT) MISC Use lancet to check sugar    levocetirizine (XYZAL) 5 MG tablet Take 1 tablet (5 mg total) by mouth every evening    levothyroxine 112 mcg tablet TAKE 1 TABLET BY MOUTH DAILY    nebivolol (Bystolic) 5 mg tablet Take 0.5 tablets (2.5 mg total) by mouth daily    potassium chloride (Klor-Con) 10 mEq tablet TAKE 1 TABLET BY MOUTH TWICE  DAILY    pramipexole (MIRAPEX) 0.25 mg tablet TAKE 1 TABLET BY MOUTH DAILY AT  BEDTIME    ramipril (ALTACE) 2.5 mg capsule TAKE 1 CAPSULE BY MOUTH DAILY    semaglutide, 1 mg/dose, (Ozempic, 1 MG/DOSE,) 4 mg/3 mL injection pen Inject 0.75 mL (1 mg total) under the skin every 7 days    solifenacin (VESICARE) 10 MG tablet Take 1 tablet (10 mg total) by mouth daily    naproxen (Naprosyn) 500 mg tablet Take 1 tablet (500 mg total) by mouth 2  "(two) times a day with meals for 7 days (Patient not taking: Reported on 3/15/2024)    omeprazole (PriLOSEC) 40 MG capsule Take 1 capsule (40 mg total) by mouth daily    valACYclovir (VALTREX) 1,000 mg tablet Take 2 tablets (2,000 mg total) by mouth 2 (two) times a day for 1 day (Patient not taking: Reported on 3/15/2024)       Objective     /70   Pulse 72   Temp (!) 97.2 °F (36.2 °C)   Resp 18   Ht 5' 3\" (1.6 m)   Wt 111 kg (244 lb 12.8 oz)   LMP 01/01/2004   SpO2 96%   BMI 43.36 kg/m²     Physical Exam  Vitals reviewed.   Constitutional:       General: She is not in acute distress.     Appearance: She is obese. She is not ill-appearing or toxic-appearing.   HENT:      Head: Atraumatic.   Eyes:      Extraocular Movements: Extraocular movements intact.      Conjunctiva/sclera: Conjunctivae normal.   Cardiovascular:      Rate and Rhythm: Normal rate and regular rhythm.      Heart sounds: Normal heart sounds.   Pulmonary:      Effort: Pulmonary effort is normal. No respiratory distress.   Musculoskeletal:      Right knee: No swelling, deformity, erythema, ecchymosis, bony tenderness or crepitus. Normal range of motion. No tenderness.      Left knee: No swelling or bony tenderness. Normal range of motion. No tenderness.      Right lower leg: No tenderness.   Neurological:      Mental Status: She is alert.      Sensory: Sensation is intact.      Motor: Motor function is intact.       Evelyn Luna MD    "

## 2024-03-26 ENCOUNTER — TELEPHONE (OUTPATIENT)
Dept: FAMILY MEDICINE CLINIC | Facility: OTHER | Age: 74
End: 2024-03-26

## 2024-03-26 DIAGNOSIS — I10 ESSENTIAL HYPERTENSION: ICD-10-CM

## 2024-03-26 RX ORDER — POTASSIUM CHLORIDE 750 MG/1
10 TABLET, FILM COATED, EXTENDED RELEASE ORAL 2 TIMES DAILY
Qty: 180 TABLET | Refills: 3 | Status: SHIPPED | OUTPATIENT
Start: 2024-03-26

## 2024-03-26 NOTE — TELEPHONE ENCOUNTER
Hi, my name is Sindy Beckford. I'm calling about a refill prescription that I need immediately. There was a mix up with [facility]. Instead of sending to where I'm at now on vacation, they sent it to my home in [place]. I need potassium chloride, a refill and I'm in [place] and I have the address for [facility] here in [place] where it can be sent to my number. [facility] is [address], [place], [place]. That's [facility] [address]. Please give me a call back if there's gonna be a problem. My number is [phone], [phone]. Thank you.

## 2024-04-11 DIAGNOSIS — N32.81 OVERACTIVE BLADDER: ICD-10-CM

## 2024-04-11 RX ORDER — SOLIFENACIN SUCCINATE 10 MG/1
10 TABLET, FILM COATED ORAL DAILY
Qty: 90 TABLET | Refills: 1 | Status: SHIPPED | OUTPATIENT
Start: 2024-04-11

## 2024-04-19 DIAGNOSIS — K21.9 GASTROESOPHAGEAL REFLUX DISEASE WITHOUT ESOPHAGITIS: ICD-10-CM

## 2024-04-19 RX ORDER — OMEPRAZOLE 40 MG/1
40 CAPSULE, DELAYED RELEASE ORAL DAILY
Qty: 90 CAPSULE | Refills: 3 | Status: SHIPPED | OUTPATIENT
Start: 2024-04-19

## 2024-04-22 ENCOUNTER — OFFICE VISIT (OUTPATIENT)
Age: 74
End: 2024-04-22
Payer: COMMERCIAL

## 2024-04-22 VITALS
TEMPERATURE: 97 F | OXYGEN SATURATION: 97 % | SYSTOLIC BLOOD PRESSURE: 120 MMHG | HEART RATE: 74 BPM | HEIGHT: 63 IN | DIASTOLIC BLOOD PRESSURE: 70 MMHG | WEIGHT: 242.2 LBS | BODY MASS INDEX: 42.91 KG/M2

## 2024-04-22 DIAGNOSIS — E03.9 ACQUIRED HYPOTHYROIDISM: ICD-10-CM

## 2024-04-22 DIAGNOSIS — S86.911D KNEE STRAIN, RIGHT, SUBSEQUENT ENCOUNTER: ICD-10-CM

## 2024-04-22 DIAGNOSIS — I50.32 CHRONIC HEART FAILURE WITH PRESERVED EJECTION FRACTION (HFPEF) (HCC): ICD-10-CM

## 2024-04-22 DIAGNOSIS — E11.65 TYPE 2 DIABETES MELLITUS WITH HYPERGLYCEMIA, WITHOUT LONG-TERM CURRENT USE OF INSULIN (HCC): ICD-10-CM

## 2024-04-22 DIAGNOSIS — I10 ESSENTIAL HYPERTENSION: Primary | ICD-10-CM

## 2024-04-22 DIAGNOSIS — I25.119 ATHEROSCLEROSIS OF NATIVE CORONARY ARTERY WITH ANGINA PECTORIS, UNSPECIFIED WHETHER NATIVE OR TRANSPLANTED HEART (HCC): ICD-10-CM

## 2024-04-22 PROBLEM — I35.9 AORTIC VALVE DISORDER: Status: ACTIVE | Noted: 2024-04-10

## 2024-04-22 PROCEDURE — G2211 COMPLEX E/M VISIT ADD ON: HCPCS | Performed by: FAMILY MEDICINE

## 2024-04-22 PROCEDURE — 99214 OFFICE O/P EST MOD 30 MIN: CPT | Performed by: FAMILY MEDICINE

## 2024-04-22 NOTE — PROGRESS NOTES
"Name: Sindy Beckford      : 1950      MRN: [id number]  Encounter Provider: Terese Alford DO  Encounter Date: 2024   Encounter department: [facility]    Assessment & Plan     1. Essential hypertension  Assessment & Plan:  BP goal <130/80  Continue ramipril and nebivolol      2. Type 2 diabetes mellitus with hyperglycemia, without long-term current use of insulin ([facility])  Assessment & Plan:  Asymptomatic  Lab Results   Component Value Date    HGBA1C 8.0 (H) 2024     Followed closely by Endocrinology  DM foot exam done today      3. Acquired hypothyroidism  Assessment & Plan:  Stable  Continue levothyroxine      4. Atherosclerosis of native coronary artery with angina pectoris, unspecified whether native or transplanted heart ([facility])  Assessment & Plan:  Stable, followed closely by Cardio/CTS at [facility]  Continue antihypertensive regimen along with Lipitor + zetia      5. Chronic heart failure with preserved ejection fraction (HFpEF) ([facility])  Assessment & Plan:  Wt Readings from Last 3 Encounters:   24 110 kg (242 lb 3.2 oz)   03/15/24 111 kg (244 lb 12.8 oz)   24 111 kg (245 lb)     Stable at this time  Followed closely by Cardiology/CTS at [facility]      6. Knee strain, right, subsequent encounter  -     Ambulatory Referral to Physical Therapy; Future         Return in about 5 months (around 2024) for AWV.    The patient indicates understanding of these issues and agrees with the plan.        Subjective      Every so often the right knee \"acts up\"  Pain in knee is dull (popliteal, over patella)  Radiates (shooting pain) into shin of RLE  Does not feel like it is giving out, no reported crepitus  Notes an occasional popping sound, mild  Uses icy/hot for sx relief in addition to tylenol    Diabetes  She presents for her follow-up diabetic visit. She has type 2 diabetes mellitus. Her disease course has been stable. There are no hypoglycemic associated symptoms. Pertinent " negatives for hypoglycemia include no dizziness, headaches, nervousness/anxiousness, sweats or tremors. There are no diabetic associated symptoms. Pertinent negatives for diabetes include no blurred vision, no chest pain, no fatigue, no visual change and no weight loss. There are no hypoglycemic complications. Diabetic complications include heart disease. Pertinent negatives for diabetic complications include no autonomic neuropathy, CVA, nephropathy, peripheral neuropathy, PVD or retinopathy. Risk factors for coronary artery disease include diabetes mellitus, dyslipidemia, family history, hypertension, obesity and post-menopausal. Current diabetic treatment includes oral agent (monotherapy). She is compliant with treatment all of the time. She is following a generally healthy diet. Meal planning includes avoidance of concentrated sweets. She has had a previous visit with a dietitian. She participates in exercise intermittently. An ACE inhibitor/angiotensin II receptor blocker is being taken. She does not see a podiatrist.Eye exam is current.   Hypertension  This is a chronic problem. The current episode started more than 1 year ago. The problem has been waxing and waning since onset. The problem is controlled. Pertinent negatives include no anxiety, blurred vision, chest pain, headaches, malaise/fatigue, neck pain, orthopnea, palpitations, peripheral edema, PND, shortness of breath or sweats. There are no associated agents to hypertension. Risk factors for coronary artery disease include diabetes mellitus, dyslipidemia, family history, obesity and post-menopausal state. Past treatments include ACE inhibitors and beta blockers. The current treatment provides moderate improvement. Compliance problems include diet, exercise and psychosocial issues.  Hypertensive end-organ damage includes CAD/MI. There is no history of angina, kidney disease, CVA, heart failure, left ventricular hypertrophy, PVD or retinopathy.  Identifiable causes of hypertension include a thyroid problem. There is no history of chronic renal disease or a hypertension causing med.   Thyroid Problem  Presents for follow-up visit. Patient reports no anxiety, cold intolerance, constipation, depressed mood, diaphoresis, diarrhea, dry skin, fatigue, hair loss, heat intolerance, hoarse voice, leg swelling, menstrual problem, nail problem, palpitations, tremors, visual change, weight gain or weight loss. There is no history of heart failure.     Review of Systems   Constitutional:  Negative for activity change, diaphoresis, fatigue, fever, malaise/fatigue, weight gain and weight loss.   HENT:  Negative for congestion, ear pain, hoarse voice, sinus pain and sore throat.    Eyes:  Negative for blurred vision, pain and itching.   Respiratory:  Negative for cough and shortness of breath.    Cardiovascular:  Negative for chest pain, palpitations, orthopnea and PND.   Gastrointestinal:  Negative for abdominal pain, constipation, diarrhea, nausea and vomiting.   Endocrine: Negative for cold intolerance and heat intolerance.   Genitourinary:  Negative for dysuria and menstrual problem.   Musculoskeletal:  Positive for arthralgias (right knee per HPI). Negative for myalgias and neck pain.   Skin:  Negative for color change and rash.   Neurological:  Negative for dizziness, tremors, syncope and headaches.   Hematological:  Negative for adenopathy.   Psychiatric/Behavioral:  Negative for behavioral problems, dysphoric mood and sleep disturbance. The patient is not nervous/anxious.        Current Outpatient Medications on File Prior to Visit   Medication Sig   • aspirin (ECOTRIN LOW STRENGTH) 81 mg EC tablet Take 81 mg by mouth daily   • atorvastatin (LIPITOR) 80 mg tablet TAKE 1 TABLET BY MOUTH DAILY   • Blood Glucose Monitoring Suppl (ONE TOUCH ULTRA MINI) w/Device KIT Please dispense 1 Kit   • Cholecalciferol (Vitamin D3) 25 MCG (1000 UT) CAPS Take 2000 IU with dinner   •  "ezetimibe (ZETIA) 10 mg tablet Take 1 tablet (10 mg total) by mouth daily   • famotidine-calcium carbonate-magnesium hydroxide (PEPCID COMPLETE) -165 MG CHEW Chew 1 tablet daily as needed for heartburn   • fluticasone (FLONASE) 50 mcg/act nasal spray 1 spray into each nostril if needed   • glucose blood (OneTouch Ultra) test strip Use once a day   • Invokana 300 MG TABS TAKE 1 TABLET BY MOUTH  DAILY   • Lancet Devices (OneTouch Delica Plus Lancing) MISC USE WITH LANCETS TO CHECK SUGAR   • Lancets (OneTouch Delica Plus [id number]) MISC USE AS DIRECTED TO CHECK SUGARS TWICE EVERY OTHER DAY   • Lancets Misc. (ONE TOUCH SURESOFT) MISC Use lancet to check sugar   • levocetirizine (XYZAL) 5 MG tablet Take 1 tablet (5 mg total) by mouth every evening   • levothyroxine 112 mcg tablet TAKE 1 TABLET BY MOUTH DAILY   • nebivolol (Bystolic) 5 mg tablet Take 0.5 tablets (2.5 mg total) by mouth daily   • omeprazole (PriLOSEC) 40 MG capsule TAKE 1 CAPSULE BY MOUTH DAILY   • potassium chloride (Klor-Con) 10 mEq tablet Take 1 tablet (10 mEq total) by mouth 2 (two) times a day   • pramipexole (MIRAPEX) 0.25 mg tablet TAKE 1 TABLET BY MOUTH DAILY AT  BEDTIME   • ramipril (ALTACE) 2.5 mg capsule TAKE 1 CAPSULE BY MOUTH DAILY   • semaglutide, 1 mg/dose, (Ozempic, 1 MG/DOSE,) 4 mg/3 mL injection pen Inject 0.75 mL (1 mg total) under the skin every 7 days   • solifenacin (VESICARE) 10 MG tablet TAKE 1 TABLET(10 MG) BY MOUTH DAILY   • valACYclovir (VALTREX) 1,000 mg tablet Take 2 tablets (2,000 mg total) by mouth 2 (two) times a day for 1 day       Objective     /70   Pulse 74   Temp (!) 97 °F (36.1 °C)   Ht 5' 3\" (1.6 m)   Wt 110 kg (242 lb 3.2 oz)   LMP 01/01/2004   SpO2 97%   BMI 42.90 kg/m²     Physical Exam  Vitals and nursing note reviewed.   Constitutional:       General: She is not in acute distress.     Appearance: Normal appearance. She is well-developed. She is not ill-appearing.      Comments: Body mass index " is 42.9 kg/m².    HENT:      Head: Normocephalic and atraumatic.      Right Ear: External ear normal.      Left Ear: External ear normal.      Nose: Nose normal.   Eyes:      General: No scleral icterus.     Conjunctiva/sclera: Conjunctivae normal.      Pupils: Pupils are equal, round, and reactive to light.   Neck:      Thyroid: No thyromegaly.   Cardiovascular:      Rate and Rhythm: Normal rate and regular rhythm.      Pulses: no weak pulses.           Dorsalis pedis pulses are 2+ on the right side and 2+ on the left side.        Posterior tibial pulses are 2+ on the right side and 2+ on the left side.      Heart sounds: Normal heart sounds. No murmur heard.  Pulmonary:      Effort: Pulmonary effort is normal. No respiratory distress.      Breath sounds: Normal breath sounds. No wheezing.   Abdominal:      General: Bowel sounds are normal. There is no distension.      Palpations: Abdomen is soft.      Tenderness: There is no abdominal tenderness.   Musculoskeletal:         General: No tenderness.      Cervical back: Normal range of motion and neck supple.      Right knee: No crepitus. Decreased range of motion. No tenderness. No LCL laxity, MCL laxity, ACL laxity or PCL laxity. Normal alignment, normal meniscus and normal patellar mobility.      Instability Tests: Anterior drawer test negative. Posterior drawer test negative. Anterior Lachman test negative. Medial Jorge Luis test negative and lateral Jorge Luis test negative.      Right lower leg: No edema.      Left lower leg: No edema.   Feet:      Right foot:      Skin integrity: No ulcer, skin breakdown, erythema, warmth, callus or dry skin.      Left foot:      Skin integrity: No ulcer, skin breakdown, erythema, warmth, callus or dry skin.   Lymphadenopathy:      Cervical: No cervical adenopathy.   Skin:     General: Skin is warm and dry.      Findings: No rash.   Neurological:      Mental Status: She is alert and oriented to person, place, and time.      Cranial  Nerves: No cranial nerve deficit.   Psychiatric:         Behavior: Behavior normal.       Patient's shoes and socks removed.    Right Foot/Ankle   Right Foot Inspection  Skin Exam: skin normal and skin intact. No dry skin, no warmth, no callus, no erythema, no maceration, no abnormal color, no pre-ulcer, no ulcer and no callus.     Toe Exam: ROM and strength within normal limits.     Sensory   Vibration: intact  Proprioception: intact  Monofilament testing: intact    Vascular  Capillary refills: < 3 seconds  The right DP pulse is 2+. The right PT pulse is 2+.     Left Foot/Ankle  Left Foot Inspection  Skin Exam: skin normal and skin intact. No dry skin, no warmth, no erythema, no maceration, normal color, no pre-ulcer, no ulcer and no callus.     Toe Exam: ROM and strength within normal limits.     Sensory   Vibration: intact  Proprioception: intact  Monofilament testing: intact    Vascular  Capillary refills: < 3 seconds  The left DP pulse is 2+. The left PT pulse is 2+.     Assign Risk Category  No deformity present  No loss of protective sensation  No weak pulses  Risk: 0      Terese Alford,

## 2024-04-24 NOTE — ASSESSMENT & PLAN NOTE
Asymptomatic  Lab Results   Component Value Date    HGBA1C 8.0 (H) 02/07/2024     Followed closely by Endocrinology  DM foot exam done today

## 2024-04-24 NOTE — ASSESSMENT & PLAN NOTE
Stable, followed closely by Cardio/CTS at [facility]  Continue antihypertensive regimen along with Lipitor + zetia

## 2024-04-24 NOTE — ASSESSMENT & PLAN NOTE
Wt Readings from Last 3 Encounters:   04/22/24 110 kg (242 lb 3.2 oz)   03/15/24 111 kg (244 lb 12.8 oz)   03/11/24 111 kg (245 lb)     Stable at this time  Followed closely by Cardiology/CTS at [facility]

## 2024-05-01 ENCOUNTER — EVALUATION (OUTPATIENT)
Dept: PHYSICAL THERAPY | Facility: REHABILITATION | Age: 74
End: 2024-05-01
Payer: COMMERCIAL

## 2024-05-01 DIAGNOSIS — S86.911D KNEE STRAIN, RIGHT, SUBSEQUENT ENCOUNTER: ICD-10-CM

## 2024-05-01 PROCEDURE — 97161 PT EVAL LOW COMPLEX 20 MIN: CPT | Performed by: PHYSICAL THERAPIST

## 2024-05-01 PROCEDURE — 97110 THERAPEUTIC EXERCISES: CPT | Performed by: PHYSICAL THERAPIST

## 2024-05-01 NOTE — PROGRESS NOTES
PT Evaluation     Today's date: 2024  Patient name: Sindy Beckford  : 1950  MRN: [id number]  Referring provider: Terese Alford DO  Dx:   Encounter Diagnosis     ICD-10-CM    1. Knee strain, right, subsequent encounter  S86.911D Ambulatory Referral to Physical Therapy                     Assessment  Assessment details: Sindy Beckford is a 73 y.o. female who presents to physical therapy with diagnosis of Knee strain, right. Sindy presents with pain and limitations in range of motion, strength, joint mobility, flexibility, and functional ability. The current limitations are affecting Sindy's ability to function at prior level. She will benefit from skilled physical therapy to address the current impairments and functional limitations to enable her to return to daily activities at maximal level. Thank you for the referral.    Impairments: abnormal or restricted ROM, activity intolerance, impaired balance, impaired physical strength, lacks appropriate home exercise program, pain with function and weight-bearing intolerance    Symptom irritability: lowUnderstanding of Dx/Px/POC: good   Prognosis: good    Goals  STG  Patient will decrease pain at worst to 3/10  Patient will demonstrate pain free knee flexion  Patient will demonstrate LE strength 1/3 grade in all deficit planes  Patient will be independent with basic HEP    LTG  Patient will decrease pain at worst to 0-1/10  Patient will improve LE strength to 5/5 grade in all deficit planes  Patient will improve FOTO greater than or equal to projected score  Patient will be independent with comprehensive HEP    Plan  Patient would benefit from: skilled physical therapy  Planned modality interventions: cryotherapy and thermotherapy: hydrocollator packs  Planned therapy interventions: manual therapy, neuromuscular re-education, patient education, therapeutic activities, therapeutic exercise, therapeutic training and home exercise program  Frequency: 2x  week  Duration in weeks: 6  Treatment plan discussed with: patient        Subjective Evaluation    History of Present Illness  Mechanism of injury: Sindy is a 73 y.o. female presenting to physical therapy on 24 with primary complaints of right knee pain. She mentions it started in the beginning of March when she was in [place]. It gave out on her and she felt a pop in her knee. She was taking tylenol and icing it and it did get a little better. However, when she got home she stepped off a curb and almost fell and it was very painful. She went to the doctor who advised Voltaren cream and ice which have been helping her symptoms. At this time she has no difficulty completing ADLs or household chores. She is able to sleep through the night without pain as well as negotiate steps in a reciprocal pattern. She feels as though it is getting better but wants to get her full strength back and be pain free.  Patient Goals  Patient goals for therapy: decreased pain, increased strength and increased motion  Patient goal: Get full strength back in knee.  Pain  Current pain ratin  At best pain ratin  At worst pain ratin  Location: R Knee  Quality: sharp and discomfort  Relieving factors: ice (volteran cream)  Aggravating factors: sitting    Social Support  Steps to enter house: no  Stairs in house: no     Employment status: not working    Diagnostic Tests  X-ray: normal  Treatments  No previous or current treatments        Objective    Gait: normal, no AD.    Palpation: tenderness noted along medial and lateral joint line    Knee AROM:  Right: 0-120 degrees  Left: 0-120 degrees with pain    Lower Extremity Strength  Right   Hip Flexion: 5/5   Hip Extension: 4/5  Hip Abduction: 3+/5   Hip IR (/): 4+/5  Hip ER (): 4/5  Knee Extension: 4+/5   Knee Flexion: 4+/5   Ankle DF: 5/5     Left  Hip Flexion: 5/5   Hip Extension: 5/5  Hip Abduction: 4+/5   Hip IR (): 5/5  Hip ER (): 4+/5  Knee Extension:  "4+/5   Knee Flexion: 4+/5   Ankle DF: 5/5     Flexibility: tightness noted in bilateral quads.    Special Tests: negative Medial and Lateral Jorge Luis, varus stress, valgus stress, anterior drawers, posterior drawers.         Precautions: anxiety, diabetes, hyperlipidemia, hypertension, hypothyroidism, obesity     Manuals 5/1            Patellar mobs                                                    Neuro Re-Ed             QS + SLR x10            TB Side Stepping             TB Monster Walk                                                                 Ther Ex             Bike nv            Clamshells 5\"x10            Prone HF Stretch 20\"x3            Bridges             Mini Squat             Standing Hip 3way             Leg Press             Leg Ext Machine             Leg Curl Machine                          Ther Activity             Step Up Fwd                          Gait Training                                       Modalities                                            "

## 2024-05-03 ENCOUNTER — OFFICE VISIT (OUTPATIENT)
Dept: PHYSICAL THERAPY | Facility: REHABILITATION | Age: 74
End: 2024-05-03
Payer: COMMERCIAL

## 2024-05-03 DIAGNOSIS — S86.911D KNEE STRAIN, RIGHT, SUBSEQUENT ENCOUNTER: Primary | ICD-10-CM

## 2024-05-03 PROCEDURE — 97112 NEUROMUSCULAR REEDUCATION: CPT

## 2024-05-03 PROCEDURE — 97140 MANUAL THERAPY 1/> REGIONS: CPT

## 2024-05-03 PROCEDURE — 97110 THERAPEUTIC EXERCISES: CPT

## 2024-05-03 NOTE — PROGRESS NOTES
"Daily Note     Today's date: 5/3/2024  Patient name: Sindy Beckford  : 1950  MRN: [id number]  Referring provider: Terese Alford DO  Dx:   Encounter Diagnosis     ICD-10-CM    1. Knee strain, right, subsequent encounter  S86.911D           Start Time: 1010  Stop Time: 1055  Total time in clinic (min): 45 minutes    Subjective: Patient reports knee has been feeling better as of late. She reports no complaints after previous session and compliance with HEP.       Objective: See treatment diary below      Assessment: Tolerated treatment well. Patient demonstrated fatigue post treatment, exhibited good technique with therapeutic exercises, and would benefit from continued PT Initiated POC this session with good tolerance. No knee pain noted only reports of muscle fatigue. Patient given updated HEP today reporting understanding.       Plan: Continue per plan of care.  Progress treatment as tolerated.       Precautions: anxiety, diabetes, hyperlipidemia, hypertension, hypothyroidism, obesity     Manuals  5/3           Patellar mobs  ROM done                                                  Neuro Re-Ed             QS + SLR x10 2x10           TB Side Stepping             TB Monster Walk             Quad set  2x10x5''                                                  Ther Ex             Bike nv 5' lvl 1           Clamshells 5\"x10 2x10 ea           Prone HF Stretch 20\"x3            Bridges  2x10           Mini Squat             Standing Hip 3way  Standing hip abduction 3x10            Leg Press  65# 3x10           Leg Ext Machine             Leg Curl Machine             Seated hamstring stretch  20''x3 ea           Ther Activity             Step Up Fwd                          Gait Training                                       Modalities                                            "

## 2024-05-06 DIAGNOSIS — J30.2 SEASONAL ALLERGIES: ICD-10-CM

## 2024-05-06 RX ORDER — LEVOCETIRIZINE DIHYDROCHLORIDE 5 MG/1
5 TABLET, FILM COATED ORAL EVERY EVENING
Qty: 90 TABLET | Refills: 0 | Status: SHIPPED | OUTPATIENT
Start: 2024-05-06

## 2024-05-08 ENCOUNTER — OFFICE VISIT (OUTPATIENT)
Dept: PHYSICAL THERAPY | Facility: REHABILITATION | Age: 74
End: 2024-05-08
Payer: COMMERCIAL

## 2024-05-08 DIAGNOSIS — S86.911D KNEE STRAIN, RIGHT, SUBSEQUENT ENCOUNTER: Primary | ICD-10-CM

## 2024-05-08 PROCEDURE — 97112 NEUROMUSCULAR REEDUCATION: CPT

## 2024-05-08 PROCEDURE — 97530 THERAPEUTIC ACTIVITIES: CPT

## 2024-05-08 PROCEDURE — 97110 THERAPEUTIC EXERCISES: CPT

## 2024-05-08 NOTE — PROGRESS NOTES
"Daily Note     Today's date: 2024  Patient name: Sindy Beckford  : 1950  MRN: [id number]  Referring provider: Terese Alford DO  Dx:   Encounter Diagnosis     ICD-10-CM    1. Knee strain, right, subsequent encounter  S86.911D           Start Time: 1202  Stop Time: 1248  Total time in clinic (min): 46 minutes    Subjective: Patient states she was having some discomfort behind her knee this morning. Patient reports no complaints after previous session and compliance with HEP.       Objective: See treatment diary below      Assessment: Tolerated treatment well. Patient demonstrated fatigue post treatment, exhibited good technique with therapeutic exercises, and would benefit from continued PT Good tolerance to additional TE today. Initial knee discomfort noted with sit to stands, relief noted with added foam boost. Patient given updated HEP today with patient reporting understanding.       Plan: Continue per plan of care.  Progress treatment as tolerated.       Precautions: anxiety, diabetes, hyperlipidemia, hypertension, hypothyroidism, obesity     Manuals  5/3 5          Patellar mobs  ROM done                                                  Neuro Re-Ed             QS + SLR x10 2x10 3x10          TB Side Stepping             TB Monster Walk             Quad set  2x10x5'' 2x10x5''                                                 Ther Ex             Bike nv 5' lvl 1 5' lvl 1          Clamshells 5\"x10 2x10 ea 3x10 pink TB ea          Prone HF Stretch 20\"x3            Bridges  2x10 3x10          Mini Squat             Standing Hip 3way  Standing hip abduction 3x10  Standing hip abduction 3x10 ea          Leg Press  65# 3x10           Leg Ext Machine             Leg Curl Machine             Seated hamstring stretch  20''x3 ea 20''x5 ea          Ther Activity             Step Up Fwd   4'' 2x10 ea          Sit to stands   One foam shahid 3x5          Gait Training                                     "   Modalities

## 2024-05-10 ENCOUNTER — OFFICE VISIT (OUTPATIENT)
Dept: PHYSICAL THERAPY | Facility: REHABILITATION | Age: 74
End: 2024-05-10
Payer: COMMERCIAL

## 2024-05-10 DIAGNOSIS — S86.911D KNEE STRAIN, RIGHT, SUBSEQUENT ENCOUNTER: Primary | ICD-10-CM

## 2024-05-10 PROCEDURE — 97530 THERAPEUTIC ACTIVITIES: CPT | Performed by: PHYSICAL THERAPIST

## 2024-05-10 PROCEDURE — 97110 THERAPEUTIC EXERCISES: CPT | Performed by: PHYSICAL THERAPIST

## 2024-05-10 PROCEDURE — 97112 NEUROMUSCULAR REEDUCATION: CPT | Performed by: PHYSICAL THERAPIST

## 2024-05-10 NOTE — PROGRESS NOTES
"Daily Note     Today's date: 5/10/2024  Patient name: Sindy Beckford  : 1950  MRN: [id number]  Referring provider: Terese Alford DO  Dx:   Encounter Diagnosis     ICD-10-CM    1. Knee strain, right, subsequent encounter  S86.911D           Start Time: 849  Stop Time: 09  Total time in clinic (min): 43 minutes    Subjective: Patient reports the knee has been feeling ok with no complaints after last session.      Objective: See treatment diary below      Assessment: Tolerated treatment well. Patient demonstrated fatigue post treatment, exhibited good technique with therapeutic exercises, and would benefit from continued PT. No complaints of knee pain throughout session and overall good tolerance to TE.      Plan: Continue per plan of care.      Precautions: anxiety, diabetes, hyperlipidemia, hypertension, hypothyroidism, obesity     Manuals  5/3 5 510         Patellar mobs  ROM done                                                  Neuro Re-Ed             QS + SLR x10 2x10 3x10 3x10         TB Side Stepping             TB Monster Walk             Quad set  2x10x5'' 2x10x5'' 2x10x5\"                                                Ther Ex             Bike nv 5' lvl 1 5' lvl 1 5' lv 1         Clamshells 5\"x10 2x10 ea 3x10 pink TB ea Pink 3x10 bl         Prone HF Stretch 20\"x3            Bridges  2x10 3x10 3x10         Mini Squat             Standing Hip 3way  Standing hip abduction 3x10  Standing hip abduction 3x10 ea Abd + ext 3x10 b/l         Leg Press  65# 3x10           Leg Ext Machine             Leg Curl Machine             Seated hamstring stretch  20''x3 ea 20''x5 ea 5x20\" b/l         Ther Activity             Step Up Fwd   4'' 2x10 ea 4\" 2x10 ea         Sit to stands   One foam shahid 3x5 One foam 2x8         Gait Training                                       Modalities                                                " Unable to assess

## 2024-05-11 DIAGNOSIS — K21.9 GASTROESOPHAGEAL REFLUX DISEASE WITHOUT ESOPHAGITIS: ICD-10-CM

## 2024-05-12 RX ORDER — OMEPRAZOLE 40 MG/1
40 CAPSULE, DELAYED RELEASE ORAL DAILY
Qty: 90 CAPSULE | Refills: 1 | Status: SHIPPED | OUTPATIENT
Start: 2024-05-12

## 2024-05-14 ENCOUNTER — APPOINTMENT (OUTPATIENT)
Dept: PHYSICAL THERAPY | Facility: REHABILITATION | Age: 74
End: 2024-05-14
Payer: COMMERCIAL

## 2024-05-15 ENCOUNTER — OFFICE VISIT (OUTPATIENT)
Dept: ENDOCRINOLOGY | Facility: CLINIC | Age: 74
End: 2024-05-15
Payer: COMMERCIAL

## 2024-05-15 VITALS
HEIGHT: 63 IN | OXYGEN SATURATION: 95 % | BODY MASS INDEX: 42.52 KG/M2 | WEIGHT: 240 LBS | HEART RATE: 79 BPM | SYSTOLIC BLOOD PRESSURE: 118 MMHG | DIASTOLIC BLOOD PRESSURE: 80 MMHG

## 2024-05-15 DIAGNOSIS — E03.9 HYPOTHYROIDISM (ACQUIRED): ICD-10-CM

## 2024-05-15 DIAGNOSIS — E04.2 NONTOXIC MULTINODULAR GOITER: ICD-10-CM

## 2024-05-15 DIAGNOSIS — E66.01 SEVERE OBESITY (BMI >= 40) (HCC): ICD-10-CM

## 2024-05-15 DIAGNOSIS — I25.10 CORONARY ARTERY DISEASE INVOLVING NATIVE CORONARY ARTERY OF NATIVE HEART WITHOUT ANGINA PECTORIS: ICD-10-CM

## 2024-05-15 DIAGNOSIS — I10 ESSENTIAL HYPERTENSION: ICD-10-CM

## 2024-05-15 DIAGNOSIS — E11.59 TYPE 2 DIABETES MELLITUS WITH OTHER CIRCULATORY COMPLICATION, WITHOUT LONG-TERM CURRENT USE OF INSULIN (HCC): Primary | ICD-10-CM

## 2024-05-15 DIAGNOSIS — E78.2 MIXED HYPERLIPIDEMIA: ICD-10-CM

## 2024-05-15 PROCEDURE — 99214 OFFICE O/P EST MOD 30 MIN: CPT | Performed by: INTERNAL MEDICINE

## 2024-05-15 NOTE — PROGRESS NOTES
Sindy Beckford 73 y.o. female MRN: [id number]    Encounter: [id number]      Assessment & Plan     Assessment:  This is a 73 y.o.-year-old female with diabetes with hyperglycemia.    Plan:  Diagnoses and all orders for this visit:    Type 2 diabetes mellitus with other circulatory complication, without long-term current use of insulin ([facility])  Lab Results   Component Value Date    HGBA1C 8.0 (H) 02/07/2024   A1c is 8.0%, uncontrolled, this A1c was done in February  She is due for another A1c now.  Will order the A1c to be done now.  Her blood sugars are in 100 to 150 mg per DL range so it is expected that A1c will be better.  Discussed to continue Invokana and Ozempic at current dose.  Educated to keep carbohydrate consistent with meals.  Discussed the importance of follow-up with ophthalmology and podiatry    -     Hemoglobin A1C; Future  -     Albumin / creatinine urine ratio; Future  -     Hemoglobin A1C; Future  -     Basic metabolic panel; Future    Hypothyroidism (acquired)  Patient is clinically and biochemically euthyroid.  Continue current dose of levothyroxine.  Lab Results   Component Value Date    [id number] 3.418 02/07/2024       -     T4, free; Future  -     TSH, 3rd generation; Future    Nontoxic multinodular goiter  Thyroid ultrasound showed stable thyroid nodules with previous nonmalignant biopsy result for right lower pole nodule as well as left mid gland nodule.  Right mid gland nodule which is dominant 2.2 x 1.8 x 1.4 cm was also biopsied with benign results.  Upper pole nodule measuring 2 x 1.6 x 1.4 cm was biopsied previously with benign results.  So patient had previously biopsy on all 4 nodules with benign results.  This thyroid nodules are stable    Mixed hyperlipidemia  Lab Results   Component Value Date    LDLCALC 51 05/22/2023   LDL is at goal.  Continue statins  Coronary artery disease involving native coronary artery of native heart without angina pectoris  Stable findings.  Followed by  cardiology  Essential hypertension  Blood pressure well-controlled, continue current management  Severe obesity (BMI >= 40) (HCC)  Continue Ozempic.  Reinforced lifestyle modifications       CC: Diabetes    History of Present Illness     HPI:    Sindy Beckford is 73-year-old female with medical history of type 2 diabetes, with complications such as coronary artery disease, multinodular goiter, hypertension hyperlipidemia, obesity is here for follow-up.  Diabetes course has been stable.    Current medical management includes Ozempic 1 mg once a week, Invokana 300 mg once daily    For hypothyroidism, she takes levothyroxine 112 mcg daily on empty stomach 1 hour before breakfast    For hyperlipidemia, she takes Lipitor 80 mg daily and Zetia 10 mg daily    Follows with ophthalmology and podiatry regularly.  She checks blood sugars twice daily and her blood sugars usually range in 100-150 mg per DL range  She denies severe hypoglycemia or hyperglycemia requiring hospitalizations  Lab Results   Component Value Date    HGBA1C 8.0 (H) 02/07/2024       Lab Results   Component Value Date    [id number] 3.418 02/07/2024             Lab Results   Component Value Date    HGBA1C 8.0 (H) 02/07/2024          Review of Systems    Historical Information   Past Medical History:   Diagnosis Date    Allergic 2018   weekly inject.    Allergic rhinitis     Allergies     Anemia     Anxiety     Arthritis     Chronic constipation     Colon polyp     Diabetes (HCC)     Diabetes mellitus (HCC)     Disease of thyroid gland     GERD (gastroesophageal reflux disease)     Headache(784.0)     Heart murmur     Hyperlipidemia     Hypertension     Hypothyroidism     Obesity     Obesity     Restless legs     Vitamin D deficiency      Past Surgical History:   Procedure Laterality Date    ABDOMINAL SURGERY  01/2001    Dr. Rúal LEW    BREAST BIOPSY Right 2001    CATARACT EXTRACTION      CATARACT EXTRACTION, BILATERAL  05/2021    CHOLECYSTECTOMY       COLONOSCOPY  2018    goes q 5 yr-due in Summer 2018-Dr. Lam    CORONARY ANGIOPLASTY      Dr Bateman    LYMPH NODE BIOPSY      US GUIDANCE  2013    US GUIDANCE  10/17/2016    US GUIDED FINE NEEDLE ASPIRATION (ALL INC)  10/15/2013     Social History   Social History     Substance and Sexual Activity   Alcohol Use Yes    Comment: on special occasions     Social History     Substance and Sexual Activity   Drug Use Never     Social History     Tobacco Use   Smoking Status Never   Smokeless Tobacco Never     Family History:   Family History   Problem Relation Age of Onset    Hypertension Father             Diabetes Father     Heart disease Father     Diabetes type II Father             Multiple sclerosis Brother     Thyroid disease unspecified Brother     Diabetes Paternal Aunt     Multiple sclerosis Paternal Uncle     Diabetes Paternal Uncle     Multiple sclerosis Family     Cancer Neg Hx     Stroke Neg Hx     Obesity Neg Hx        Meds/Allergies   Current Outpatient Medications   Medication Sig Dispense Refill    aspirin (ECOTRIN LOW STRENGTH) 81 mg EC tablet Take 81 mg by mouth daily      atorvastatin (LIPITOR) 80 mg tablet TAKE 1 TABLET BY MOUTH DAILY 90 tablet 3    Blood Glucose Monitoring Suppl (ONE TOUCH ULTRA MINI) w/Device KIT Please dispense 1 Kit 1 each 0    Cholecalciferol (Vitamin D3) 25 MCG (1000 UT) CAPS Take 2000 IU with dinner      ezetimibe (ZETIA) 10 mg tablet Take 1 tablet (10 mg total) by mouth daily 90 tablet 0    famotidine-calcium carbonate-magnesium hydroxide (PEPCID COMPLETE) -165 MG CHEW Chew 1 tablet daily as needed for heartburn      fluticasone (FLONASE) 50 mcg/act nasal spray 1 spray into each nostril if needed      glucose blood (OneTouch Ultra) test strip Use once a day 100 each 3    Invokana 300 MG TABS TAKE 1 TABLET BY MOUTH  DAILY 90 tablet 3    Lancet Devices (Schoologyuch Delica Plus Lancing) MISC USE WITH LANCETS TO CHECK SUGAR      Lancets  "(OneTouch Delica Plus [id number]) MISC USE AS DIRECTED TO CHECK SUGARS TWICE EVERY OTHER  each 1    Lancets Misc. (ONE TOUCH SURESOFT) MISC Use lancet to check sugar 1 each 0    levocetirizine (XYZAL) 5 MG tablet Take 1 tablet (5 mg total) by mouth every evening 90 tablet 0    levothyroxine 112 mcg tablet TAKE 1 TABLET BY MOUTH DAILY 90 tablet 3    nebivolol (Bystolic) 5 mg tablet Take 0.5 tablets (2.5 mg total) by mouth daily 90 tablet 0    omeprazole (PriLOSEC) 40 MG capsule Take 1 capsule (40 mg total) by mouth daily 90 capsule 1    potassium chloride (Klor-Con) 10 mEq tablet Take 1 tablet (10 mEq total) by mouth 2 (two) times a day 180 tablet 3    pramipexole (MIRAPEX) 0.25 mg tablet TAKE 1 TABLET BY MOUTH DAILY AT  BEDTIME 90 tablet 3    ramipril (ALTACE) 2.5 mg capsule TAKE 1 CAPSULE BY MOUTH DAILY 90 capsule 3    semaglutide, 1 mg/dose, (Ozempic, 1 MG/DOSE,) 4 mg/3 mL injection pen Inject 0.75 mL (1 mg total) under the skin every 7 days 9 mL 1    solifenacin (VESICARE) 10 MG tablet TAKE 1 TABLET(10 MG) BY MOUTH DAILY 90 tablet 1    valACYclovir (VALTREX) 1,000 mg tablet Take 2 tablets (2,000 mg total) by mouth 2 (two) times a day for 1 day 8 tablet 2     No current facility-administered medications for this visit.     Allergies   Allergen Reactions    Other Sneezing     Seasonal, dust, Cat dander, mold    Penicillins Hives       Objective   Vitals: Blood pressure 118/80, pulse 79, height 5' 3\" (1.6 m), weight 109 kg (240 lb), last menstrual period 01/01/2004, SpO2 95%.    Physical Exam  Constitutional:       General: She is not in acute distress.     Appearance: Normal appearance. She is not ill-appearing.   HENT:      Head: Normocephalic and atraumatic.      Nose: Nose normal.   Eyes:      Extraocular Movements: Extraocular movements intact.      Conjunctiva/sclera: Conjunctivae normal.   Pulmonary:      Effort: No respiratory distress.   Musculoskeletal:      Cervical back: Normal range of motion. " "  Neurological:      General: No focal deficit present.      Mental Status: She is alert and oriented to person, place, and time.   Psychiatric:         Mood and Affect: Mood normal.         Behavior: Behavior normal.         The history was obtained from the review of the chart, patient.    Lab Results:   Lab Results   Component Value Date/Time    Hemoglobin A1C 8.0 (H) 02/07/2024 06:31 AM    Hemoglobin A1C 6.9 (A) 09/28/2023 10:37 AM    Hemoglobin A1C 6.8 (H) 05/22/2023 06:39 AM    BUN 20 04/10/2024 08:52 AM    BUN 15 04/09/2024 09:39 AM    BUN 16 02/07/2024 06:31 AM    BUN 17 05/22/2023 06:39 AM    Potassium 4.3 04/10/2024 08:52 AM    Potassium 4.3 04/09/2024 09:39 AM    Potassium 4.2 02/07/2024 06:31 AM    Potassium 4.3 05/22/2023 06:39 AM    Chloride 106 04/10/2024 08:52 AM    Chloride 104 04/09/2024 09:39 AM    Chloride 105 02/07/2024 06:31 AM    Chloride 101 05/22/2023 06:39 AM    Carbon Dioxide 25 04/10/2024 08:52 AM    Carbon Dioxide 24 04/09/2024 09:39 AM    CO2 25 02/07/2024 06:31 AM    CO2 28 05/22/2023 06:39 AM    Creatinine 0.70 04/10/2024 08:52 AM    Creatinine 0.81 04/09/2024 09:39 AM    Creatinine 0.75 02/07/2024 06:31 AM    Creatinine 0.89 05/22/2023 06:39 AM    AST 17 05/22/2023 06:39 AM    ALT 19 05/22/2023 06:39 AM    Total Protein 7.0 05/22/2023 06:39 AM    Albumin 4.0 05/22/2023 06:39 AM    HDL, Direct 46 (L) 05/22/2023 06:39 AM    Triglycerides 186 (H) 05/22/2023 06:39 AM           Imaging Studies: I have personally reviewed pertinent reports.      Portions of the record may have been created with voice recognition software. Occasional wrong word or \"sound a like\" substitutions may have occurred due to the inherent limitations of voice recognition software. Read the chart carefully and recognize, using context, where substitutions have occurred.    "

## 2024-05-16 ENCOUNTER — OFFICE VISIT (OUTPATIENT)
Dept: PHYSICAL THERAPY | Facility: REHABILITATION | Age: 74
End: 2024-05-16
Payer: COMMERCIAL

## 2024-05-16 DIAGNOSIS — S86.911D KNEE STRAIN, RIGHT, SUBSEQUENT ENCOUNTER: Primary | ICD-10-CM

## 2024-05-16 PROCEDURE — 97530 THERAPEUTIC ACTIVITIES: CPT | Performed by: PHYSICAL THERAPIST

## 2024-05-16 PROCEDURE — 97110 THERAPEUTIC EXERCISES: CPT | Performed by: PHYSICAL THERAPIST

## 2024-05-16 NOTE — PROGRESS NOTES
"Daily Note     Today's date: 2024  Patient name: Sindy Beckford  : 1950  MRN: [id number]  Referring provider: Terese Alford DO  Dx:   Encounter Diagnosis     ICD-10-CM    1. Knee strain, right, subsequent encounter  S86.911D           Start Time: 1015  Stop Time: 1053  Total time in clinic (min): 38 minutes    Subjective: No new complaints in regards to the right knee.      Objective: See treatment diary below      Assessment: Tolerated treatment well. Patient demonstrated fatigue post treatment, exhibited good technique with therapeutic exercises, and would benefit from continued PT. Progressed with increased repetitions, resistance and additional closed chain strengthening with good tolerance.       Plan: Continue per plan of care.      Precautions: anxiety, diabetes, hyperlipidemia, hypertension, hypothyroidism, obesity     Manuals 5/1 5/3 5/8 5/10 5/16        Patellar mobs  ROM done                                                  Neuro Re-Ed             QS + SLR x10 2x10 3x10 3x10 3x12        TB Side Stepping             TB Monster Walk             Quad set  2x10x5'' 2x10x5'' 2x10x5\"                                                Ther Ex             Bike nv 5' lvl 1 5' lvl 1 5' lv 1 5' lv 1        Clamshells 5\"x10 2x10 ea 3x10 pink TB ea Pink 3x10 bl Pink 3x12 b/l        Prone HF Stretch 20\"x3            Bridges  2x10 3x10 3x10 3x12        Mini Squat     2x10         Standing Hip 3way  Standing hip abduction 3x10  Standing hip abduction 3x10 ea Abd + ext 3x10 b/l Pink abd + ext 3x10 b/l        Leg Press  65# 3x10           Leg Ext Machine             Leg Curl Machine             Seated hamstring stretch  20''x3 ea 20''x5 ea 5x20\" b/l 5x20\" b/l         Ther Activity             Step Up Fwd   4'' 2x10 ea 4\" 2x10 ea 6\" 3x10 b/l        Sit to stands   One foam shahid 3x5 One foam 2x8 Hi lo 3x8        Gait Training                                       Modalities                                     "

## 2024-05-17 ENCOUNTER — OFFICE VISIT (OUTPATIENT)
Dept: PHYSICAL THERAPY | Facility: REHABILITATION | Age: 74
End: 2024-05-17
Payer: COMMERCIAL

## 2024-05-17 DIAGNOSIS — S86.911D KNEE STRAIN, RIGHT, SUBSEQUENT ENCOUNTER: Primary | ICD-10-CM

## 2024-05-17 PROCEDURE — 97110 THERAPEUTIC EXERCISES: CPT

## 2024-05-17 PROCEDURE — 97530 THERAPEUTIC ACTIVITIES: CPT

## 2024-05-17 NOTE — PROGRESS NOTES
"Daily Note     Today's date: 2024  Patient name: Sindy Beckford  : 1950  MRN: [id number]  Referring provider: Terese Alford DO  Dx:   Encounter Diagnosis     ICD-10-CM    1. Knee strain, right, subsequent encounter  S86.911D           Start Time: 1015  Stop Time: 1053  Total time in clinic (min): 38 minutes    Subjective: Patient reports she had increased knee soreness/achiness yesterday, especially at night when trying to sleep stating her \"legs felt restless.\" She reports using voltaren gel and taking some medication with some relief, and reports feeling better today at start of session.       Objective: See treatment diary below      Assessment: Tolerated treatment well. Patient demonstrated fatigue post treatment, exhibited good technique with therapeutic exercises, and would benefit from continued PT No pain noted with any TE performed today, only muscle fatigue. Cues for proper form required for mini squat and to keep within tolerable range, use of chair behind patient helped to correct form.       Plan: Continue per plan of care.  Progress treatment as tolerated.       Precautions: anxiety, diabetes, hyperlipidemia, hypertension, hypothyroidism, obesity     Manuals 5/1 5/3 5/8 5/10 5/16 5/17       Patellar mobs  ROM done                                                  Neuro Re-Ed             QS + SLR x10 2x10 3x10 3x10 3x12 3x12 ea       TB Side Stepping             TB Monster Walk             Quad set  2x10x5'' 2x10x5'' 2x10x5\"                                                Ther Ex             Bike nv 5' lvl 1 5' lvl 1 5' lv 1 5' lv 1 5' lvl 1       Clamshells 5\"x10 2x10 ea 3x10 pink TB ea Pink 3x10 bl Pink 3x12 b/l Pink 3x12 b/l       Prone HF Stretch 20\"x3            Bridges  2x10 3x10 3x10 3x12 3x12       Mini Squat     2x10  2x10       Standing Hip 3way  Standing hip abduction 3x10  Standing hip abduction 3x10 ea Abd + ext 3x10 b/l Pink abd + ext 3x10 b/l Pink abd +ext 3x10 b/l       Leg " "Press  65# 3x10           Leg Ext Machine             Leg Curl Machine             Seated hamstring stretch  20''x3 ea 20''x5 ea 5x20\" b/l 5x20\" b/l  5x20'' b/l       Ther Activity             Step Up Fwd   4'' 2x10 ea 4\" 2x10 ea 6\" 3x10 b/l 6'' 3x10 b/l       Sit to stands   One foam shahid 3x5 One foam 2x8 Hi lo 3x8 Hi lo 3x8       Gait Training                                       Modalities                                                    "

## 2024-05-20 ENCOUNTER — LAB (OUTPATIENT)
Dept: LAB | Facility: HOSPITAL | Age: 74
End: 2024-05-20
Payer: COMMERCIAL

## 2024-05-20 DIAGNOSIS — E11.59 TYPE 2 DIABETES MELLITUS WITH OTHER CIRCULATORY COMPLICATION, WITHOUT LONG-TERM CURRENT USE OF INSULIN (HCC): ICD-10-CM

## 2024-05-20 LAB
EST. AVERAGE GLUCOSE BLD GHB EST-MCNC: 154 MG/DL
HBA1C MFR BLD: 7 %

## 2024-05-20 PROCEDURE — 36415 COLL VENOUS BLD VENIPUNCTURE: CPT

## 2024-05-20 PROCEDURE — 83036 HEMOGLOBIN GLYCOSYLATED A1C: CPT

## 2024-05-21 ENCOUNTER — OFFICE VISIT (OUTPATIENT)
Dept: PHYSICAL THERAPY | Facility: REHABILITATION | Age: 74
End: 2024-05-21
Payer: COMMERCIAL

## 2024-05-21 DIAGNOSIS — S86.911D KNEE STRAIN, RIGHT, SUBSEQUENT ENCOUNTER: Primary | ICD-10-CM

## 2024-05-21 PROCEDURE — 97110 THERAPEUTIC EXERCISES: CPT

## 2024-05-21 PROCEDURE — 97530 THERAPEUTIC ACTIVITIES: CPT

## 2024-05-21 NOTE — PROGRESS NOTES
"Daily Note     Today's date: 2024  Patient name: Sindy Beckford  : 1950  MRN: [id number]  Referring provider: Terese Alford DO  Dx:   Encounter Diagnosis     ICD-10-CM    1. Knee strain, right, subsequent encounter  S86.911D           Start Time: 1020  Stop Time: 1100  Total time in clinic (min): 40 minutes    Subjective: Patient reports knee has been feeling better as of late. She reports no complaints after previous session. She states she was on a bus trip over the weekend where she experienced knee stiffness and soreness but that was due to the lack of room on bus.       Objective: See treatment diary below      Assessment: Tolerated treatment well. Patient demonstrated fatigue post treatment, exhibited good technique with therapeutic exercises, and would benefit from continued PT Good tolerance to increased reps/resistance and additional TE this session.         Plan: Continue per plan of care.  Progress treatment as tolerated.       Precautions: anxiety, diabetes, hyperlipidemia, hypertension, hypothyroidism, obesity     Manuals 5/1 5/3 5/8 5/10 5/16 5/17 5/21      Patellar mobs  ROM done                                                  Neuro Re-Ed             QS + SLR x10 2x10 3x10 3x10 3x12 3x12 ea 3x15 ea      TB Side Stepping             TB Monster Walk             Quad set  2x10x5'' 2x10x5'' 2x10x5\"                                                Ther Ex             Bike nv 5' lvl 1 5' lvl 1 5' lv 1 5' lv 1 5' lvl 1 5' lvl 1      Clamshells 5\"x10 2x10 ea 3x10 pink TB ea Pink 3x10 bl Pink 3x12 b/l Pink 3x12 b/l GTB 3x10 b/l      Prone HF Stretch 20\"x3            Bridges  2x10 3x10 3x10 3x12 3x12 3x15      Mini Squat     2x10  2x10 3x10      Standing Hip 3way  Standing hip abduction 3x10  Standing hip abduction 3x10 ea Abd + ext 3x10 b/l Pink abd + ext 3x10 b/l Pink abd +ext 3x10 b/l GTB abd +ext 3x10 ea      Lateral walking       GTB 5 laps mirror      Leg Press  65# 3x10           Leg Ext " "Machine             Leg Curl Machine             Seated hamstring stretch  20''x3 ea 20''x5 ea 5x20\" b/l 5x20\" b/l  5x20'' b/l 5x20'' b/l      Ther Activity             Step Up Fwd   4'' 2x10 ea 4\" 2x10 ea 6\" 3x10 b/l 6'' 3x10 b/l 6'' 3x10 b/l      Sit to stands   One foam shahid 3x5 One foam 2x8 Hi lo 3x8 Hi lo 3x8 One foam 3x10      Gait Training                                       Modalities                                                      "

## 2024-05-22 ENCOUNTER — OFFICE VISIT (OUTPATIENT)
Age: 74
End: 2024-05-22
Payer: COMMERCIAL

## 2024-05-22 VITALS
HEART RATE: 77 BPM | WEIGHT: 241.8 LBS | SYSTOLIC BLOOD PRESSURE: 120 MMHG | DIASTOLIC BLOOD PRESSURE: 60 MMHG | BODY MASS INDEX: 42.84 KG/M2 | HEIGHT: 63 IN | TEMPERATURE: 96.9 F | OXYGEN SATURATION: 97 %

## 2024-05-22 DIAGNOSIS — R09.82 POST-NASAL DRIP: Primary | ICD-10-CM

## 2024-05-22 DIAGNOSIS — H61.892 IRRITATION OF LEFT EXTERNAL AUDITORY CANAL: ICD-10-CM

## 2024-05-22 PROCEDURE — G2211 COMPLEX E/M VISIT ADD ON: HCPCS

## 2024-05-22 PROCEDURE — 99213 OFFICE O/P EST LOW 20 MIN: CPT

## 2024-05-22 NOTE — ASSESSMENT & PLAN NOTE
History of seasonal allergies with one night of sore throat. No signs or symptoms of bacterial infection. Viral vs postnasal drip, treat symptoms with flonase and allergy medication. Call if symptoms are worsening or not resolving to discuss antibiotics.

## 2024-05-22 NOTE — PROGRESS NOTES
"Ambulatory Visit  Name: Sindy Beckford      : 1950      MRN: [id number]  Encounter Provider: Twila Larry MD  Encounter Date: 2024   Encounter department: [facility]    Assessment & Plan   1. Post-nasal drip  Assessment & Plan:  History of seasonal allergies with one night of sore throat. No signs or symptoms of bacterial infection. Viral vs postnasal drip, treat symptoms with flonase and allergy medication. Call if symptoms are worsening or not resolving to discuss antibiotics.  2. Irritation of left external auditory canal  No signs of symptoms of infection at this time. Hearing wnl, physical exam wnl no signs of infection. Follow up if worsening, suspect healing abrasion/irritation of canal.      History of Present Illness     Sore throat starting last night. On the right side. She drank fluids which didn't help. No sick contacts. Denies fevers, nausea/vomiting. She did feel chilly last night. Up to date on vaccinations. Does have seasonal allergies, takes zyxal and occasional flonase/neti pot.     Had an L ear pain a few weeks ago and felt throbbing's when she lays on that side. It will wake her up at night occasionally. This pain is intermittent. Has been stable, not worsening. No changes in hearing. Denies drainage from that ear. Denies instrumentation or drops into her ear canal.         Review of Systems   Constitutional:  Negative for appetite change, fatigue and fever.   HENT:  Positive for ear pain, postnasal drip and sore throat. Negative for ear discharge, facial swelling, hearing loss, sinus pressure, trouble swallowing and voice change.    Respiratory:  Negative for shortness of breath and wheezing.    Gastrointestinal:  Negative for diarrhea, nausea and vomiting.       Objective     /60   Pulse 77   Temp (!) 96.9 °F (36.1 °C)   Ht 5' 3\" (1.6 m)   Wt 110 kg (241 lb 12.8 oz)   LMP 2004   SpO2 97%   BMI 42.83 kg/m²     Physical " Exam  Vitals reviewed.   Constitutional:       General: She is not in acute distress.     Appearance: She is well-developed. She is not ill-appearing.   HENT:      Head: Normocephalic and atraumatic.      Right Ear: Tympanic membrane and ear canal normal. No drainage or swelling.      Left Ear: Tympanic membrane normal. No drainage, swelling or tenderness.  No middle ear effusion.      Ears:      Comments: L canal without erythema, pain on insertion of ophthalmoscope or purulent d/c     Mouth/Throat:      Mouth: Mucous membranes are moist. No oral lesions.      Pharynx: Posterior oropharyngeal erythema present. No pharyngeal swelling or oropharyngeal exudate.   Cardiovascular:      Rate and Rhythm: Normal rate and regular rhythm.      Heart sounds: Murmur heard.   Pulmonary:      Effort: Pulmonary effort is normal. No respiratory distress.      Breath sounds: Normal breath sounds. No wheezing or rales.   Abdominal:      General: There is no distension.      Palpations: Abdomen is soft.      Tenderness: There is no abdominal tenderness.   Skin:     General: Skin is warm and dry.   Neurological:      General: No focal deficit present.      Mental Status: She is alert and oriented to person, place, and time.   Psychiatric:         Mood and Affect: Mood normal.         Behavior: Behavior normal.       Administrative Statements

## 2024-05-22 NOTE — PATIENT INSTRUCTIONS
Please call the office if sore throat does not resolve in 1 week to discuss possible antibiotics. Please use flonase daily in the AM for postnasal drip.

## 2024-05-23 ENCOUNTER — APPOINTMENT (OUTPATIENT)
Dept: PHYSICAL THERAPY | Facility: REHABILITATION | Age: 74
End: 2024-05-23
Payer: COMMERCIAL

## 2024-05-24 ENCOUNTER — OFFICE VISIT (OUTPATIENT)
Dept: PHYSICAL THERAPY | Facility: REHABILITATION | Age: 74
End: 2024-05-24
Payer: COMMERCIAL

## 2024-05-24 DIAGNOSIS — S86.911D KNEE STRAIN, RIGHT, SUBSEQUENT ENCOUNTER: Primary | ICD-10-CM

## 2024-05-24 PROCEDURE — 97530 THERAPEUTIC ACTIVITIES: CPT | Performed by: PHYSICAL THERAPIST

## 2024-05-24 PROCEDURE — 97110 THERAPEUTIC EXERCISES: CPT | Performed by: PHYSICAL THERAPIST

## 2024-05-24 NOTE — PROGRESS NOTES
"Daily Note     Today's date: 2024  Patient name: Sindy Beckfodr  : 1950  MRN: [id number]  Referring provider: Terese Alford DO  Dx:   Encounter Diagnosis     ICD-10-CM    1. Knee strain, right, subsequent encounter  S86.911D           Start Time: 1048  Stop Time: 1128  Total time in clinic (min): 40 minutes    Subjective: No new complaints.      Objective: See treatment diary below      Assessment: Tolerated treatment well. Patient demonstrated fatigue post treatment, exhibited good technique with therapeutic exercises, and would benefit from continued PT. Progressed with increased resistance and repetitions with good tolerance, increased fatigue noted but no complaints of pain.      Plan: Continue per plan of care.      Precautions: anxiety, diabetes, hyperlipidemia, hypertension, hypothyroidism, obesity     Manuals 5/1 5/3 5/8 5/10 5/16 5/17 5/21 5/24     Patellar mobs  ROM done                                                  Neuro Re-Ed             QS + SLR x10 2x10 3x10 3x10 3x12 3x12 ea 3x15 ea 1.5# 3x12 b/l     TB Side Stepping             TB Monster Walk             Quad set  2x10x5'' 2x10x5'' 2x10x5\"                                                Ther Ex             Bike nv 5' lvl 1 5' lvl 1 5' lv 1 5' lv 1 5' lvl 1 5' lvl 1 5' lv 1     Clamshells 5\"x10 2x10 ea 3x10 pink TB ea Pink 3x10 bl Pink 3x12 b/l Pink 3x12 b/l GTB 3x10 b/l GTB 3x10 b/l     Prone HF Stretch 20\"x3            Bridges  2x10 3x10 3x10 3x12 3x12 3x15 3x15     Mini Squat     2x10  2x10 3x10 3x10     Standing Hip 3way  Standing hip abduction 3x10  Standing hip abduction 3x10 ea Abd + ext 3x10 b/l Pink abd + ext 3x10 b/l Pink abd +ext 3x10 b/l GTB abd +ext 3x10 ea GTB 3x12 ea abd + ext b/l      Lateral walking       GTB 5 laps mirror GTB 5 laps counter     Leg Press  65# 3x10           Leg Ext Machine             Leg Curl Machine             Seated hamstring stretch  20''x3 ea 20''x5 ea 5x20\" b/l 5x20\" b/l  5x20'' b/l 5x20'' " "b/l 5x20\" b/l     Ther Activity             Step Up Fwd   4'' 2x10 ea 4\" 2x10 ea 6\" 3x10 b/l 6'' 3x10 b/l 6'' 3x10 b/l 6\" 3x10 b/l      Sit to stands   One foam shahid 3x5 One foam 2x8 Hi lo 3x8 Hi lo 3x8 One foam 3x10 Hi lo 3x10     Gait Training                                       Modalities                                                        "

## 2024-05-28 ENCOUNTER — OFFICE VISIT (OUTPATIENT)
Age: 74
End: 2024-05-28
Payer: COMMERCIAL

## 2024-05-28 ENCOUNTER — APPOINTMENT (OUTPATIENT)
Dept: PHYSICAL THERAPY | Facility: REHABILITATION | Age: 74
End: 2024-05-28
Payer: COMMERCIAL

## 2024-05-28 VITALS
SYSTOLIC BLOOD PRESSURE: 120 MMHG | HEIGHT: 63 IN | DIASTOLIC BLOOD PRESSURE: 80 MMHG | TEMPERATURE: 98 F | WEIGHT: 248 LBS | HEART RATE: 73 BPM | OXYGEN SATURATION: 96 % | BODY MASS INDEX: 43.94 KG/M2

## 2024-05-28 DIAGNOSIS — U07.1 COVID-19: Primary | ICD-10-CM

## 2024-05-28 DIAGNOSIS — Z11.9 ENCOUNTER FOR SCREENING FOR INFECTIOUS AND PARASITIC DISEASES, UNSPECIFIED: ICD-10-CM

## 2024-05-28 PROCEDURE — 87811 SARS-COV-2 COVID19 W/OPTIC: CPT

## 2024-05-28 PROCEDURE — 99213 OFFICE O/P EST LOW 20 MIN: CPT

## 2024-05-28 PROCEDURE — G2211 COMPLEX E/M VISIT ADD ON: HCPCS

## 2024-05-28 NOTE — PROGRESS NOTES
COVID-19 Outpatient Progress Note  Name: Sindy Beckford      : 1950      MRN: [id number]  Encounter Provider: Evelyn Luna MD  Encounter Date: 2024   Encounter department: [facility]    Assessment & Plan   1. COVID-19  Assessment & Plan:  Recommend symptomatic management for cough and congestion including OTC antitussives/expectorant, Tylenol/Ibuprofen  Continue Xyzal, flonase, saline rinse   Covid PCR completed, will follow up with results  Pt advised to quarantine/isolate up to 5 days  F/U in 1 week if symptoms worsen or fail to improve  Orders:  -     COVID Only - Office Collect  2. Encounter for screening for infectious and parasitic diseases, unspecified  Disposition:     Patient was advised that they can go back to your normal activities when, for at least 24 hours, both are true: their symptoms are getting better overall and they have not had a fever (and are not using fever-reducing medication).    When going back to your normal activities, take added precaution over the next 5 days, such as taking additional steps for  air, hygiene, masks, physical distancing, and/or testing when you will be around other people indoors. You may still be able to spread the virus that made you sick, even if you are feeling better.    If patient develops a fever or starts to feel worse after they have gone back to normal activities, stay home and away from others again until, for at least 24 hours, both are true: symptoms are improving overall and they have not had a fever (and are not using fever-reducing medication). Then take added precaution for the next 5 days.      Discussed symptom directed medication options with patient.     I have spent a total time of 20 minutes on the day of the encounter for this patient including discussing diagnostic results, instructions for management and impressions.          Encounter provider: Evelyn Luna MD     Provider located at: [place]  [facility]  [address]  [address]  [facility] [place] [address] [phone]     Recent Visits  Date Type Provider Dept   05/22/24 Office Visit Twila Larry MD [facility]   Showing recent visits within past 7 days and meeting all other requirements  Today's Visits  Date Type Provider Dept   05/28/24 Office Visit Evelyn Luna MD [facility]   Showing today's visits and meeting all other requirements  Future Appointments  No visits were found meeting these conditions.  Showing future appointments within next 150 days and meeting all other requirements    History of Present Illness      Subjective:   Sindy Beckford is a 74 y.o. female who has been screened for COVID-19. Symptom change since last report: improving. Patient's symptoms include fever, fatigue, malaise, nasal congestion, rhinorrhea, sore throat, anosmia, loss of taste and cough. Patient denies chills, shortness of breath, abdominal pain, nausea, vomiting, myalgias and headaches.     - Date of symptom onset: 5/25/2024  - Date of exposure: 5/24/2024  - Date of positive COVID-19 test: 5/25/2024. Type of test: Home antigen.     COVID-19 vaccination status: Fully vaccinated with booster    Sindy has been staying home and has isolated themselves in her home. She is taking care to not share personal items and is cleaning all surfaces that are touched often, like counters, tabletops, and doorknobs using household cleaning sprays or wipes. She is wearing a mask when she leaves her room.     Saturday 101 temp, took Tylenol, no recurrence  Productive cough is the more bothersome symptom at this time    Lab Results   Component Value Date    SARSCOVAG Positive (A) 07/31/2023       Review of Systems   Constitutional:  Positive for fatigue and fever. Negative for activity change and chills.   HENT:  Positive for congestion, rhinorrhea and sore throat.    Eyes:  Negative for visual disturbance.   Respiratory:  Positive for cough.  "Negative for shortness of breath and wheezing.    Cardiovascular:  Negative for chest pain and palpitations.   Gastrointestinal:  Negative for abdominal pain, nausea and vomiting.   Musculoskeletal:  Negative for arthralgias, myalgias, neck pain and neck stiffness.   Neurological:  Negative for dizziness and headaches.   Psychiatric/Behavioral:  Positive for sleep disturbance. Negative for dysphoric mood. The patient is not nervous/anxious.      Objective     /80   Pulse 73   Temp 98 °F (36.7 °C)   Ht 5' 3\" (1.6 m)   Wt 112 kg (248 lb)   LMP 01/01/2004   SpO2 96%   BMI 43.93 kg/m²     Physical Exam  Vitals and nursing note reviewed.   Constitutional:       General: She is not in acute distress.     Appearance: Normal appearance. She is not ill-appearing or toxic-appearing.      Comments: Body mass index is 43.93 kg/m².   HENT:      Head: Atraumatic.      Right Ear: External ear normal.      Left Ear: External ear normal.      Nose: Congestion present.   Eyes:      Extraocular Movements: Extraocular movements intact.      Conjunctiva/sclera: Conjunctivae normal.   Cardiovascular:      Rate and Rhythm: Normal rate and regular rhythm.      Heart sounds: Murmur heard.   Pulmonary:      Effort: Pulmonary effort is normal. No respiratory distress.      Breath sounds: Normal breath sounds. No wheezing, rhonchi or rales.   Musculoskeletal:         General: Normal range of motion.      Cervical back: Neck supple.   Skin:     General: Skin is warm and dry.   Neurological:      Mental Status: She is alert and oriented to person, place, and time.   Psychiatric:         Mood and Affect: Mood normal.         Behavior: Behavior normal.       "

## 2024-05-28 NOTE — ASSESSMENT & PLAN NOTE
Recommend symptomatic management for cough and congestion including OTC antitussives/expectorant, Tylenol/Ibuprofen  Continue Xyzal, flonase, saline rinse   Covid PCR completed, will follow up with results  Pt advised to quarantine/isolate up to 5 days  F/U in 1 week if symptoms worsen or fail to improve

## 2024-05-30 ENCOUNTER — TELEPHONE (OUTPATIENT)
Age: 74
End: 2024-05-30

## 2024-05-30 ENCOUNTER — APPOINTMENT (OUTPATIENT)
Dept: PHYSICAL THERAPY | Facility: REHABILITATION | Age: 74
End: 2024-05-30
Payer: COMMERCIAL

## 2024-05-30 DIAGNOSIS — J30.2 SEASONAL ALLERGIES: ICD-10-CM

## 2024-05-30 DIAGNOSIS — E11.9 TYPE 2 DIABETES MELLITUS WITHOUT COMPLICATION, WITHOUT LONG-TERM CURRENT USE OF INSULIN (HCC): ICD-10-CM

## 2024-05-30 RX ORDER — LEVOCETIRIZINE DIHYDROCHLORIDE 5 MG/1
5 TABLET, FILM COATED ORAL EVERY EVENING
Qty: 90 TABLET | Refills: 0 | Status: SHIPPED | OUTPATIENT
Start: 2024-05-30

## 2024-05-30 NOTE — TELEPHONE ENCOUNTER
Patient calling in follow up on COVID symptoms. She has been having a lot of mucous, coughing, and sneezing. She has been taking OTC musinex, and cough syrup and would like to know if she can take benadryl as well. Please advise.

## 2024-05-31 DIAGNOSIS — I25.10 CORONARY ARTERY DISEASE INVOLVING NATIVE CORONARY ARTERY OF NATIVE HEART WITHOUT ANGINA PECTORIS: ICD-10-CM

## 2024-05-31 DIAGNOSIS — E78.2 MIXED HYPERLIPIDEMIA: ICD-10-CM

## 2024-05-31 DIAGNOSIS — E78.2 MIXED HYPERLIPIDEMIA: Primary | ICD-10-CM

## 2024-05-31 RX ORDER — EZETIMIBE 10 MG/1
10 TABLET ORAL DAILY
Qty: 90 TABLET | Refills: 0 | Status: SHIPPED | OUTPATIENT
Start: 2024-05-31

## 2024-05-31 NOTE — TELEPHONE ENCOUNTER
Called pt and let her know to get fasting blood test done-pt said she will go tomorrow morning to [facility].

## 2024-06-01 ENCOUNTER — APPOINTMENT (OUTPATIENT)
Dept: LAB | Facility: HOSPITAL | Age: 74
End: 2024-06-01
Payer: COMMERCIAL

## 2024-06-01 DIAGNOSIS — E78.2 MIXED HYPERLIPIDEMIA: ICD-10-CM

## 2024-06-01 LAB
CHOLEST SERPL-MCNC: 113 MG/DL
HDLC SERPL-MCNC: 38 MG/DL
LDLC SERPL CALC-MCNC: 43 MG/DL (ref 0–100)
TRIGL SERPL-MCNC: 162 MG/DL

## 2024-06-01 PROCEDURE — 36415 COLL VENOUS BLD VENIPUNCTURE: CPT

## 2024-06-01 PROCEDURE — 80061 LIPID PANEL: CPT

## 2024-06-03 ENCOUNTER — TELEPHONE (OUTPATIENT)
Age: 74
End: 2024-06-03

## 2024-06-03 NOTE — TELEPHONE ENCOUNTER
Patient called in regarding results. Results not yet reviewed. She would like to know what next steps are she is leaving town to go to [place] tomorrow. States if she needs to start on any medications they should be sent to [facility] in [facility] on [address]. Please advise.

## 2024-06-04 ENCOUNTER — TELEPHONE (OUTPATIENT)
Age: 74
End: 2024-06-04

## 2024-06-04 LAB
SARS-COV-2 AG UPPER RESP QL IA: NEGATIVE
VALID CONTROL: NORMAL

## 2024-06-04 NOTE — TELEPHONE ENCOUNTER
Called patient to clarify information about her COVID test.     I initially thought she had the pcr test completed per the orders but upon further investigation, the POCT test was completed same day and was found to be negative.    I called the patient to share the results and reiterate the information from my previous [facility] message.    Left detailed VM.

## 2024-07-22 DIAGNOSIS — I25.10 CORONARY ARTERY DISEASE INVOLVING NATIVE CORONARY ARTERY OF NATIVE HEART WITHOUT ANGINA PECTORIS: ICD-10-CM

## 2024-07-22 DIAGNOSIS — E78.2 MIXED HYPERLIPIDEMIA: ICD-10-CM

## 2024-07-23 RX ORDER — EZETIMIBE 10 MG/1
10 TABLET ORAL DAILY
Qty: 100 TABLET | Refills: 1 | Status: SHIPPED | OUTPATIENT
Start: 2024-07-23

## 2024-08-06 DIAGNOSIS — E11.59 TYPE 2 DIABETES MELLITUS WITH OTHER CIRCULATORY COMPLICATION, WITHOUT LONG-TERM CURRENT USE OF INSULIN (HCC): ICD-10-CM

## 2024-08-07 RX ORDER — SEMAGLUTIDE 1.34 MG/ML
INJECTION, SOLUTION SUBCUTANEOUS
Qty: 9 ML | Refills: 1 | Status: SHIPPED | OUTPATIENT
Start: 2024-08-07

## 2024-08-13 DIAGNOSIS — E78.2 MIXED HYPERLIPIDEMIA: ICD-10-CM

## 2024-08-13 DIAGNOSIS — G47.61 PERIODIC LIMB MOVEMENT DISORDER (PLMD): ICD-10-CM

## 2024-08-14 RX ORDER — ATORVASTATIN CALCIUM 80 MG/1
80 TABLET, FILM COATED ORAL DAILY
Qty: 90 TABLET | Refills: 1 | Status: SHIPPED | OUTPATIENT
Start: 2024-08-14

## 2024-08-14 RX ORDER — PRAMIPEXOLE DIHYDROCHLORIDE 0.25 MG/1
0.25 TABLET ORAL
Qty: 90 TABLET | Refills: 1 | Status: SHIPPED | OUTPATIENT
Start: 2024-08-14

## 2024-09-11 ENCOUNTER — OFFICE VISIT (OUTPATIENT)
Dept: UROLOGY | Facility: CLINIC | Age: 74
End: 2024-09-11
Payer: COMMERCIAL

## 2024-09-11 VITALS
DIASTOLIC BLOOD PRESSURE: 80 MMHG | BODY MASS INDEX: 41.64 KG/M2 | HEIGHT: 63 IN | HEART RATE: 69 BPM | OXYGEN SATURATION: 97 % | SYSTOLIC BLOOD PRESSURE: 130 MMHG | WEIGHT: 235 LBS

## 2024-09-11 DIAGNOSIS — N32.81 OVERACTIVE BLADDER: ICD-10-CM

## 2024-09-11 DIAGNOSIS — E78.2 MIXED HYPERLIPIDEMIA: ICD-10-CM

## 2024-09-11 DIAGNOSIS — I25.10 CORONARY ARTERY DISEASE INVOLVING NATIVE CORONARY ARTERY OF NATIVE HEART WITHOUT ANGINA PECTORIS: ICD-10-CM

## 2024-09-11 PROCEDURE — 99213 OFFICE O/P EST LOW 20 MIN: CPT | Performed by: PHYSICIAN ASSISTANT

## 2024-09-11 RX ORDER — SOLIFENACIN SUCCINATE 10 MG/1
10 TABLET, FILM COATED ORAL DAILY
Qty: 90 TABLET | Refills: 3 | Status: SHIPPED | OUTPATIENT
Start: 2024-09-11

## 2024-09-11 RX ORDER — EZETIMIBE 10 MG/1
10 TABLET ORAL DAILY
Qty: 100 TABLET | Refills: 1 | Status: SHIPPED | OUTPATIENT
Start: 2024-09-11

## 2024-09-11 NOTE — PROGRESS NOTES
UROLOGY PROGRESS NOTE   Patient Identifiers: Sindy Beckford (MRN [id number])  Date of Service: 9/11/2024    Subjective:   74 female history of overactive bladder and incontinence.  She has been on Vesicare 10 mg with good results and no side effects.  She does get mild dry mouth.  No UTIs.  She did previously do pelvic floor physical therapy.    Reason for visit: Follow over OAB and incontinence follow-up     Objective:     VITALS:    Vitals:    09/11/24 1029   BP: 130/80   Pulse: 69   SpO2: 97%           LABS:  Lab Results   Component Value Date    HGB 15.3 07/26/2019    HCT 47.5 (H) 07/26/2019    WBC 5.80 07/26/2019     07/26/2019   ]    Lab Results   Component Value Date    K 4.3 04/10/2024     04/10/2024    CO2 25 04/10/2024    BUN 20 04/10/2024    CREATININE 0.70 04/10/2024    CALCIUM 9.0 04/10/2024   ]        INPATIENT MEDS:    Current Outpatient Medications:     aspirin (ECOTRIN LOW STRENGTH) 81 mg EC tablet, Take 81 mg by mouth daily, Disp: , Rfl:     atorvastatin (LIPITOR) 80 mg tablet, TAKE 1 TABLET BY MOUTH ONCE  DAILY, Disp: 90 tablet, Rfl: 1    Blood Glucose Monitoring Suppl (ONE TOUCH ULTRA MINI) w/Device KIT, Please dispense 1 Kit, Disp: 1 each, Rfl: 0    Canagliflozin (Invokana) 300 MG TABS, Take 1 tablet (300 mg total) by mouth daily, Disp: 90 tablet, Rfl: 1    Cholecalciferol (Vitamin D3) 25 MCG (1000 UT) CAPS, Take 2000 IU with dinner, Disp: , Rfl:     ezetimibe (ZETIA) 10 mg tablet, TAKE 1 TABLET(10 MG) BY MOUTH DAILY, Disp: 100 tablet, Rfl: 1    famotidine-calcium carbonate-magnesium hydroxide (PEPCID COMPLETE) -165 MG CHEW, Chew 1 tablet daily as needed for heartburn, Disp: , Rfl:     fluticasone (FLONASE) 50 mcg/act nasal spray, 1 spray into each nostril if needed, Disp: , Rfl:     glucose blood (OneTouch Ultra) test strip, Use once a day, Disp: 100 each, Rfl: 3    Lancet Devices (OneTouch Delica Plus Lancing) MISC, USE WITH LANCETS TO CHECK SUGAR, Disp: , Rfl:     Lancets  "(OneTouch Delica Plus [id number]) MISC, USE AS DIRECTED TO CHECK SUGARS TWICE EVERY OTHER DAY, Disp: 100 each, Rfl: 1    Lancets Misc. (ONE TOUCH SURESOFT) MISC, Use lancet to check sugar, Disp: 1 each, Rfl: 0    levocetirizine (XYZAL) 5 MG tablet, Take 1 tablet (5 mg total) by mouth every evening, Disp: 90 tablet, Rfl: 0    levothyroxine 112 mcg tablet, TAKE 1 TABLET BY MOUTH DAILY, Disp: 90 tablet, Rfl: 3    nebivolol (Bystolic) 5 mg tablet, Take 0.5 tablets (2.5 mg total) by mouth daily, Disp: 90 tablet, Rfl: 0    omeprazole (PriLOSEC) 40 MG capsule, Take 1 capsule (40 mg total) by mouth daily, Disp: 90 capsule, Rfl: 1    Ozempic, 1 MG/DOSE, 4 MG/3ML injection pen, ADMINISTER 1 MG UNDER THE SKIN EVERY 7 DAYS, Disp: 9 mL, Rfl: 1    potassium chloride (Klor-Con) 10 mEq tablet, Take 1 tablet (10 mEq total) by mouth 2 (two) times a day, Disp: 180 tablet, Rfl: 3    pramipexole (MIRAPEX) 0.25 mg tablet, TAKE 1 TABLET BY MOUTH DAILY AT  BEDTIME, Disp: 90 tablet, Rfl: 1    ramipril (ALTACE) 2.5 mg capsule, TAKE 1 CAPSULE BY MOUTH DAILY, Disp: 90 capsule, Rfl: 3    solifenacin (VESICARE) 10 MG tablet, Take 1 tablet (10 mg total) by mouth daily, Disp: 90 tablet, Rfl: 3    valACYclovir (VALTREX) 1,000 mg tablet, Take 2 tablets (2,000 mg total) by mouth 2 (two) times a day for 1 day, Disp: 8 tablet, Rfl: 2      Physical Exam:   /80 (BP Location: Left arm, Patient Position: Sitting, Cuff Size: Standard)   Pulse 69   Ht 5' 3\" (1.6 m)   Wt 107 kg (235 lb)   LMP 01/01/2004   SpO2 97%   BMI 41.63 kg/m²   GEN: no acute distress    RESP: breathing comfortably with no accessory muscle use    ABD: soft, non-tender, non-distended   INCISION:    EXT: no significant peripheral edema     RADIOLOGY:   none     Assessment:   1.  Overactive bladder    Plan:   -Vesicare 10 mg reordered  -Follow-up 1 year for annual exam and med refill  -  -          "

## 2024-09-24 ENCOUNTER — TELEPHONE (OUTPATIENT)
Age: 74
End: 2024-09-24

## 2024-09-24 NOTE — TELEPHONE ENCOUNTER
Patient called in regard to lab ordered.  Patient wanted to know if insurance would cover lab.  Suggested patient contact insurance company for that information.

## 2024-09-28 DIAGNOSIS — I10 ESSENTIAL HYPERTENSION: ICD-10-CM

## 2024-09-28 RX ORDER — NEBIVOLOL 5 MG/1
2.5 TABLET ORAL DAILY
Qty: 90 TABLET | Refills: 1 | Status: SHIPPED | OUTPATIENT
Start: 2024-09-28

## 2024-10-02 DIAGNOSIS — I10 ESSENTIAL HYPERTENSION: ICD-10-CM

## 2024-10-02 DIAGNOSIS — E11.59 TYPE 2 DIABETES MELLITUS WITH OTHER CIRCULATORY COMPLICATION, WITHOUT LONG-TERM CURRENT USE OF INSULIN (HCC): ICD-10-CM

## 2024-10-02 DIAGNOSIS — J30.2 SEASONAL ALLERGIES: ICD-10-CM

## 2024-10-02 RX ORDER — LEVOCETIRIZINE DIHYDROCHLORIDE 5 MG/1
5 TABLET, FILM COATED ORAL EVERY EVENING
Qty: 90 TABLET | Refills: 1 | Status: SHIPPED | OUTPATIENT
Start: 2024-10-02

## 2024-10-02 RX ORDER — NEBIVOLOL 5 MG/1
2.5 TABLET ORAL DAILY
Qty: 90 TABLET | Refills: 0 | OUTPATIENT
Start: 2024-10-02

## 2024-10-03 ENCOUNTER — LAB (OUTPATIENT)
Dept: LAB | Facility: HOSPITAL | Age: 74
End: 2024-10-03
Attending: INTERNAL MEDICINE
Payer: COMMERCIAL

## 2024-10-03 DIAGNOSIS — E03.9 HYPOTHYROIDISM (ACQUIRED): ICD-10-CM

## 2024-10-03 DIAGNOSIS — E11.59 TYPE 2 DIABETES MELLITUS WITH OTHER CIRCULATORY COMPLICATION, WITHOUT LONG-TERM CURRENT USE OF INSULIN (HCC): ICD-10-CM

## 2024-10-03 LAB
ANION GAP SERPL CALCULATED.3IONS-SCNC: 10 MMOL/L (ref 4–13)
BUN SERPL-MCNC: 20 MG/DL (ref 5–25)
CALCIUM SERPL-MCNC: 8.9 MG/DL (ref 8.4–10.2)
CHLORIDE SERPL-SCNC: 104 MMOL/L (ref 96–108)
CO2 SERPL-SCNC: 27 MMOL/L (ref 21–32)
CREAT SERPL-MCNC: 0.95 MG/DL (ref 0.6–1.3)
CREAT UR-MCNC: 212 MG/DL
EST. AVERAGE GLUCOSE BLD GHB EST-MCNC: 151 MG/DL
GFR SERPL CREATININE-BSD FRML MDRD: 59 ML/MIN/1.73SQ M
GLUCOSE P FAST SERPL-MCNC: 128 MG/DL (ref 65–99)
HBA1C MFR BLD: 6.9 %
MICROALBUMIN UR-MCNC: 127.1 MG/L
MICROALBUMIN/CREAT 24H UR: 60 MG/G CREATININE (ref 0–30)
POTASSIUM SERPL-SCNC: 4.2 MMOL/L (ref 3.5–5.3)
SODIUM SERPL-SCNC: 141 MMOL/L (ref 135–147)
T4 FREE SERPL-MCNC: 0.75 NG/DL (ref 0.61–1.12)
TSH SERPL DL<=0.05 MIU/L-ACNC: 2.89 UIU/ML (ref 0.45–4.5)

## 2024-10-03 PROCEDURE — 36415 COLL VENOUS BLD VENIPUNCTURE: CPT

## 2024-10-03 PROCEDURE — 84443 ASSAY THYROID STIM HORMONE: CPT

## 2024-10-03 PROCEDURE — 80048 BASIC METABOLIC PNL TOTAL CA: CPT

## 2024-10-03 PROCEDURE — 83036 HEMOGLOBIN GLYCOSYLATED A1C: CPT

## 2024-10-03 PROCEDURE — 82043 UR ALBUMIN QUANTITATIVE: CPT

## 2024-10-03 PROCEDURE — 82570 ASSAY OF URINE CREATININE: CPT

## 2024-10-03 PROCEDURE — 84439 ASSAY OF FREE THYROXINE: CPT

## 2024-10-03 RX ORDER — LANCETS 33 GAUGE
EACH MISCELLANEOUS
Qty: 100 EACH | Refills: 1 | Status: SHIPPED | OUTPATIENT
Start: 2024-10-03

## 2024-10-10 ENCOUNTER — OFFICE VISIT (OUTPATIENT)
Age: 74
End: 2024-10-10
Payer: COMMERCIAL

## 2024-10-10 VITALS
WEIGHT: 238 LBS | SYSTOLIC BLOOD PRESSURE: 122 MMHG | TEMPERATURE: 98 F | HEIGHT: 63 IN | HEART RATE: 70 BPM | BODY MASS INDEX: 42.17 KG/M2 | OXYGEN SATURATION: 97 % | DIASTOLIC BLOOD PRESSURE: 64 MMHG

## 2024-10-10 DIAGNOSIS — M19.041 ARTHRITIS OF BOTH HANDS: ICD-10-CM

## 2024-10-10 DIAGNOSIS — I10 ESSENTIAL HYPERTENSION: ICD-10-CM

## 2024-10-10 DIAGNOSIS — Z00.00 MEDICARE ANNUAL WELLNESS VISIT, SUBSEQUENT: Primary | ICD-10-CM

## 2024-10-10 DIAGNOSIS — Z12.31 ENCOUNTER FOR SCREENING MAMMOGRAM FOR BREAST CANCER: ICD-10-CM

## 2024-10-10 DIAGNOSIS — Z23 ENCOUNTER FOR IMMUNIZATION: ICD-10-CM

## 2024-10-10 DIAGNOSIS — M19.042 ARTHRITIS OF BOTH HANDS: ICD-10-CM

## 2024-10-10 DIAGNOSIS — N32.81 OVERACTIVE BLADDER: ICD-10-CM

## 2024-10-10 PROCEDURE — 90662 IIV NO PRSV INCREASED AG IM: CPT

## 2024-10-10 PROCEDURE — G0439 PPPS, SUBSEQ VISIT: HCPCS | Performed by: FAMILY MEDICINE

## 2024-10-10 PROCEDURE — G0008 ADMIN INFLUENZA VIRUS VAC: HCPCS

## 2024-10-10 RX ORDER — POTASSIUM CHLORIDE 750 MG/1
10 TABLET, EXTENDED RELEASE ORAL 2 TIMES DAILY
Qty: 180 TABLET | Refills: 1 | Status: SHIPPED | OUTPATIENT
Start: 2024-10-10 | End: 2024-10-10

## 2024-10-10 RX ORDER — POTASSIUM CHLORIDE 750 MG/1
10 TABLET, EXTENDED RELEASE ORAL 2 TIMES DAILY
Qty: 180 TABLET | Refills: 1 | Status: SHIPPED | OUTPATIENT
Start: 2024-10-10

## 2024-10-10 NOTE — PROGRESS NOTES
Ambulatory Visit  Name: Sindy Beckford      : 1950      MRN: [id number]  Encounter Provider: Terese Alford DO  Encounter Date: 10/10/2024   Encounter department: [facility]  Medicare annual wellness visit, subsequent         Encounter for immunization    Orders:    influenza vaccine, high-dose, PF 0.5 mL (Fluzone High Dose)    Overactive bladder  Followed by Urology       Essential hypertension    Orders:    potassium chloride (Klor-Con) 10 mEq tablet; Take 1 tablet (10 mEq total) by mouth 2 (two) times a day    Encounter for screening mammogram for breast cancer    Orders:    Mammo screening bilateral w 3d and cad; Future    Arthritis of both hands    Orders:    Ambulatory Referral to Physical Therapy; Future      Depression Screening and Follow-up Plan: Patient was screened for depression during today's encounter. They screened negative with a PHQ-2 score of 0.    Urinary Incontinence Plan of Care: counseling topics discussed: practice Kegel (pelvic floor strengthening) exercises, use restroom every 2 hours, limit alcohol, caffeine, spicy foods, and acidic foods, limiting fluid intake 3-4 hours before bed and preventing constipation. Rx'd Vesicare by Urology.       Preventive health issues were discussed with patient, and age appropriate screening tests were ordered as noted in patient's After Visit Summary. Personalized health advice and appropriate referrals for health education or preventive services given if needed, as noted in patient's After Visit Summary.      Return in about 6 months (around 4/10/2025) for Recheck DM, HTN, HLD.                History of Present Illness     Pt presents for AWV  Only concern today is getting a refill of her Potassium medication sent to [facility]  Currently wearing a Holter Monitor at the request of her Cardiologist (Dr Rebeca RECIO)  Recently had labs completed in preparation for her Endocrinology visit       Patient Care  Team:  Terese Alford DO as PCP - General (Family Medicine)  Krish Lam MD as Care Coordinator (Gastroenterology)  Jamie Dewitt MD (Inactive) as Care Coordinator (Ophthalmology)  Vin Ye MD as Care Coordinator (Otolaryngology)  Timi Lindquist MD as Care Coordinator (Cardiology)  Jamie Dewitt MD (Inactive) (Ophthalmology)  Petrona Arroyo MD (Endocrinology)  Elizabeth Ortega PA-C as Physician Assistant (Endocrinology)    Review of Systems   Constitutional:  Negative for appetite change, fatigue, fever and unexpected weight change.   HENT:  Negative for congestion, dental problem, ear pain, postnasal drip, sore throat and tinnitus.    Eyes:  Negative for pain, discharge and visual disturbance.   Respiratory:  Negative for cough, shortness of breath and wheezing.    Cardiovascular:  Negative for chest pain, palpitations and leg swelling.   Gastrointestinal:  Negative for abdominal pain, constipation, diarrhea, nausea and vomiting.   Endocrine: Negative for cold intolerance and heat intolerance.   Genitourinary:  Negative for difficulty urinating, dysuria, flank pain and urgency.   Musculoskeletal:  Negative for arthralgias, back pain, joint swelling and myalgias.   Skin:  Negative for rash and wound.   Allergic/Immunologic: Negative for immunocompromised state.   Neurological:  Negative for dizziness, syncope, speech difficulty, weakness and numbness.   Hematological:  Negative for adenopathy. Does not bruise/bleed easily.   Psychiatric/Behavioral:  Negative for confusion, dysphoric mood and sleep disturbance. The patient is not nervous/anxious.      Medical History Reviewed by provider this encounter:  Tobacco  Allergies  Meds  Problems  Med Hx  Surg Hx  Fam Hx       Annual Wellness Visit Questionnaire   Sindy is here for her Initial Wellness visit. Last Medicare Wellness visit information reviewed, patient interviewed and updates made to the record today.      Health Risk  Assessment:   Patient rates overall health as very good. Patient feels that their physical health rating is same. Patient is very satisfied with their life. Eyesight was rated as same. Hearing was rated as same. Patient feels that their emotional and mental health rating is same. Patients states they are never, rarely angry. Patient states they are never, rarely unusually tired/fatigued. Pain experienced in the last 7 days has been some. Patient's pain rating has been 3/10. Patient states that she has experienced no weight loss or gain in last 6 months.     Depression Screening:   PHQ-2 Score: 0      Fall Risk Screening:   In the past year, patient has experienced: no history of falling in past year      Urinary Incontinence Screening:   Patient has leaked urine accidently in the last six months. Rx'd Vesicare by Urology    Home Safety:  Patient does not have trouble with stairs inside or outside of their home. Patient has working smoke alarms and has working carbon monoxide detector. Home safety hazards include: none.     Nutrition:   Current diet is Regular.     Medications:   Patient is currently taking over-the-counter supplements. OTC medications include: see medication list. Patient is able to manage medications.     Activities of Daily Living (ADLs)/Instrumental Activities of Daily Living (IADLs):   Walk and transfer into and out of bed and chair?: Yes  Dress and groom yourself?: Yes    Bathe or shower yourself?: Yes    Feed yourself? Yes  Do your laundry/housekeeping?: Yes  Manage your money, pay your bills and track your expenses?: Yes  Make your own meals?: Yes    Do your own shopping?: Yes    Previous Hospitalizations:   Any hospitalizations or ED visits within the last 12 months?: Yes    How many hospitalizations have you had in the last year?: 1-2    Hospitalization Comments: Heart and breathing problem    Advance Care Planning:   Living will: Yes    Durable POA for healthcare: Yes    Advanced  directive: Yes      Cognitive Screening:   Provider or family/friend/caregiver concerned regarding cognition?: No    PREVENTIVE SCREENINGS      Cardiovascular Screening:    General: Screening Not Indicated and History Lipid Disorder      Diabetes Screening:     General: Screening Not Indicated and History Diabetes      Colorectal Cancer Screening:     General: Screening Current      Breast Cancer Screening:     General: Screening Current    Due for: Mammogram        Cervical Cancer Screening:    General: Screening Not Indicated      Osteoporosis Screening:    General: Screening Current      Abdominal Aortic Aneurysm (AAA) Screening:        General: Screening Not Indicated      Lung Cancer Screening:     General: Screening Not Indicated      Hepatitis C Screening:    General: Screening Current    Screening, Brief Intervention, and Referral to Treatment (SBIRT)    Screening  Typical number of drinks in a day: 0  Typical number of drinks in a week: 0  Interpretation: Low risk drinking behavior.    AUDIT-C Screenin) How often did you have a drink containing alcohol in the past year? never  2) How many drinks did you have on a typical day when you were drinking in the past year? 0  3) How often did you have 6 or more drinks on one occasion in the past year? never    AUDIT-C Score: 0  Interpretation: Score 0-2 (female): Negative screen for alcohol misuse    Single Item Drug Screening:  How often have you used an illegal drug (including marijuana) or a prescription medication for non-medical reasons in the past year? never    Single Item Drug Screen Score: 0  Interpretation: Negative screen for possible drug use disorder    Brief Intervention  Alcohol & drug use screenings were reviewed. No concerns regarding substance use disorder identified.     Other Counseling Topics:   Car/seat belt/driving safety, skin self-exam, sunscreen and calcium and vitamin D intake and regular weightbearing exercise.     Social  "Determinants of Health     Financial Resource Strain: Patient Declined (4/10/2024)    [facility]    Overall Financial Resource Strain (CARDIA)     Difficulty of Paying Living Expenses: Patient declined   Food Insecurity: No Food Insecurity (10/3/2024)    Hunger Vital Sign     Worried About Running Out of Food in the Last Year: Never true     Ran Out of Food in the Last Year: Never true   Transportation Needs: No Transportation Needs (10/3/2024)    PRAPARE - Transportation     Lack of Transportation (Medical): No     Lack of Transportation (Non-Medical): No   Housing Stability: Low Risk  (10/3/2024)    Housing Stability Vital Sign     Unable to Pay for Housing in the Last Year: No     Number of Times Moved in the Last Year: 0     Homeless in the Last Year: No   Utilities: Not At Risk (10/3/2024)    [facility] Utilities     Threatened with loss of utilities: No     No results found.    Objective     /64   Pulse 70   Temp 98 °F (36.7 °C)   Ht 5' 3\" (1.6 m)   Wt 108 kg (238 lb)   LMP 01/01/2004   SpO2 97%   BMI 42.16 kg/m²     Physical Exam  Vitals and nursing note reviewed.   Constitutional:       General: She is not in acute distress.     Appearance: Normal appearance. She is well-developed.      Comments: Body mass index is 42.16 kg/m².    HENT:      Head: Normocephalic and atraumatic.      Right Ear: Hearing, tympanic membrane, ear canal and external ear normal.      Left Ear: Hearing, tympanic membrane, ear canal and external ear normal.      Nose: Nose normal.      Mouth/Throat:      Mouth: Mucous membranes are moist.      Pharynx: Uvula midline. No oropharyngeal exudate.   Eyes:      General: No scleral icterus.     Conjunctiva/sclera: Conjunctivae normal.      Pupils: Pupils are equal, round, and reactive to light.   Neck:      Thyroid: No thyromegaly.   Cardiovascular:      Rate and Rhythm: Normal rate and regular rhythm.      Heart sounds: " Normal heart sounds. No murmur heard.  Pulmonary:      Effort: Pulmonary effort is normal. No respiratory distress.      Breath sounds: Normal breath sounds. No wheezing or rales.   Abdominal:      General: Bowel sounds are normal.      Palpations: Abdomen is soft. There is no mass.      Tenderness: There is no abdominal tenderness.   Musculoskeletal:         General: No tenderness. Normal range of motion.      Cervical back: Normal range of motion and neck supple.      Right lower leg: No edema.      Left lower leg: No edema.   Lymphadenopathy:      Cervical: No cervical adenopathy.   Skin:     General: Skin is warm and dry.      Coloration: Skin is not jaundiced.      Findings: No erythema or rash.   Neurological:      General: No focal deficit present.      Mental Status: She is alert and oriented to person, place, and time.      Cranial Nerves: No cranial nerve deficit.      Deep Tendon Reflexes: Reflexes are normal and symmetric.   Psychiatric:         Mood and Affect: Mood normal.         Behavior: Behavior normal.

## 2024-10-10 NOTE — ASSESSMENT & PLAN NOTE
Orders:    potassium chloride (Klor-Con) 10 mEq tablet; Take 1 tablet (10 mEq total) by mouth 2 (two) times a day

## 2024-10-15 ENCOUNTER — OFFICE VISIT (OUTPATIENT)
Dept: ENDOCRINOLOGY | Facility: CLINIC | Age: 74
End: 2024-10-15
Payer: COMMERCIAL

## 2024-10-15 VITALS
WEIGHT: 238 LBS | DIASTOLIC BLOOD PRESSURE: 78 MMHG | SYSTOLIC BLOOD PRESSURE: 118 MMHG | HEART RATE: 75 BPM | HEIGHT: 63 IN | OXYGEN SATURATION: 96 % | BODY MASS INDEX: 42.17 KG/M2

## 2024-10-15 DIAGNOSIS — E04.2 NONTOXIC MULTINODULAR GOITER: Primary | ICD-10-CM

## 2024-10-15 DIAGNOSIS — E78.2 MIXED HYPERLIPIDEMIA: ICD-10-CM

## 2024-10-15 DIAGNOSIS — I10 ESSENTIAL HYPERTENSION: ICD-10-CM

## 2024-10-15 DIAGNOSIS — E66.01 SEVERE OBESITY (BMI >= 40) (HCC): ICD-10-CM

## 2024-10-15 DIAGNOSIS — E11.59 TYPE 2 DIABETES MELLITUS WITH OTHER CIRCULATORY COMPLICATION, WITHOUT LONG-TERM CURRENT USE OF INSULIN (HCC): ICD-10-CM

## 2024-10-15 DIAGNOSIS — K21.9 GASTROESOPHAGEAL REFLUX DISEASE WITHOUT ESOPHAGITIS: ICD-10-CM

## 2024-10-15 DIAGNOSIS — E03.9 HYPOTHYROIDISM (ACQUIRED): ICD-10-CM

## 2024-10-15 DIAGNOSIS — E55.9 VITAMIN D DEFICIENCY: ICD-10-CM

## 2024-10-15 DIAGNOSIS — I25.10 CORONARY ARTERY DISEASE INVOLVING NATIVE CORONARY ARTERY OF NATIVE HEART WITHOUT ANGINA PECTORIS: ICD-10-CM

## 2024-10-15 PROCEDURE — 99214 OFFICE O/P EST MOD 30 MIN: CPT | Performed by: INTERNAL MEDICINE

## 2024-10-15 NOTE — PROGRESS NOTES
Sindy Beckford 74 y.o. female MRN: [id number]    Encounter: [id number]      Assessment & Plan     Assessment:  This is a 74 y.o.-year-old female with diabetes with hyperglycemia.    Plan:    Diagnoses and all orders for this visit:    Nontoxic multinodular goiter  Last thyroid ultrasound was done in June 2023 which showed all nodules stable in size, no significant change.  Right upper pole nodule measuring 2 x 1.6 x 1.4 cm not changed.  This nodule was biopsied previously and it was benign.  Right mid gland nodule measuring 2.2 x 1.8 x 1.4 cm.  No significant change.  This nodule had a prior benign biopsy.  Not significantly changed  Right lower pole nodule measuring 1.8 x 1.6 x 1.3 cm not significantly changed.  Left mid gland nodule measuring 1.7 x 0.9 x 1.1 cm.  Not significantly changed.  This nodule had a prior benign biopsy.  Obtain thyroid ultrasound now for follow-up and again in 1 year    Lab Results   Component Value Date    VFK5HJCHWTYE 2.886 10/03/2024       -     US thyroid; Future    Type 2 diabetes mellitus with other circulatory complication, without long-term current use of insulin ([facility])    Lab Results   Component Value Date    HGBA1C 6.9 (H) 10/03/2024   A1c 6.9%, at goal  Continue Ozempic 1 mg once a week, Invokana 300 mg daily  Continue to monitor blood sugar twice daily and goal for blood sugar is 80 to 160 mg per DL range  Keep carbohydrate consistent with meals.  Discussed to follow-up with ophthalmology and podiatry regularly  Follow-up in 6 months    -     semaglutide, 1 mg/dose, (Ozempic, 1 MG/DOSE,) 4 mg/3 mL injection pen; Inject 0.75 mL (1 mg total) under the skin every 7 days  -     Ambulatory referral to Podiatry; Future  -     Albumin / creatinine urine ratio; Future  -     Hemoglobin A1C; Future  -     Basic metabolic panel; Future    Coronary artery disease involving native coronary artery of native heart without angina pectoris  Currently managed on glucagon like peptide therapy  for cardiovascular benefit, currently also managed on SGLT2 inhibitor therapy  Essential hypertension  Blood pressure is well-controlled, continue current management as per PCP  Hypothyroidism (acquired)  Clinically and biochemically euthyroid.  Continue current management  -     T4, free; Future  -     TSH, 3rd generation; Future    Mixed hyperlipidemia  Lab Results   Component Value Date    LDLCALC 43 06/01/2024   LDL is at goal, continue statins  Vitamin D deficiency  Continue vitamin D3 supplementation 2000 IU daily  Severe obesity (BMI >= 40) (HCC)  Reinforced dietary modification and importance of weight loss  Gastroesophageal reflux disease without esophagitis  Symptoms well-controlled on medical management       CC: Diabetes    History of Present Illness     HPI: Sindy Beckford is 74-year-old female with medical history of type 2 diabetes with complications such as coronary artery disease, multinodular goiter, hypertension and hyperlipidemia, obesity is here for follow-up.  Diabetes course has been stable.  Current medication includes Ozempic 1 mg once a week, Invokana 300 mg once daily.  For hypothyroidism she takes levothyroxine 112 mcg daily on empty stomach 1 hour before breakfast.  For hyperlipidemia, she takes Lipitor 80 mg daily and Zetia 10 mg daily.    She follows with ophthalmology and podiatry regularly.  Checks blood sugars twice daily and her blood sugars are usually in 80 to 180 mg per DL range    For hypertension, she takes Altace 2.5 mg daily    Wt Readings from Last 3 Encounters:   10/15/24 108 kg (238 lb)   10/10/24 108 kg (238 lb)   09/11/24 107 kg (235 lb)       Component      Latest Ref Rng 10/3/2024   Sodium      135 - 147 mmol/L 141    Potassium      3.5 - 5.3 mmol/L 4.2    Chloride      96 - 108 mmol/L 104    Carbon Dioxide      21 - 32 mmol/L 27    ANION GAP      4 - 13 mmol/L 10    BUN      5 - 25 mg/dL 20    Creatinine      0.60 - 1.30 mg/dL 0.95    GLUCOSE, FASTING      65 - 99  mg/dL 128 (H)    Calcium      8.4 - 10.2 mg/dL 8.9    GFR, Calculated      ml/min/1.73sq m 59    EXT Creatinine Urine      Reference range not established. mg/dL 212.0    Albumin,U,Random      <20.0 mg/L 127.1 (H)    Albumin Creat Ratio      0 - 30 mg/g creatinine 60 (H)    Hemoglobin A1C      Normal 4.0-5.6%; PreDiabetic 5.7-6.4%; Diabetic >=6.5%; Glycemic control for adults with diabetes <7.0% % 6.9 (H)    eAG, EST AVG Glucose      mg/dl 151    FREE T4      0.61 - 1.12 ng/dL 0.75    TSH 3RD GENERATON      0.450 - 4.500 uIU/mL 2.886       Lab Results   Component Value Date    ZAU9TNLXLBOQ 2.886 10/03/2024         Lab Results   Component Value Date    HGBA1C 6.9 (H) 10/03/2024     Lab Results   Component Value Date    LDLCALC 43 06/01/2024     BP Readings from Last 3 Encounters:   10/15/24 118/78   10/10/24 122/64   09/11/24 130/80         Review of Systems    Historical Information   Past Medical History:   Diagnosis Date    Allergic 2018   weekly inject.    Allergic rhinitis     Allergies     Anemia     Anxiety     Arthritis     Chronic constipation     Colon polyp     Diabetes (HCC)     Diabetes mellitus (HCC)     Disease of thyroid gland     GERD (gastroesophageal reflux disease)     Headache(784.0)     Heart murmur     Hyperlipidemia     Hypertension     Hypothyroidism     Obesity     Obesity     Restless legs     Vitamin D deficiency      Past Surgical History:   Procedure Laterality Date    ABDOMINAL SURGERY  01/2001    Dr. Raúl LEW    BREAST BIOPSY Right 2001    CATARACT EXTRACTION      CATARACT EXTRACTION, BILATERAL  05/2021    CHOLECYSTECTOMY      COLONOSCOPY  06/01/2018    goes q 5 yr-due in Summer 2018-Dr. Lam    CORONARY ANGIOPLASTY  2015    Dr Bateman    LYMPH NODE BIOPSY  2001    US GUIDANCE  09/26/2013    US GUIDANCE  10/17/2016    US GUIDED FINE NEEDLE ASPIRATION (ALL INC)  10/15/2013     Social History   Social History     Substance and Sexual Activity   Alcohol Use Yes    Comment: on special  occasions     Social History     Substance and Sexual Activity   Drug Use Never     Social History     Tobacco Use   Smoking Status Never   Smokeless Tobacco Never     Family History:   Family History   Problem Relation Age of Onset    Hypertension Father             Diabetes Father     Heart disease Father     Diabetes type II Father             Multiple sclerosis Brother     Thyroid disease unspecified Brother     Diabetes Paternal Aunt     Multiple sclerosis Paternal Uncle     Diabetes Paternal Uncle     Multiple sclerosis Family     Cancer Neg Hx     Stroke Neg Hx     Obesity Neg Hx        Meds/Allergies   Current Outpatient Medications   Medication Sig Dispense Refill    aspirin (ECOTRIN LOW STRENGTH) 81 mg EC tablet Take 81 mg by mouth daily      atorvastatin (LIPITOR) 80 mg tablet TAKE 1 TABLET BY MOUTH ONCE  DAILY 90 tablet 1    Blood Glucose Monitoring Suppl (ONE TOUCH ULTRA MINI) w/Device KIT Please dispense 1 Kit 1 each 0    Canagliflozin (Invokana) 300 MG TABS Take 1 tablet (300 mg total) by mouth daily 90 tablet 1    Cholecalciferol (Vitamin D3) 25 MCG (1000 UT) CAPS Take 2000 IU with dinner      ezetimibe (ZETIA) 10 mg tablet Take 1 tablet (10 mg total) by mouth daily 100 tablet 1    famotidine-calcium carbonate-magnesium hydroxide (PEPCID COMPLETE) -165 MG CHEW Chew 1 tablet daily as needed for heartburn      fluticasone (FLONASE) 50 mcg/act nasal spray 1 spray into each nostril if needed      glucose blood (OneTouch Ultra) test strip Use once a day 100 each 3    Lancets (OneTouch Delica Plus [id number]) MISC USE AS DIRECTED TO CHECK SUGARS TWICE EVERY OTHER  each 1    levocetirizine (XYZAL) 5 MG tablet Take 1 tablet (5 mg total) by mouth every evening 90 tablet 1    levothyroxine 112 mcg tablet TAKE 1 TABLET BY MOUTH DAILY 90 tablet 3    nebivolol (Bystolic) 5 mg tablet Take 0.5 tablets (2.5 mg total) by mouth daily 90 tablet 1    omeprazole (PriLOSEC) 40 MG capsule Take 1  "capsule (40 mg total) by mouth daily 90 capsule 1    potassium chloride (Klor-Con) 10 mEq tablet Take 1 tablet (10 mEq total) by mouth 2 (two) times a day 180 tablet 1    pramipexole (MIRAPEX) 0.25 mg tablet TAKE 1 TABLET BY MOUTH DAILY AT  BEDTIME 90 tablet 1    ramipril (ALTACE) 2.5 mg capsule TAKE 1 CAPSULE BY MOUTH DAILY 90 capsule 3    solifenacin (VESICARE) 10 MG tablet Take 1 tablet (10 mg total) by mouth daily 90 tablet 3    valACYclovir (VALTREX) 1,000 mg tablet Take 2 tablets (2,000 mg total) by mouth 2 (two) times a day for 1 day 8 tablet 2    Lancet Devices (OneTouch Delica Plus Lancing) MISC USE WITH LANCETS TO CHECK SUGAR (Patient not taking: Reported on 10/15/2024)      Lancets Misc. (ONE TOUCH SURESOFT) MISC Use lancet to check sugar (Patient not taking: Reported on 10/15/2024) 1 each 0    semaglutide, 1 mg/dose, (Ozempic, 1 MG/DOSE,) 4 mg/3 mL injection pen Inject 0.75 mL (1 mg total) under the skin every 7 days 9 mL 1     No current facility-administered medications for this visit.     Allergies   Allergen Reactions    Other Sneezing     Seasonal, dust, Cat dander, mold    Penicillins Hives       Objective   Vitals: Blood pressure 118/78, pulse 75, height 5' 3\" (1.6 m), weight 108 kg (238 lb), last menstrual period 01/01/2004, SpO2 96%.    Physical Exam  Constitutional:       General: She is not in acute distress.     Appearance: Normal appearance. She is not ill-appearing.   HENT:      Head: Normocephalic and atraumatic.      Nose: Nose normal.   Eyes:      Extraocular Movements: Extraocular movements intact.      Conjunctiva/sclera: Conjunctivae normal.   Pulmonary:      Effort: No respiratory distress.   Musculoskeletal:      Cervical back: Normal range of motion.   Neurological:      General: No focal deficit present.      Mental Status: She is alert and oriented to person, place, and time.   Psychiatric:         Mood and Affect: Mood normal.         Behavior: Behavior normal.         The " "history was obtained from the review of the chart, patient.    Lab Results:   Lab Results   Component Value Date/Time    Hemoglobin A1C 6.9 (H) 10/03/2024 06:32 AM    Hemoglobin A1C 7.0 (H) 05/20/2024 06:42 AM    Hemoglobin A1C 8.0 (H) 02/07/2024 06:31 AM    BUN 20 10/03/2024 06:32 AM    BUN 20 04/10/2024 08:52 AM    BUN 15 04/09/2024 09:39 AM    BUN 16 02/07/2024 06:31 AM    Potassium 4.2 10/03/2024 06:32 AM    Potassium 4.3 04/10/2024 08:52 AM    Potassium 4.3 04/09/2024 09:39 AM    Potassium 4.2 02/07/2024 06:31 AM    Chloride 104 10/03/2024 06:32 AM    Chloride 106 04/10/2024 08:52 AM    Chloride 104 04/09/2024 09:39 AM    Chloride 105 02/07/2024 06:31 AM    Carbon Dioxide 25 04/10/2024 08:52 AM    Carbon Dioxide 24 04/09/2024 09:39 AM    CO2 27 10/03/2024 06:32 AM    CO2 25 02/07/2024 06:31 AM    Creatinine 0.95 10/03/2024 06:32 AM    Creatinine 0.70 04/10/2024 08:52 AM    Creatinine 0.81 04/09/2024 09:39 AM    Creatinine 0.75 02/07/2024 06:31 AM    HDL, Direct 38 (L) 06/01/2024 07:07 AM    Triglycerides 162 (H) 06/01/2024 07:07 AM           Imaging Studies: Results Review Statement: No pertinent imaging studies reviewed.    Portions of the record may have been created with voice recognition software. Occasional wrong word or \"sound a like\" substitutions may have occurred due to the inherent limitations of voice recognition software. Read the chart carefully and recognize, using context, where substitutions have occurred.    "

## 2024-10-18 ENCOUNTER — HOSPITAL ENCOUNTER (OUTPATIENT)
Dept: ULTRASOUND IMAGING | Facility: HOSPITAL | Age: 74
End: 2024-10-18
Payer: COMMERCIAL

## 2024-10-18 DIAGNOSIS — E04.2 NONTOXIC MULTINODULAR GOITER: ICD-10-CM

## 2024-10-18 PROCEDURE — 76536 US EXAM OF HEAD AND NECK: CPT

## 2024-10-23 ENCOUNTER — TELEPHONE (OUTPATIENT)
Dept: ENDOCRINOLOGY | Facility: CLINIC | Age: 74
End: 2024-10-23

## 2024-10-23 NOTE — TELEPHONE ENCOUNTER
Left message on pts vm to call our office to schedule her end of April 2025 appt and that I mailed her after visit summary

## 2024-10-25 DIAGNOSIS — K21.9 GASTROESOPHAGEAL REFLUX DISEASE WITHOUT ESOPHAGITIS: ICD-10-CM

## 2024-10-25 RX ORDER — OMEPRAZOLE 40 MG/1
40 CAPSULE, DELAYED RELEASE ORAL DAILY
Qty: 90 CAPSULE | Refills: 3 | Status: SHIPPED | OUTPATIENT
Start: 2024-10-25

## 2024-11-06 DIAGNOSIS — I10 ESSENTIAL HYPERTENSION: ICD-10-CM

## 2024-11-07 RX ORDER — RAMIPRIL 2.5 MG/1
2.5 CAPSULE ORAL DAILY
Qty: 90 CAPSULE | Refills: 1 | Status: SHIPPED | OUTPATIENT
Start: 2024-11-07

## 2024-11-20 DIAGNOSIS — E11.65 TYPE 2 DIABETES MELLITUS WITH HYPERGLYCEMIA, WITHOUT LONG-TERM CURRENT USE OF INSULIN (HCC): ICD-10-CM

## 2024-11-21 RX ORDER — BLOOD SUGAR DIAGNOSTIC
STRIP MISCELLANEOUS
Qty: 100 EACH | Refills: 0 | Status: SHIPPED | OUTPATIENT
Start: 2024-11-21

## 2024-11-30 DIAGNOSIS — E11.9 TYPE 2 DIABETES MELLITUS WITHOUT COMPLICATION, WITHOUT LONG-TERM CURRENT USE OF INSULIN (HCC): ICD-10-CM

## 2024-12-02 RX ORDER — CANAGLIFLOZIN 300 MG/1
300 TABLET, FILM COATED ORAL DAILY
Qty: 90 TABLET | Refills: 1 | Status: SHIPPED | OUTPATIENT
Start: 2024-12-02

## 2024-12-17 DIAGNOSIS — E03.9 HYPOTHYROIDISM (ACQUIRED): ICD-10-CM

## 2024-12-18 RX ORDER — LEVOTHYROXINE SODIUM 112 UG/1
112 TABLET ORAL DAILY
Qty: 90 TABLET | Refills: 1 | Status: SHIPPED | OUTPATIENT
Start: 2024-12-18

## 2025-01-03 DIAGNOSIS — E78.2 MIXED HYPERLIPIDEMIA: ICD-10-CM

## 2025-01-03 DIAGNOSIS — G47.61 PERIODIC LIMB MOVEMENT DISORDER (PLMD): ICD-10-CM

## 2025-01-04 RX ORDER — ATORVASTATIN CALCIUM 80 MG/1
80 TABLET, FILM COATED ORAL DAILY
Qty: 90 TABLET | Refills: 3 | Status: SHIPPED | OUTPATIENT
Start: 2025-01-04

## 2025-01-04 RX ORDER — PRAMIPEXOLE DIHYDROCHLORIDE 0.25 MG/1
0.25 TABLET ORAL
Qty: 90 TABLET | Refills: 3 | Status: SHIPPED | OUTPATIENT
Start: 2025-01-04

## 2025-01-08 ENCOUNTER — EVALUATION (OUTPATIENT)
Dept: OCCUPATIONAL THERAPY | Facility: CLINIC | Age: 75
End: 2025-01-08
Payer: COMMERCIAL

## 2025-01-08 DIAGNOSIS — M19.042 ARTHRITIS OF BOTH HANDS: Primary | ICD-10-CM

## 2025-01-08 DIAGNOSIS — M19.041 ARTHRITIS OF BOTH HANDS: Primary | ICD-10-CM

## 2025-01-08 PROCEDURE — 97165 OT EVAL LOW COMPLEX 30 MIN: CPT

## 2025-01-08 NOTE — PROGRESS NOTES
OT Evaluation     Today's date: 2025  Patient name: Sindy Beckford  : 1950  MRN: [id number]  Referring provider: Terese Alofrd, *  Dx:   Encounter Diagnosis     ICD-10-CM    1. Arthritis of both hands  M19.041 Ambulatory Referral to Physical Therapy    M19.042                      Assessment  Impairments: abnormal or restricted ROM, abnormal movement, activity intolerance, impaired physical strength, lacks appropriate home exercise program, pain with function and weight-bearing intolerance  Symptom irritability: low    Assessment details: Patient presenting to OP OT hand therapy services due to a diagnosis of bilateral hand arthritis. Patient is experiencing 4th and 5th digit decreased ROM. There is likely capsular restriction that is causing the contracture of the PIP joints of the 4th and 5th digit alongside the arthritis. Patient AROM and PROM of the 4th and 5th digit are identical and there is a hard end feel. Therapist referred patient to orthopedics to determine the nature of the contracture. Patient digit flexion and extension are decreased. Wrist AROM is WNL.  strength and pinch strength is decreased. Patient was provided a custom HEP and instructed on how to complete it. See below for a more detailed assessment.  Understanding of Dx/Px/POC: good     Prognosis: good    Goals  STG:    Patient will display increased digit AROM by a combined total of >30 degrees in all joints by 4 weeks    Patient will report decreased pain by 1-2 grades in the 4th and 5th digit during functional movement in 4 weeks    Patient will begin to incorporate joint mechanics and activity modifications to ADLs in 4 weeks    LTG:    Patient will display ROM WFL in the digits in 8 weeks or discharge    Patient will report resolution of pain in the 4th and 5th digit during all activities in 8 weeks or discharge    Patient will fully incorporate joint mechanics and activity modifications to ADLs in 8 weeks or  "discharge    Patient will increase FOTO score by >20 points in 8 weeks or discharge    Plan  Patient would benefit from: custom splinting and OT eval  Referral necessary: No  Planned modality interventions: ultrasound, thermotherapy: paraffin bath and thermotherapy: hydrocollator packs    Planned therapy interventions: balance/weight bearing training, coordination, home exercise program, functional ROM exercises, flexibility, IASTM, manual therapy, joint mobilization, orthotic fitting/training, strengthening, stretching, therapeutic activities and therapeutic exercise    Frequency: 1x week  Treatment plan discussed with: patient  Plan details: Will schedule following their orthopedic appointment        Subjective Evaluation    History of Present Illness  Mechanism of injury: Subjective:  \"I was here back in \". \"The arthritis has gotten worse and the and the RF and SF cannot close anymore\". \"It is hard for me to grasp because they do not close\". \"It has been this way for about a year and a half\". \"There wasn't an injury, it just started over time\". \"I've been doing the exercises and the putty I was given the first time I came\". \"I haven't had x-rays of my hands\". \"I haven't had x-rays of my hands to see the arthritis\". 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 \"I have left handed so I do most of the things with that hand\" \"Opening jars and lids is hard\". \"Lifting and vacuuming isn't bad\". \"Gripping to pull a plug out of the wall is difficult\". \"I get a little pain in the fingers when I stretch\". \"The weather bothers me\". \"When I lay on my shoulder at night I get tingling in the hand\".           Recurrent probem    Quality of life: good    Patient Goals  Patient goals for therapy: decreased edema, decreased pain, increased motion, increased strength and return to sport/leisure activities    Pain  Current pain ratin  At best pain ratin  At worst pain " ratin  Location: R RF and SF  Quality: dull ache  Relieving factors: rest and relaxation  Aggravating factors: lifting (Gripping, opening jars and lids, needle point)  Progression: worsening    Social Support    Employment status: not working  Hand dominance: left    Treatments  Previous treatment: occupational therapy  Current treatment: occupational therapy        Objective     Observations     Right Wrist/Hand   Positive for edema.     Additional Observation Details  R:  Edema over MCP joints of RF and SF    Tenderness     Right Wrist/Hand   No tenderness in the DIP joint, MCP joint and PIP joint.     Additional Tenderness Details  Non-tender at all joints    Neurological Testing     Sensation     Wrist/Hand   Left   Intact: light touch    Right   Intact: light touch    Active Range of Motion     Left Wrist   Normal active range of motion    Right Wrist   Normal active range of motion    Left Thumb   Kapandji score: 10 degrees      Right Thumb   Kapandji score: 10 degrees    Right Digits   Flexion   Ring     MCP: 84    PIP: 54    DIP: 50  Little     MCP: 80    PIP: 48    DIP: 42  Extension   Ring     PIP: -20  Little     PIP: -34    Additional Active Range of Motion Details  Passive and Active motion are the same. There is a hard end feel.     Strength/Myotome Testing     Left Wrist/Hand      (2nd hand position)     Trial 1: 42.5    Right Wrist/Hand      (2nd hand position)     Trial 1: 45    Swelling     Left Wrist/Hand   Ring     Proximal: 6.5 cm  Little     Proximal: 6.2 cm  Circumference MCP: 20.2 cm    Right Wrist/Hand   Ring     Proximal: 7 cm  Little     Proximal: 6.2 cm  Circumference MCP: 20.2 cm             Precautions: Hypertension, cardiac abnormalities      Manuals                                                                 Neuro Re-Ed                                                                                                        Ther Ex             HEP Digit PROM    Tendon  glides    Blocking    Reverse Blocking                                                                                                       Ther Activity                                       Gait Training                                       Modalities

## 2025-01-13 ENCOUNTER — OFFICE VISIT (OUTPATIENT)
Dept: OBGYN CLINIC | Facility: CLINIC | Age: 75
End: 2025-01-13
Payer: COMMERCIAL

## 2025-01-13 ENCOUNTER — HOSPITAL ENCOUNTER (OUTPATIENT)
Dept: RADIOLOGY | Facility: HOSPITAL | Age: 75
Discharge: HOME | End: 2025-01-13
Attending: STUDENT IN AN ORGANIZED HEALTH CARE EDUCATION/TRAINING PROGRAM
Payer: COMMERCIAL

## 2025-01-13 VITALS — WEIGHT: 238 LBS | BODY MASS INDEX: 42.17 KG/M2 | HEIGHT: 63 IN

## 2025-01-13 DIAGNOSIS — M79.641 RIGHT HAND PAIN: ICD-10-CM

## 2025-01-13 DIAGNOSIS — M25.641 STIFFNESS OF FINGER JOINT OF RIGHT HAND: ICD-10-CM

## 2025-01-13 DIAGNOSIS — M19.041 ARTHRITIS OF RIGHT HAND: Primary | ICD-10-CM

## 2025-01-13 PROCEDURE — 73130 X-RAY EXAM OF HAND: CPT

## 2025-01-13 PROCEDURE — 99204 OFFICE O/P NEW MOD 45 MIN: CPT | Performed by: STUDENT IN AN ORGANIZED HEALTH CARE EDUCATION/TRAINING PROGRAM

## 2025-01-13 NOTE — PROGRESS NOTES
ORTHOPAEDIC HAND, WRIST, AND ELBOW OFFICE  VISIT      ASSESSMENT/PLAN:      Diagnoses and all orders for this visit:    Arthritis of right hand  -     Ambulatory Referral to PT/OT Hand Therapy; Future    Right hand pain  -     XR hand 3+ vw right; Future    Stiffness of finger joint of right hand  -     Ambulatory Referral to PT/OT Hand Therapy; Future          74 y.o. female with right hand arthritis   Anatomy of the condition/s as well as treatment options and expected outcomes were discussed.  Any pertinent imaging or lab results were reviewed with the patient.   The patient verbalized understanding of exam findings and treatment plan.   The patient was given the opportunity to ask questions.  Questions were answered to the patient's satisfaction.  The patient decided to move forward with OT, script was provided. We discussed the possibility of localized joint CSI's if the joints become painful.  I will see her back in the office on an as needed basis if symptoms worsen or fail to improve.        Follow Up:  PRN       To Do Next Visit:  Re-evaluation of current issue      Discussions:  Osteoarthritis:  The anatomy and physiology of osteoarthritis was discussed with the patient today in the office.  Deterioration of the articular cartilage eventually leads to altered mobility at the joint, resulting in joint subluxation, osteophyte formation, cystic changes, as well as subchondral sclerosis.  Eventually, pain, limited mobility, and compensatory hypermobility at surrounding joints may develop.  While normal activity and usage of the joint may provide a painful experience to the patient, this typically does not result in damage to the limb.  Treatment options include splints to decreased joint edema, pain, and inflammation.  Therapy exercises to strengthen the surrounding musculature may relieve pain, but do not alter the overall continued development of osteoarthritis.  Oral medications, topical medications,  corticosteroid injections may decrease pain and increase overall function.  Eventually, some patients may require surgical intervention.       Pascual Houston MD  Attending, Orthopaedic Surgery  Hand, Wrist, and Elbow Surgery  [facility]    ______________________________________________________________________________________________    CHIEF COMPLAINT:  Chief Complaint   Patient presents with    Right Hand - Pain       SUBJECTIVE:  Patient is a 74 y.o. LHD female who presents today for evaluation and treatment of her right hand. She notes the inability to fully flex her right small and ring fingers, which has been ongoing for over a year. She denies pain associated with this. She denies alix locking of the fingers.       Occupation: retired      I have personally reviewed all the relevant PMH, PSH, SH, FH, Medications and allergies      PAST MEDICAL HISTORY:  Past Medical History:   Diagnosis Date    Allergic 2018   weekly inject.    Allergic rhinitis     Allergies     Anemia     Anxiety     Arthritis     Chronic constipation     Colon polyp     Diabetes (HCC)     Diabetes mellitus (HCC)     Disease of thyroid gland     GERD (gastroesophageal reflux disease)     Headache(784.0)     Heart murmur     Hyperlipidemia     Hypertension     Hypothyroidism     Obesity     Obesity     Restless legs     Vitamin D deficiency        PAST SURGICAL HISTORY:  Past Surgical History:   Procedure Laterality Date    ABDOMINAL SURGERY  01/2001    Dr. Raúl LEW    BREAST BIOPSY Right 2001    CATARACT EXTRACTION      CATARACT EXTRACTION, BILATERAL  05/2021    CHOLECYSTECTOMY      COLONOSCOPY  06/01/2018    goes q 5 yr-due in Summer 2018-Dr. Lam    CORONARY ANGIOPLASTY  2015    Dr Bateman    LYMPH NODE BIOPSY  2001    US GUIDANCE  09/26/2013    US GUIDANCE  10/17/2016    US GUIDED FINE NEEDLE ASPIRATION (ALL INC)  10/15/2013       FAMILY HISTORY:  Family History   Problem Relation Age of Onset    Hypertension  Father             Diabetes Father     Heart disease Father     Diabetes type II Father             Multiple sclerosis Brother     Thyroid disease unspecified Brother     Diabetes Paternal Aunt     Multiple sclerosis Paternal Uncle     Diabetes Paternal Uncle     Multiple sclerosis Family     Cancer Neg Hx     Stroke Neg Hx     Obesity Neg Hx        SOCIAL HISTORY:  Social History     Tobacco Use    Smoking status: Never    Smokeless tobacco: Never   Vaping Use    Vaping status: Never Used   Substance Use Topics    Alcohol use: Yes     Comment: on special occasions    Drug use: Never       MEDICATIONS:    Current Outpatient Medications:     aspirin (ECOTRIN LOW STRENGTH) 81 mg EC tablet, Take 81 mg by mouth daily, Disp: , Rfl:     atorvastatin (LIPITOR) 80 mg tablet, TAKE 1 TABLET BY MOUTH ONCE  DAILY, Disp: 90 tablet, Rfl: 3    Blood Glucose Monitoring Suppl (ONE TOUCH ULTRA MINI) w/Device KIT, Please dispense 1 Kit, Disp: 1 each, Rfl: 0    Canagliflozin (Invokana) 300 MG TABS, TAKE 1 TABLET BY MOUTH DAILY, Disp: 90 tablet, Rfl: 1    Cholecalciferol (Vitamin D3) 25 MCG (1000 UT) CAPS, Take 2000 IU with dinner, Disp: , Rfl:     ezetimibe (ZETIA) 10 mg tablet, Take 1 tablet (10 mg total) by mouth daily, Disp: 100 tablet, Rfl: 1    famotidine-calcium carbonate-magnesium hydroxide (PEPCID COMPLETE) -165 MG CHEW, Chew 1 tablet daily as needed for heartburn, Disp: , Rfl:     fluticasone (FLONASE) 50 mcg/act nasal spray, 1 spray into each nostril if needed, Disp: , Rfl:     Lancets (OneTouch Delica Plus [id number]) MISC, USE AS DIRECTED TO CHECK SUGARS TWICE EVERY OTHER DAY, Disp: 100 each, Rfl: 1    levocetirizine (XYZAL) 5 MG tablet, Take 1 tablet (5 mg total) by mouth every evening, Disp: 90 tablet, Rfl: 1    levothyroxine 112 mcg tablet, TAKE 1 TABLET BY MOUTH DAILY, Disp: 90 tablet, Rfl: 1    nebivolol (Bystolic) 5 mg tablet, Take 0.5 tablets (2.5 mg total) by mouth daily, Disp: 90 tablet, Rfl:  1    omeprazole (PriLOSEC) 40 MG capsule, TAKE 1 CAPSULE BY MOUTH DAILY, Disp: 90 capsule, Rfl: 3    potassium chloride (Klor-Con) 10 mEq tablet, Take 1 tablet (10 mEq total) by mouth 2 (two) times a day, Disp: 180 tablet, Rfl: 1    pramipexole (MIRAPEX) 0.25 mg tablet, TAKE 1 TABLET BY MOUTH DAILY AT  BEDTIME, Disp: 90 tablet, Rfl: 3    ramipril (ALTACE) 2.5 mg capsule, TAKE 1 CAPSULE BY MOUTH DAILY, Disp: 90 capsule, Rfl: 1    semaglutide, 1 mg/dose, (Ozempic, 1 MG/DOSE,) 4 mg/3 mL injection pen, Inject 0.75 mL (1 mg total) under the skin every 7 days, Disp: 9 mL, Rfl: 1    solifenacin (VESICARE) 10 MG tablet, Take 1 tablet (10 mg total) by mouth daily, Disp: 90 tablet, Rfl: 3    glucose blood (OneTouch Ultra) test strip, Use once a day, Disp: 100 each, Rfl: 0    Lancet Devices (OneTouch Delica Plus Lancing) MISC, USE WITH LANCETS TO CHECK SUGAR (Patient not taking: Reported on 10/15/2024), Disp: , Rfl:     Lancets Misc. (ONE TOUCH SURESOFT) MISC, Use lancet to check sugar (Patient not taking: Reported on 10/15/2024), Disp: 1 each, Rfl: 0    valACYclovir (VALTREX) 1,000 mg tablet, Take 2 tablets (2,000 mg total) by mouth 2 (two) times a day for 1 day, Disp: 8 tablet, Rfl: 2    ALLERGIES:  Allergies   Allergen Reactions    Other Sneezing     Seasonal, dust, Cat dander, mold    Penicillins Hives           REVIEW OF SYSTEMS:  Musculoskeletal:        As noted in HPI.   All other systems reviewed and are negative.    VITALS:  There were no vitals filed for this visit.    LABS:  HgA1c:   Lab Results   Component Value Date    HGBA1C 6.7 (H) 01/08/2025     BMP:   Lab Results   Component Value Date    CALCIUM 9.6 01/08/2025    K 4.6 01/08/2025    CO2 25 01/08/2025     01/08/2025    BUN 24 01/08/2025    CREATININE 0.87 01/08/2025       _____________________________________________________  PHYSICAL EXAMINATION:  General: Well developed and well nourished, alert & oriented x 3, appears comfortable  Psychiatric:  Normal  HEENT: Normocephalic, Atraumatic Trachea Midline, No torticollis  Pulmonary: No audible wheezing or respiratory distress   Abdomen/GI: Non tender, non distended   Cardiovascular: No pitting edema, 2+ radial pulse   Skin: No masses, erythema, lacerations, fluctation, ulcerations  Neurovascular: Sensation Intact to the Median, Ulnar, Radial Nerve, Motor Intact to the Median, Ulnar, Radial Nerve, and Pulses Intact  Musculoskeletal: Normal, except as noted in detailed exam and in HPI.      MUSCULOSKELETAL EXAMINATION:    Right hand:     No erythema, ecchymosis or edema  Ring finger PIP joint 15 degrees shy of full extension, flexion to 55 degrees  Small finger PIP joint is 25 degrees shy of full extension, flexion to 55 degrees     ___________________________________________________  STUDIES REVIEWED:  Xrays of the right hand were reviewed and independently interpreted in PACS by Dr. Houston and demonstrate degenerative changes noted, most pronounced to the PIP and DIP joints.           PROCEDURES PERFORMED:  Procedures  No Procedures performed today    _____________________________________________________      Scribe Attestation      I,:  Sophia Galvez MA am acting as a scribe while in the presence of the attending physician.:       I,:  Pascual Houston MD personally performed the services described in this documentation    as scribed in my presence.:

## 2025-01-14 ENCOUNTER — OFFICE VISIT (OUTPATIENT)
Age: 75
End: 2025-01-14
Payer: COMMERCIAL

## 2025-01-14 VITALS
HEIGHT: 63 IN | DIASTOLIC BLOOD PRESSURE: 76 MMHG | WEIGHT: 236.6 LBS | TEMPERATURE: 98.4 F | SYSTOLIC BLOOD PRESSURE: 112 MMHG | OXYGEN SATURATION: 97 % | HEART RATE: 86 BPM | BODY MASS INDEX: 41.92 KG/M2

## 2025-01-14 DIAGNOSIS — H66.91 OTITIS OF RIGHT EAR: Primary | ICD-10-CM

## 2025-01-14 PROCEDURE — 99214 OFFICE O/P EST MOD 30 MIN: CPT

## 2025-01-14 PROCEDURE — G2211 COMPLEX E/M VISIT ADD ON: HCPCS

## 2025-01-14 RX ORDER — CIPROFLOXACIN 500 MG/1
500 TABLET, FILM COATED ORAL EVERY 12 HOURS SCHEDULED
Qty: 20 TABLET | Refills: 0 | Status: SHIPPED | OUTPATIENT
Start: 2025-01-14 | End: 2025-01-24

## 2025-01-22 DIAGNOSIS — E11.59 TYPE 2 DIABETES MELLITUS WITH OTHER CIRCULATORY COMPLICATION, WITHOUT LONG-TERM CURRENT USE OF INSULIN (HCC): ICD-10-CM

## 2025-01-23 ENCOUNTER — OFFICE VISIT (OUTPATIENT)
Dept: OCCUPATIONAL THERAPY | Facility: CLINIC | Age: 75
End: 2025-01-23
Payer: COMMERCIAL

## 2025-01-23 DIAGNOSIS — M19.042 ARTHRITIS OF BOTH HANDS: Primary | ICD-10-CM

## 2025-01-23 DIAGNOSIS — M19.041 ARTHRITIS OF BOTH HANDS: Primary | ICD-10-CM

## 2025-01-23 PROCEDURE — 97140 MANUAL THERAPY 1/> REGIONS: CPT

## 2025-01-23 PROCEDURE — 97110 THERAPEUTIC EXERCISES: CPT

## 2025-01-23 NOTE — PROGRESS NOTES
"Daily Note     Today's date: 2025  Patient name: Sindy Beckford  : 1950  MRN: [id number]  Referring provider: Terese Alford, *  Dx:   Encounter Diagnosis     ICD-10-CM    1. Arthritis of both hands  M19.041     M19.042                      Subjective: \"I think it is moving a bit better today with the exercises\".       Objective: See treatment diary below      Assessment: Tolerated treatment well. Patient saw orthopedics since the previous visit and had x-rays which displayed significant arthritic joint changes in the Ips and PIPs of the 4th and 5th digit. Therapist fabricated a nighttime extension splint to maintain current digit extension. Patient tolerated session well.       Plan: Continue per plan of care.  Progress treatment as tolerated.       Precautions: Hypertension, cardiac abnormalities      Manuals            IASTM  8           Nighttime Splinting  Fabricated                                     Neuro Re-Ed                                                                                                        Ther Ex             HEP Digit PROM    Tendon glides    Blocking    Reverse Blocking            Digit PROM gentle  3'           Digit AAROM             Dex balls  2'           Hook fist pegs  X5 down and back           Pencil grasps  X2 sets           Digiflex  Y iso x10    R comp x20           Power web  Y center and rim x 20                                                               Ther Activity             Keypegs  x1                                                               Modalities             MHP  5'           Paraffin  W/ moist heat                "

## 2025-01-27 ENCOUNTER — OFFICE VISIT (OUTPATIENT)
Dept: OCCUPATIONAL THERAPY | Facility: CLINIC | Age: 75
End: 2025-01-27
Payer: COMMERCIAL

## 2025-01-27 DIAGNOSIS — M19.041 ARTHRITIS OF BOTH HANDS: Primary | ICD-10-CM

## 2025-01-27 DIAGNOSIS — M19.042 ARTHRITIS OF BOTH HANDS: Primary | ICD-10-CM

## 2025-01-27 PROCEDURE — 97110 THERAPEUTIC EXERCISES: CPT

## 2025-01-27 PROCEDURE — 97140 MANUAL THERAPY 1/> REGIONS: CPT

## 2025-01-27 NOTE — PROGRESS NOTES
Daily Note     Today's date: 2025  Patient name: Sindy Beckford  : 1950  MRN: [id number]  Referring provider: Terese Alford, *  Dx:   Encounter Diagnosis     ICD-10-CM    1. Arthritis of both hands  M19.041     M19.042                      Subjective: I need my splint adjusted.      Objective: See treatment diary below      Assessment: Tolerated treatment well. Added moleskin around the thumb area of the splint for better comfort, as pt is unable to tolerate all night due to rubbing. Pt has less pain with use of paraffin pre-tx. God tolerance of light resistive exercises.      Plan: Continue per plan of care.  Progress treatment as tolerated.       Precautions: Hypertension, cardiac abnormalities      Manuals           IASTM  8 8'          Nighttime Splinting  Fabricated                                     Neuro Re-Ed                                                                                                        Ther Ex             HEP Digit PROM    Tendon glides    Blocking    Reverse Blocking            Digit PROM gentle  3' 3'          Digit AAROM             Dex balls  2' 2'          Hook fist pegs  X5 down and back 5x down/back          Pencil grasps  X2 sets 2x          Digiflex  Y iso x10    R comp x20 Y iso x10    R comp x20          Power web  Y center and rim x 20 Y center and rim x 20                                                              Ther Activity             Keypegs  x1 1x                                                              Modalities             MHP  5' 5'          Paraffin  W/ moist heat

## 2025-02-04 ENCOUNTER — OFFICE VISIT (OUTPATIENT)
Dept: OCCUPATIONAL THERAPY | Facility: CLINIC | Age: 75
End: 2025-02-04
Payer: COMMERCIAL

## 2025-02-04 DIAGNOSIS — M19.041 ARTHRITIS OF BOTH HANDS: Primary | ICD-10-CM

## 2025-02-04 DIAGNOSIS — M19.042 ARTHRITIS OF BOTH HANDS: Primary | ICD-10-CM

## 2025-02-04 PROCEDURE — 97110 THERAPEUTIC EXERCISES: CPT

## 2025-02-04 PROCEDURE — 97140 MANUAL THERAPY 1/> REGIONS: CPT

## 2025-02-04 NOTE — PROGRESS NOTES
Daily Note     Today's date: 2025  Patient name: Sindy Beckford  : 1950  MRN: [id number]  Referring provider: Terese Alford, *  Dx:   Encounter Diagnosis     ICD-10-CM    1. Arthritis of both hands  M19.041     M19.042                      Subjective: I can bend the little finger better, but the RF is abut the same.      Objective: See treatment diary below      Assessment: Tolerated treatment well. Pt shows improved SF flexion, and improved RF ext. Pt doing a little better with functional hand use at home, but limited due to RF limited flexion.      Plan: Continue per plan of care.  Progress treatment as tolerated.       Precautions: Hypertension, cardiac abnormalities      Manuals          IASTM  8 8' 8'         Nighttime Splinting  Fabricated                                     Neuro Re-Ed                                                                                                        Ther Ex             HEP Digit PROM    Tendon glides    Blocking    Reverse Blocking            Digit PROM gentle  3' 3' 3'         Digit AAROM             Dex balls  2' 2' 2'         Hook fist pegs  X5 down and back 5x down/back 5x down/back         Pencil grasps  X2 sets 2x 2x         Digiflex  Y iso x10    R comp x20 Y iso x10    R comp x20 Y iso x10    R comp x20         Power web  Y center and rim x 20 Y center and rim x 20 Y center and rim x 20                                                             Ther Activity             Keypegs  x1 1x 1x                                                             Modalities             MHP  5' 5'          Paraffin  W/ moist heat  5'

## 2025-02-11 ENCOUNTER — OFFICE VISIT (OUTPATIENT)
Dept: OCCUPATIONAL THERAPY | Facility: CLINIC | Age: 75
End: 2025-02-11
Payer: COMMERCIAL

## 2025-02-11 DIAGNOSIS — M19.041 ARTHRITIS OF BOTH HANDS: Primary | ICD-10-CM

## 2025-02-11 DIAGNOSIS — M19.042 ARTHRITIS OF BOTH HANDS: Primary | ICD-10-CM

## 2025-02-11 PROCEDURE — 97110 THERAPEUTIC EXERCISES: CPT

## 2025-02-11 PROCEDURE — 97140 MANUAL THERAPY 1/> REGIONS: CPT

## 2025-02-11 NOTE — PROGRESS NOTES
Daily Note     Today's date: 2025  Patient name: Sindy Beckford  : 1950  MRN: [id number]  Referring provider: Terese Alford, *  Dx:   Encounter Diagnosis     ICD-10-CM    1. Arthritis of both hands  M19.041     M19.042                      Subjective: It is bending so much better in the pinky.      Objective: See treatment diary below      Assessment: Tolerated treatment well. Patient continues to display incremental gains in digit flexion in both the small and ring finger. Patient will be off for a week on vacation and will return in a few weeks.       Plan: Continue per plan of care.  Progress treatment as tolerated.       Precautions: Hypertension, cardiac abnormalities      Manuals         IASTM  8 8' 8' 8        Nighttime Splinting  Fabricated                                     Neuro Re-Ed                                                                                                        Ther Ex             HEP Digit PROM    Tendon glides    Blocking    Reverse Blocking            Digit PROM gentle  3' 3' 3' 3'        Digit AAROM             Dex balls  2' 2' 2' 2'        Hook fist pegs  X5 down and back 5x down/back 5x down/back 5x down/back        Pencil grasps  X2 sets 2x 2x 2x        Digiflex  Y iso x10    R comp x20 Y iso x10    R comp x20 Y iso x10    R comp x20 R iso x10    G comp x20        Power web  Y center and rim x 20 Y center and rim x 20 Y center and rim x 20 R center and rim x20                                                            Ther Activity             Keypegs  x1 1x 1x 1x                                                            Modalities             MHP  5' 5'  5'        Paraffin  W/ moist heat  5'

## 2025-02-14 ENCOUNTER — TELEPHONE (OUTPATIENT)
Dept: ENDOCRINOLOGY | Facility: CLINIC | Age: 75
End: 2025-02-14

## 2025-02-14 ENCOUNTER — HOSPITAL ENCOUNTER (OUTPATIENT)
Facility: HOSPITAL | Age: 75
End: 2025-02-14
Payer: COMMERCIAL

## 2025-02-14 VITALS — HEIGHT: 63 IN | BODY MASS INDEX: 41.82 KG/M2 | WEIGHT: 236 LBS

## 2025-02-14 DIAGNOSIS — Z12.31 ENCOUNTER FOR SCREENING MAMMOGRAM FOR BREAST CANCER: ICD-10-CM

## 2025-02-14 PROCEDURE — 77067 SCR MAMMO BI INCL CAD: CPT

## 2025-02-14 PROCEDURE — 77063 BREAST TOMOSYNTHESIS BI: CPT

## 2025-02-17 ENCOUNTER — RESULTS FOLLOW-UP (OUTPATIENT)
Age: 75
End: 2025-02-17

## 2025-02-27 ENCOUNTER — OFFICE VISIT (OUTPATIENT)
Dept: OCCUPATIONAL THERAPY | Facility: CLINIC | Age: 75
End: 2025-02-27
Payer: COMMERCIAL

## 2025-02-27 DIAGNOSIS — M19.042 ARTHRITIS OF BOTH HANDS: Primary | ICD-10-CM

## 2025-02-27 DIAGNOSIS — M19.041 ARTHRITIS OF BOTH HANDS: Primary | ICD-10-CM

## 2025-02-27 PROCEDURE — 97110 THERAPEUTIC EXERCISES: CPT

## 2025-02-27 NOTE — PROGRESS NOTES
Daily Note     Today's date: 2025  Patient name: Sindy Beckford  : 1950  MRN: [id number]  Referring provider: Terese Alford, *  Dx:   Encounter Diagnosis     ICD-10-CM    1. Arthritis of both hands  M19.041     M19.042                      Subjective: I did the exercises while I was away.      Objective: See treatment diary below      Assessment: Tolerated treatment well. Pt able to complete increased resistive levels. Less pain in the fingers. Pt able to make a functional  and is able to translate coins without dropping them.      Plan: Continue per plan of care.  Progress treatment as tolerated.       Precautions: Hypertension, cardiac abnormalities      Manuals        IASTM  8 8' 8' 8 8'       Nighttime Splinting  Fabricated                                     Neuro Re-Ed                                                                                                        Ther Ex             HEP Digit PROM    Tendon glides    Blocking    Reverse Blocking            Digit PROM gentle  3' 3' 3' 3' 3'       Digit AAROM             Dex balls  2' 2' 2' 2' 2'       Hook fist pegs  X5 down and back 5x down/back 5x down/back 5x down/back 5x down/back       Pencil grasps  X2 sets 2x 2x 2x 2x       Digiflex  Y iso x10    R comp x20 Y iso x10    R comp x20 Y iso x10    R comp x20 R iso x10    G comp x20 R iso x10    G comp x20       Power web  Y center and rim x 20 Y center and rim x 20 Y center and rim x 20 R center and rim x20 R center and rim x20                                                           Ther Activity             Keypegs  x1 1x 1x 1x 1x                                                           Modalities             MHP  5' 5'  5'        Paraffin  W/ moist heat  5'  5'

## 2025-03-03 ENCOUNTER — OFFICE VISIT (OUTPATIENT)
Dept: OCCUPATIONAL THERAPY | Facility: CLINIC | Age: 75
End: 2025-03-03
Payer: COMMERCIAL

## 2025-03-03 DIAGNOSIS — M19.042 ARTHRITIS OF BOTH HANDS: Primary | ICD-10-CM

## 2025-03-03 DIAGNOSIS — M19.041 ARTHRITIS OF BOTH HANDS: Primary | ICD-10-CM

## 2025-03-03 PROCEDURE — 97110 THERAPEUTIC EXERCISES: CPT | Performed by: OCCUPATIONAL THERAPIST

## 2025-03-03 NOTE — PROGRESS NOTES
"Daily Note     Today's date: 3/3/2025  Patient name: Sindy Beckford  : 1950  MRN: [id number]  Referring provider: Terese Alford, *  Dx:   Encounter Diagnosis     ICD-10-CM    1. Arthritis of both hands  M19.041     M19.042             Start Time: 1030  Stop Time: 1110  Total time in clinic (min): 40 minutes    Subjective: \"a little stiff\"      Objective: See treatment diary below      Assessment: Tolerated treatment well. Consistent RF and SF PIP and DIP restriction, however overall ROM improves after heat and stretch.       Plan: Continue per plan of care.  Progress treatment as tolerated.       Precautions: Hypertension, cardiac abnormalities      Manuals 1/08 1/23 1/27 2/4 2/11 2/27 3/3      IASTM  8 8' 8' 8 8'       Nighttime Splinting  Fabricated                                     Neuro Re-Ed                                                                                                        Ther Ex             HEP Digit PROM    Tendon glides    Blocking    Reverse Blocking            Digit PROM gentle  3' 3' 3' 3' 3' 5'      Digit AAROM             Dex balls  2' 2' 2' 2' 2' 2'      Hook fist pegs  X5 down and back 5x down/back 5x down/back 5x down/back 5x down/back 5x down and back      Pencil grasps  X2 sets 2x 2x 2x 2x 2x      Digiflex  Y iso x10    R comp x20 Y iso x10    R comp x20 Y iso x10    R comp x20 R iso x10    G comp x20 R iso x10    G comp x20 R iso x10    G comp x20      Power web  Y center and rim x 20 Y center and rim x 20 Y center and rim x 20 R center and rim x20 R center and rim x20 R center and rim x30      Wall walking        TB 5x                                                           Ther Activity             Keypegs  x1 1x 1x 1x 1x 1x                                                          Modalities             MHP  5' 5'  5'        Paraffin  W/ moist heat  5'  5' 5'           "

## 2025-03-10 ENCOUNTER — OFFICE VISIT (OUTPATIENT)
Age: 75
End: 2025-03-10
Payer: COMMERCIAL

## 2025-03-10 ENCOUNTER — APPOINTMENT (OUTPATIENT)
Dept: OCCUPATIONAL THERAPY | Facility: CLINIC | Age: 75
End: 2025-03-10
Payer: COMMERCIAL

## 2025-03-10 VITALS
TEMPERATURE: 97.6 F | HEART RATE: 95 BPM | DIASTOLIC BLOOD PRESSURE: 78 MMHG | WEIGHT: 236.6 LBS | SYSTOLIC BLOOD PRESSURE: 116 MMHG | OXYGEN SATURATION: 99 % | BODY MASS INDEX: 41.91 KG/M2

## 2025-03-10 DIAGNOSIS — R11.2 NAUSEA AND VOMITING, UNSPECIFIED VOMITING TYPE: ICD-10-CM

## 2025-03-10 DIAGNOSIS — R19.7 DIARRHEA OF PRESUMED INFECTIOUS ORIGIN: ICD-10-CM

## 2025-03-10 DIAGNOSIS — A08.4 VIRAL GASTROENTERITIS: Primary | ICD-10-CM

## 2025-03-10 PROCEDURE — 99213 OFFICE O/P EST LOW 20 MIN: CPT

## 2025-03-10 PROCEDURE — G2211 COMPLEX E/M VISIT ADD ON: HCPCS

## 2025-03-10 RX ORDER — ONDANSETRON 4 MG/1
TABLET, ORALLY DISINTEGRATING ORAL
COMMUNITY
Start: 2025-03-04 | End: 2025-03-14

## 2025-03-10 RX ORDER — DICYCLOMINE HYDROCHLORIDE 10 MG/1
CAPSULE ORAL
COMMUNITY
Start: 2025-03-04 | End: 2025-03-14

## 2025-03-10 NOTE — PROGRESS NOTES
Name: Sindy Beckford      : 1950      MRN: [id number]  Encounter Provider: Kyara Palacio DO  Encounter Date: 3/10/2025   [facility]  :  Assessment & Plan  Viral gastroenteritis         Diarrhea of presumed infectious origin         Nausea and vomiting, unspecified vomiting type         Presents with 6 days of intermittent non-bloody, non-bilious NVD. Went to urgent care and received zofran and bentyl, has been helping. Advised patient to continue using medications as needed, will call if she needs refills. Recommended avoiding triggering foods during illness, such as dairy and fried/fatty substances. May keep food journal to help with trigger food identification. Advised on soft diet and increasing hydration, may use electrolyte repletion prn during illness to help prevent against dehydration. ED and return precautions given. Follow up at next office visit or sooner as needed.        History of Present Illness {?Quick Links Encounters * My Last Note * Last Note in Specialty * Snapshot * Since Last Visit * History :22743}  HPI  Patient is a 74-year-old female with past medical history of hypertension, atherosclerosis of native coronary artery, AS, chronic heart failure with preserved ejection fraction, HLD, and DM that presents to the office today for GI symptoms.  Patient reports that she started developing nausea, vomiting, and diarrhea on 3/4.  She was having up to 7 episodes of non-bloody diarrhea per day. She went to urgent care and was prescribed zofran and bentyl. She has been using them with relief. She reports some abdominal cramping after eating certain foods such as guillaume and dairy products. Denies fevers, chills, CP, SOB.     Review of Systems   Constitutional:  Negative for chills and fever.   HENT:  Negative for ear pain and sore throat.    Eyes:  Negative for pain and visual disturbance.   Respiratory:  Negative for cough and shortness of  breath.    Cardiovascular:  Negative for chest pain and palpitations.   Gastrointestinal:  Positive for diarrhea, nausea and vomiting. Negative for abdominal pain.   Genitourinary:  Negative for dysuria and hematuria.   Musculoskeletal:  Negative for arthralgias and back pain.   Skin:  Negative for color change and rash.   Neurological:  Negative for seizures and syncope.   All other systems reviewed and are negative.      Objective {?Quick Links Trend Vitals * Enter New Vitals * Results Review * Timeline (Adult) * Labs * Imaging * Cardiology * Procedures * Lung Cancer Screening * Surgical eConsent :43292}  /78   Pulse 95   Temp 97.6 °F (36.4 °C)   Wt 107 kg (236 lb 9.6 oz)   LMP 01/01/2004   SpO2 99%   BMI 41.91 kg/m²      Physical Exam  Vitals reviewed.   Constitutional:       Appearance: Normal appearance. She is not ill-appearing, toxic-appearing or diaphoretic.   HENT:      Head: Normocephalic and atraumatic.      Right Ear: External ear normal.      Left Ear: External ear normal.      Nose: Nose normal.      Mouth/Throat:      Mouth: Mucous membranes are moist.      Pharynx: Oropharynx is clear.   Eyes:      Extraocular Movements: Extraocular movements intact.      Conjunctiva/sclera: Conjunctivae normal.      Pupils: Pupils are equal, round, and reactive to light.   Cardiovascular:      Rate and Rhythm: Normal rate and regular rhythm.      Pulses: Normal pulses.      Heart sounds: Murmur heard.   Pulmonary:      Effort: Pulmonary effort is normal.      Breath sounds: Normal breath sounds.   Abdominal:      General: Abdomen is flat. There is no distension.      Palpations: Abdomen is soft.      Tenderness: There is no abdominal tenderness. There is no guarding or rebound.   Musculoskeletal:         General: Normal range of motion.      Cervical back: Normal range of motion.      Right lower leg: No edema.      Left lower leg: No edema.   Skin:     General: Skin is warm.      Capillary Refill:  Capillary refill takes less than 2 seconds.      Coloration: Skin is not jaundiced.      Findings: No erythema.   Neurological:      General: No focal deficit present.      Mental Status: She is alert and oriented to person, place, and time. Mental status is at baseline.   Psychiatric:         Mood and Affect: Mood normal.         Behavior: Behavior normal.

## 2025-03-11 NOTE — TELEPHONE ENCOUNTER
Hi, this is Sindy, Minority calling. This is my second call. I am out of medication. I'm on vacation. It's potassium chloride and I originally ordered from [facility], but unfortunately they send it to my home address and I'm away in [place] and please give me a call back so I can give you the rest of the information. My number is [phone] at [phone]. Thank you. Bye.    
Pt notified of Rx being sent to pharmacy and to call back if any issues at the pharmacy  
none

## 2025-03-12 DIAGNOSIS — I10 ESSENTIAL HYPERTENSION: ICD-10-CM

## 2025-03-13 RX ORDER — POTASSIUM CHLORIDE 750 MG/1
10 TABLET, EXTENDED RELEASE ORAL 2 TIMES DAILY
Qty: 180 TABLET | Refills: 1 | Status: SHIPPED | OUTPATIENT
Start: 2025-03-13

## 2025-03-14 ENCOUNTER — OFFICE VISIT (OUTPATIENT)
Dept: OCCUPATIONAL THERAPY | Facility: CLINIC | Age: 75
End: 2025-03-14
Payer: COMMERCIAL

## 2025-03-14 ENCOUNTER — TELEPHONE (OUTPATIENT)
Age: 75
End: 2025-03-14

## 2025-03-14 ENCOUNTER — OFFICE VISIT (OUTPATIENT)
Dept: ENDOCRINOLOGY | Facility: CLINIC | Age: 75
End: 2025-03-14
Payer: COMMERCIAL

## 2025-03-14 VITALS
WEIGHT: 236 LBS | BODY MASS INDEX: 41.82 KG/M2 | DIASTOLIC BLOOD PRESSURE: 78 MMHG | HEART RATE: 84 BPM | OXYGEN SATURATION: 97 % | HEIGHT: 63 IN | SYSTOLIC BLOOD PRESSURE: 116 MMHG

## 2025-03-14 DIAGNOSIS — M19.042 ARTHRITIS OF BOTH HANDS: Primary | ICD-10-CM

## 2025-03-14 DIAGNOSIS — M19.041 ARTHRITIS OF BOTH HANDS: Primary | ICD-10-CM

## 2025-03-14 DIAGNOSIS — E04.2 NONTOXIC MULTINODULAR GOITER: ICD-10-CM

## 2025-03-14 DIAGNOSIS — E03.9 HYPOTHYROIDISM (ACQUIRED): ICD-10-CM

## 2025-03-14 DIAGNOSIS — E11.65 TYPE 2 DIABETES MELLITUS WITH HYPERGLYCEMIA, WITHOUT LONG-TERM CURRENT USE OF INSULIN (HCC): Primary | ICD-10-CM

## 2025-03-14 DIAGNOSIS — E78.2 MIXED HYPERLIPIDEMIA: ICD-10-CM

## 2025-03-14 DIAGNOSIS — I10 ESSENTIAL HYPERTENSION: ICD-10-CM

## 2025-03-14 DIAGNOSIS — E66.01 SEVERE OBESITY (BMI >= 40) (HCC): ICD-10-CM

## 2025-03-14 DIAGNOSIS — I50.32 CHRONIC HEART FAILURE WITH PRESERVED EJECTION FRACTION (HFPEF) (HCC): ICD-10-CM

## 2025-03-14 DIAGNOSIS — E55.9 VITAMIN D DEFICIENCY: ICD-10-CM

## 2025-03-14 DIAGNOSIS — E11.59 TYPE 2 DIABETES MELLITUS WITH OTHER CIRCULATORY COMPLICATION, WITHOUT LONG-TERM CURRENT USE OF INSULIN (HCC): ICD-10-CM

## 2025-03-14 PROCEDURE — G2211 COMPLEX E/M VISIT ADD ON: HCPCS | Performed by: INTERNAL MEDICINE

## 2025-03-14 PROCEDURE — 97110 THERAPEUTIC EXERCISES: CPT

## 2025-03-14 PROCEDURE — 99214 OFFICE O/P EST MOD 30 MIN: CPT | Performed by: INTERNAL MEDICINE

## 2025-03-14 RX ORDER — BLOOD-GLUCOSE METER
KIT MISCELLANEOUS
Qty: 1 KIT | Refills: 0 | Status: SHIPPED | OUTPATIENT
Start: 2025-03-14

## 2025-03-14 NOTE — ASSESSMENT & PLAN NOTE
Follow-up in 6 months, dose for Ozempic increased to 2 mg once a week  Lab Results   Component Value Date    HGBA1C 6.7 (H) 01/08/2025       Orders:    semaglutide, 2 mg/dose, (Ozempic, 2 MG/DOSE,) 8 mg/ mL injection pen; Inject 0.75 mL (2 mg total) under the skin every 7 days    Albumin / creatinine urine ratio; Future    Basic metabolic panel; Future    Hemoglobin A1C; Future    Blood Glucose Monitoring Suppl (ONE TOUCH ULTRA MINI) w/Device KIT; Please dispense 1 Kit

## 2025-03-14 NOTE — ASSESSMENT & PLAN NOTE
Wt Readings from Last 3 Encounters:   03/14/25 107 kg (236 lb)   03/10/25 107 kg (236 lb 9.6 oz)   02/14/25 107 kg (236 lb)   Managed by cardiology

## 2025-03-14 NOTE — ASSESSMENT & PLAN NOTE
Lab Results   Component Value Date    LDLCALC 43 06/01/2024   Continue Lipitor 80 mg daily as per PCP

## 2025-03-14 NOTE — PROGRESS NOTES
Name: Sindy Beckford      : 1950      MRN: [id number]  Encounter Provider: Petrona Arroyo MD  Encounter Date: 3/14/2025   Encounter department: [facility] [place]      :  Assessment & Plan  Type 2 diabetes mellitus with other circulatory complication, without long-term current use of insulin ([facility])    Lab Results   Component Value Date    HGBA1C 6.7 (H) 2025     A1c is 6.7%, at goal.  His BMI is still 41, will increase Ozempic to 2 mg once a week  Discussed to check blood sugar once daily and goal for blood sugar is 80 to 140 mg per DL  Discussed to keep carbohydrate consistent with meals.  Discussed to follow-up in 6 months    Orders:    semaglutide, 2 mg/dose, (Ozempic, 2 MG/DOSE,) 8 mg/ mL injection pen; Inject 0.75 mL (2 mg total) under the skin every 7 days    Type 2 diabetes mellitus with hyperglycemia, without long-term current use of insulin ([facility])  Follow-up in 6 months, dose for Ozempic increased to 2 mg once a week  Lab Results   Component Value Date    HGBA1C 6.7 (H) 2025       Orders:    semaglutide, 2 mg/dose, (Ozempic, 2 MG/DOSE,) 8 mg/ mL injection pen; Inject 0.75 mL (2 mg total) under the skin every 7 days    Albumin / creatinine urine ratio; Future    Basic metabolic panel; Future    Hemoglobin A1C; Future    Blood Glucose Monitoring Suppl (ONE TOUCH ULTRA MINI) w/Device KIT; Please dispense 1 Kit    Vitamin D deficiency  Continue vitamin D3 supplementation 2000 IU daily       Nontoxic multinodular goiter  Patient has multinodular goiter, last ultrasound was done in 2024 which showed stable thyroid nodules.  Several of these nodules had a history of benign biopsy.  Follow-up is needed in 24 months.  She is due for next ultrasound in 2026       Hypothyroidism (acquired)  Patient is clinically and biochemically euthyroid.  Continue current dose of levothyroxine       Essential hypertension  Blood pressure well-controlled,  continue current management as per PCP       Mixed hyperlipidemia  Lab Results   Component Value Date    LDLCALC 43 06/01/2024   Continue Lipitor 80 mg daily as per PCP       Chronic heart failure with preserved ejection fraction (HFpEF) ([facility])  Wt Readings from Last 3 Encounters:   03/14/25 107 kg (236 lb)   03/10/25 107 kg (236 lb 9.6 oz)   02/14/25 107 kg (236 lb)   Managed by cardiology                 Severe obesity (BMI >= 40) ([facility])  Increase glucagon like peptide therapy dose to 2 mg once a week           History of Present Illness     Sindy Beckford is a 74 y.o. female with type 2 diabetes, nontoxic multinodular goiter, hypothyroidism, hypertension and hyperlipidemia, vitamin D deficiency is here for follow-up.  Diabetes course has been stable.  Last A1c is  Lab Results   Component Value Date    HGBA1C 6.7 (H) 01/08/2025   She denies any complications of diabetes.    Patient denies recent hospitalization for hyperglycemia or hypoglycemia.    Her BMI is 41.8, her weight has been stable for last 6 months    For hypothyroidism, she takes levothyroxine 112 mcg daily on empty stomach 1 hour before breakfast.  She denies missing medication.  She takes vitamin D3 supplementation separately from levothyroxine    Lab Results   Component Value Date    OND8CYTMUGEM 2.886 10/03/2024    TSH 4.06 01/08/2025         Wt Readings from Last 3 Encounters:   03/14/25 107 kg (236 lb)   03/10/25 107 kg (236 lb 9.6 oz)   02/14/25 107 kg (236 lb)    Current regimen:   Ozempic 1 mg once a week, Invokana 300 mg daily      Last Eye Exam: 03/13/2023  Last Foot Exam: 04/22/2024    Component      Latest Ref Rng 1/8/2025   GLUCOSE      65 - 99 mg/dL 129 (H) (E)   BUN      7 - 25 mg/dL 24 (E)   Creatinine      0.40 - 1.10 mg/dL 0.87 (E)   Sodium      135 - 145 mmol/L 141 (E)   Potassium      3.5 - 5.2 mmol/L 4.6 (E)   Chloride      100 - 109 mmol/L 106 (E)   Carbon Dioxide      21 - 31 mmol/L 25 (E)   Calcium      8.5 - 10.5 mg/dL 9.6 (E)    ANION GAP      3 - 11  10 (E)   GFR, Calculated      >59  70 (E)   eGFR Comment Interpretive information: calculated GFR (E)   Total Bilirubin      0.2 - 1.0 mg/dL 0.6 (E)   Bilirubin Direct      0.0 - 0.2 mg/dL 0.1 (E)   ALK PHOS      35 - 120 U/L 74 (E)   AST      <41 U/L 15 (E)   ALT      <56 U/L 17 (E)   Total Protein      6.3 - 8.3 g/dL 7.1 (E)   Albumin      3.5 - 5.7 g/dL 4.3 (E)   Hemoglobin A1C      <5.7 % 6.7 (H) (E)   eAG, EST AVG Glucose      mg/dL 146 (E)   Magnesium      1.4 - 2.2 mg/dL 1.9 (E)   FREE T4      0.61 - 1.12 ng/dL 0.89 (E)   TSH, POC      0.45 - 5.33 uIU/mL 4.06 (E)        Health Maintenance   Topic Date Due    Diabetic Eye Exam  03/13/2025    Diabetic Foot Exam  04/22/2025     Pertinent Medical History     Wt Readings from Last 3 Encounters:   03/14/25 107 kg (236 lb)   03/10/25 107 kg (236 lb 9.6 oz)   02/14/25 107 kg (236 lb)          Review of Systems   Constitutional:  Negative for activity change, diaphoresis, fatigue, fever and unexpected weight change.   HENT: Negative.     Eyes:  Negative for visual disturbance.   Respiratory:  Negative for cough, chest tightness and shortness of breath.    Cardiovascular:  Negative for chest pain, palpitations and leg swelling.   Gastrointestinal:  Negative for abdominal pain, blood in stool, constipation, diarrhea, nausea and vomiting.   Endocrine: Negative for cold intolerance, heat intolerance, polydipsia, polyphagia and polyuria.   Genitourinary:  Negative for dysuria, enuresis, frequency and urgency.   Musculoskeletal:  Negative for arthralgias and myalgias.   Skin:  Negative for pallor, rash and wound.   Allergic/Immunologic: Negative.    Neurological:  Negative for dizziness, tremors, weakness and numbness.   Hematological: Negative.    Psychiatric/Behavioral: Negative.      as per HPI         Medical History Reviewed by provider this encounter:     .    Objective   /78 (BP Location: Left arm, Patient Position: Sitting, Cuff Size:  "Large)   Pulse 84   Ht 5' 3\" (1.6 m)   Wt 107 kg (236 lb)   LMP 01/01/2004   SpO2 97%   BMI 41.81 kg/m²      Body mass index is 41.81 kg/m².  Wt Readings from Last 3 Encounters:   03/14/25 107 kg (236 lb)   03/10/25 107 kg (236 lb 9.6 oz)   02/14/25 107 kg (236 lb)     Physical Exam  Vitals reviewed.   Constitutional:       Appearance: Normal appearance. She is obese.   Musculoskeletal:         General: Normal range of motion.      Cervical back: Normal range of motion and neck supple.      Right lower leg: No edema.      Left lower leg: No edema.   Skin:     General: Skin is warm and dry.   Neurological:      General: No focal deficit present.      Mental Status: She is alert and oriented to person, place, and time.   Psychiatric:         Behavior: Behavior normal.         Labs:   Lab Results   Component Value Date    HGBA1C 6.7 (H) 01/08/2025    HGBA1C 6.9 (H) 10/03/2024    HGBA1C 7.0 (H) 05/20/2024     Lab Results   Component Value Date    CREATININE 0.87 01/08/2025    CREATININE 0.95 10/03/2024    CREATININE 0.70 04/10/2024    BUN 24 01/08/2025    K 4.6 01/08/2025     01/08/2025    CO2 25 01/08/2025     eGFRcr   Date Value Ref Range Status   01/08/2025 70 >59 Final     Lab Results   Component Value Date    HDL 38 (L) 06/01/2024    TRIG 162 (H) 06/01/2024     Lab Results   Component Value Date    ALT 17 01/08/2025    AST 15 01/08/2025    ALKPHOS 74 01/08/2025     Lab Results   Component Value Date    NEI3PQDTZLGE 2.886 10/03/2024    EQC7RSYRGRMB 3.418 02/07/2024    ZNF9OZCNWPXN 3.692 05/22/2023       Discussed with the patient and all questioned fully answered. She will call me if any problems arise.      "

## 2025-03-14 NOTE — PROGRESS NOTES
"Daily Note     Today's date: 3/14/2025  Patient name: Sindy Beckford  : 1950  MRN: [id number]  Referring provider: Terese Alford, *  Dx:   Encounter Diagnosis     ICD-10-CM    1. Arthritis of both hands  M19.041     M19.042                        Subjective: \"The fingers are getting better\". \"I can touch my pinky to my palm now and it tucks better in\".       Objective: See treatment diary below      Assessment: Tolerated treatment well. Consistent RF and SF PIP and DIP restriction but it is improving each session. They are now able to touch the pads of the 4th and 5th digit to the palm.       Plan: Continue per plan of care.  Progress treatment as tolerated.       Precautions: Hypertension, cardiac abnormalities      Manuals 1/08 1/23 1/27 2/4 2/11 2/27 3/3 3/14     IASTM  8 8' 8' 8 8'       Nighttime Splinting  Fabricated                                     Neuro Re-Ed                                                                                                        Ther Ex             HEP Digit PROM    Tendon glides    Blocking    Reverse Blocking            Digit PROM gentle  3' 3' 3' 3' 3' 5' 5'     Digit AAROM             Dex balls  2' 2' 2' 2' 2' 2' 2'     Hook fist pegs  X5 down and back 5x down/back 5x down/back 5x down/back 5x down/back 5x down and back 5x down and back     Pencil grasps  X2 sets 2x 2x 2x 2x 2x 2x     Digiflex  Y iso x10    R comp x20 Y iso x10    R comp x20 Y iso x10    R comp x20 R iso x10    G comp x20 R iso x10    G comp x20 R iso x10    G comp x20 R iso x10    G comp x20     Power web  Y center and rim x 20 Y center and rim x 20 Y center and rim x 20 R center and rim x20 R center and rim x20 R center and rim x30 R center and rim x20     Wall walking        TB 5x  Tb 5x     Pinch Ring        R/R x2                                            Ther Activity             Keypegs  x1 1x 1x 1x 1x 1x 1x                                                         Modalities        "      MHP  5' 5'  5'        Paraffin  W/ moist heat  5'  5' 5' 5'

## 2025-03-14 NOTE — ASSESSMENT & PLAN NOTE
Patient has multinodular goiter, last ultrasound was done in October 2024 which showed stable thyroid nodules.  Several of these nodules had a history of benign biopsy.  Follow-up is needed in 24 months.  She is due for next ultrasound in October 2026

## 2025-03-15 DIAGNOSIS — J30.2 SEASONAL ALLERGIES: ICD-10-CM

## 2025-03-16 RX ORDER — LEVOCETIRIZINE DIHYDROCHLORIDE 5 MG/1
5 TABLET, FILM COATED ORAL EVERY EVENING
Qty: 90 TABLET | Refills: 1 | Status: SHIPPED | OUTPATIENT
Start: 2025-03-16

## 2025-03-17 ENCOUNTER — OFFICE VISIT (OUTPATIENT)
Dept: OCCUPATIONAL THERAPY | Facility: CLINIC | Age: 75
End: 2025-03-17
Payer: COMMERCIAL

## 2025-03-17 DIAGNOSIS — M19.041 ARTHRITIS OF BOTH HANDS: Primary | ICD-10-CM

## 2025-03-17 DIAGNOSIS — M19.042 ARTHRITIS OF BOTH HANDS: Primary | ICD-10-CM

## 2025-03-17 PROCEDURE — 97530 THERAPEUTIC ACTIVITIES: CPT | Performed by: OCCUPATIONAL THERAPIST

## 2025-03-17 PROCEDURE — 97110 THERAPEUTIC EXERCISES: CPT | Performed by: OCCUPATIONAL THERAPIST

## 2025-03-17 NOTE — PROGRESS NOTES
"Daily Note     Today's date: 3/17/2025  Patient name: Sindy Beckford  : 1950  MRN: [id number]  Referring provider: Terese Alford, *  Dx:   Encounter Diagnosis     ICD-10-CM    1. Arthritis of both hands  M19.041     M19.042             Start Time: 09  Stop Time: 1020  Total time in clinic (min): 35 minutes    Subjective: \"they were stiff over the weekend, but now are moving better\"      Objective: See treatment diary below      Assessment: Tolerated treatment well. Right ring finger most restricted into flexion, and with composite digit grasp, the small finger scissors under the ring finger.       Plan: Continue per plan of care.  Progress treatment as tolerated.       Precautions: Hypertension, cardiac abnormalities      Manuals 1/08 1/23 1/27 2/4 2/11 2/27 3/3 3/14 3/17    IASTM  8 8' 8' 8 8'       Nighttime Splinting  Fabricated                                     Neuro Re-Ed                                                                                                        Ther Ex             HEP Digit PROM    Tendon glides    Blocking    Reverse Blocking            Digit PROM gentle  3' 3' 3' 3' 3' 5' 5' 5'    Digit AAROM             Dex balls  2' 2' 2' 2' 2' 2' 2' 2'    Hook fist pegs  X5 down and back 5x down/back 5x down/back 5x down/back 5x down/back 5x down and back 5x down and back     Pencil grasps  X2 sets 2x 2x 2x 2x 2x 2x 2x R hand    Digiflex  Y iso x10    R comp x20 Y iso x10    R comp x20 Y iso x10    R comp x20 R iso x10    G comp x20 R iso x10    G comp x20 R iso x10    G comp x20 R iso x10    G comp x20 R iso x10    G comp x20    Power web  Y center and rim x 20 Y center and rim x 20 Y center and rim x 20 R center and rim x20 R center and rim x20 R center and rim x30 R center and rim x20 R center and rim x20    Wall walking        TB 5x  Tb 5x Tb 5x    Pinch Ring        R/R x2 R/R x2                                           Ther Activity             Keypegs  x1 1x 1x 1x 1x " 1x 1x 1x                                                        Modalities             MHP  5' 5'  5'    5'    Paraffin  W/ moist heat  5'  5' 5' 5'

## 2025-03-24 ENCOUNTER — EVALUATION (OUTPATIENT)
Dept: OCCUPATIONAL THERAPY | Facility: CLINIC | Age: 75
End: 2025-03-24
Payer: COMMERCIAL

## 2025-03-24 DIAGNOSIS — M19.041 ARTHRITIS OF BOTH HANDS: Primary | ICD-10-CM

## 2025-03-24 DIAGNOSIS — M19.042 ARTHRITIS OF BOTH HANDS: Primary | ICD-10-CM

## 2025-03-24 PROCEDURE — 97110 THERAPEUTIC EXERCISES: CPT

## 2025-03-24 PROCEDURE — 97530 THERAPEUTIC ACTIVITIES: CPT

## 2025-03-24 NOTE — PROGRESS NOTES
OT Re-Evaluation     Today's date: 3/24/2025  Patient name: Sindy Beckford  : 1950  MRN: [id number]  Referring provider: Terese Alford, *  Dx:   Encounter Diagnosis     ICD-10-CM    1. Arthritis of both hands  M19.041     M19.042                      Assessment  Impairments: abnormal or restricted ROM, abnormal movement, activity intolerance, impaired physical strength, lacks appropriate home exercise program, pain with function and weight-bearing intolerance  Symptom irritability: low    Assessment details: Patient presenting to OP OT hand therapy services due to a diagnosis of bilateral hand arthritis. Patient is experiencing 4th and 5th digit decreased ROM. There is likely capsular restriction that is causing the contracture of the PIP joints of the 4th and 5th digit alongside the arthritis. Patient AROM and PROM of the 4th and 5th digit are identical and there is a hard end feel. Therapist referred patient to orthopedics to determine the nature of the contracture. Patient digit flexion and extension are decreased. Wrist AROM is WNL.  strength and pinch strength is decreased. Patient was provided a custom HEP and instructed on how to complete it.  (Re-evaluation 3/24/25) Patient presents to re-evaluation after consistently attending OP OT hand therapy services. Patient has made gains as the result of therapy and home exercises and is progressing well towards all established goals. Digit AROM in the 4th and 5th digit has increased. PROM has also improved in both digits. The patient is closing in on forming a full composite fist, but continues to be unable at this time. Wrist AROM remains WNL.  strength has increased bilaterally.Patient and therapist discussed discharge to final HEP as they have achieved the majority of their goals and they feel they can achieve continued gains at home. Patient discharged.   Understanding of Dx/Px/POC: good     Prognosis: good    Goals  STG:    Patient will  "display increased digit AROM by a combined total of >30 degrees in all joints by 4 weeks- Progressing    Patient will report decreased pain by 1-2 grades in the 4th and 5th digit during functional movement in 4 weeks- MET    Patient will begin to incorporate joint mechanics and activity modifications to ADLs in 4 weeks- MET    LTG:    Patient will display ROM WFL in the digits in 8 weeks or discharge    Patient will report resolution of pain in the 4th and 5th digit during all activities in 8 weeks or discharge    Patient will fully incorporate joint mechanics and activity modifications to ADLs in 8 weeks or discharge    Patient will increase FOTO score by >20 points in 8 weeks or discharge    Plan  Patient would benefit from: custom splinting and OT eval  Referral necessary: No  Planned modality interventions: ultrasound, thermotherapy: paraffin bath and thermotherapy: hydrocollator packs    Planned therapy interventions: balance/weight bearing training, coordination, home exercise program, functional ROM exercises, flexibility, IASTM, manual therapy, joint mobilization, orthotic fitting/training, strengthening, stretching, therapeutic activities and therapeutic exercise    Frequency: 1x week  Duration in weeks: 10  Plan of Care beginning date: 3/24/2025  Plan of Care expiration date: 5/24/2025  Treatment plan discussed with: patient  Plan details: Discharged        Subjective Evaluation    History of Present Illness  Mechanism of injury: Subjective:  \"I notice I can bend my ring finger further down\". \"The pinky goes further under than it did\". \"I still get the pain and stiffness when I stretch though\". \"I can grasp stuff a little more\". \"I don't have pain in the pinky anymore\".           Recurrent probem    Quality of life: good    Patient Goals  Patient goals for therapy: decreased edema, decreased pain, increased motion, increased strength and return to sport/leisure activities    Pain  Current pain rating: " 0  At best pain ratin  At worst pain ratin  Location: R RF and SF  Quality: dull ache  Relieving factors: rest and relaxation  Aggravating factors: lifting (Gripping, opening jars and lids, needle point)  Progression: improved    Social Support    Employment status: not working  Hand dominance: left    Treatments  Previous treatment: occupational therapy  Current treatment: occupational therapy      Objective     Tenderness     Right Wrist/Hand   No tenderness in the DIP joint, MCP joint and PIP joint.     Additional Tenderness Details  Non-tender at all joints    Neurological Testing     Sensation     Wrist/Hand   Left   Intact: light touch    Right   Intact: light touch    Active Range of Motion     Left Wrist   Normal active range of motion    Right Wrist   Normal active range of motion    Left Thumb   Kapandji score: 10 degrees      Right Thumb   Kapandji score: 10 degrees    Right Digits   Flexion   Ring     MCP: 90    PIP: 60    DIP: 50  Little     MCP: 90    PIP: 64    DIP: 50  Extension   Ring     PIP: -20  Little     PIP: -30    Additional Active Range of Motion Details  Passive and Active motion are the same. There is a hard end feel.     Strength/Myotome Testing     Left Wrist/Hand      (2nd hand position)     Trial 1: 50    Right Wrist/Hand      (2nd hand position)     Trial 1: 48    Swelling     Left Wrist/Hand   Ring     Proximal: 6.5 cm  Little     Proximal: 6.2 cm  Circumference MCP: 20.2 cm    Right Wrist/Hand   Ring     Proximal: 7 cm  Little     Proximal: 6.2 cm  Circumference MCP: 20.2 cm             Precautions: Hypertension, cardiac abnormalities      Manuals 1/08 1/23 1/27 2/4 2/11 2/27 3/3 3/14 3/17 3/24   IASTM  8 8' 8' 8 8'       Nighttime Splinting  Fabricated           Re-evaluation          completed                Neuro Re-Ed                                                                                                        Ther Ex             HEP Digit PROM    Tendon  glides    Blocking    Reverse Blocking            Digit PROM gentle  3' 3' 3' 3' 3' 5' 5' 5' 5'   Digit AAROM             Dex balls  2' 2' 2' 2' 2' 2' 2' 2' 2'   Hook fist pegs  X5 down and back 5x down/back 5x down/back 5x down/back 5x down/back 5x down and back 5x down and back  5x down and back   Pencil grasps  X2 sets 2x 2x 2x 2x 2x 2x 2x R hand 2x R Hand   Digiflex  Y iso x10    R comp x20 Y iso x10    R comp x20 Y iso x10    R comp x20 R iso x10    G comp x20 R iso x10    G comp x20 R iso x10    G comp x20 R iso x10    G comp x20 R iso x10    G comp x20 R iso x10    G comp x20   Power web  Y center and rim x 20 Y center and rim x 20 Y center and rim x 20 R center and rim x20 R center and rim x20 R center and rim x30 R center and rim x20 R center and rim x20 R center and rim x20   Wall walking        TB 5x  Tb 5x Tb 5x TB 5x   Pinch Ring        R/R x2 R/R x2 R/R x2                                          Ther Activity             Keypegs  x1 1x 1x 1x 1x 1x 1x 1x 1x                                                       Modalities             MHP  5' 5'  5'    5' 5'   Paraffin  W/ moist heat  5'  5' 5' 5'

## 2025-04-08 DIAGNOSIS — I10 ESSENTIAL HYPERTENSION: ICD-10-CM

## 2025-04-09 RX ORDER — RAMIPRIL 2.5 MG/1
2.5 CAPSULE ORAL DAILY
Qty: 90 CAPSULE | Refills: 1 | Status: SHIPPED | OUTPATIENT
Start: 2025-04-09

## 2025-04-10 ENCOUNTER — OFFICE VISIT (OUTPATIENT)
Age: 75
End: 2025-04-10
Payer: COMMERCIAL

## 2025-04-10 VITALS
SYSTOLIC BLOOD PRESSURE: 120 MMHG | BODY MASS INDEX: 41.99 KG/M2 | TEMPERATURE: 98.2 F | HEIGHT: 63 IN | WEIGHT: 237 LBS | HEART RATE: 84 BPM | DIASTOLIC BLOOD PRESSURE: 72 MMHG | OXYGEN SATURATION: 95 %

## 2025-04-10 DIAGNOSIS — E11.59 TYPE 2 DIABETES MELLITUS WITH OTHER CIRCULATORY COMPLICATION, WITHOUT LONG-TERM CURRENT USE OF INSULIN (HCC): ICD-10-CM

## 2025-04-10 DIAGNOSIS — I35.0 MODERATE AORTIC STENOSIS: ICD-10-CM

## 2025-04-10 DIAGNOSIS — E78.2 MIXED HYPERLIPIDEMIA: ICD-10-CM

## 2025-04-10 DIAGNOSIS — I10 ESSENTIAL HYPERTENSION: ICD-10-CM

## 2025-04-10 DIAGNOSIS — E11.65 TYPE 2 DIABETES MELLITUS WITH HYPERGLYCEMIA, WITHOUT LONG-TERM CURRENT USE OF INSULIN (HCC): Primary | ICD-10-CM

## 2025-04-10 DIAGNOSIS — E11.65 TYPE 2 DIABETES MELLITUS WITH HYPERGLYCEMIA, WITHOUT LONG-TERM CURRENT USE OF INSULIN (HCC): ICD-10-CM

## 2025-04-10 PROCEDURE — G2211 COMPLEX E/M VISIT ADD ON: HCPCS | Performed by: FAMILY MEDICINE

## 2025-04-10 PROCEDURE — 99214 OFFICE O/P EST MOD 30 MIN: CPT | Performed by: FAMILY MEDICINE

## 2025-04-10 RX ORDER — LANCETS 33 GAUGE
EACH MISCELLANEOUS
Qty: 100 EACH | Refills: 1 | Status: CANCELLED | OUTPATIENT
Start: 2025-04-10

## 2025-04-10 RX ORDER — BLOOD SUGAR DIAGNOSTIC
STRIP MISCELLANEOUS
Qty: 100 EACH | Refills: 11 | Status: SHIPPED | OUTPATIENT
Start: 2025-04-10

## 2025-04-10 RX ORDER — BLOOD SUGAR DIAGNOSTIC
STRIP MISCELLANEOUS
Qty: 100 EACH | Refills: 0 | Status: CANCELLED | OUTPATIENT
Start: 2025-04-10

## 2025-04-10 RX ORDER — LANCETS 33 GAUGE
EACH MISCELLANEOUS
Qty: 100 EACH | Refills: 11 | Status: SHIPPED | OUTPATIENT
Start: 2025-04-10

## 2025-04-10 NOTE — PROGRESS NOTES
jName: Sindy Beckford      : 1950      MRN: [id number]  Encounter Provider: Terese Alford DO  Encounter Date: 4/10/2025   [facility]  :  Assessment & Plan  Type 2 diabetes mellitus with hyperglycemia, without long-term current use of insulin ([facility])  A1c goal about 7%  Followed regularly by endocrinology  Patient reports good results from a weight management standpoint as well as glucose management since starting Ozempic 2 mg weekly  Also recommend that she continue lifestyle modifications    Lab Results   Component Value Date    HGBA1C 6.7 (H) 2025       Orders:    Lancets (OneTouch Delica Plus [id number]) MISC; USE AS DIRECTED TO CHECK SUGARS TWICE EVERY OTHER DAY    glucose blood (OneTouch Ultra) test strip; Use once a day    Essential hypertension  BP goal less than 140/90 (ideally below 130/80 in the setting of type 2 diabetes)  Continue Bystolic 5 mg, ramipril 2.5 mg, DASH diet         Moderate aortic stenosis  Followed closely by her cardiologist with [facility]  Obtains regular echocardiograms via [facility]         Mixed hyperlipidemia  LDL goal less than 70  Continue atorvastatin 80 mg daily, Zetia 10 mg daily                Return in about 6 months (around 10/10/2025) for AWV.    The patient indicates understanding of these issues and agrees with the plan.              History of Present Illness   HPI  Patient presents for routine follow-up for type 2 diabetes, hypertension, hyperlipidemia  She reports feeling well at this time  Has an upcoming appointment next week with her [facility] cardiologist and an appointment later this year with endocrinology  Home glucose levels are reported to be stable  She denies any symptoms of hypo or hyperglycemia and does follow regularly with podiatry as well as optometry  Reports good medication compliance and tolerance  Only refills needed today are for diabetic testing supplies      Review of Systems  "  Constitutional:  Negative for activity change, fatigue and fever.   HENT:  Negative for congestion, ear pain, sinus pain and sore throat.    Eyes:  Negative for pain and itching.   Respiratory:  Negative for cough and shortness of breath.    Cardiovascular:  Negative for chest pain and palpitations.   Gastrointestinal:  Negative for abdominal pain, constipation, diarrhea, nausea and vomiting.   Endocrine: Negative for cold intolerance and heat intolerance.   Genitourinary:  Negative for dysuria.   Musculoskeletal:  Negative for myalgias.   Skin:  Negative for color change and rash.   Neurological:  Negative for dizziness, syncope and headaches.   Hematological:  Negative for adenopathy.   Psychiatric/Behavioral:  Negative for behavioral problems, dysphoric mood and sleep disturbance. The patient is not nervous/anxious.        Objective   /72   Pulse 84   Temp 98.2 °F (36.8 °C)   Ht 5' 3\" (1.6 m)   Wt 108 kg (237 lb)   LMP 01/01/2004   SpO2 95%   BMI 41.98 kg/m²      Physical Exam  Vitals and nursing note reviewed.   Constitutional:       General: She is not in acute distress.     Appearance: Normal appearance. She is well-developed. She is not ill-appearing.      Comments: Body mass index is 41.98 kg/m².    HENT:      Head: Normocephalic and atraumatic.      Right Ear: External ear normal.      Left Ear: External ear normal.      Nose: Nose normal.   Eyes:      General: No scleral icterus.     Conjunctiva/sclera: Conjunctivae normal.      Pupils: Pupils are equal, round, and reactive to light.   Neck:      Thyroid: No thyromegaly.   Cardiovascular:      Rate and Rhythm: Normal rate and regular rhythm.      Heart sounds: Murmur (3/6 JAMEY) heard.   Pulmonary:      Effort: Pulmonary effort is normal. No respiratory distress.      Breath sounds: Normal breath sounds. No wheezing.   Abdominal:      General: Bowel sounds are normal. There is no distension.      Palpations: Abdomen is soft.      Tenderness: " There is no abdominal tenderness.   Musculoskeletal:         General: No tenderness. Normal range of motion.      Cervical back: Normal range of motion and neck supple.   Lymphadenopathy:      Cervical: No cervical adenopathy.   Skin:     General: Skin is warm and dry.      Findings: No rash.   Neurological:      Mental Status: She is alert and oriented to person, place, and time.      Cranial Nerves: No cranial nerve deficit.   Psychiatric:         Behavior: Behavior normal.

## 2025-04-10 NOTE — ASSESSMENT & PLAN NOTE
A1c goal about 7%  Followed regularly by endocrinology  Patient reports good results from a weight management standpoint as well as glucose management since starting Ozempic 2 mg weekly  Also recommend that she continue lifestyle modifications    Lab Results   Component Value Date    HGBA1C 6.7 (H) 01/08/2025       Orders:    Lancets (OneTouch Delica Plus [id number]) MISC; USE AS DIRECTED TO CHECK SUGARS TWICE EVERY OTHER DAY    glucose blood (OneTouch Ultra) test strip; Use once a day

## 2025-04-11 NOTE — ASSESSMENT & PLAN NOTE
Followed closely by her cardiologist with [facility]  [facility]

## 2025-04-11 NOTE — ASSESSMENT & PLAN NOTE
BP goal less than 140/90 (ideally below 130/80 in the setting of type 2 diabetes)  Continue Bystolic 5 mg, ramipril 2.5 mg, DASH diet

## 2025-04-29 DIAGNOSIS — E11.9 TYPE 2 DIABETES MELLITUS WITHOUT COMPLICATION, WITHOUT LONG-TERM CURRENT USE OF INSULIN (HCC): ICD-10-CM

## 2025-04-29 DIAGNOSIS — E03.9 HYPOTHYROIDISM (ACQUIRED): ICD-10-CM

## 2025-04-29 DIAGNOSIS — I10 ESSENTIAL HYPERTENSION: ICD-10-CM

## 2025-04-29 RX ORDER — NEBIVOLOL 5 MG/1
2.5 TABLET ORAL DAILY
Qty: 90 TABLET | Refills: 1 | Status: SHIPPED | OUTPATIENT
Start: 2025-04-29

## 2025-04-30 RX ORDER — LEVOTHYROXINE SODIUM 112 UG/1
112 TABLET ORAL DAILY
Qty: 90 TABLET | Refills: 1 | Status: SHIPPED | OUTPATIENT
Start: 2025-04-30

## 2025-04-30 RX ORDER — CANAGLIFLOZIN 300 MG/1
300 TABLET, FILM COATED ORAL DAILY
Qty: 90 TABLET | Refills: 3 | OUTPATIENT
Start: 2025-04-30

## 2025-08-19 DIAGNOSIS — I10 ESSENTIAL HYPERTENSION: ICD-10-CM

## 2025-08-19 DIAGNOSIS — N32.81 OVERACTIVE BLADDER: ICD-10-CM

## 2025-08-21 RX ORDER — SOLIFENACIN SUCCINATE 10 MG/1
10 TABLET, FILM COATED ORAL DAILY
Qty: 90 TABLET | Refills: 0 | Status: SHIPPED | OUTPATIENT
Start: 2025-08-21

## 2025-08-21 RX ORDER — POTASSIUM CHLORIDE 750 MG/1
10 TABLET, EXTENDED RELEASE ORAL 2 TIMES DAILY
Qty: 180 TABLET | Refills: 1 | Status: SHIPPED | OUTPATIENT
Start: 2025-08-21